# Patient Record
Sex: FEMALE | Race: WHITE | NOT HISPANIC OR LATINO | Employment: UNEMPLOYED | URBAN - METROPOLITAN AREA
[De-identification: names, ages, dates, MRNs, and addresses within clinical notes are randomized per-mention and may not be internally consistent; named-entity substitution may affect disease eponyms.]

---

## 2017-01-11 PROBLEM — Z87.09 PERSONAL HISTORY OF ASTHMA: Status: ACTIVE | Noted: 2017-01-11

## 2017-01-11 PROBLEM — Z86.69 HISTORY OF MIGRAINE HEADACHES: Status: ACTIVE | Noted: 2017-01-11

## 2017-01-12 ENCOUNTER — OFFICE VISIT (OUTPATIENT)
Dept: FAMILY MEDICINE CLINIC | Facility: CLINIC | Age: 47
End: 2017-01-12

## 2017-01-12 VITALS
WEIGHT: 185 LBS | DIASTOLIC BLOOD PRESSURE: 90 MMHG | TEMPERATURE: 98.2 F | BODY MASS INDEX: 28.13 KG/M2 | RESPIRATION RATE: 16 BRPM | HEART RATE: 72 BPM | SYSTOLIC BLOOD PRESSURE: 126 MMHG

## 2017-01-12 DIAGNOSIS — K12.0 APHTHOUS ULCER OF MOUTH: ICD-10-CM

## 2017-01-12 DIAGNOSIS — R79.89 ABNORMAL LIVER FUNCTION TEST: ICD-10-CM

## 2017-01-12 DIAGNOSIS — M25.562 ARTHRALGIA OF BOTH KNEES: ICD-10-CM

## 2017-01-12 DIAGNOSIS — R21 RASH: ICD-10-CM

## 2017-01-12 DIAGNOSIS — M25.561 ARTHRALGIA OF BOTH KNEES: ICD-10-CM

## 2017-01-12 DIAGNOSIS — E03.9 HYPOTHYROIDISM, UNSPECIFIED TYPE: Primary | ICD-10-CM

## 2017-01-12 PROCEDURE — 99214 OFFICE O/P EST MOD 30 MIN: CPT | Performed by: FAMILY MEDICINE

## 2017-01-12 PROCEDURE — 36415 COLL VENOUS BLD VENIPUNCTURE: CPT | Performed by: FAMILY MEDICINE

## 2017-01-12 RX ORDER — DEXAMETHASONE 0.5 MG/5ML
1 SOLUTION ORAL 3 TIMES DAILY
Qty: 240 ML | Refills: 0 | Status: SHIPPED | OUTPATIENT
Start: 2017-01-12 | End: 2017-04-18

## 2017-01-12 RX ORDER — LEVOTHYROXINE SODIUM 0.1 MG/1
100 TABLET ORAL DAILY
Qty: 30 TABLET | Refills: 3 | Status: SHIPPED | OUTPATIENT
Start: 2017-01-12 | End: 2017-03-15 | Stop reason: SDUPTHER

## 2017-01-12 RX ORDER — CLOTRIMAZOLE AND BETAMETHASONE DIPROPIONATE 10; .64 MG/G; MG/G
CREAM TOPICAL 2 TIMES DAILY
Qty: 15 G | Refills: 1 | Status: SHIPPED | OUTPATIENT
Start: 2017-01-12 | End: 2017-03-15

## 2017-01-12 NOTE — MR AVS SNAPSHOT
Dawna Lovell   1/12/2017 9:00 AM   Office Visit    Provider:  Tc Alejandro MD   Department:  [facility] FAMILY MEDICINE   Dept Phone:  [phone]                Your Full Care Plan              Today's Medication Changes          These changes are accurate as of: 1/12/17 10:35 AM.  If you have any questions, ask your nurse or doctor.               New Medication(s)Ordered:     clotrimazole-betamethasone 1-0.05 % cream   Commonly known as:  LOTRISONE   Apply  topically 2 (Two) Times a Day.   Started by:  Tc Alejandro MD       dexamethasone 0.5 MG/5ML solution   Take 10 mL by mouth 3 (Three) Times a Day.   Started by:  Tc Alejandro MD         Medication(s)that have changed:     levothyroxine 100 MCG tablet   Commonly known as:  SYNTHROID   Take 1 tablet by mouth Daily.   What changed:    - medication strength  - how much to take   Changed by:  Tc Alejandro MD         Stop taking medication(s)listed here:     PROBIOTIC ADVANCED PO   Stopped by:  Tc Alejandro MD                Where to Get Your Medications      These medications were sent to [facility] [place] [address] [address] [facility] [phone] [facility] [phone] [place] [address] [address] [place] [id number]    Hours:  24-hours Phone:  [phone]     clotrimazole-betamethasone 1-0.05 % cream    dexamethasone 0.5 MG/5ML solution    levothyroxine 100 MCG tablet                  Your Updated Medication List          This list is accurate as of: 1/12/17 10:35 AM.  Always use your most recent med list.                * carvedilol 6.25 MG tablet   Commonly known as:  COREG       * carvedilol 3.125 MG tablet   Commonly known as:  COREG       cholecalciferol 1000 UNITS tablet   Commonly known as:  VITAMIN D3       clotrimazole-betamethasone 1-0.05 % cream   Commonly known as:  LOTRISONE   Apply  topically 2 (Two)  Times a Day.       dexamethasone 0.5 MG/5ML solution   Take 10 mL by mouth 3 (Three) Times a Day.       gabapentin (once-daily) 600 MG tablet tablet   Commonly known as:  GRALISE   Take 3 tablets once daily       hydrOXYzine 25 MG tablet   Commonly known as:  ATARAX   TAKE 1 TABLET Twice daily PRN       levothyroxine 100 MCG tablet   Commonly known as:  SYNTHROID   Take 1 tablet by mouth Daily.       montelukast 10 MG tablet   Commonly known as:  SINGULAIR   take 1 tablet by mouth at bedtime       MULTIVITAMIN ADULT PO       * Notice:  This list has 2 medication(s) that are the same as other medications prescribed for you. Read the directions carefully, and ask your doctor or other care provider to review them with you.            We Performed the Following     Ambulatory Referral to Rheumatology     B. Burgdorferi Antibodies     CBC & Differential     Comprehensive Metabolic Panel       You Were Diagnosed With        Codes Comments    Hypothyroidism, unspecified type    -  Primary ICD-10-CM: E03.9  ICD-9-CM: 244.9     Abnormal liver function test     ICD-10-CM: R79.89  ICD-9-CM: 790.6     Aphthous ulcer of mouth     ICD-10-CM: K12.0  ICD-9-CM: 528.2     Arthralgia of both knees     ICD-10-CM: M25.561, M25.562  ICD-9-CM: 719.46     Rash     ICD-10-CM: R21  ICD-9-CM: 782.1       Instructions     None    Patient Instructions History      [employer] Signup     Our records indicate that you have an active [employer] account.    You can view your After Visit Summary by going to Jobyal and logging in with your [employer]where username and password.  If you don't have a [employer]where username and password but a parent or guardian has access to your record, the parent or guardian should login with their own Polleverywhere username and password and access your record to view the After Visit Summary.    If you have questions, you can email [email or web address] or call [phone] to talk to our Polle[employer]where staff.   Remember, MyChart is NOT to be used for urgent needs.  For medical emergencies, dial 911.               Other Info from Your Visit           Your Appointments     Apr 03, 2017 11:30 AM EDT   Follow Up with Edwardo Blum DO   [facility] CARDIOLOGY (--)    [address] [place] [address] [address]  [facility] [address] [phone]           Arrive 15 minutes prior to appointment.              Allergies     Morphine And Related  Itching      Reason for Visit     Follow-up           Vital Signs     Blood Pressure Pulse Temperature Respirations Weight Body Mass Index    126/90 72 98.2 °F (36.8 °C) 16 185 lb (83.9 kg) 28.13 kg/m2    Smoking Status                   Former Smoker           Problems and Diagnoses Noted     Abnormal liver function test    Underactive thyroid    Canker sore        Arthralgia of both knees        Rash and nonspecific skin eruption

## 2017-01-12 NOTE — PROGRESS NOTES
Subjective   Dawna Lovell is a 46 y.o. female.     History of Present Illness   Seen GYN and TSH screen 15.06.  On levo-thyroxin 88 mcg Free T 4 1.0. Hormone levels all normal.   Return of fatigue, sleeping more, mild hand swelling. No headaches.  Some exercise.  Not as energetic.  No racing heart or chest discomfort.   Some return of tremor.  Mouth sores helped by steroid mouth rinse. Dry skin on left ankle.  Leg aches and knee discomfort returned with partial help by gabapentin.  The following portions of the patient's history were reviewed and updated as appropriate: allergies, current medications, past family history, past medical history, past social history, past surgical history and problem list.    Review of Systems   Constitutional: Negative.    HENT: Negative.    Eyes: Negative.    Respiratory: Negative.    Cardiovascular: Negative.    Gastrointestinal: Negative.    Endocrine: Negative.    Musculoskeletal: Negative.    Skin: Positive for rash.       Objective   Physical Exam   Constitutional: She appears well-developed. No distress.   HENT:   Head: Normocephalic.   Nose: Nose normal.   Neck: Neck supple. No thyromegaly present.   Cardiovascular: Normal rate and regular rhythm.    Pulmonary/Chest: Effort normal.   Lymphadenopathy:     She has no cervical adenopathy.   Neurological: She is alert.   Mild non intentional tremor hands.    Skin: Rash (left lateral ankle 1 cm macular dry tan area) noted.   Vitals reviewed.      Assessment/Plan   Dawna was seen today for follow-up.    Diagnoses and all orders for this visit:    Hypothyroidism, unspecified type  -     levothyroxine (SYNTHROID) 100 MCG tablet; Take 1 tablet by mouth Daily.    Abnormal liver function test  -     CBC & Differential  -     Comprehensive Metabolic Panel  -     B. Burgdorferi Antibodies    Aphthous ulcer of mouth  -     dexamethasone 0.5 MG/5ML solution; Take 10 mL by mouth 3 (Three) Times a Day.    Arthralgia of both knees  -      Ambulatory Referral to Rheumatology    Rash  -     clotrimazole-betamethasone (LOTRISONE) 1-0.05 % cream; Apply  topically 2 (Two) Times a Day.

## 2017-01-13 LAB
ALBUMIN SERPL-MCNC: 4.7 G/DL (ref 3.5–5.5)
ALBUMIN/GLOB SERPL: 1.7 {RATIO} (ref 1.1–2.5)
ALP SERPL-CCNC: 100 IU/L (ref 39–117)
ALT SERPL-CCNC: 237 IU/L (ref 0–32)
AST SERPL-CCNC: 365 IU/L (ref 0–40)
BASOPHILS # BLD AUTO: 0 X10E3/UL (ref 0–0.2)
BASOPHILS NFR BLD AUTO: 1 %
BILIRUB SERPL-MCNC: 2.3 MG/DL (ref 0–1.2)
BUN SERPL-MCNC: 7 MG/DL (ref 6–24)
BUN/CREAT SERPL: 11 (ref 9–23)
CALCIUM SERPL-MCNC: 10 MG/DL (ref 8.7–10.2)
CHLORIDE SERPL-SCNC: 95 MMOL/L (ref 96–106)
CO2 SERPL-SCNC: 24 MMOL/L (ref 18–29)
CREAT SERPL-MCNC: 0.64 MG/DL (ref 0.57–1)
EOSINOPHIL # BLD AUTO: 0.1 X10E3/UL (ref 0–0.4)
EOSINOPHIL NFR BLD AUTO: 2 %
ERYTHROCYTE [DISTWIDTH] IN BLOOD BY AUTOMATED COUNT: 14.1 % (ref 12.3–15.4)
GLOBULIN SER CALC-MCNC: 2.8 G/DL (ref 1.5–4.5)
GLUCOSE SERPL-MCNC: 100 MG/DL (ref 65–99)
HCT VFR BLD AUTO: 38.6 % (ref 34–46.6)
HGB BLD-MCNC: 13.6 G/DL (ref 11.1–15.9)
IMM GRANULOCYTES # BLD: 0 X10E3/UL (ref 0–0.1)
IMM GRANULOCYTES NFR BLD: 0 %
LYMPHOCYTES # BLD AUTO: 0.9 X10E3/UL (ref 0.7–3.1)
LYMPHOCYTES NFR BLD AUTO: 27 %
MCH RBC QN AUTO: 37 PG (ref 26.6–33)
MCHC RBC AUTO-ENTMCNC: 35.2 G/DL (ref 31.5–35.7)
MCV RBC AUTO: 105 FL (ref 79–97)
MONOCYTES # BLD AUTO: 0.5 X10E3/UL (ref 0.1–0.9)
MONOCYTES NFR BLD AUTO: 15 %
NEUTROPHILS # BLD AUTO: 1.9 X10E3/UL (ref 1.4–7)
NEUTROPHILS NFR BLD AUTO: 55 %
PLATELET # BLD AUTO: 199 X10E3/UL (ref 150–379)
POTASSIUM SERPL-SCNC: 4.6 MMOL/L (ref 3.5–5.2)
PROT SERPL-MCNC: 7.5 G/DL (ref 6–8.5)
RBC # BLD AUTO: 3.68 X10E6/UL (ref 3.77–5.28)
SODIUM SERPL-SCNC: 140 MMOL/L (ref 134–144)
WBC # BLD AUTO: 3.5 X10E3/UL (ref 3.4–10.8)

## 2017-01-18 ENCOUNTER — TELEPHONE (OUTPATIENT)
Dept: FAMILY MEDICINE CLINIC | Facility: CLINIC | Age: 47
End: 2017-01-18

## 2017-01-18 NOTE — TELEPHONE ENCOUNTER
----- Message from Maria Esther Lester MA sent at 1/18/2017 10:55 AM EST -----  Contact: [phone]  Pt has questions regarding lab results for Lyme disease.    Left msg for pt that results aren't back on that test yet, does take longer that the others.  BBjenny, CMA

## 2017-01-19 LAB
B BURGDOR IGG+IGM SER-ACNC: <0.91 ISR (ref 0–0.9)
WRITTEN AUTHORIZATION: NORMAL

## 2017-01-20 ENCOUNTER — TELEPHONE (OUTPATIENT)
Dept: FAMILY MEDICINE CLINIC | Facility: CLINIC | Age: 47
End: 2017-01-20

## 2017-01-20 NOTE — TELEPHONE ENCOUNTER
----- Message from Maria Esther Lester MA sent at 1/20/2017 10:48 AM EST -----  Pt has questions about lab results that were posted on [facility]ban[facility]et last night and wants to know if she should start an antibiotic. Call  (Natanael) at #[phone] or Dawna #[phone].     Pt did not understand results on Mychart and thought that an antibiotic was necessary.  Explained that test that was in question was negative.  BBjenny, CMA

## 2017-02-01 RX ORDER — CARVEDILOL 6.25 MG/1
TABLET ORAL
Qty: 60 TABLET | Refills: 2 | Status: SHIPPED | OUTPATIENT
Start: 2017-02-01 | End: 2017-04-22 | Stop reason: SDUPTHER

## 2017-02-01 RX ORDER — GABAPENTIN 600 MG/1
TABLET ORAL
Qty: 90 TABLET | Refills: 5 | Status: SHIPPED | OUTPATIENT
Start: 2017-02-01 | End: 2017-03-15 | Stop reason: SDUPTHER

## 2017-02-24 RX ORDER — CARVEDILOL 3.12 MG/1
TABLET ORAL
Qty: 60 TABLET | Refills: 6 | Status: SHIPPED | OUTPATIENT
Start: 2017-02-24 | End: 2017-09-16 | Stop reason: SDUPTHER

## 2017-03-02 RX ORDER — TRAMADOL HYDROCHLORIDE 50 MG/1
TABLET ORAL
Qty: 60 TABLET | Refills: 2 | OUTPATIENT
Start: 2017-03-02

## 2017-03-15 ENCOUNTER — OFFICE VISIT (OUTPATIENT)
Dept: FAMILY MEDICINE CLINIC | Facility: CLINIC | Age: 47
End: 2017-03-15

## 2017-03-15 VITALS
DIASTOLIC BLOOD PRESSURE: 70 MMHG | WEIGHT: 181 LBS | HEART RATE: 88 BPM | RESPIRATION RATE: 16 BRPM | SYSTOLIC BLOOD PRESSURE: 90 MMHG | TEMPERATURE: 98.7 F | BODY MASS INDEX: 27.52 KG/M2

## 2017-03-15 DIAGNOSIS — G25.81 RESTLESS LEG SYNDROME: ICD-10-CM

## 2017-03-15 DIAGNOSIS — E03.9 HYPOTHYROIDISM, UNSPECIFIED TYPE: Primary | ICD-10-CM

## 2017-03-15 DIAGNOSIS — R74.01 ELEVATED AST (SGOT): ICD-10-CM

## 2017-03-15 DIAGNOSIS — J30.89 PERENNIAL ALLERGIC RHINITIS, UNSPECIFIED ALLERGIC RHINITIS TRIGGER: ICD-10-CM

## 2017-03-15 LAB
ALBUMIN SERPL-MCNC: 4.1 G/DL (ref 3.2–4.8)
ALBUMIN/GLOB SERPL: 1.5 G/DL (ref 1.5–2.5)
ALP SERPL-CCNC: 87 U/L (ref 25–100)
ALT SERPL-CCNC: 29 U/L (ref 7–40)
AST SERPL-CCNC: 27 U/L (ref 0–33)
BILIRUB SERPL-MCNC: 0.4 MG/DL (ref 0.3–1.2)
BUN SERPL-MCNC: 11 MG/DL (ref 9–23)
BUN/CREAT SERPL: 18.3 (ref 7–25)
CALCIUM SERPL-MCNC: 10.1 MG/DL (ref 8.7–10.4)
CHLORIDE SERPL-SCNC: 106 MMOL/L (ref 99–109)
CO2 SERPL-SCNC: 31 MMOL/L (ref 20–31)
CREAT SERPL-MCNC: 0.6 MG/DL (ref 0.6–1.3)
GLOBULIN SER CALC-MCNC: 2.8 GM/DL
GLUCOSE SERPL-MCNC: 91 MG/DL (ref 70–100)
POTASSIUM SERPL-SCNC: 4.5 MMOL/L (ref 3.5–5.5)
PROT SERPL-MCNC: 6.9 G/DL (ref 5.7–8.2)
SODIUM SERPL-SCNC: 141 MMOL/L (ref 132–146)
T4 FREE SERPL-MCNC: 1.1 NG/DL (ref 0.89–1.76)
TSH SERPL DL<=0.005 MIU/L-ACNC: 5 MIU/ML (ref 0.35–5.35)

## 2017-03-15 PROCEDURE — 99213 OFFICE O/P EST LOW 20 MIN: CPT | Performed by: FAMILY MEDICINE

## 2017-03-15 PROCEDURE — 36415 COLL VENOUS BLD VENIPUNCTURE: CPT | Performed by: FAMILY MEDICINE

## 2017-03-15 RX ORDER — LEVOTHYROXINE SODIUM 0.1 MG/1
100 TABLET ORAL DAILY
Qty: 30 TABLET | Refills: 3 | Status: SHIPPED | OUTPATIENT
Start: 2017-03-15 | End: 2017-07-14 | Stop reason: DRUGHIGH

## 2017-03-15 RX ORDER — MONTELUKAST SODIUM 10 MG/1
10 TABLET ORAL NIGHTLY
Qty: 30 TABLET | Refills: 3 | Status: SHIPPED | OUTPATIENT
Start: 2017-03-15 | End: 2017-10-02 | Stop reason: SDUPTHER

## 2017-03-15 RX ORDER — ALPRAZOLAM 1 MG/1
1 TABLET ORAL 2 TIMES DAILY PRN
COMMUNITY
End: 2017-04-28 | Stop reason: SDUPTHER

## 2017-03-15 RX ORDER — TRAMADOL HYDROCHLORIDE 50 MG/1
TABLET ORAL
COMMUNITY
Start: 2017-02-02 | End: 2017-03-15 | Stop reason: SDUPTHER

## 2017-03-15 RX ORDER — TRAMADOL HYDROCHLORIDE 50 MG/1
50 TABLET ORAL EVERY 8 HOURS PRN
Qty: 60 TABLET | Refills: 2 | Status: SHIPPED | OUTPATIENT
Start: 2017-03-15 | End: 2017-10-02 | Stop reason: SDUPTHER

## 2017-03-15 NOTE — PROGRESS NOTES
Subjective   Dawna Lovell is a 46 y.o. female.     History of Present Illness   Energy improved, appetite good.  Trace ankle swelling, no face or hand swelling.  No indigestion.  Sleeping fairly well.  Headache average two a month. Neck stiffness. Legs still get achy.  The following portions of the patient's history were reviewed and updated as appropriate: allergies, current medications, past family history, past medical history, past social history, past surgical history and problem list.    Review of Systems   Constitutional: Negative.    Eyes: Negative.    Respiratory: Positive for cough.    Cardiovascular: Negative.    Musculoskeletal: Positive for arthralgias and neck pain.   Skin: Negative.    Neurological: Positive for headaches. Negative for dizziness, tremors and syncope.       Objective   Physical Exam   Constitutional: She is oriented to person, place, and time. She appears well-developed. No distress.   HENT:   Head: Normocephalic.   Mouth/Throat: Oropharynx is clear and moist.   Neck: No spinous process tenderness present. No rigidity. No thyromegaly present.   Cardiovascular: Normal rate and regular rhythm.    Pulmonary/Chest: Effort normal and breath sounds normal. She has no wheezes.   Musculoskeletal: She exhibits no edema.   Lymphadenopathy:     She has no cervical adenopathy.   Neurological: She is alert and oriented to person, place, and time. She exhibits normal muscle tone.   Vitals reviewed.      Assessment/Plan   Dawna was seen today for follow-up.    Diagnoses and all orders for this visit:    Hypothyroidism, unspecified type  -     T4, Free  -     TSH  -     levothyroxine (SYNTHROID) 100 MCG tablet; Take 1 tablet by mouth Daily.    Elevated AST (SGOT)  -     Comprehensive Metabolic Panel    Restless leg syndrome  -     traMADol (ULTRAM) 50 MG tablet; Take 1 tablet by mouth Every 8 (Eight) Hours As Needed for Moderate Pain (4-6).    Perennial allergic rhinitis, unspecified allergic  rhinitis trigger  -     montelukast (SINGULAIR) 10 MG tablet; Take 1 tablet by mouth Every Night.

## 2017-03-28 DIAGNOSIS — J30.9 ALLERGIC RHINITIS: ICD-10-CM

## 2017-03-28 RX ORDER — MONTELUKAST SODIUM 10 MG/1
TABLET ORAL
Qty: 30 TABLET | Refills: 3 | Status: SHIPPED | OUTPATIENT
Start: 2017-03-28 | End: 2017-04-28

## 2017-04-18 DIAGNOSIS — R68.89 ABNORMAL ENDOCRINE LABORATORY TEST FINDING: Primary | ICD-10-CM

## 2017-04-18 RX ORDER — DEXAMETHASONE 1 MG
TABLET ORAL
Qty: 1 TABLET | Refills: 0 | Status: SHIPPED | OUTPATIENT
Start: 2017-04-18 | End: 2017-04-28

## 2017-04-24 RX ORDER — CARVEDILOL 6.25 MG/1
TABLET ORAL
Qty: 60 TABLET | Refills: 2 | Status: SHIPPED | OUTPATIENT
Start: 2017-04-24 | End: 2018-06-11 | Stop reason: SDUPTHER

## 2017-04-28 ENCOUNTER — LAB (OUTPATIENT)
Dept: INTERNAL MEDICINE | Facility: CLINIC | Age: 47
End: 2017-04-28

## 2017-04-28 ENCOUNTER — OFFICE VISIT (OUTPATIENT)
Dept: FAMILY MEDICINE CLINIC | Facility: CLINIC | Age: 47
End: 2017-04-28

## 2017-04-28 VITALS
BODY MASS INDEX: 27.74 KG/M2 | OXYGEN SATURATION: 98 % | HEART RATE: 87 BPM | SYSTOLIC BLOOD PRESSURE: 108 MMHG | HEIGHT: 68 IN | WEIGHT: 183 LBS | DIASTOLIC BLOOD PRESSURE: 78 MMHG

## 2017-04-28 DIAGNOSIS — F41.9 ANXIETY: ICD-10-CM

## 2017-04-28 DIAGNOSIS — H10.89 OTHER CONJUNCTIVITIS: Primary | ICD-10-CM

## 2017-04-28 DIAGNOSIS — R68.89 ABNORMAL ENDOCRINE LABORATORY TEST FINDING: ICD-10-CM

## 2017-04-28 PROCEDURE — 99213 OFFICE O/P EST LOW 20 MIN: CPT | Performed by: FAMILY MEDICINE

## 2017-04-28 RX ORDER — SULFACETAMIDE SODIUM 100 MG/ML
1 SOLUTION/ DROPS OPHTHALMIC 4 TIMES DAILY
Qty: 5 ML | Refills: 0 | Status: SHIPPED | OUTPATIENT
Start: 2017-04-28 | End: 2017-10-02

## 2017-04-28 RX ORDER — ALPRAZOLAM 1 MG/1
1 TABLET ORAL 2 TIMES DAILY PRN
Qty: 60 TABLET | Refills: 0 | Status: SHIPPED | OUTPATIENT
Start: 2017-04-28 | End: 2017-10-02 | Stop reason: SDUPTHER

## 2017-04-28 NOTE — PROGRESS NOTES
Subjective   Dawna Lovell is a 46 y.o. female.     History of Present Illness   Three days right eye red, itchy. Some increased tears, no light sensitivity. No fever. Used drops without help.  Renew Xanax.  The following portions of the patient's history were reviewed and updated as appropriate: allergies, current medications, past family history, past medical history, past social history, past surgical history and problem list.    Review of Systems   HENT: Negative for rhinorrhea and sinus pressure.    Eyes: Positive for redness, itching and visual disturbance.   Respiratory: Negative.    Cardiovascular: Negative.        Objective   Physical Exam   Constitutional: She appears well-developed. No distress.   HENT:   Nose: Nose normal.   Eyes: Lids are normal. Right conjunctiva is injected. Right conjunctiva has no hemorrhage. Left conjunctiva has no hemorrhage.       Neck: Neck supple.   Pulmonary/Chest: Effort normal.   Neurological: She is alert.   Vitals reviewed.      Assessment/Plan   Dawna was seen today for eye problem.    Diagnoses and all orders for this visit:    Other conjunctivitis  -     sulfacetamide (BLEPH-10) 10 % ophthalmic solution; Administer 1 drop to the right eye 4 (Four) Times a Day.    Anxiety  -     ALPRAZolam (XANAX) 1 MG tablet; Take 1 tablet by mouth 2 (Two) Times a Day As Needed for Anxiety.

## 2017-04-29 LAB — CORTIS SERPL-MCNC: 0.4 UG/DL

## 2017-05-03 ENCOUNTER — OFFICE VISIT (OUTPATIENT)
Dept: ENDOCRINOLOGY | Facility: CLINIC | Age: 47
End: 2017-05-03

## 2017-05-03 VITALS
HEIGHT: 68 IN | SYSTOLIC BLOOD PRESSURE: 106 MMHG | WEIGHT: 187.5 LBS | HEART RATE: 94 BPM | OXYGEN SATURATION: 99 % | BODY MASS INDEX: 28.42 KG/M2 | DIASTOLIC BLOOD PRESSURE: 72 MMHG

## 2017-05-03 DIAGNOSIS — E06.3 HYPOTHYROIDISM DUE TO HASHIMOTO'S THYROIDITIS: ICD-10-CM

## 2017-05-03 DIAGNOSIS — E03.8 HYPOTHYROIDISM DUE TO HASHIMOTO'S THYROIDITIS: ICD-10-CM

## 2017-05-03 DIAGNOSIS — R68.89 ABNORMAL ENDOCRINE LABORATORY TEST FINDING: Primary | ICD-10-CM

## 2017-05-03 PROCEDURE — 99214 OFFICE O/P EST MOD 30 MIN: CPT | Performed by: INTERNAL MEDICINE

## 2017-05-26 ENCOUNTER — OFFICE VISIT (OUTPATIENT)
Dept: FAMILY MEDICINE CLINIC | Facility: CLINIC | Age: 47
End: 2017-05-26

## 2017-05-26 VITALS
HEIGHT: 68 IN | SYSTOLIC BLOOD PRESSURE: 104 MMHG | HEART RATE: 89 BPM | RESPIRATION RATE: 16 BRPM | OXYGEN SATURATION: 98 % | BODY MASS INDEX: 27.28 KG/M2 | DIASTOLIC BLOOD PRESSURE: 72 MMHG | WEIGHT: 180 LBS | TEMPERATURE: 98.7 F

## 2017-05-26 DIAGNOSIS — J01.00 ACUTE NON-RECURRENT MAXILLARY SINUSITIS: Primary | ICD-10-CM

## 2017-05-26 PROCEDURE — 99213 OFFICE O/P EST LOW 20 MIN: CPT | Performed by: FAMILY MEDICINE

## 2017-05-26 RX ORDER — ROPINIROLE 1 MG/1
TABLET, FILM COATED ORAL
Refills: 1 | COMMUNITY
Start: 2017-05-20 | End: 2017-05-26

## 2017-05-26 RX ORDER — AMOXICILLIN AND CLAVULANATE POTASSIUM 875; 125 MG/1; MG/1
1 TABLET, FILM COATED ORAL 2 TIMES DAILY
Qty: 20 TABLET | Refills: 0 | Status: SHIPPED | OUTPATIENT
Start: 2017-05-26 | End: 2017-06-05

## 2017-05-26 RX ORDER — FLUTICASONE PROPIONATE 50 MCG
SPRAY, SUSPENSION (ML) NASAL
Qty: 1 BOTTLE | Refills: 0 | Status: SHIPPED | OUTPATIENT
Start: 2017-05-26 | End: 2017-08-07 | Stop reason: SDUPTHER

## 2017-07-13 LAB
T4 FREE SERPL-MCNC: 1.01 NG/DL (ref 0.82–1.77)
TSH SERPL DL<=0.005 MIU/L-ACNC: 22.95 UIU/ML (ref 0.45–4.5)

## 2017-07-14 DIAGNOSIS — E03.8 HYPOTHYROIDISM DUE TO HASHIMOTO'S THYROIDITIS: Primary | ICD-10-CM

## 2017-07-14 DIAGNOSIS — E06.3 HYPOTHYROIDISM DUE TO HASHIMOTO'S THYROIDITIS: Primary | ICD-10-CM

## 2017-07-14 RX ORDER — CARVEDILOL 6.25 MG/1
TABLET ORAL
Qty: 60 TABLET | Refills: 2 | OUTPATIENT
Start: 2017-07-14

## 2017-07-14 RX ORDER — LEVOTHYROXINE SODIUM 0.12 MG/1
125 TABLET ORAL DAILY
Qty: 30 TABLET | Refills: 11 | Status: SHIPPED | OUTPATIENT
Start: 2017-07-14 | End: 2017-09-05 | Stop reason: DRUGHIGH

## 2017-08-07 DIAGNOSIS — J01.00 ACUTE NON-RECURRENT MAXILLARY SINUSITIS: ICD-10-CM

## 2017-08-07 RX ORDER — FLUTICASONE PROPIONATE 50 MCG
SPRAY, SUSPENSION (ML) NASAL
Qty: 16 ML | Refills: 0 | Status: SHIPPED | OUTPATIENT
Start: 2017-08-07 | End: 2017-10-02 | Stop reason: SDUPTHER

## 2017-08-07 RX ORDER — AZELASTINE HYDROCHLORIDE 0.5 MG/ML
SOLUTION/ DROPS OPHTHALMIC
Qty: 6 ML | Refills: 1 | Status: SHIPPED | OUTPATIENT
Start: 2017-08-07 | End: 2018-10-23 | Stop reason: SDUPTHER

## 2017-08-08 RX ORDER — GABAPENTIN 600 MG/1
TABLET, FILM COATED ORAL
Qty: 90 TABLET | Refills: 3 | OUTPATIENT
Start: 2017-08-08 | End: 2018-01-04

## 2017-08-25 ENCOUNTER — RESULTS ENCOUNTER (OUTPATIENT)
Dept: ENDOCRINOLOGY | Facility: CLINIC | Age: 47
End: 2017-08-25

## 2017-08-25 DIAGNOSIS — E06.3 HYPOTHYROIDISM DUE TO HASHIMOTO'S THYROIDITIS: ICD-10-CM

## 2017-08-25 DIAGNOSIS — E03.8 HYPOTHYROIDISM DUE TO HASHIMOTO'S THYROIDITIS: ICD-10-CM

## 2017-08-29 ENCOUNTER — APPOINTMENT (OUTPATIENT)
Dept: LAB | Facility: HOSPITAL | Age: 47
End: 2017-08-29

## 2017-08-29 LAB
T4 FREE SERPL-MCNC: 1.3 NG/DL (ref 0.89–1.76)
TSH SERPL DL<=0.005 MIU/L-ACNC: 2.47 MIU/ML (ref 0.35–5.35)

## 2017-09-05 ENCOUNTER — TELEPHONE (OUTPATIENT)
Dept: ENDOCRINOLOGY | Facility: CLINIC | Age: 47
End: 2017-09-05

## 2017-09-05 DIAGNOSIS — E06.3 HYPOTHYROIDISM DUE TO HASHIMOTO'S THYROIDITIS: Primary | ICD-10-CM

## 2017-09-05 DIAGNOSIS — E03.8 HYPOTHYROIDISM DUE TO HASHIMOTO'S THYROIDITIS: Primary | ICD-10-CM

## 2017-09-05 RX ORDER — LEVOTHYROXINE SODIUM 137 UG/1
137 TABLET ORAL DAILY
Qty: 90 TABLET | Refills: 1 | Status: SHIPPED | OUTPATIENT
Start: 2017-09-05 | End: 2018-01-04 | Stop reason: DRUGHIGH

## 2017-09-18 RX ORDER — CARVEDILOL 3.12 MG/1
TABLET ORAL
Qty: 60 TABLET | Refills: 0 | Status: SHIPPED | OUTPATIENT
Start: 2017-09-18 | End: 2018-01-14 | Stop reason: SDUPTHER

## 2017-10-02 ENCOUNTER — OFFICE VISIT (OUTPATIENT)
Dept: FAMILY MEDICINE CLINIC | Facility: CLINIC | Age: 47
End: 2017-10-02

## 2017-10-02 VITALS
HEART RATE: 72 BPM | TEMPERATURE: 98.5 F | BODY MASS INDEX: 29.95 KG/M2 | DIASTOLIC BLOOD PRESSURE: 80 MMHG | SYSTOLIC BLOOD PRESSURE: 112 MMHG | RESPIRATION RATE: 16 BRPM | WEIGHT: 197 LBS

## 2017-10-02 DIAGNOSIS — J30.1 SEASONAL ALLERGIC RHINITIS DUE TO POLLEN, UNSPECIFIED CHRONICITY: Primary | ICD-10-CM

## 2017-10-02 DIAGNOSIS — R79.89 ABNORMAL LIVER FUNCTION TEST: ICD-10-CM

## 2017-10-02 DIAGNOSIS — G25.81 RESTLESS LEG SYNDROME: ICD-10-CM

## 2017-10-02 DIAGNOSIS — F41.9 ANXIETY: ICD-10-CM

## 2017-10-02 DIAGNOSIS — Z00.00 PERIODIC HEALTH ASSESSMENT, GENERAL SCREENING, ADULT: ICD-10-CM

## 2017-10-02 DIAGNOSIS — H10.89 OTHER CONJUNCTIVITIS OF BOTH EYES: ICD-10-CM

## 2017-10-02 DIAGNOSIS — J01.00 ACUTE NON-RECURRENT MAXILLARY SINUSITIS: ICD-10-CM

## 2017-10-02 LAB
ALBUMIN SERPL-MCNC: 4.3 G/DL (ref 3.2–4.8)
ALBUMIN/GLOB SERPL: 1.5 G/DL (ref 1.5–2.5)
ALP SERPL-CCNC: 89 U/L (ref 25–100)
ALT SERPL-CCNC: 65 U/L (ref 7–40)
AST SERPL-CCNC: 50 U/L (ref 0–33)
BASOPHILS # BLD AUTO: 0.02 10*3/MM3 (ref 0–0.2)
BASOPHILS NFR BLD AUTO: 0.3 % (ref 0–1)
BILIRUB SERPL-MCNC: 0.9 MG/DL (ref 0.3–1.2)
BUN SERPL-MCNC: 9 MG/DL (ref 9–23)
BUN/CREAT SERPL: 12.9 (ref 7–25)
CALCIUM SERPL-MCNC: 9.8 MG/DL (ref 8.7–10.4)
CHLORIDE SERPL-SCNC: 105 MMOL/L (ref 99–109)
CHOLEST SERPL-MCNC: 232 MG/DL (ref 0–200)
CO2 SERPL-SCNC: 29 MMOL/L (ref 20–31)
CREAT SERPL-MCNC: 0.7 MG/DL (ref 0.6–1.3)
EOSINOPHIL # BLD AUTO: 0.17 10*3/MM3 (ref 0–0.3)
EOSINOPHIL NFR BLD AUTO: 2.1 % (ref 0–3)
ERYTHROCYTE [DISTWIDTH] IN BLOOD BY AUTOMATED COUNT: 12.3 % (ref 11.3–14.5)
GLOBULIN SER CALC-MCNC: 2.8 GM/DL
GLUCOSE SERPL-MCNC: 109 MG/DL (ref 70–100)
HCT VFR BLD AUTO: 40.7 % (ref 34.5–44)
HDLC SERPL-MCNC: 69 MG/DL (ref 40–60)
HGB BLD-MCNC: 13.7 G/DL (ref 11.5–15.5)
IMM GRANULOCYTES # BLD: 0.03 10*3/MM3 (ref 0–0.03)
IMM GRANULOCYTES NFR BLD: 0.4 % (ref 0–0.6)
LDLC SERPL CALC-MCNC: 138 MG/DL (ref 0–100)
LYMPHOCYTES # BLD AUTO: 2.44 10*3/MM3 (ref 0.6–4.8)
LYMPHOCYTES NFR BLD AUTO: 30.6 % (ref 24–44)
MCH RBC QN AUTO: 34.1 PG (ref 27–31)
MCHC RBC AUTO-ENTMCNC: 33.7 G/DL (ref 32–36)
MCV RBC AUTO: 101.2 FL (ref 80–99)
MONOCYTES # BLD AUTO: 0.75 10*3/MM3 (ref 0–1)
MONOCYTES NFR BLD AUTO: 9.4 % (ref 0–12)
NEUTROPHILS # BLD AUTO: 4.56 10*3/MM3 (ref 1.5–8.3)
NEUTROPHILS NFR BLD AUTO: 57.2 % (ref 41–71)
PLATELET # BLD AUTO: 243 10*3/MM3 (ref 150–450)
POTASSIUM SERPL-SCNC: 4.4 MMOL/L (ref 3.5–5.5)
PROT SERPL-MCNC: 7.1 G/DL (ref 5.7–8.2)
RBC # BLD AUTO: 4.02 10*6/MM3 (ref 3.89–5.14)
SODIUM SERPL-SCNC: 137 MMOL/L (ref 132–146)
TRIGL SERPL-MCNC: 126 MG/DL (ref 0–150)
VLDLC SERPL CALC-MCNC: 25.2 MG/DL
WBC # BLD AUTO: 7.97 10*3/MM3 (ref 3.5–10.8)

## 2017-10-02 PROCEDURE — 99213 OFFICE O/P EST LOW 20 MIN: CPT | Performed by: FAMILY MEDICINE

## 2017-10-02 PROCEDURE — 36415 COLL VENOUS BLD VENIPUNCTURE: CPT | Performed by: FAMILY MEDICINE

## 2017-10-02 RX ORDER — MONTELUKAST SODIUM 10 MG/1
10 TABLET ORAL NIGHTLY
Qty: 30 TABLET | Refills: 3 | Status: SHIPPED | OUTPATIENT
Start: 2017-10-02 | End: 2018-03-04 | Stop reason: SDUPTHER

## 2017-10-02 RX ORDER — ALPRAZOLAM 1 MG/1
1 TABLET ORAL 2 TIMES DAILY PRN
Qty: 60 TABLET | Refills: 0 | Status: SHIPPED | OUTPATIENT
Start: 2017-10-02 | End: 2018-10-23 | Stop reason: SDUPTHER

## 2017-10-02 RX ORDER — SULFACETAMIDE SODIUM 100 MG/ML
1 SOLUTION/ DROPS OPHTHALMIC 4 TIMES DAILY
Qty: 5 ML | Refills: 0 | Status: SHIPPED | OUTPATIENT
Start: 2017-10-02 | End: 2018-02-15

## 2017-10-02 RX ORDER — TRAMADOL HYDROCHLORIDE 50 MG/1
50 TABLET ORAL EVERY 8 HOURS PRN
Qty: 60 TABLET | Refills: 2 | Status: SHIPPED | OUTPATIENT
Start: 2017-10-02 | End: 2018-10-23 | Stop reason: SDUPTHER

## 2017-10-02 RX ORDER — FLUTICASONE PROPIONATE 50 MCG
1 SPRAY, SUSPENSION (ML) NASAL DAILY
Qty: 16 ML | Refills: 2 | Status: SHIPPED | OUTPATIENT
Start: 2017-10-02 | End: 2018-10-23 | Stop reason: SDUPTHER

## 2017-10-02 NOTE — PROGRESS NOTES
Subjective   Dawna Lovell is a 46 y.o. female.     History of Present Illness   Thyroid followed by endocrinology rewuiring larger dose of thyroid replacement. Energy good. No headache, no heartburn.  Feet hurt helped with gabapentin 600 mg bid.  Sleeping fairly well.  Back doing reasonably well worse with housework duties.   Need refills.  Nose congestion and drainage.   The following portions of the patient's history were reviewed and updated as appropriate: allergies, current medications, past family history, past medical history, past social history, past surgical history and problem list.    Review of Systems   Constitutional: Negative.    HENT: Negative.    Respiratory: Negative.    Cardiovascular: Negative.    Musculoskeletal: Positive for myalgias.       Objective   Physical Exam   Constitutional: She is oriented to person, place, and time. She appears well-developed.   Neck: Neck supple. No thyromegaly present.   Cardiovascular: Regular rhythm.    Pulmonary/Chest: Effort normal and breath sounds normal.   Neurological: She is alert and oriented to person, place, and time.   Vitals reviewed.      Assessment/Plan   Dawna was seen today for follow-up.    Diagnoses and all orders for this visit:    Seasonal allergic rhinitis due to pollen, unspecified chronicity  -     montelukast (SINGULAIR) 10 MG tablet; Take 1 tablet by mouth Every Night.    Acute non-recurrent maxillary sinusitis  -     fluticasone (FLONASE) 50 MCG/ACT nasal spray; 1 spray into each nostril Daily.  -     CBC & Differential    Other conjunctivitis of both eyes  -     sulfacetamide (BLEPH-10) 10 % ophthalmic solution; Administer 1 drop to the right eye 4 (Four) Times a Day.    Restless leg syndrome  -     traMADol (ULTRAM) 50 MG tablet; Take 1 tablet by mouth Every 8 (Eight) Hours As Needed for Moderate Pain .    Anxiety  -     ALPRAZolam (XANAX) 1 MG tablet; Take 1 tablet by mouth 2 (Two) Times a Day As Needed for Anxiety.    Abnormal  liver function test  -     Comprehensive Metabolic Panel    Periodic health assessment, general screening, adult  -     Lipid Panel

## 2018-01-03 ENCOUNTER — OFFICE VISIT (OUTPATIENT)
Dept: ENDOCRINOLOGY | Facility: CLINIC | Age: 48
End: 2018-01-03

## 2018-01-03 VITALS
SYSTOLIC BLOOD PRESSURE: 128 MMHG | WEIGHT: 196 LBS | OXYGEN SATURATION: 98 % | BODY MASS INDEX: 29.8 KG/M2 | DIASTOLIC BLOOD PRESSURE: 82 MMHG | HEART RATE: 87 BPM

## 2018-01-03 DIAGNOSIS — E03.8 HYPOTHYROIDISM DUE TO HASHIMOTO'S THYROIDITIS: Primary | ICD-10-CM

## 2018-01-03 DIAGNOSIS — E06.3 HYPOTHYROIDISM DUE TO HASHIMOTO'S THYROIDITIS: Primary | ICD-10-CM

## 2018-01-03 PROCEDURE — 99213 OFFICE O/P EST LOW 20 MIN: CPT | Performed by: INTERNAL MEDICINE

## 2018-01-03 NOTE — PROGRESS NOTES
"Chief Complaint   Patient presents with   • Hypothyroidism     follow up       HPI:   Dawna Lovell is a 47 y.o.female who returns to endocrine clinic for follow-up evaluation of possible Cushing's syndrome.  Last visit 05/03/2017. Her history is as follows:    Interim events:   - Overall health is improved since her last visit.    - POTS controlled with Coreg.    - No noticeable difference after increasing levothyroxine dose from 125 mcg to 137 mcg    1) hypothyroidism due to Hashimoto's thyroiditis:  - Diagnosed approximately 2001  - Current dose of levothyroxine 137 µg daily  - Takes tablet in the morning on an empty stomach. Waits at least 30-60 minutes before eating/hot liquids.    - Takes multivitamin in the evening  - not on biotin    2) h/o abnormal endocrine tests:  - Pt had random serum cortisol levels collected at various time in the day which were elevated from 11/2015 to 4/2016. However, she was on an estrogen containing OCP at the time of these collections which falsely elevated the random serum cortisols. 3PM - ACTH was 11.6. Had elevated hepatic enzymes, normal glucose  - I evaluated pt in 2016 for possible Cushing's. Evaluation off birth control proved to be negative:  ( 06/2016) 24 hour urinary cortisol: was normal at 3 mcg/24 hours  (06/2016)  Midnight salivary cortisols (lab [facility]): normal  #1 <0.010, #2 0.013 ( normal range <0.010 - 0.090)  (07/2016) 1 mg overnight dexamethasone suppression test: 8AM cortisol was appropriately suppressed to 0.5 (normal < 1.8)    Had pt complete 1 mg overnight dex suppression test again on 4/28/2017:   04/28/2017)  1 mg overnight dexamethasone suppression test: 8AM cortisol was appropriately suppressed to 0.4    Review of Imaging:   - CT ABD 05/02/2016 - \"showed marked inhomogeneous geographic appearing liver with a mosaic pattern, most typical for marked inhomogeneous geographic steatosis with focal areas of fat sparing. This pattern was noted by ultrasound on " "11/10/2015.\" and normal adrenal glands  - CT Head w/o contrast 1/21/2016: \"Old lacunar infarct seen in the left basal ganglia. No acute intracranial abnormalities identified.\"    Review of Systems   Constitutional:        Weight gain since May 2017   Eyes: Negative.    Respiratory: Negative.    Cardiovascular: Positive for palpitations (occasional).   Gastrointestinal: Negative.  Negative for diarrhea and nausea.   Endocrine: Negative.    Genitourinary: Negative.    Musculoskeletal: Positive for myalgias. Negative for arthralgias.   Skin: Negative.    Allergic/Immunologic: Negative.    Neurological: Positive for headaches (Occasional). Negative for tremors and syncope.   Hematological: Bruises/bleeds easily.   Psychiatric/Behavioral:        Intermittent \"brain fog\"     The following portions of the patient's history were reviewed and updated as appropriate: allergies, current medications, past family history, past medical history, past social history, past surgical history and problem list.    /82  Pulse 87  Wt 88.9 kg (196 lb)  SpO2 98%  BMI 29.8 kg/m2  Physical Exam   Constitutional: She is oriented to person, place, and time. She appears well-developed. No distress.   HENT:   Head: Normocephalic.   Mouth/Throat: Oropharynx is clear and moist.   Eyes: Conjunctivae and EOM are normal. Pupils are equal, round, and reactive to light.   Neck: No tracheal deviation present. No thyromegaly present.   No palpable thyroid nodules     Cardiovascular: Normal rate, regular rhythm and normal heart sounds.    No murmur heard.  Pulmonary/Chest: Effort normal and breath sounds normal. No respiratory distress.   Abdominal: Soft. Bowel sounds are normal. She exhibits no mass. There is no tenderness.   Musculoskeletal: She exhibits no edema or tenderness.   Lymphadenopathy:     She has no cervical adenopathy.   Neurological: She is alert and oriented to person, place, and time. No cranial nerve deficit.   Skin: Skin is " warm and dry. She is not diaphoretic. No erythema.   no dark abd striae, no hyperpigmentation, no hirsutism   A few scattered telangiectasias on her back and chest   Psychiatric: She has a normal mood and affect. Her behavior is normal.   Vitals reviewed.    LABS/IMAGING: prior records reviewed and summarized in HPI  Office Visit on 01/03/2018   Component Date Value Ref Range Status   • TSH 01/03/2018 1.771  0.350 - 5.350 mIU/mL Final   • Free T4 01/03/2018 1.40  0.89 - 1.76 ng/dL Final     ASSESSMENT/PLAN:  1) hypothyroidism due to Hashimoto's thyroiditis:  - clinically euthyroid on exam  - TFT's today WNL  - continue same dose of 137 mcg. Will have patient try Brand Levoxyl 137 mcg to see if she notes any difference on Brand vs generic.  - Reviewed proper levothyroxine administration, and factors to avoid that decrease medication potency and medication absorption.     RTC 6 months

## 2018-01-04 ENCOUNTER — OFFICE VISIT (OUTPATIENT)
Dept: NEUROLOGY | Facility: CLINIC | Age: 48
End: 2018-01-04

## 2018-01-04 ENCOUNTER — TELEPHONE (OUTPATIENT)
Dept: ENDOCRINOLOGY | Facility: CLINIC | Age: 48
End: 2018-01-04

## 2018-01-04 VITALS
SYSTOLIC BLOOD PRESSURE: 117 MMHG | HEIGHT: 68 IN | OXYGEN SATURATION: 98 % | DIASTOLIC BLOOD PRESSURE: 82 MMHG | BODY MASS INDEX: 29.7 KG/M2 | RESPIRATION RATE: 18 BRPM | HEART RATE: 83 BPM | WEIGHT: 196 LBS

## 2018-01-04 DIAGNOSIS — G25.81 RESTLESS LEG SYNDROME: Primary | ICD-10-CM

## 2018-01-04 LAB
T4 FREE SERPL-MCNC: 1.4 NG/DL (ref 0.89–1.76)
TSH SERPL DL<=0.005 MIU/L-ACNC: 1.77 MIU/ML (ref 0.35–5.35)

## 2018-01-04 PROCEDURE — 99213 OFFICE O/P EST LOW 20 MIN: CPT | Performed by: PSYCHIATRY & NEUROLOGY

## 2018-01-04 RX ORDER — GABAPENTIN 300 MG/1
900 CAPSULE ORAL NIGHTLY
Qty: 90 CAPSULE | Refills: 3 | Status: SHIPPED | OUTPATIENT
Start: 2018-01-04 | End: 2018-07-10 | Stop reason: SDUPTHER

## 2018-01-04 RX ORDER — LEVOTHYROXINE SODIUM 137 UG/1
137 TABLET ORAL DAILY
Qty: 90 TABLET | Refills: 3 | Status: SHIPPED | OUTPATIENT
Start: 2018-01-04 | End: 2018-01-05 | Stop reason: SDUPTHER

## 2018-01-04 NOTE — TELEPHONE ENCOUNTER
Spoke to patient about lab results. See clinic note from 01/03/2018 for details.   Camilla Gray MD

## 2018-01-04 NOTE — PROGRESS NOTES
Subjective   Patient ID: Dawna Lovell is a 47 y.o. female     Chief Complaint   Patient presents with   • Pain in both upper extremities        History of Present Illness    47 y.o. female presents as a new patient to my practice.  Sx started two years ago.  Sx started suddenly.  Legs feel a fluttering in legs mainly lying down at night.  Sx improved during the day and walking.  Improved of late.  Mild intensity.  Gralise 1200 mg qhs.    Legs ache during the day when sitting still.      EMG/NCS - normal       Past Medical History:   Diagnosis Date   • Anxiety    • Asthma    • GERD (gastroesophageal reflux disease)    • Hyperlipidemia    • Hypothyroidism    • Lyme disease 05/2016   • Migraine headache    • POTS (postural orthostatic tachycardia syndrome)    • Syncope 9/27/2016   • Tachycardia 9/27/2016     Family History   Problem Relation Age of Onset   • Esophageal cancer Father    • Dementia Other      Social History     Social History   • Marital status:      Spouse name: N/A   • Number of children: N/A   • Years of education: N/A     Social History Main Topics   • Smoking status: Former Smoker     Types: Cigarettes     Quit date: 1995   • Smokeless tobacco: Never Used   • Alcohol use 0.6 oz/week     1 Glasses of wine per week   • Drug use: No   • Sexual activity: Defer     Other Topics Concern   • None     Social History Narrative       Review of Systems   Constitutional: Positive for fatigue. Negative for activity change and unexpected weight change.   HENT: Negative for tinnitus and trouble swallowing.    Eyes: Negative for photophobia and visual disturbance.   Respiratory: Negative for apnea, cough and choking.    Cardiovascular: Negative for leg swelling.   Gastrointestinal: Negative for nausea and vomiting.   Endocrine: Negative for cold intolerance and heat intolerance.   Genitourinary: Negative for difficulty urinating, frequency, menstrual problem and urgency.   Musculoskeletal: Positive for  "myalgias. Negative for back pain, gait problem and neck pain.   Skin: Negative for color change and rash.   Allergic/Immunologic: Negative for immunocompromised state.   Neurological: Positive for numbness. Negative for dizziness, tremors, seizures, syncope, facial asymmetry, speech difficulty, weakness, light-headedness and headaches.   Hematological: Negative for adenopathy. Does not bruise/bleed easily.   Psychiatric/Behavioral: Positive for decreased concentration and sleep disturbance. Negative for behavioral problems, confusion and hallucinations.       Objective     Vitals:    01/04/18 0952   BP: 117/82   BP Location: Left arm   Patient Position: Sitting   Cuff Size: Adult   Pulse: 83   Resp: 18   SpO2: 98%   Weight: 88.9 kg (196 lb)   Height: 172.7 cm (68\")       Neurologic Exam     Mental Status   Oriented to person, place, and time.   Registration: recalls 3 of 3 objects. Recall at 5 minutes: recalls 3 of 3 objects. Follows 3 step commands.   Attention: normal. Concentration: normal.   Speech: speech is normal   Level of consciousness: alert  Knowledge: good and consistent with education.   Able to name object. Able to read. Able to repeat. Able to write. Normal comprehension.     Cranial Nerves     CN II   Visual fields full to confrontation.   Visual acuity: normal  Right visual field deficit: none  Left visual field deficit: none     CN III, IV, VI   Pupils are equal, round, and reactive to light.  Extraocular motions are normal.   Right pupil: Shape: regular. Reactivity: brisk. Consensual response: intact.   Left pupil: Shape: regular. Reactivity: brisk. Consensual response: intact.   Nystagmus: none   Diplopia: none  Ophthalmoparesis: none  Upgaze: normal  Downgaze: normal  Conjugate gaze: present  Vestibulo-ocular reflex: present    CN V   Facial sensation intact.   Right corneal reflex: normal  Left corneal reflex: normal    CN VII   Right facial weakness: none  Left facial weakness: none    CN VIII "   Hearing: intact    CN IX, X   Palate: symmetric  Right gag reflex: normal  Left gag reflex: normal    CN XI   Right sternocleidomastoid strength: normal  Left sternocleidomastoid strength: normal    CN XII   Tongue: not atrophic  Fasciculations: absent  Tongue deviation: none    Motor Exam   Muscle bulk: normal  Overall muscle tone: normal  Right arm tone: normal  Left arm tone: normal  Right leg tone: normal  Left leg tone: normal    Strength   Strength 5/5 throughout.     Sensory Exam   Light touch normal.   Vibration normal.   Proprioception normal.   Pinprick normal.     Gait, Coordination, and Reflexes     Gait  Gait: normal    Coordination   Romberg: negative  Finger to nose coordination: normal  Heel to shin coordination: normal  Tandem walking coordination: normal    Tremor   Resting tremor: absent  Intention tremor: absent  Action tremor: absent    Reflexes   Reflexes 2+ except as noted.       Physical Exam   Constitutional: She is oriented to person, place, and time. Vital signs are normal. She appears well-developed and well-nourished.   HENT:   Head: Normocephalic and atraumatic.   Eyes: EOM and lids are normal. Pupils are equal, round, and reactive to light.   Fundoscopic exam:       The right eye shows no exudate, no hemorrhage and no papilledema. The right eye shows venous pulsations.        The left eye shows no exudate, no hemorrhage and no papilledema. The left eye shows venous pulsations.   Neck: Normal range of motion and phonation normal. Normal carotid pulses present. Carotid bruit is not present. No thyroid mass and no thyromegaly present.   Cardiovascular: Normal rate, regular rhythm and normal heart sounds.    Pulmonary/Chest: Effort normal.   Neurological: She is oriented to person, place, and time. She has normal strength. She has a normal Finger-Nose-Finger Test, a normal Heel to Shin Test, a normal Romberg Test and a normal Tandem Gait Test. Gait normal.   Skin: Skin is warm and dry.    Psychiatric: She has a normal mood and affect. Her speech is normal.   Nursing note and vitals reviewed.      Office Visit on 01/03/2018   Component Date Value Ref Range Status   • TSH 01/03/2018 1.771  0.350 - 5.350 mIU/mL Final   • Free T4 01/03/2018 1.40  0.89 - 1.76 ng/dL Final         Assessment/Plan     Problem List Items Addressed This Visit        Other    Restless leg syndrome - Primary    Current Assessment & Plan     Stop Gralise   Start  mg qhs          Relevant Medications    traMADol (ULTRAM) 50 MG tablet             No Follow-up on file.

## 2018-01-05 DIAGNOSIS — E06.3 HYPOTHYROIDISM DUE TO HASHIMOTO'S THYROIDITIS: ICD-10-CM

## 2018-01-05 DIAGNOSIS — E03.8 HYPOTHYROIDISM DUE TO HASHIMOTO'S THYROIDITIS: ICD-10-CM

## 2018-01-05 RX ORDER — LEVOTHYROXINE SODIUM 137 UG/1
137 TABLET ORAL DAILY
Qty: 90 TABLET | Refills: 3 | Status: SHIPPED | OUTPATIENT
Start: 2018-01-05 | End: 2018-03-07 | Stop reason: DRUGHIGH

## 2018-01-15 RX ORDER — CARVEDILOL 3.12 MG/1
TABLET ORAL
Qty: 60 TABLET | Refills: 6 | Status: SHIPPED | OUTPATIENT
Start: 2018-01-15 | End: 2018-03-09 | Stop reason: DRUGHIGH

## 2018-02-12 ENCOUNTER — TELEPHONE (OUTPATIENT)
Dept: INTERNAL MEDICINE | Facility: CLINIC | Age: 48
End: 2018-02-12

## 2018-02-12 NOTE — TELEPHONE ENCOUNTER
PT HAS SOME QUESTIONS ABOUT HER SYNTHROID.  SHE WOULD LIKE TO DISCUSS BRAND NAME VERSUS GENERIC.PLEASE CALL HER BACK [phone]

## 2018-02-15 ENCOUNTER — OFFICE VISIT (OUTPATIENT)
Dept: CARDIOLOGY | Facility: CLINIC | Age: 48
End: 2018-02-15

## 2018-02-15 VITALS
WEIGHT: 195.6 LBS | DIASTOLIC BLOOD PRESSURE: 78 MMHG | SYSTOLIC BLOOD PRESSURE: 110 MMHG | HEART RATE: 73 BPM | HEIGHT: 68 IN | BODY MASS INDEX: 29.64 KG/M2

## 2018-02-15 DIAGNOSIS — E78.2 MIXED HYPERLIPIDEMIA: ICD-10-CM

## 2018-02-15 DIAGNOSIS — R00.0 TACHYCARDIA: ICD-10-CM

## 2018-02-15 DIAGNOSIS — R00.2 HEART PALPITATIONS: ICD-10-CM

## 2018-02-15 DIAGNOSIS — I10 ESSENTIAL HYPERTENSION: Primary | ICD-10-CM

## 2018-02-15 PROCEDURE — 93000 ELECTROCARDIOGRAM COMPLETE: CPT | Performed by: PHYSICIAN ASSISTANT

## 2018-02-15 PROCEDURE — 99214 OFFICE O/P EST MOD 30 MIN: CPT | Performed by: PHYSICIAN ASSISTANT

## 2018-02-15 NOTE — PROGRESS NOTES
[facility] - Office Note  Dawna Lovell         [address] [facility] [address]  1970   [phone] (home) [phone] (work)     LOCATION:  [place] office.  Visit Type: Follow Up.    PCP:  Tc Alejandro MD    02/15/18   Dawna Lovell is a 47 y.o.  female  currently employed.      Chief Complaint:   FU on HTN, HLP, Tachycardia. C/O Palpitations.  Problem List/Past Medical History:     1. Syncope:  a. Patient experienced first syncopal episode in late 2015. No prodromal symptoms. Patient wore a 24-hour Holter monitor that revealed rare PVCs and PACs, one 5-beat run of atrial tachycardia. Minimum heart rate of 76 beats per minute, maximum heart rate was 162 beats per minute.  b. Tilt table test did not elicit any symptoms. Patient's heart rate did increase following Isuprel.   c. Event monitor from 2016-2016, showed normal sinus rhythm. There were multiple episodes of the patient having symptoms of fluttering, dizziness, fatigue, or shortness of breath, which correlated all with normal sinus rhythm. There were no atrial or ventricular arrhythmias and no significant ectopy noted on any event monitor recordings.   d. Patient has seen neurology in the recent past and also needs to see endocrinology in the near future.   2. Tachycardia.   3. Hyperlipidemia.   4. Hypothyroidism, currently on replacement therapy.   5. Anxiety.   6. GERD.  7. Thyroid disorder.   8. Surgical history:  a. History of .        Allergies   Allergen Reactions   • Morphine And Related Itching         Current Outpatient Prescriptions:   •  ALPRAZolam (XANAX) 1 MG tablet, Take 1 tablet by mouth 2 (Two) Times a Day As Needed for Anxiety., Disp: 60 tablet, Rfl: 0  •  azelastine (OPTIVAR) 0.05 % ophthalmic solution, instill 1 drop IN BOTH EYES every 12 hours, Disp: 6 mL, Rfl: 1  •  carvedilol (COREG) 3.125 MG tablet, take 1 tablet by mouth twice a  "day with food, Disp: 60 tablet, Rfl: 6  •  carvedilol (COREG) 6.25 MG tablet, take 1 tablet by mouth twice a day with food, Disp: 60 tablet, Rfl: 2  •  fluticasone (FLONASE) 50 MCG/ACT nasal spray, 1 spray into each nostril Daily., Disp: 16 mL, Rfl: 2  •  gabapentin (NEURONTIN) 300 MG capsule, Take 3 capsules by mouth Every Night. (Patient taking differently: Take 600 mg by mouth Every Night.), Disp: 90 capsule, Rfl: 3  •  hydrOXYzine (ATARAX) 25 MG tablet, TAKE 1 TABLET Twice daily PRN, Disp: 40 tablet, Rfl: 1  •  LEVOXYL 137 MCG tablet, Take 1 tablet by mouth Daily., Disp: 90 tablet, Rfl: 3  •  montelukast (SINGULAIR) 10 MG tablet, Take 1 tablet by mouth Every Night., Disp: 30 tablet, Rfl: 3  •  Multiple Vitamins-Minerals (MULTIVITAMIN ADULT PO), Take  by mouth Daily., Disp: , Rfl:   •  traMADol (ULTRAM) 50 MG tablet, Take 1 tablet by mouth Every 8 (Eight) Hours As Needed for Moderate Pain ., Disp: 60 tablet, Rfl: 2    HPI    Mrs. Lovell is a 48 yo CF with the noted above history who was last seen in our office in October 2016. She is here today for an overdue follow up, complaining of increased palpitations.  She has been on Coreg for some time, often alternating her dose of am/pm medication depending on how she felt.  For the last 4 months, she has consistently been taking Coreg 6.25mg BID.  She states she notices them often ~ 0400, waking her from sleep.  Once she is upright, the palpitations resolve.  She admits to taking Zantac OTC several times throughout the week to \"help with heartburn.\" She also mentions that since July 2017, she's had abnormalities with her thyroid levels and has undergone a significant adjustment to her thyroid supplementation.  Her most recent labs are WNL.  She denies chest pain, denies dizziness, denies pre syncope.  She        The following portions of the patient's history were reviewed in the chart and updated as appropriate: allergies, current medications, past family history, " "past medical history, past social history, past surgical history and problem list.    Review of Systems   Cardiovascular: Positive for irregular heartbeat and palpitations. Negative for chest pain, dyspnea on exertion and leg swelling.   Respiratory: Positive for shortness of breath.    Gastrointestinal: Positive for diarrhea.   Neurological: Positive for sensory change.   All other systems reviewed and are negative.            height is 172.7 cm (68\") and weight is 88.7 kg (195 lb 9.6 oz). Her blood pressure is 110/78 and her pulse is 73.   Physical Exam   Constitutional: Vital signs are normal. She appears well-developed and well-nourished.   Cardiovascular: Normal rate, regular rhythm, S1 normal, S2 normal, normal heart sounds, intact distal pulses and normal pulses.    Pulmonary/Chest: Effort normal and breath sounds normal. She has no wheezes. She has no rhonchi. She has no rales.   Abdominal: Soft. Normal appearance and bowel sounds are normal. There is no hepatosplenomegaly.   Neurological: She is alert.           ECG 12 Lead  Date/Time: 2/15/2018 10:03 AM  Performed by: JAIME LINTON  Authorized by: JAIME LINTON   Comparison: compared with previous ECG from 4/18/2016  Similar to previous ECG  Rhythm: sinus rhythm  Rate: normal  QRS axis: normal  Clinical impression: normal ECG and non-specific ECG             Assessment/ Plan     Essential hypertension:  Well controlled.  Continue to monitor.    Tachycardia:  HR WNL today, on Coreg.  Would continue with consistent dosing of 6.25mg BID.  EKG performed and reviewed -  No changes to previous tracing.  Will place a 5 day monitor on to review/evaluate for PACs, PVCs, heart rate.  I will call her with results.  I believe there is a strong component of reflux that is bothering her, along with thyroid disease.  Her recent labs are WNL.  I asked her to take her Zantac consistently and even advised elevating the head of her bed.    She is to follow up with her " PCP regarding this if the monitor is normal.  Otherwise, RTC 6 months with Dr. Blum or sooner PRN.    Mixed hyperlipidemia:  Currently on no statin therapy.  Get annual lipid panel with PCP. Follow appropriate diet.        Evy Bear PA-C functioning independently  2/15/2018 9:40 AM      EMR Dragon/Transcription disclaimer:   Much of this encounter note is an electronic transcription/translation of spoken language to printed text. The electronic translation of spoken language may permit erroneous, or at times, nonsensical words or phrases to be inadvertently transcribed; Although I have reviewed the note for such errors, some may still exist.

## 2018-03-04 DIAGNOSIS — J30.1 SEASONAL ALLERGIC RHINITIS DUE TO POLLEN, UNSPECIFIED CHRONICITY: ICD-10-CM

## 2018-03-04 RX ORDER — MONTELUKAST SODIUM 10 MG/1
TABLET ORAL
Qty: 30 TABLET | Refills: 3 | Status: SHIPPED | OUTPATIENT
Start: 2018-03-04 | End: 2018-03-09

## 2018-03-06 ENCOUNTER — TELEPHONE (OUTPATIENT)
Dept: INTERNAL MEDICINE | Facility: CLINIC | Age: 48
End: 2018-03-06

## 2018-03-06 NOTE — TELEPHONE ENCOUNTER
PATIENT WOULD LIE A RETURN CALL REGARDING BRAND NAME VS GENERIC SYNTHROID.    CALL BACK [phone]

## 2018-03-07 DIAGNOSIS — E06.3 HYPOTHYROIDISM DUE TO HASHIMOTO'S THYROIDITIS: Primary | ICD-10-CM

## 2018-03-07 DIAGNOSIS — E03.8 HYPOTHYROIDISM DUE TO HASHIMOTO'S THYROIDITIS: Primary | ICD-10-CM

## 2018-03-07 RX ORDER — LEVOTHYROXINE SODIUM 137 UG/1
137 TABLET ORAL DAILY
Qty: 30 TABLET | Refills: 11 | Status: SHIPPED | OUTPATIENT
Start: 2018-03-07 | End: 2019-01-18

## 2018-03-07 RX ORDER — SUMATRIPTAN 25 MG/1
TABLET, FILM COATED ORAL AS NEEDED
Refills: 0 | COMMUNITY
Start: 2018-01-16 | End: 2022-04-07 | Stop reason: SDUPTHER

## 2018-03-07 RX ORDER — LEVONORGESTREL/ETHINYL ESTRADIOL AND ETHINYL ESTRADIOL 100-20(84)
KIT ORAL
Refills: 0 | COMMUNITY
Start: 2018-01-10 | End: 2019-11-08 | Stop reason: ALTCHOICE

## 2018-03-07 NOTE — TELEPHONE ENCOUNTER
Spoke to patient. Has been on generic levothyroxine 137 mcg daily for the past month and tolerating. Noted no benefit with Brand vs generic. Will send in new Rx for generic levothyroxine 137 mcg daily.   Camilla Gray MD

## 2018-03-09 ENCOUNTER — OFFICE VISIT (OUTPATIENT)
Dept: FAMILY MEDICINE CLINIC | Facility: CLINIC | Age: 48
End: 2018-03-09

## 2018-03-09 VITALS
SYSTOLIC BLOOD PRESSURE: 118 MMHG | HEART RATE: 92 BPM | RESPIRATION RATE: 16 BRPM | WEIGHT: 190 LBS | TEMPERATURE: 97.9 F | OXYGEN SATURATION: 96 % | DIASTOLIC BLOOD PRESSURE: 78 MMHG | BODY MASS INDEX: 28.79 KG/M2 | HEIGHT: 68 IN

## 2018-03-09 DIAGNOSIS — J30.1 SEASONAL ALLERGIC RHINITIS DUE TO POLLEN, UNSPECIFIED CHRONICITY: ICD-10-CM

## 2018-03-09 DIAGNOSIS — R00.2 PALPITATIONS: Primary | ICD-10-CM

## 2018-03-09 DIAGNOSIS — R53.83 FATIGUE, UNSPECIFIED TYPE: ICD-10-CM

## 2018-03-09 DIAGNOSIS — F41.9 ANXIETY: ICD-10-CM

## 2018-03-09 PROCEDURE — 96372 THER/PROPH/DIAG INJ SC/IM: CPT | Performed by: FAMILY MEDICINE

## 2018-03-09 PROCEDURE — 99213 OFFICE O/P EST LOW 20 MIN: CPT | Performed by: FAMILY MEDICINE

## 2018-03-09 RX ORDER — CYANOCOBALAMIN 1000 UG/ML
1000 INJECTION, SOLUTION INTRAMUSCULAR; SUBCUTANEOUS
Status: DISCONTINUED | OUTPATIENT
Start: 2018-03-09 | End: 2019-11-08

## 2018-03-09 RX ORDER — PAROXETINE 10 MG/1
10 TABLET, FILM COATED ORAL EVERY MORNING
Qty: 30 TABLET | Refills: 3 | Status: SHIPPED | OUTPATIENT
Start: 2018-03-09 | End: 2018-03-19

## 2018-03-09 RX ORDER — LORATADINE 10 MG/1
10 TABLET ORAL DAILY
Qty: 30 TABLET | Refills: 3 | Status: SHIPPED | OUTPATIENT
Start: 2018-03-09 | End: 2018-09-11 | Stop reason: SDUPTHER

## 2018-03-09 RX ADMIN — CYANOCOBALAMIN 1000 MCG: 1000 INJECTION, SOLUTION INTRAMUSCULAR; SUBCUTANEOUS at 11:27

## 2018-03-09 NOTE — PROGRESS NOTES
Subjective   Dawna Lovell is a 47 y.o. female.     History of Present Illness   Searching for new job.   Palpitations, needs renewal of Corgeg. Saw cardiology and wore monitor two weeks. No ectopy on report.  Anxious makes feel heart.  Did not have good result taking Cymbalta, unsteady feeling. Wakes each morning about 3 AM.   Allergies no longer helped with Singulair. Sneezing often.   The following portions of the patient's history were reviewed and updated as appropriate: allergies, current medications, past family history, past medical history, past social history, past surgical history and problem list.    Review of Systems   Constitutional: Positive for fatigue.   HENT: Positive for sneezing.    Eyes: Positive for redness and itching.   Respiratory: Negative.    Cardiovascular: Negative.    Gastrointestinal: Negative.    Allergic/Immunologic: Positive for environmental allergies.       Objective   Physical Exam   Constitutional: She appears well-developed. No distress.   HENT:   Mouth/Throat: Oropharynx is clear and moist.   Neck: Neck supple.   Cardiovascular: Normal heart sounds.    Pulmonary/Chest: Breath sounds normal.   Musculoskeletal: She exhibits no edema.   Lymphadenopathy:     She has no cervical adenopathy.   Neurological: She is alert.   Psychiatric: She has a normal mood and affect.   Vitals reviewed.      Assessment/Plan   Dawna was seen today for follow-up.    Diagnoses and all orders for this visit:    Palpitations    Anxiety  -     PARoxetine (PAXIL) 10 MG tablet; Take 1 tablet by mouth Every Morning.    Seasonal allergic rhinitis due to pollen, unspecified chronicity  -     loratadine (CLARITIN) 10 MG tablet; Take 1 tablet by mouth Daily.        Vitamin B 12 injection for trial of energy improvement.

## 2018-03-19 ENCOUNTER — TELEPHONE (OUTPATIENT)
Dept: CARDIOLOGY | Facility: CLINIC | Age: 48
End: 2018-03-19

## 2018-03-19 ENCOUNTER — OFFICE VISIT (OUTPATIENT)
Dept: FAMILY MEDICINE CLINIC | Facility: CLINIC | Age: 48
End: 2018-03-19

## 2018-03-19 VITALS
WEIGHT: 188 LBS | TEMPERATURE: 98.5 F | HEART RATE: 77 BPM | DIASTOLIC BLOOD PRESSURE: 70 MMHG | BODY MASS INDEX: 28.59 KG/M2 | RESPIRATION RATE: 16 BRPM | OXYGEN SATURATION: 98 % | SYSTOLIC BLOOD PRESSURE: 120 MMHG

## 2018-03-19 DIAGNOSIS — F41.9 ANXIETY: Primary | ICD-10-CM

## 2018-03-19 PROCEDURE — 99213 OFFICE O/P EST LOW 20 MIN: CPT | Performed by: FAMILY MEDICINE

## 2018-03-19 RX ORDER — CITALOPRAM 10 MG/1
10 TABLET ORAL DAILY
Qty: 30 TABLET | Refills: 3 | Status: SHIPPED | OUTPATIENT
Start: 2018-03-19 | End: 2018-07-09 | Stop reason: SDUPTHER

## 2018-03-19 NOTE — TELEPHONE ENCOUNTER
Called patient and told her Completely normal - no arrhythmias, no ectopy, absolutely normal. She verbalized understanding.

## 2018-03-19 NOTE — PROGRESS NOTES
Subjective   Dawna Lovell is a 47 y.o. female.     History of Present Illness   Took Paxil five days and had headaches, stomach upset and palpitations. Felt could not remember things clearly.  No effect on sleep. No joint stiffness.  Loose stools. Not been on medication five days, still does not feel back to herself.  Some dizzy to rapidly turn head.   Head congestion improved taking Claritin and stopping Singulair.  Some cough, no wheeze.   The following portions of the patient's history were reviewed and updated as appropriate: allergies, current medications, past family history, past medical history, past social history, past surgical history and problem list.    Review of Systems   Constitutional: Negative.    HENT: Negative for congestion.    Respiratory: Negative.    Gastrointestinal: Positive for diarrhea. Negative for abdominal pain.   Neurological: Positive for dizziness and headaches.       Objective   Physical Exam   Pulmonary/Chest: Effort normal.   Musculoskeletal: She exhibits no edema.   Neurological: She is alert.   Psychiatric: She has a normal mood and affect. Thought content normal.   Vitals reviewed.      Assessment/Plan   Dawna was seen today for anxiety.    Diagnoses and all orders for this visit:    Anxiety  -     citalopram (CELEXA) 10 MG tablet; Take 1 tablet by mouth Daily.

## 2018-05-24 ENCOUNTER — OFFICE VISIT (OUTPATIENT)
Dept: FAMILY MEDICINE CLINIC | Facility: CLINIC | Age: 48
End: 2018-05-24

## 2018-05-24 VITALS
RESPIRATION RATE: 16 BRPM | WEIGHT: 180 LBS | BODY MASS INDEX: 27.37 KG/M2 | HEART RATE: 101 BPM | SYSTOLIC BLOOD PRESSURE: 122 MMHG | OXYGEN SATURATION: 95 % | TEMPERATURE: 98.1 F | DIASTOLIC BLOOD PRESSURE: 90 MMHG

## 2018-05-24 DIAGNOSIS — M54.50 ACUTE BILATERAL LOW BACK PAIN WITHOUT SCIATICA: Primary | ICD-10-CM

## 2018-05-24 PROCEDURE — 99213 OFFICE O/P EST LOW 20 MIN: CPT | Performed by: FAMILY MEDICINE

## 2018-05-24 RX ORDER — METHOCARBAMOL 500 MG/1
500 TABLET, FILM COATED ORAL 3 TIMES DAILY PRN
Qty: 30 TABLET | Refills: 0 | Status: SHIPPED | OUTPATIENT
Start: 2018-05-24 | End: 2018-12-14 | Stop reason: SDUPTHER

## 2018-05-24 RX ORDER — PREDNISONE 20 MG/1
20 TABLET ORAL
Qty: 20 TABLET | Refills: 0 | Status: SHIPPED | OUTPATIENT
Start: 2018-05-24 | End: 2018-07-06

## 2018-05-24 NOTE — PROGRESS NOTES
Subjective   Dawna Lovell is a 47 y.o. female.     History of Present Illness   Ten days of lower back discomfort, no specific injury.  Gardening, bending doing housework activities. Sleep difficult, gets up to lay on hard floor. No hip or leg pain no shoulder pain. Took Tramadol and made sleepy, Advil or Aleve not effective.  new job in [place], travels five days a week.   The following portions of the patient's history were reviewed and updated as appropriate: allergies, current medications, past family history, past medical history, past social history, past surgical history and problem list.    Review of Systems   Constitutional: Negative for chills and fever.   Respiratory: Negative.    Cardiovascular: Negative.    Musculoskeletal: Positive for back pain. Negative for arthralgias and joint swelling.   Neurological: Negative for tremors and numbness.       Objective   Physical Exam   Constitutional: She appears well-developed.   Pulmonary/Chest: Effort normal.   Musculoskeletal:        Lumbar back: She exhibits tenderness (bilateral SI area).   Leg lift negative   Neurological: She is alert.   Vitals reviewed.      Assessment/Plan   Dawna was seen today for back pain.    Diagnoses and all orders for this visit:    Acute bilateral low back pain without sciatica  -     predniSONE (DELTASONE) 20 MG tablet; Take 1 tablet by mouth 2 (Two) Times a Day.  -     methocarbamol (ROBAXIN) 500 MG tablet; Take 1 tablet by mouth 3 (Three) Times a Day As Needed for Muscle Spasms.  -     Ambulatory Referral to Physical Therapy Evaluate and treat

## 2018-06-11 RX ORDER — CARVEDILOL 6.25 MG/1
TABLET ORAL
Qty: 60 TABLET | Refills: 5 | Status: SHIPPED | OUTPATIENT
Start: 2018-06-11 | End: 2018-12-03 | Stop reason: SDUPTHER

## 2018-06-12 ENCOUNTER — HOSPITAL ENCOUNTER (OUTPATIENT)
Dept: PHYSICAL THERAPY | Facility: HOSPITAL | Age: 48
Setting detail: THERAPIES SERIES
Discharge: HOME | End: 2018-06-12

## 2018-06-12 DIAGNOSIS — M54.40 ACUTE BILATERAL LOW BACK PAIN WITH SCIATICA, SCIATICA LATERALITY UNSPECIFIED: Primary | ICD-10-CM

## 2018-06-12 PROCEDURE — 97161 PT EVAL LOW COMPLEX 20 MIN: CPT

## 2018-06-12 NOTE — THERAPY EVALUATION
Outpatient Physical Therapy Ortho Initial Evaluation  [facility]     Patient Name: Dawna Lovell  : 1970  MRN: [id number]  Today's Date: 2018      Visit Date: 2018    Patient Active Problem List   Diagnosis   • Obstructive sleep apnea, adult   • Psychophysiological insomnia   • Anxiety   • Essential hypertension   • GERD (gastroesophageal reflux disease)   • Restless leg syndrome   • Abnormal liver function test   • Allergic rhinitis   • Elevated AST (SGOT)   • Fatigue   • Hyperlipidemia   • Hypothyroidism due to Hashimoto's thyroiditis   • UTI (urinary tract infection)   • Memory loss   • Syncope   • Tachycardia   • History of migraine headaches   • Personal history of asthma        Past Medical History:   Diagnosis Date   • Anxiety    • Asthma    • GERD (gastroesophageal reflux disease)    • Hyperlipidemia    • Hypothyroidism    • Lyme disease 2016   • Migraine headache    • POTS (postural orthostatic tachycardia syndrome)    • Syncope 2016   • Tachycardia 2016        Past Surgical History:   Procedure Laterality Date   •  SECTION  2003       Visit Dx:     ICD-10-CM ICD-9-CM   1. Acute bilateral low back pain with sciatica, sciatica laterality unspecified M54.40 724.2     724.3             Patient History     Row Name 18 1300             History    Chief Complaint Pain  -GB      Type of Pain Back pain  -GB      Date Current Problem(s) Began --   ~ 2 month   -GB      Brief Description of Current Complaint Mrs Lovell has experienced pain across back and at times into legs.  She has had past experience of back and leg pain about 6-7 years ago.  Possible weakness in left ankle.  She has pain with sleep.  She has had more difficulty walking.  -GB      Patient/Caregiver Goals Relieve pain  -GB      Patient's Rating of General Health Good  -GB      Hand Dominance right-handed  -GB         Pain     Pain Location Back  -GB      Pain at Present 6  -GB      Pain at  Best 0  -GB      Pain at Worst 9  -GB         Fall Risk Assessment    Any falls in the past year: No  -GB         Daily Activities    Primary Language English  -GB      Barriers to learning Visual  -GB      Pt Participated in POC and Goals Yes  -GB         Safety    Are you being hurt, hit, or frightened by anyone at home or in your life? No  -GB        User Key  (r) = Recorded By, (t) = Taken By, (c) = Cosigned By    Initials Name Provider Type    GB Panchito Metzger PT Physical Therapist                PT Ortho     Row Name 06/12/18 1400       Precautions and Contraindications    Precautions/Limitations no known precautions/limitations  -GB       Special Tests/Palpation    Special Tests/Palpation Lumbar/SI;Hip  -GB       Lumbosacral Palpation    SI --   No tenderness  -GB    Lumbosacral Segment Left:;Tender;Guarded/taut   Left rotation in neutral at L4-5 and L5-S1  -GB    Lumbosacral Palpation? Yes  -GB       Lumbar/SI Special Tests    Slump Test (Neural Tension) Bilateral:;Negative  -GB    SI Compression Test (SI Dysfunction) Bilateral:;Negative  -GB    MONICA (hip vs. SI Dysfunction) Bilateral:;Negative  -GB       Leg Length Test    Apparent Unequal;Left Higher Leg   Minimal left posterior rotation of ILium  -GB       General ROM    Head/Neck/Trunk Trunk Extension;Trunk Flexion;Trunk Lt Lateral Flexion;Trunk Rt Lateral Flexion;Trunk Lt Rotation;Trunk Rt Rotation  -GB       Head/Neck/Trunk    Trunk Extension AROM 38  -GB    Trunk Flexion AROM 60  -GB    Trunk Lt Lateral Flexion AROM 30  -GB    Trunk Rt Lateral Flexion AROM 28  -GB    Trunk Lt Rotation AROM 25  -GB    Trunk Rt Rotation AROM 25  -GB       MMT (Manual Muscle Testing)    Additional Documentation General Assessment (Manual Muscle Testing) (Group)  -GB       General Assessment (Manual Muscle Testing)    General Manual Muscle Testing (MMT) Assessment no strength deficits identified  -GB    Comment, General Manual Muscle Testing (MMT) Assessment LE  screen reveals normal strength for all myotomal patterns assessed and Hip ABduction and Adduction  -GB       Flexibility    Flexibility Tested? Lower Extremity  -GB       Lower Extremity Flexibility    Hamstrings Right:;Severely limited;Left:;Moderately limited  -GB    ITB Bilateral:;Mildly limited  -GB       Pathomechanics    Pathomechanics Comments No change with flexion or extension patterns;  minimal relief with light distraction of each LE  -GB      User Key  (r) = Recorded By, (t) = Taken By, (c) = Cosigned By    Initials Name Provider Type    ANTHONY Metzger, PT Physical Therapist                                  PT OP Goals     Row Name 06/12/18 1600          PT Short Term Goals    STG Date to Achieve 06/26/18  -GB     STG 1 Patient is independent with a HEP for flexibility and initial strengthening.  -GB     STG 1 Progress New  -GB     STG 2 Patient reports that pain is decreased by at least 25% with frequency or intensity of symptoms.  -GB     STG 2 Progress New  -GB        Long Term Goals    LTG Date to Achieve 09/10/18  -GB     LTG 1 Patient is independent with long term HEP for stabilization and self mobilizations.  -GB     LTG 1 Progress New  -GB     LTG 2 Lumbar AROM is WNL for all planes.  -GB     LTG 2 Progress New  -GB     LTG 3 Modified Oswestry score is improved by at least 15%.  -GB     LTG 3 Progress New  -GB        Time Calculation    PT Goal Re-Cert Due Date 09/10/18  -GB       User Key  (r) = Recorded By, (t) = Taken By, (c) = Cosigned By    Initials Name Provider Type    ANTHONY Metzger, PT Physical Therapist                PT Assessment/Plan     Row Name 06/12/18 1623          PT Assessment    Functional Limitations Limitation in home management;Limitations in community activities;Performance in self-care ADL;Performance in leisure activities  -GB     Impairments Joint mobility;Joint integrity;Pain;Range of motion;Posture;Impaired flexibility;Impaired postural alignment  -GB      Assessment Comments Findings are consistent with diagnosis.  She has signs that would suggest some L4, L5 issues and some structural malalignment in Pelvis which seem to be related to muscle imbalances.  -GB     Please refer to paper survey for additional self-reported information Yes  -GB     Rehab Potential Good  -GB     Patient/caregiver participated in establishment of treatment plan and goals Yes  -GB     Patient would benefit from skilled therapy intervention Yes  -GB        PT Plan    PT Frequency 2x/week  -GB     Predicted Duration of Therapy Intervention (Therapy Eval) 4 weeks (then reduce to weekly x 8 weeks)  -GB     Planned CPT's? PT EVAL LOW COMPLEXITY: 82363;PT RE-EVAL: 31979;PT NEUROMUSC RE-EDUCATION EA 15 MIN: 27653;PT MANUAL THERAPY EA 15 MIN: 74793;PT THER PROC EA 15 MIN: 54700;PT ELECTRICAL STIM UNATTEND: ;PT ELECTRICAL STIM ATTD EA 15 MIN: 55462;PT HOT/COLD PACK WC NONMCARE: 98736;PT ULTRASOUND EA 15 MIN: 92417;PT TRACTION LUMBAR: 95611  -GB     PT Plan Comments Progress with ther ex in clinic and home settings and incorporate Astym and other manual techniques.  -GB       User Key  (r) = Recorded By, (t) = Taken By, (c) = Cosigned By    Initials Name Provider Type    ANTHONY Metzger, PT Physical Therapist                                        Time Calculation:     Therapy Suggested Charges     Code   Minutes Charges    None             Start Time: 1345     Therapy Charges for Today     Code Description Service Date Service Provider Modifiers Qty    75193692690 HC PT EVAL LOW COMPLEXITY 3 6/12/2018 Panchito Metzger, PT GP 1                    Panchito Metzger, PT  6/12/2018

## 2018-06-14 ENCOUNTER — HOSPITAL ENCOUNTER (OUTPATIENT)
Dept: PHYSICAL THERAPY | Facility: HOSPITAL | Age: 48
Setting detail: THERAPIES SERIES
Discharge: HOME | End: 2018-06-14

## 2018-06-14 DIAGNOSIS — M54.40 ACUTE BILATERAL LOW BACK PAIN WITH SCIATICA, SCIATICA LATERALITY UNSPECIFIED: Primary | ICD-10-CM

## 2018-06-14 PROCEDURE — 97140 MANUAL THERAPY 1/> REGIONS: CPT

## 2018-06-14 PROCEDURE — 97110 THERAPEUTIC EXERCISES: CPT

## 2018-06-14 NOTE — THERAPY TREATMENT NOTE
Outpatient Physical Therapy Ortho Treatment Note   [facility]     Patient Name: Dawna Lovell  : 1970  MRN: [id number]  Today's Date: 2018      Visit Date: 2018    Visit Dx:    ICD-10-CM ICD-9-CM   1. Acute bilateral low back pain with sciatica, sciatica laterality unspecified M54.40 724.2     724.3       Patient Active Problem List   Diagnosis   • Obstructive sleep apnea, adult   • Psychophysiological insomnia   • Anxiety   • Essential hypertension   • GERD (gastroesophageal reflux disease)   • Restless leg syndrome   • Abnormal liver function test   • Allergic rhinitis   • Elevated AST (SGOT)   • Fatigue   • Hyperlipidemia   • Hypothyroidism due to Hashimoto's thyroiditis   • UTI (urinary tract infection)   • Memory loss   • Syncope   • Tachycardia   • History of migraine headaches   • Personal history of asthma        Past Medical History:   Diagnosis Date   • Anxiety    • Asthma    • GERD (gastroesophageal reflux disease)    • Hyperlipidemia    • Hypothyroidism    • Lyme disease 2016   • Migraine headache    • POTS (postural orthostatic tachycardia syndrome)    • Syncope 2016   • Tachycardia 2016        Past Surgical History:   Procedure Laterality Date   •  SECTION  2003             PT Ortho     Row Name 18 1400       Precautions and Contraindications    Precautions/Limitations no known precautions/limitations  -GB       Special Tests/Palpation    Special Tests/Palpation Lumbar/SI;Hip  -GB       Lumbosacral Palpation    SI --   No tenderness  -GB    Lumbosacral Segment Left:;Tender;Guarded/taut   Left rotation in neutral at L4-5 and L5-S1  -GB    Lumbosacral Palpation? Yes  -GB       Lumbar/SI Special Tests    Slump Test (Neural Tension) Bilateral:;Negative  -GB    SI Compression Test (SI Dysfunction) Bilateral:;Negative  -GB    MONICA (hip vs. SI Dysfunction) Bilateral:;Negative  -GB       Leg Length Test    Apparent Unequal;Left Higher Leg   Minimal  left posterior rotation of ILium  -GB       General ROM    Head/Neck/Trunk Trunk Extension;Trunk Flexion;Trunk Lt Lateral Flexion;Trunk Rt Lateral Flexion;Trunk Lt Rotation;Trunk Rt Rotation  -GB       Head/Neck/Trunk    Trunk Extension AROM 38  -GB    Trunk Flexion AROM 60  -GB    Trunk Lt Lateral Flexion AROM 30  -GB    Trunk Rt Lateral Flexion AROM 28  -GB    Trunk Lt Rotation AROM 25  -GB    Trunk Rt Rotation AROM 25  -GB       MMT (Manual Muscle Testing)    Additional Documentation General Assessment (Manual Muscle Testing) (Group)  -GB       General Assessment (Manual Muscle Testing)    General Manual Muscle Testing (MMT) Assessment no strength deficits identified  -GB    Comment, General Manual Muscle Testing (MMT) Assessment LE screen reveals normal strength for all myotomal patterns assessed and Hip ABduction and Adduction  -GB       Flexibility    Flexibility Tested? Lower Extremity  -GB       Lower Extremity Flexibility    Hamstrings Right:;Severely limited;Left:;Moderately limited  -GB    ITB Bilateral:;Mildly limited  -GB       Pathomechanics    Pathomechanics Comments No change with flexion or extension patterns;  minimal relief with light distraction of each LE  -GB      User Key  (r) = Recorded By, (t) = Taken By, (c) = Cosigned By    Initials Name Provider Type    ANTHONY Metzger, PT Physical Therapist                            PT Assessment/Plan     Row Name 06/14/18 1041          PT Assessment    Assessment Comments Dawna seemed to do well with ther ex performed, especially stretching and she had apparent relief with Astym to paravertebrals of lumbar spine.  Abnormal tissue texture is noticed with Astym to right Piriformis and glut Med and left paravertebrals and Glut Min.  -GB        PT Plan    PT Plan Comments Follow up with care.  -GB       User Key  (r) = Recorded By, (t) = Taken By, (c) = Cosigned By    Initials Name Provider Type    ANTHONY Metzger, PT Physical Therapist                     Exercises     Row Name 06/14/18 1000 06/14/18 0900          Subjective Pain    Able to rate subjective pain? yes  -GB  --     Pre-Treatment Pain Level 6  -GB  --        Total Minutes    33883 - PT Therapeutic Exercise Minutes 30   25 min ther ex in clinic/5 min educ with HEP  -GB  --     26527 - PT Manual Therapy Minutes  -- 12  -GB        Exercise 1    Exercise Name 1 Rec Bike  -GB  --     Time 1 7 min  -GB  --     Additional Comments Level 1  -GB  --        Exercise 2    Exercise Name 2 TRX straps - forward leans  -GB  --     Reps 2 10  -GB  --        Exercise 3    Exercise Name 3 Ab presses  -GB  --     Reps 3 10  -GB  --        Exercise 4    Exercise Name 4 Oblique presses  -GB  --     Reps 4 5 each side  -GB  --        Exercise 5    Exercise Name 5 LTRs  -GB  --     Reps 5 5 each side  -GB  --        Exercise 6    Exercise Name 6 Piriformis stretch  -GB  --     Time 6 1 min each  -GB  --        Exercise 7    Exercise Name 7 HS stretch  -GB  --     Time 7 1 min each  -GB  --       User Key  (r) = Recorded By, (t) = Taken By, (c) = Cosigned By    Initials Name Provider Type    ANTHONY Metzger, PT Physical Therapist                        Manual Rx (last 36 hours)      Manual Treatments     Row Name 06/14/18 0900             Total Minutes    46947 - PT Manual Therapy Minutes 12  -GB         Manual Rx 1    Manual Rx 1 Location Bilateral Lumbosacral and gluteal areas  -GB      Manual Rx 1 Type Astym from prone and semi-prone  -GB      Manual Rx 1 Duration 12  -GB        User Key  (r) = Recorded By, (t) = Taken By, (c) = Cosigned By    Initials Name Provider Type    ANTHONY Metzger, PT Physical Therapist              Therapy Education  Education Details: Ab presses, oblique preses, LTRs, piriformis and HS stretches  Given: HEP  Program: New  How Provided: Verbal, Demonstration, Written  Provided to: Patient  Level of Understanding: Verbalized, Demonstrated              Time Calculation:    Start Time: 1000  Therapy Suggested Charges     Code   Minutes Charges    01723 (CPT®) Hc Pt Neuromusc Re Education Ea 15 Min      49652 (CPT®) Hc Pt Ther Proc Ea 15 Min 30 2    14504 (CPT®) Hc Gait Training Ea 15 Min      71359 (CPT®) Hc Pt Therapeutic Act Ea 15 Min      51358 (CPT®) Hc Pt Manual Therapy Ea 15 Min 12 1    84159 (CPT®) Hc Pt Ther Massage- Per 15 Min      12898 (CPT®) Hc Pt Iontophoresis Ea 15 Min      17230 (CPT®) Hc Pt Elec Stim Ea-Per 15 Min      53011 (CPT®) Hc Pt Ultrasound Ea 15 Min      73030 (CPT®) Hc Pt Self Care/Mgmt/Train Ea 15 Min      Total  42 3                      Panchito Metzger, PT  6/14/2018

## 2018-06-21 ENCOUNTER — HOSPITAL ENCOUNTER (OUTPATIENT)
Dept: PHYSICAL THERAPY | Facility: HOSPITAL | Age: 48
Setting detail: THERAPIES SERIES
Discharge: HOME | End: 2018-06-21

## 2018-06-21 DIAGNOSIS — M54.40 ACUTE BILATERAL LOW BACK PAIN WITH SCIATICA, SCIATICA LATERALITY UNSPECIFIED: Primary | ICD-10-CM

## 2018-06-21 PROCEDURE — 97110 THERAPEUTIC EXERCISES: CPT

## 2018-06-21 NOTE — THERAPY TREATMENT NOTE
Outpatient Physical Therapy Ortho Treatment Note  [facility]     Patient Name: Dawna Lovell  : 1970  MRN: [id number]  Today's Date: 2018      Visit Date: 2018    Visit Dx:    ICD-10-CM ICD-9-CM   1. Acute bilateral low back pain with sciatica, sciatica laterality unspecified M54.40 724.2     724.3       Patient Active Problem List   Diagnosis   • Obstructive sleep apnea, adult   • Psychophysiological insomnia   • Anxiety   • Essential hypertension   • GERD (gastroesophageal reflux disease)   • Restless leg syndrome   • Abnormal liver function test   • Allergic rhinitis   • Elevated AST (SGOT)   • Fatigue   • Hyperlipidemia   • Hypothyroidism due to Hashimoto's thyroiditis   • UTI (urinary tract infection)   • Memory loss   • Syncope   • Tachycardia   • History of migraine headaches   • Personal history of asthma        Past Medical History:   Diagnosis Date   • Anxiety    • Asthma    • GERD (gastroesophageal reflux disease)    • Hyperlipidemia    • Hypothyroidism    • Lyme disease 2016   • Migraine headache    • POTS (postural orthostatic tachycardia syndrome)    • Syncope 2016   • Tachycardia 2016        Past Surgical History:   Procedure Laterality Date   •  SECTION  2003                             PT Assessment/Plan     Row Name 18 1316          PT Assessment    Assessment Comments She has less abnormal tissue texture in gluteal and lumbar areas.  She does well with subtle progressions in ther ex.  -GB        PT Plan    PT Plan Comments Continue with care.  -ANTHONY       User Key  (r) = Recorded By, (t) = Taken By, (c) = Cosigned By    Initials Name Provider Type    ANTHONY Metzger, PT Physical Therapist                    Exercises     Row Name 18 1300 18 1100          Subjective Comments    Subjective Comments  -- She feels comfortable with HEP.      -ANTHONY        Subjective Pain    Able to rate subjective pain?  -- yes  -ANTHONY      Pre-Treatment Pain Level  -- 5  -GB     Post-Treatment Pain Level  -- 5  -GB        Total Minutes    44850 - PT Therapeutic Exercise Minutes  -- 25   20 min ther ex/ 5 min educ in HEP  -GB     51006 - PT Manual Therapy Minutes 14  -GB  --        Exercise 1    Exercise Name 1  -- Rec Bike  -GB     Time 1  -- 7 min  -GB        Exercise 2    Exercise Name 2  -- TRX straps Forward/reverse leans  -GB     Sets 2  -- 2  -GB     Reps 2  -- 10  -GB        Exercise 3    Exercise Name 3  -- Ab presses  -GB     Reps 3  -- 10  -GB     Additional Comments  -- Legs on gym ball  -GB        Exercise 4    Exercise Name 4  -- Oblique presses  -GB     Reps 4  -- 5 each side  -GB     Additional Comments  -- Legs on gym ball  -GB        Exercise 5    Exercise Name 5  -- LTRs  -GB     Reps 5  -- 5 each side  -GB     Additional Comments  -- Legs on gym ball  -GB        Exercise 6    Exercise Name 6  -- Piriformis stretch  -GB     Time 6  -- 1 min each  -GB        Exercise 7    Exercise Name 7  -- Bird Dogs  -GB     Reps 7  -- 10  -GB        Exercise 8    Exercise Name 8  -- HS stretch  -GB     Time 8  -- 1 min each  -GB       User Key  (r) = Recorded By, (t) = Taken By, (c) = Cosigned By    Initials Name Provider Type    ANTHONY Metzger, PT Physical Therapist                        Manual Rx (last 36 hours)      Manual Treatments     Row Name 06/21/18 1300             Total Minutes    17902 - PT Manual Therapy Minutes 14  -GB         Manual Rx 1    Manual Rx 1 Location Bilateral Lumbosacral and gluteal areas  -GB      Manual Rx 1 Type Astym from prone and semi-prone  -GB      Manual Rx 1 Duration 14  -GB        User Key  (r) = Recorded By, (t) = Taken By, (c) = Cosigned By    Initials Name Provider Type    ANTHONY Metzger, PT Physical Therapist              Therapy Education  Education Details: Ab presses, Oblique presses and LTRs reinforced  Given: HEP  Program: Reinforced  How Provided: Verbal, Demonstration  Provided to:  Patient  Level of Understanding: Verbalized, Demonstrated              Time Calculation:   Start Time: 1136  Therapy Suggested Charges     Code   Minutes Charges    97257 (CPT®) Hc Pt Neuromusc Re Education Ea 15 Min      35965 (CPT®) Hc Pt Ther Proc Ea 15 Min      16090 (CPT®) Hc Gait Training Ea 15 Min      30800 (CPT®) Hc Pt Therapeutic Act Ea 15 Min      87827 (CPT®) Hc Pt Manual Therapy Ea 15 Min 14 1    19368 (CPT®) Hc Pt Ther Massage- Per 15 Min      42944 (CPT®) Hc Pt Iontophoresis Ea 15 Min      16964 (CPT®) Hc Pt Elec Stim Ea-Per 15 Min      39041 (CPT®) Hc Pt Ultrasound Ea 15 Min      00539 (CPT®) Hc Pt Self Care/Mgmt/Train Ea 15 Min      Total  14 1        Therapy Charges for Today     Code Description Service Date Service Provider Modifiers Qty    98084358129 HC PT THER PROC EA 15 MIN 6/21/2018 Panchito Metzger, PT GP 2                    Panchito Metzger, PT  6/21/2018

## 2018-06-29 DIAGNOSIS — J30.1 SEASONAL ALLERGIC RHINITIS DUE TO POLLEN, UNSPECIFIED CHRONICITY: ICD-10-CM

## 2018-06-29 RX ORDER — MONTELUKAST SODIUM 10 MG/1
TABLET ORAL
Qty: 30 TABLET | Refills: 3 | OUTPATIENT
Start: 2018-06-29

## 2018-07-03 ENCOUNTER — TELEPHONE (OUTPATIENT)
Dept: FAMILY MEDICINE CLINIC | Facility: CLINIC | Age: 48
End: 2018-07-03

## 2018-07-06 ENCOUNTER — HOSPITAL ENCOUNTER (OUTPATIENT)
Dept: PHYSICAL THERAPY | Facility: HOSPITAL | Age: 48
Setting detail: THERAPIES SERIES
Discharge: HOME | End: 2018-07-06

## 2018-07-06 ENCOUNTER — OFFICE VISIT (OUTPATIENT)
Dept: FAMILY MEDICINE CLINIC | Facility: CLINIC | Age: 48
End: 2018-07-06

## 2018-07-06 VITALS
SYSTOLIC BLOOD PRESSURE: 130 MMHG | DIASTOLIC BLOOD PRESSURE: 90 MMHG | HEART RATE: 97 BPM | BODY MASS INDEX: 27.37 KG/M2 | TEMPERATURE: 98.9 F | RESPIRATION RATE: 16 BRPM | OXYGEN SATURATION: 95 % | WEIGHT: 180 LBS

## 2018-07-06 DIAGNOSIS — E03.8 HYPOTHYROIDISM DUE TO HASHIMOTO'S THYROIDITIS: ICD-10-CM

## 2018-07-06 DIAGNOSIS — M54.40 ACUTE BILATERAL LOW BACK PAIN WITH SCIATICA, SCIATICA LATERALITY UNSPECIFIED: Primary | ICD-10-CM

## 2018-07-06 DIAGNOSIS — I10 ESSENTIAL HYPERTENSION: Primary | ICD-10-CM

## 2018-07-06 DIAGNOSIS — E06.3 HYPOTHYROIDISM DUE TO HASHIMOTO'S THYROIDITIS: ICD-10-CM

## 2018-07-06 DIAGNOSIS — M25.50 ARTHRALGIA, UNSPECIFIED JOINT: ICD-10-CM

## 2018-07-06 PROCEDURE — 99213 OFFICE O/P EST LOW 20 MIN: CPT | Performed by: FAMILY MEDICINE

## 2018-07-06 PROCEDURE — 97140 MANUAL THERAPY 1/> REGIONS: CPT

## 2018-07-06 PROCEDURE — 97110 THERAPEUTIC EXERCISES: CPT

## 2018-07-06 NOTE — THERAPY TREATMENT NOTE
Outpatient Physical Therapy Ortho Treatment Note  [facility]     Patient Name: Dawna Lovell  : 1970  MRN: [id number]  Today's Date: 2018      Visit Date: 2018    Visit Dx:    ICD-10-CM ICD-9-CM   1. Acute bilateral low back pain with sciatica, sciatica laterality unspecified M54.40 724.2     724.3       Patient Active Problem List   Diagnosis   • Obstructive sleep apnea, adult   • Psychophysiological insomnia   • Anxiety   • Essential hypertension   • GERD (gastroesophageal reflux disease)   • Restless leg syndrome   • Abnormal liver function test   • Allergic rhinitis   • Elevated AST (SGOT)   • Fatigue   • Hyperlipidemia   • Hypothyroidism due to Hashimoto's thyroiditis   • UTI (urinary tract infection)   • Memory loss   • Syncope   • Tachycardia   • History of migraine headaches   • Personal history of asthma        Past Medical History:   Diagnosis Date   • Anxiety    • Asthma    • GERD (gastroesophageal reflux disease)    • Hyperlipidemia    • Hypothyroidism    • Lyme disease 2016   • Migraine headache    • POTS (postural orthostatic tachycardia syndrome)    • Syncope 2016   • Tachycardia 2016        Past Surgical History:   Procedure Laterality Date   •  SECTION  2003                             PT Assessment/Plan     Row Name 18 0819          PT Assessment    Assessment Comments Mrs Lovell has increased abnormal tissue texture and tightness over  left L4,5 and upper Sacral border with additional abnormal tissue texture at left Gluteal Robin and Medius.  -GB        PT Plan    PT Plan Comments Follow up with care and reassess.  -ANTHONY       User Key  (r) = Recorded By, (t) = Taken By, (c) = Cosigned By    Initials Name Provider Type    ANTHONY Metzger, PT Physical Therapist                    Exercises     Row Name 18 0700             Subjective Comments    Subjective Comments Patient reports that she aggrravated back while out of town,  about 5 days ago.  -GB         Subjective Pain    Able to rate subjective pain? yes  -GB      Pre-Treatment Pain Level 7  -GB      Post-Treatment Pain Level 6  -GB         Total Minutes    46709 - PT Therapeutic Exercise Minutes 24  -GB      82007 - PT Manual Therapy Minutes 18  -GB         Exercise 1    Exercise Name 1 NuStep at level 4  -GB      Time 1 7 min  -GB         Exercise 2    Exercise Name 2 Leaning Planks  -GB      Reps 2 5  -GB      Time 2 10 sec each  -GB         Exercise 3    Exercise Name 3 Bird Dogs  -GB      Reps 3 10  -GB         Exercise 4    Exercise Name 4 Fire hydrants  -GB      Reps 4 10  -GB         Exercise 5    Exercise Name 5 Quadruped roll outs  -GB      Reps 5 10  -GB         Exercise 6    Exercise Name 6 Quadruped extended roll outs  -GB      Reps 6 10  -GB         Exercise 7    Exercise Name 7 Quadruuped gym ball clocks  -GB      Reps 7 10  -GB         Exercise 8    Exercise Name 8 Kathie pose  -GB      Time 8 1 min  -GB         Exercise 9    Exercise Name 9 HS and Piriformis stretches  -GB      Time 9 1 min each  -GB        User Key  (r) = Recorded By, (t) = Taken By, (c) = Cosigned By    Initials Name Provider Type    GB Panchito Carrilloncer Yassine, PT Physical Therapist                        Manual Rx (last 36 hours)      Manual Treatments     Row Name 07/06/18 0700             Total Minutes    01436 - PT Manual Therapy Minutes 18  -GB         Manual Rx 1    Manual Rx 1 Location Bilateral Lumbosacral and gluteal areas  -GB      Manual Rx 1 Type Astym from prone and semi-prone  -GB      Manual Rx 1 Duration 12  -GB         Manual Rx 2    Manual Rx 2 Location Pelvis  -GB      Manual Rx 2 Type MET with ham/quad technique for left posterior rotation  -GB      Manual Rx 2 Duration 2  -GB         Manual Rx 3    Manual Rx 3 Location L4,5  -GB      Manual Rx 3 Type Side lying rotational glides and MET for left Piriformis  -GB      Manual Rx 3 Duration 3-4 min  -GB        User Key  (r) = Recorded  By, (t) = Taken By, (c) = Cosigned By    Initials Name Provider Type    GB Panchito Metzger, PT Physical Therapist                             Time Calculation:   Start Time: 0730  Therapy Suggested Charges     Code   Minutes Charges    44480 (CPT®) Hc Pt Neuromusc Re Education Ea 15 Min      51915 (CPT®) Hc Pt Ther Proc Ea 15 Min 24 2    87241 (CPT®) Hc Gait Training Ea 15 Min      35692 (CPT®) Hc Pt Therapeutic Act Ea 15 Min      80009 (CPT®) Hc Pt Manual Therapy Ea 15 Min 18 1    32656 (CPT®) Hc Pt Ther Massage- Per 15 Min      06021 (CPT®) Hc Pt Iontophoresis Ea 15 Min      92529 (CPT®) Hc Pt Elec Stim Ea-Per 15 Min      29239 (CPT®) Hc Pt Ultrasound Ea 15 Min      76958 (CPT®) Hc Pt Self Care/Mgmt/Train Ea 15 Min      Total  42 3        Therapy Charges for Today     Code Description Service Date Service Provider Modifiers Qty    34572704352 HC PT THER PROC EA 15 MIN 7/6/2018 Panchito Metzger, PT GP 2    90570271474 HC PT MANUAL THERAPY EA 15 MIN 7/6/2018 Panchito Metzger, PT GP 1                    Panchito Metzger, PT  7/6/2018

## 2018-07-06 NOTE — PROGRESS NOTES
"Subjective   Dawna Lovell is a 47 y.o. female.     History of Present Illness   Seems to have regressed with increased irritability, \"foggy\" feeling, changes of sleep pattern with long naps and not resting several days.  Night hot flashes.  Appetite slow.  Not swelling.  No heart racing, no abdominal bloating.   Concern about tick exposure.  The following portions of the patient's history were reviewed and updated as appropriate: allergies, current medications, past family history, past medical history, past social history, past surgical history and problem list.    Review of Systems   Constitutional: Negative.    Respiratory: Negative.    Cardiovascular: Negative.    Gastrointestinal: Negative.        Objective   Physical Exam   Neck: Neck supple. No thyromegaly present.   Cardiovascular: Regular rhythm and normal heart sounds.    Pulmonary/Chest: Breath sounds normal.   Musculoskeletal: She exhibits no edema.   Lymphadenopathy:     She has no cervical adenopathy.   Neurological: She is alert.   Vitals reviewed.      Assessment/Plan   Dawna was seen today for altered mental status and back pain.    Diagnoses and all orders for this visit:    Essential hypertension  -     Comprehensive Metabolic Panel  -     CBC & Differential    Hypothyroidism due to Hashimoto's thyroiditis  -     TSH    Arthralgia, unspecified joint  -     Lyme Disease IgG/IgM Antibodies               "

## 2018-07-07 LAB
ALBUMIN SERPL-MCNC: 4.6 G/DL (ref 3.5–5.5)
ALBUMIN/GLOB SERPL: 1.4 {RATIO} (ref 1.2–2.2)
ALP SERPL-CCNC: 68 IU/L (ref 39–117)
ALT SERPL-CCNC: 131 IU/L (ref 0–32)
AST SERPL-CCNC: 251 IU/L (ref 0–40)
B BURGDOR IGG+IGM SER-ACNC: <0.91 ISR (ref 0–0.9)
BASOPHILS # BLD AUTO: 0 X10E3/UL (ref 0–0.2)
BASOPHILS NFR BLD AUTO: 0 %
BILIRUB SERPL-MCNC: 2.1 MG/DL (ref 0–1.2)
BUN SERPL-MCNC: 8 MG/DL (ref 6–24)
BUN/CREAT SERPL: 10 (ref 9–23)
CALCIUM SERPL-MCNC: 9.3 MG/DL (ref 8.7–10.2)
CHLORIDE SERPL-SCNC: 94 MMOL/L (ref 96–106)
CO2 SERPL-SCNC: 24 MMOL/L (ref 20–29)
CREAT SERPL-MCNC: 0.81 MG/DL (ref 0.57–1)
EOSINOPHIL # BLD AUTO: 0 X10E3/UL (ref 0–0.4)
EOSINOPHIL NFR BLD AUTO: 1 %
ERYTHROCYTE [DISTWIDTH] IN BLOOD BY AUTOMATED COUNT: 14 % (ref 12.3–15.4)
GLOBULIN SER CALC-MCNC: 3.2 G/DL (ref 1.5–4.5)
GLUCOSE SERPL-MCNC: 109 MG/DL (ref 65–99)
HCT VFR BLD AUTO: 41.3 % (ref 34–46.6)
HGB BLD-MCNC: 14.2 G/DL (ref 11.1–15.9)
IMM GRANULOCYTES # BLD: 0 X10E3/UL (ref 0–0.1)
IMM GRANULOCYTES NFR BLD: 0 %
LYMPHOCYTES # BLD AUTO: 1.8 X10E3/UL (ref 0.7–3.1)
LYMPHOCYTES NFR BLD AUTO: 36 %
MCH RBC QN AUTO: 35.9 PG (ref 26.6–33)
MCHC RBC AUTO-ENTMCNC: 34.4 G/DL (ref 31.5–35.7)
MCV RBC AUTO: 104 FL (ref 79–97)
MONOCYTES # BLD AUTO: 0.4 X10E3/UL (ref 0.1–0.9)
MONOCYTES NFR BLD AUTO: 9 %
NEUTROPHILS # BLD AUTO: 2.6 X10E3/UL (ref 1.4–7)
NEUTROPHILS NFR BLD AUTO: 54 %
PLATELET # BLD AUTO: 198 X10E3/UL (ref 150–379)
POTASSIUM SERPL-SCNC: 4.1 MMOL/L (ref 3.5–5.2)
PROT SERPL-MCNC: 7.8 G/DL (ref 6–8.5)
RBC # BLD AUTO: 3.96 X10E6/UL (ref 3.77–5.28)
SODIUM SERPL-SCNC: 138 MMOL/L (ref 134–144)
TSH SERPL DL<=0.005 MIU/L-ACNC: 2.97 UIU/ML (ref 0.45–4.5)
WBC # BLD AUTO: 4.9 X10E3/UL (ref 3.4–10.8)

## 2018-07-09 DIAGNOSIS — F41.9 ANXIETY: ICD-10-CM

## 2018-07-09 RX ORDER — CITALOPRAM 10 MG/1
TABLET ORAL
Qty: 30 TABLET | Refills: 3 | Status: SHIPPED | OUTPATIENT
Start: 2018-07-09 | End: 2018-10-23 | Stop reason: SDUPTHER

## 2018-07-10 ENCOUNTER — HOSPITAL ENCOUNTER (OUTPATIENT)
Dept: PHYSICAL THERAPY | Facility: HOSPITAL | Age: 48
Setting detail: THERAPIES SERIES
Discharge: HOME | End: 2018-07-10

## 2018-07-10 ENCOUNTER — OFFICE VISIT (OUTPATIENT)
Dept: NEUROLOGY | Facility: CLINIC | Age: 48
End: 2018-07-10

## 2018-07-10 VITALS
SYSTOLIC BLOOD PRESSURE: 98 MMHG | RESPIRATION RATE: 16 BRPM | HEART RATE: 92 BPM | DIASTOLIC BLOOD PRESSURE: 72 MMHG | WEIGHT: 184 LBS | BODY MASS INDEX: 27.89 KG/M2 | HEIGHT: 68 IN

## 2018-07-10 DIAGNOSIS — M54.40 ACUTE BILATERAL LOW BACK PAIN WITH SCIATICA, SCIATICA LATERALITY UNSPECIFIED: Primary | ICD-10-CM

## 2018-07-10 DIAGNOSIS — G25.81 RESTLESS LEG SYNDROME: Primary | ICD-10-CM

## 2018-07-10 PROCEDURE — 97110 THERAPEUTIC EXERCISES: CPT

## 2018-07-10 PROCEDURE — 99212 OFFICE O/P EST SF 10 MIN: CPT | Performed by: PSYCHIATRY & NEUROLOGY

## 2018-07-10 PROCEDURE — 97140 MANUAL THERAPY 1/> REGIONS: CPT

## 2018-07-10 RX ORDER — GABAPENTIN 300 MG/1
300 CAPSULE ORAL NIGHTLY
Qty: 30 CAPSULE | Refills: 5 | Status: SHIPPED | OUTPATIENT
Start: 2018-07-10 | End: 2018-07-10 | Stop reason: SDUPTHER

## 2018-07-10 RX ORDER — GABAPENTIN 300 MG/1
300 CAPSULE ORAL NIGHTLY
Qty: 30 CAPSULE | Refills: 5 | Status: SHIPPED | OUTPATIENT
Start: 2018-07-10 | End: 2019-02-18 | Stop reason: SDUPTHER

## 2018-07-10 NOTE — THERAPY RE-EVALUATION
Outpatient Physical Therapy Ortho Re-Assessment   Jovanny     Patient Name: Dawna Lovell  : 1970  MRN: [id number]  Today's Date: 7/10/2018      Visit Date: 07/10/2018    Patient Active Problem List   Diagnosis   • Obstructive sleep apnea, adult   • Psychophysiological insomnia   • Anxiety   • Essential hypertension   • GERD (gastroesophageal reflux disease)   • Restless leg syndrome   • Abnormal liver function test   • Allergic rhinitis   • Elevated AST (SGOT)   • Fatigue   • Hyperlipidemia   • Hypothyroidism due to Hashimoto's thyroiditis   • UTI (urinary tract infection)   • Memory loss   • Syncope   • Tachycardia   • History of migraine headaches   • Personal history of asthma        Past Medical History:   Diagnosis Date   • Anxiety    • Asthma    • GERD (gastroesophageal reflux disease)    • Hyperlipidemia    • Hypothyroidism    • Lyme disease 2016   • Migraine headache    • POTS (postural orthostatic tachycardia syndrome)    • Syncope 2016   • Tachycardia 2016        Past Surgical History:   Procedure Laterality Date   •  SECTION  2003       Visit Dx:     ICD-10-CM ICD-9-CM   1. Acute bilateral low back pain with sciatica, sciatica laterality unspecified M54.40 724.2     724.3                 PT Ortho     Row Name 07/10/18 1600       Subjective Comments    Subjective Comments She feels that she is improved since starting therapy.  She feels Astym makes a tremendous difference with relief.  -GB       Subjective Pain    Able to rate subjective pain? yes  -GB    Pre-Treatment Pain Level 5  -GB    Post-Treatment Pain Level 5  -GB       Lumbosacral Palpation    Lumbosacral Segment Left:;Tender;Guarded/taut   Minimal left rotation restricted in extension at L4-5, L5-S1  -GB       Lumbar/SI Special Tests    SI Compression Test (SI Dysfunction) Bilateral:;Negative  -GB       Leg Length Test    Apparent Unequal;Left Higher Leg   Minimal left posterior rotation of [place]lium   -GB       Head/Neck/Trunk    Trunk Extension AROM 35  -GB    Trunk Flexion AROM 60  -GB    Trunk Lt Lateral Flexion AROM 30  -GB    Trunk Rt Lateral Flexion AROM 27  -GB    Trunk Lt Rotation AROM 28  -GB    Trunk Rt Rotation AROM 25  -GB      User Key  (r) = Recorded By, (t) = Taken By, (c) = Cosigned By    Initials Name Provider Type    ANTHONY Metzger, PT Physical Therapist                                  PT OP Goals     Row Name 07/10/18 1600          PT Short Term Goals    STG Date to Achieve 06/26/18  -GB     STG 1 Patient is independent with a HEP for flexibility and initial strengthening.  -GB     STG 1 Progress Met  -GB     STG 2 Patient reports that pain is decreased by at least 25% with frequency or intensity of symptoms.  -GB     STG 2 Progress Partially Met  -GB     STG 2 Progress Comments Had been met, but status declined while away x 1+ week  -GB        Long Term Goals    LTG Date to Achieve 09/10/18  -GB     LTG 1 Patient is independent with long term HEP for stabilization and self mobilizations.  -GB     LTG 1 Progress Progressing  -GB     LTG 2 Lumbar AROM is WNL for all planes.  -GB     LTG 2 Progress Partially Met  -GB     LTG 3 Modified Oswestry score is improved by at least 15%.  -GB     LTG 3 Progress Ongoing;Not Met  -GB       User Key  (r) = Recorded By, (t) = Taken By, (c) = Cosigned By    Initials Name Provider Type    ANTHONY Metzger, PT Physical Therapist                PT Assessment/Plan     Row Name 07/10/18 1606          PT Assessment    Functional Limitations Limitation in home management;Limitations in community activities;Performance in self-care ADL;Performance in leisure activities  -     Impairments Joint mobility;Joint integrity;Pain;Range of motion;Posture;Impaired flexibility;Impaired postural alignment  -GB     Assessment Comments Mrs Lovell had been having some progress, and feels that she gets benefit from Astym.  She still has abnormal tissue texture over  lower left lumbar Paravertebrals, left Sacral border and left Gluteus Medius.  -GB     Please refer to paper survey for additional self-reported information Yes  -GB     Rehab Potential Good  -GB     Patient/caregiver participated in establishment of treatment plan and goals Yes  -GB     Patient would benefit from skilled therapy intervention Yes  -GB        PT Plan    PT Frequency 1x/week  -GB     Predicted Duration of Therapy Intervention (Therapy Eval) 8 weeks  -GB     PT Plan Comments Continue to progress with ther ex while including manual therapy.  -GB       User Key  (r) = Recorded By, (t) = Taken By, (c) = Cosigned By    Initials Name Provider Type    ANTHONY Haywardald Rashad Metzger, PT Physical Therapist                  Exercises     Row Name 07/10/18 1600 07/10/18 1500          Subjective Comments    Subjective Comments She feels that she is improved since starting therapy.  She feels Astym makes a tremendous difference with relief.  -GB  --        Subjective Pain    Able to rate subjective pain? yes  -GB  --     Pre-Treatment Pain Level 5  -GB  --     Post-Treatment Pain Level 5  -GB  --        Total Minutes    69637 - PT Therapeutic Exercise Minutes 15  -GB (time with reassessment)  --     53596 - PT Manual Therapy Minutes  -- 18  -GB       User Key  (r) = Recorded By, (t) = Taken By, (c) = Cosigned By    Initials Name Provider Type    ANTHONY Carrilloty Metgzer, PT Physical Therapist           Manual Rx (last 36 hours)      Manual Treatments     Row Name 07/10/18 1500             Total Minutes    54708 - PT Manual Therapy Minutes 18  -GB         Manual Rx 1    Manual Rx 1 Location Bilateral Lumbosacral and gluteal areas  -GB      Manual Rx 1 Type Astym from prone and semi-prone  -GB      Manual Rx 1 Duration 12  -GB         Manual Rx 2    Manual Rx 2 Location Pelvis  -GB      Manual Rx 2 Type MET with ham/quad technique for left posterior rotation  -GB      Manual Rx 2 Duration 2  -GB         Manual Rx 3    Manual  Rx 3 Location L4,5  -GB      Manual Rx 3 Type Side lying rotational glides and MET for left Piriformis  -GB      Manual Rx 3 Duration 3-4 min  -GB        User Key  (r) = Recorded By, (t) = Taken By, (c) = Cosigned By    Initials Name Provider Type    GB Panchito Metzger, PT Physical Therapist                      Outcome Measure Options: Modifed Owestry  Modified Oswestry  Modified Oswestry Score/Comments: 36%      Time Calculation:     Therapy Suggested Charges     Code   Minutes Charges    12833 (CPT®) Hc Pt Neuromusc Re Education Ea 15 Min      32281 (CPT®) Hc Pt Ther Proc Ea 15 Min 15 1    44550 (CPT®) Hc Gait Training Ea 15 Min      63407 (CPT®) Hc Pt Therapeutic Act Ea 15 Min      72074 (CPT®) Hc Pt Manual Therapy Ea 15 Min 18 1    62898 (CPT®) Hc Pt Ther Massage- Per 15 Min      20753 (CPT®) Hc Pt Iontophoresis Ea 15 Min      96280 (CPT®) Hc Pt Elec Stim Ea-Per 15 Min      16099 (CPT®) Hc Pt Ultrasound Ea 15 Min      99648 (CPT®) Hc Pt Self Care/Mgmt/Train Ea 15 Min      Total  33 2          Start Time: 1415     Therapy Charges for Today     Code Description Service Date Service Provider Modifiers Qty    00715399527 HC PT THER PROC EA 15 MIN 7/10/2018 Panchito Metzger, PT GP 1    29893161367 HC PT MANUAL THERAPY EA 15 MIN 7/10/2018 Panchito Metzger, PT GP 1          PT G-Codes  Outcome Measure Options: Modifed Owestry         Panchito Metzger, PT  7/10/2018

## 2018-07-10 NOTE — PROGRESS NOTES
Subjective   Patient ID: Dawna Lovell is a 47 y.o. female     Chief Complaint   Patient presents with   • Restless Legs Syndrome        History of Present Illness    47 y.o. female with RLS returns in follow up.  Last visit on 1/4/18 started  mg qhs and stopped Gralise.      Taking  mg qhs controlling restless sensation.  Will occassionally take 300 mg at 8 pm if sx start earlier in evenings.      Problem history:    Sx started two years ago.  Sx started suddenly.  Legs feel a fluttering in legs mainly lying down at night.  Sx improved during the day and walking.  Improved of late.  Mild intensity.  Gralise 1200 mg qhs.    Legs ache during the day when sitting still.      EMG/NCS - normal       Past Medical History:   Diagnosis Date   • Anxiety    • Asthma    • GERD (gastroesophageal reflux disease)    • Hyperlipidemia    • Hypothyroidism    • Lyme disease 05/2016   • Migraine headache    • POTS (postural orthostatic tachycardia syndrome)    • Syncope 9/27/2016   • Tachycardia 9/27/2016     Family History   Problem Relation Age of Onset   • Esophageal cancer Father    • Dementia Other      Social History     Social History   • Marital status:      Social History Main Topics   • Smoking status: Former Smoker     Types: Cigarettes     Quit date: 1995   • Smokeless tobacco: Never Used   • Alcohol use 0.6 oz/week     1 Glasses of wine per week   • Drug use: No   • Sexual activity: Defer     Other Topics Concern   • Not on file       Review of Systems   Constitutional: Positive for fatigue. Negative for activity change and unexpected weight change.   HENT: Negative for tinnitus and trouble swallowing.    Eyes: Negative for photophobia and visual disturbance.   Respiratory: Negative for apnea, cough and choking.    Cardiovascular: Negative for leg swelling.   Gastrointestinal: Negative for nausea and vomiting.   Endocrine: Negative for cold intolerance and heat intolerance.   Genitourinary:  "Negative for difficulty urinating, frequency, menstrual problem and urgency.   Musculoskeletal: Positive for myalgias. Negative for back pain, gait problem and neck pain.   Skin: Negative for color change and rash.   Allergic/Immunologic: Negative for immunocompromised state.   Neurological: Positive for numbness. Negative for dizziness, tremors, seizures, syncope, facial asymmetry, speech difficulty, weakness, light-headedness and headaches.   Hematological: Negative for adenopathy. Does not bruise/bleed easily.   Psychiatric/Behavioral: Positive for decreased concentration and sleep disturbance. Negative for behavioral problems, confusion and hallucinations.       Objective     Vitals:    07/10/18 1456   BP: 98/72   BP Location: Left arm   Patient Position: Sitting   Cuff Size: Adult   Pulse: 92   Resp: 16   Weight: 83.5 kg (184 lb)   Height: 172.7 cm (68\")       Neurologic Exam     Mental Status   Registration: recalls 3 of 3 objects. Recall at 5 minutes: recalls 3 of 3 objects. Follows 3 step commands.   Attention: normal. Concentration: normal.   Level of consciousness: alert  Knowledge: good and consistent with education.   Able to name object. Able to read. Able to repeat. Able to write. Normal comprehension.     Cranial Nerves     CN II   Visual fields full to confrontation.   Visual acuity: normal  Right visual field deficit: none  Left visual field deficit: none     CN III, IV, VI   Right pupil: Shape: regular. Reactivity: brisk. Consensual response: intact.   Left pupil: Shape: regular. Reactivity: brisk. Consensual response: intact.   Nystagmus: none   Diplopia: none  Ophthalmoparesis: none  Upgaze: normal  Downgaze: normal  Conjugate gaze: present  Vestibulo-ocular reflex: present    CN V   Facial sensation intact.   Right corneal reflex: normal  Left corneal reflex: normal    CN VII   Right facial weakness: none  Left facial weakness: none    CN VIII   Hearing: intact    CN IX, X   Palate: " symmetric  Right gag reflex: normal  Left gag reflex: normal    CN XI   Right sternocleidomastoid strength: normal  Left sternocleidomastoid strength: normal    CN XII   Tongue: not atrophic  Fasciculations: absent  Tongue deviation: none    Motor Exam   Muscle bulk: normal  Overall muscle tone: normal  Right arm tone: normal  Left arm tone: normal  Right leg tone: normal  Left leg tone: normal    Sensory Exam   Light touch normal.   Vibration normal.   Proprioception normal.   Pinprick normal.     Gait, Coordination, and Reflexes     Tremor   Resting tremor: absent  Intention tremor: absent  Action tremor: absent    Reflexes   Reflexes 2+ except as noted.       Physical Exam   Constitutional: She appears well-developed and well-nourished.   Nursing note and vitals reviewed.      Orders Only on 07/06/2018   Component Date Value Ref Range Status   • Lyme Ab IgG/IgM 07/06/2018 <0.91  0.00 - 0.90 ISR Final    Comment:                                 Negative         <0.91                                  Equivocal  0.91 - 1.09                                  Positive         >1.09           Assessment/Plan     Problem List Items Addressed This Visit        Other    Restless leg syndrome - Primary    Current Assessment & Plan     Sx controlled on  mg qhs          Relevant Medications    traMADol (ULTRAM) 50 MG tablet             No Follow-up on file.

## 2018-07-11 ENCOUNTER — OFFICE VISIT (OUTPATIENT)
Dept: ENDOCRINOLOGY | Facility: CLINIC | Age: 48
End: 2018-07-11

## 2018-07-11 VITALS
SYSTOLIC BLOOD PRESSURE: 106 MMHG | OXYGEN SATURATION: 98 % | BODY MASS INDEX: 27.74 KG/M2 | HEART RATE: 89 BPM | WEIGHT: 183 LBS | HEIGHT: 68 IN | DIASTOLIC BLOOD PRESSURE: 76 MMHG

## 2018-07-11 DIAGNOSIS — E06.3 HYPOTHYROIDISM DUE TO HASHIMOTO'S THYROIDITIS: Primary | ICD-10-CM

## 2018-07-11 DIAGNOSIS — E03.8 HYPOTHYROIDISM DUE TO HASHIMOTO'S THYROIDITIS: Primary | ICD-10-CM

## 2018-07-11 PROCEDURE — 99213 OFFICE O/P EST LOW 20 MIN: CPT | Performed by: INTERNAL MEDICINE

## 2018-07-11 NOTE — PROGRESS NOTES
"Chief Complaint   Patient presents with   • Hypothyroidism due to Hashimoto's thyroidits     Patient in today for f/u        HPI:   Dawna Lovell is a 47 y.o.female who returns to endocrine clinic for follow-up evaluation of possible Cushing's syndrome.  Last visit 01/04/2018. Her history is as follows:    Interim events:   - episode of severe back pain. Treated with prednisone and physical therapy  - Wore Holter monitor for her episodes of palpitations. No abnormal findings per pt.  - Has started Celexa for anxiety/palpitarions which is helping     1) hypothyroidism due to Hashimoto's thyroiditis:  - Diagnosed approximately 2001  - Current dose of levothyroxine 137 µg daily  - Takes tablet in the morning on an empty stomach. Waits at least 30-60 minutes before eating/hot liquids.    - Takes multivitamin in the evening  - not on biotin  - Pt did not notice any benefit when taking Brand vs generic thyroid hormone    2) h/o abnormal endocrine tests:  - Pt had random serum cortisol levels collected at various time in the day which were elevated from 11/2015 to 4/2016. However, she was on an estrogen containing OCP at the time of these collections which falsely elevated the random serum cortisols. 3PM - ACTH was 11.6. Had elevated hepatic enzymes, normal glucose  - I evaluated pt in 2016 for possible Cushing's. Evaluation off birth control proved to be negative:  ( 06/2016) 24 hour urinary cortisol: was normal at 3 mcg/24 hours  (06/2016)  Midnight salivary cortisols ([facility]): normal  #1 <0.010, #2 0.013 ( normal range <0.010 - 0.090)  (07/2016) 1 mg overnight dexamethasone suppression test: 8AM cortisol was appropriately suppressed to 0.5 (normal < 1.8)    Had pt complete 1 mg overnight dex suppression test again on 4/28/2017:   04/28/2017)  1 mg overnight dexamethasone suppression test: 8AM cortisol was appropriately suppressed to 0.4    Review of Imaging:   - CT ABD 05/02/2016 - \"showed marked inhomogeneous " "geographic appearing liver with a mosaic pattern, most typical for marked inhomogeneous geographic steatosis with focal areas of fat sparing. This pattern was noted by ultrasound on 11/10/2015.\" and normal adrenal glands  - CT Head w/o contrast 1/21/2016: \"Old lacunar infarct seen in the left basal ganglia. No acute intracranial abnormalities identified.\"    Review of Systems   Constitutional:        Weight loss since January 2018   Eyes: Negative.    Respiratory: Negative.    Cardiovascular: Positive for palpitations (occasional, better on celexa).   Gastrointestinal: Negative.  Negative for diarrhea and nausea.   Endocrine: Negative.    Genitourinary: Negative.    Musculoskeletal: Positive for myalgias. Negative for arthralgias.   Skin: Negative.    Allergic/Immunologic: Negative.    Neurological: Positive for headaches (Occasional). Negative for tremors and syncope.   Hematological: Negative.    Psychiatric/Behavioral:        Intermittent \"brain fog\"     The following portions of the patient's history were reviewed and updated as appropriate: allergies, current medications, past family history, past medical history, past social history, past surgical history and problem list.    /76   Pulse 89   Ht 172.7 cm (68\")   Wt 83 kg (183 lb)   SpO2 98%   BMI 27.83 kg/m²   Physical Exam   Constitutional: She is oriented to person, place, and time. She appears well-developed. No distress.   HENT:   Head: Normocephalic.   Mouth/Throat: Oropharynx is clear and moist.   Eyes: Conjunctivae and EOM are normal. Pupils are equal, round, and reactive to light.   Neck: No tracheal deviation present. No thyromegaly present.   No palpable thyroid nodules     Cardiovascular: Normal rate, regular rhythm and normal heart sounds.    No murmur heard.  Pulmonary/Chest: Effort normal and breath sounds normal. No respiratory distress.   Abdominal: Soft. Bowel sounds are normal. She exhibits no mass. There is no tenderness. "   Musculoskeletal: She exhibits no edema or tenderness.   Lymphadenopathy:     She has no cervical adenopathy.   Neurological: She is alert and oriented to person, place, and time. No cranial nerve deficit.   Skin: Skin is warm and dry. She is not diaphoretic. No erythema.   no dark abd striae, no hyperpigmentation, no hirsutism   A few scattered telangiectasias on her back and chest   Psychiatric: She has a normal mood and affect. Her behavior is normal.   Vitals reviewed.    LABS/IMAGING: prior records reviewed and summarized in HPI  Lab Results   Component Value Date    TSH 2.970 07/06/2018       ASSESSMENT/PLAN:  1) hypothyroidism due to Hashimoto's thyroiditis:  - clinically euthyroid on exam  - Recent TSH was WNL on this dose  - continue same dose of levothyroxine 137 mcg.   (pt has tried Brand Levoxyl 137 mcg and she noted no difference on Brand vs generic.)  - Reviewed proper levothyroxine administration, and factors to avoid that decrease medication potency and medication absorption.     RTC 6 months

## 2018-07-12 ENCOUNTER — HOSPITAL ENCOUNTER (OUTPATIENT)
Dept: PHYSICAL THERAPY | Facility: HOSPITAL | Age: 48
Setting detail: THERAPIES SERIES
Discharge: HOME | End: 2018-07-12

## 2018-07-12 DIAGNOSIS — M54.40 ACUTE BILATERAL LOW BACK PAIN WITH SCIATICA, SCIATICA LATERALITY UNSPECIFIED: Primary | ICD-10-CM

## 2018-07-12 PROCEDURE — 97140 MANUAL THERAPY 1/> REGIONS: CPT

## 2018-07-12 PROCEDURE — 97110 THERAPEUTIC EXERCISES: CPT

## 2018-07-12 NOTE — THERAPY TREATMENT NOTE
Outpatient Physical Therapy Ortho Treatment Note   [facility]     Patient Name: Dawna Lovell  : 1970  MRN: [id number]  Today's Date: 2018      Visit Date: 2018    Visit Dx:    ICD-10-CM ICD-9-CM   1. Acute bilateral low back pain with sciatica, sciatica laterality unspecified M54.40 724.2     724.3       Patient Active Problem List   Diagnosis   • Obstructive sleep apnea, adult   • Psychophysiological insomnia   • Anxiety   • Essential hypertension   • GERD (gastroesophageal reflux disease)   • Restless leg syndrome   • Abnormal liver function test   • Allergic rhinitis   • Elevated AST (SGOT)   • Fatigue   • Hyperlipidemia   • Hypothyroidism due to Hashimoto's thyroiditis   • UTI (urinary tract infection)   • Memory loss   • Syncope   • Tachycardia   • History of migraine headaches   • Personal history of asthma        Past Medical History:   Diagnosis Date   • Anxiety    • Asthma    • GERD (gastroesophageal reflux disease)    • Hyperlipidemia    • Hypothyroidism    • Lyme disease 2016   • Migraine headache    • POTS (postural orthostatic tachycardia syndrome)    • Syncope 2016   • Tachycardia 2016        Past Surgical History:   Procedure Laterality Date   •  SECTION  2003             PT Ortho     Row Name 07/10/18 1600       Subjective Comments    Subjective Comments She feels that she is improved since starting therapy.  She feels Astym makes a tremendous difference with relief.  -GB       Subjective Pain    Able to rate subjective pain? yes  -GB    Pre-Treatment Pain Level 5  -GB    Post-Treatment Pain Level 5  -GB       Lumbosacral Palpation    Lumbosacral Segment Left:;Tender;Guarded/taut   Minimal left rotation restricted in extension at L4-5, L5-S1  -GB       Lumbar/SI Special Tests    SI Compression Test (SI Dysfunction) Bilateral:;Negative  -GB       Leg Length Test    Apparent Unequal;Left Higher Leg   Minimal left posterior rotation of [place]  -GB        Head/Neck/Trunk    Trunk Extension AROM 35  -GB    Trunk Flexion AROM 60  -GB    Trunk Lt Lateral Flexion AROM 30  -GB    Trunk Rt Lateral Flexion AROM 27  -GB    Trunk Lt Rotation AROM 28  -GB    Trunk Rt Rotation AROM 25  -GB      User Key  (r) = Recorded By, (t) = Taken By, (c) = Cosigned By    Initials Name Provider Type    ANTHONY Metzger, PT Physical Therapist                            PT Assessment/Plan     Row Name 07/12/18 1557          PT Assessment    Assessment Comments She has more loealized tightenss and abnormal tissue texture over left lower lumbar paravertebrals, with left rotation resticted in extension at L5.  -GB        PT Plan    PT Plan Comments Follow up with care and resume strengthening and stabilization as pain allows.  -GB       User Key  (r) = Recorded By, (t) = Taken By, (c) = Cosigned By    Initials Name Provider Type    ANTHONY Metzger, PT Physical Therapist                    Exercises     Row Name 07/12/18 1500             Subjective Comments    Subjective Comments Dawna feels a little worse today, without known cause.    -GB         Subjective Pain    Able to rate subjective pain? yes  -GB      Pre-Treatment Pain Level 6  -GB      Post-Treatment Pain Level 6  -GB         Total Minutes    33487 - PT Therapeutic Exercise Minutes 20  -GB      16464 - PT Manual Therapy Minutes 20  -GB         Exercise 1    Exercise Name 1 NuStep at level 4  -GB      Time 1 7 min  -GB         Exercise 2    Exercise Name 2 SKTC  -GB      Sets 2 2  -GB      Reps 2 5 each  -GB         Exercise 3    Exercise Name 3 Piriformis stretch  -GB      Time 3 3-4 min  -GB         Exercise 4    Exercise Name 4 Gluteal Stretch  -GB      Time 4 3-4 min  -GB         Exercise 5    Exercise Name 5 HS stretch  -GB      Time 5 3-4 min  -GB         Exercise 6    Exercise Name 6 LTRs  -GB      Sets 6 2  -GB      Reps 6 10  -GB        User Key  (r) = Recorded By, (t) = Taken By, (c) = Cosigned By    Initials  Name Provider Type    GB Panchito Metzger, PT Physical Therapist                        Manual Rx (last 36 hours)      Manual Treatments     Row Name 07/12/18 1500             Total Minutes    66458 - PT Manual Therapy Minutes 20  -GB         Manual Rx 1    Manual Rx 1 Location Bilateral Lumbosacral and gluteal areas  -GB      Manual Rx 1 Type TDN f/b Astym and STM to left Gluteal and (B) Lumbosacral paravertebrals  -GB      Manual Rx 1 Duration 20  -GB        User Key  (r) = Recorded By, (t) = Taken By, (c) = Cosigned By    Initials Name Provider Type    GB Panchito Metzger, PT Physical Therapist                             Time Calculation:      Therapy Suggested Charges     Code   Minutes Charges    02104 (CPT®) Hc Pt Neuromusc Re Education Ea 15 Min      48839 (CPT®) Hc Pt Ther Proc Ea 15 Min 20 2    10707 (CPT®) Hc Gait Training Ea 15 Min      62558 (CPT®) Hc Pt Therapeutic Act Ea 15 Min      40322 (CPT®) Hc Pt Manual Therapy Ea 15 Min 20 1    50083 (CPT®) Hc Pt Ther Massage- Per 15 Min      29320 (CPT®) Hc Pt Iontophoresis Ea 15 Min      27747 (CPT®) Hc Pt Elec Stim Ea-Per 15 Min      33234 (CPT®) Hc Pt Ultrasound Ea 15 Min      85245 (CPT®) Hc Pt Self Care/Mgmt/Train Ea 15 Min      Total  40 3        Therapy Charges for Today     Code Description Service Date Service Provider Modifiers Qty    78087671492 HC PT THER PROC EA 15 MIN 7/12/2018 Panchito Metzger, PT GP 2    36137282824 HC PT MANUAL THERAPY EA 15 MIN 7/12/2018 Panchito Metzger, PT GP 1                    Panchito Metzger, PT  7/12/2018

## 2018-07-16 ENCOUNTER — HOSPITAL ENCOUNTER (OUTPATIENT)
Dept: PHYSICAL THERAPY | Facility: HOSPITAL | Age: 48
Setting detail: THERAPIES SERIES
Discharge: HOME | End: 2018-07-16

## 2018-07-16 DIAGNOSIS — M54.40 ACUTE BILATERAL LOW BACK PAIN WITH SCIATICA, SCIATICA LATERALITY UNSPECIFIED: Primary | ICD-10-CM

## 2018-07-16 PROCEDURE — 97140 MANUAL THERAPY 1/> REGIONS: CPT

## 2018-07-16 PROCEDURE — 97110 THERAPEUTIC EXERCISES: CPT

## 2018-07-16 NOTE — THERAPY TREATMENT NOTE
Outpatient Physical Therapy Ortho Treatment Note  [facility]     Patient Name: Dawna Lovell  : 1970  MRN: [id number]  Today's Date: 2018      Visit Date: 2018    Visit Dx:    ICD-10-CM ICD-9-CM   1. Acute bilateral low back pain with sciatica, sciatica laterality unspecified M54.40 724.2     724.3       Patient Active Problem List   Diagnosis   • Obstructive sleep apnea, adult   • Psychophysiological insomnia   • Anxiety   • Essential hypertension   • GERD (gastroesophageal reflux disease)   • Restless leg syndrome   • Abnormal liver function test   • Allergic rhinitis   • Elevated AST (SGOT)   • Fatigue   • Hyperlipidemia   • Hypothyroidism due to Hashimoto's thyroiditis   • UTI (urinary tract infection)   • Memory loss   • Syncope   • Tachycardia   • History of migraine headaches   • Personal history of asthma        Past Medical History:   Diagnosis Date   • Anxiety    • Asthma    • GERD (gastroesophageal reflux disease)    • Hyperlipidemia    • Hypothyroidism    • Lyme disease 2016   • Migraine headache    • POTS (postural orthostatic tachycardia syndrome)    • Syncope 2016   • Tachycardia 2016        Past Surgical History:   Procedure Laterality Date   •  SECTION  2003                             PT Assessment/Plan     Row Name 18 1338          PT Assessment    Assessment Comments She has less tightness, less abnormal tissue texture, and fewer trigger points.   Her pain level is also decreased.  -ANTHONY        PT Plan    PT Plan Comments Continue with care.  -ANTHONY       User Key  (r) = Recorded By, (t) = Taken By, (c) = Cosigned By    Initials Name Provider Type    ANTHONY Metzger, PT Physical Therapist                    Exercises     Row Name 18 1300 18 1200          Subjective Comments    Subjective Comments  -- She is having less pain and tightness.  -ANTHONY        Subjective Pain    Able to rate subjective pain?  -- yes  -ANTHONY      Pre-Treatment Pain Level  -- 3  -GB     Post-Treatment Pain Level  -- 3  -GB        Total Minutes    30408 - PT Therapeutic Exercise Minutes  -- 17  -GB     81883 - PT Manual Therapy Minutes 18  -GB  --        Exercise 1    Exercise Name 1  -- Recumbent  Bike  -GB     Time 1  -- 7 min  -GB        Exercise 2    Exercise Name 2  -- TRX Strap- frwd lean  -GB     Reps 2  -- 10  -GB        Exercise 3    Exercise Name 3  -- TRX Strap -Reverse leans  -GB     Reps 3  -- 10  -GB        Exercise 4    Exercise Name 4  -- TG - Deep squats  -GB     Reps 4  -- 20  -GB        Exercise 5    Exercise Name 5  -- Side lying Hip ABDuction   -GB     Reps 5  -- 15  -GB        Exercise 6    Exercise Name 6  -- Prone hip extension SLR  -GB     Reps 6  -- 10  -GB        Exercise 7    Exercise Name 7  -- Cat - Camel  -GB     Reps 7  -- 5  -GB        Exercise 8    Exercise Name 8  -- Kathie Pose  -GB     Reps 8  -- 2  -GB     Time 8  -- 30 sec each  -GB       User Key  (r) = Recorded By, (t) = Taken By, (c) = Cosigned By    Initials Name Provider Type    ANTHONY Metzger, PT Physical Therapist                        Manual Rx (last 36 hours)      Manual Treatments     Row Name 07/16/18 1300             Total Minutes    82409 - PT Manual Therapy Minutes 18  -GB         Manual Rx 1    Manual Rx 1 Location Bilateral Lumbosacral and gluteal areas  -GB      Manual Rx 1 Type TDN f/b Astym and STM to left Gluteal and (B) Lumbosacral paravertebrals  -GB      Manual Rx 1 Duration 18  -GB        User Key  (r) = Recorded By, (t) = Taken By, (c) = Cosigned By    Initials Name Provider Type    GB Panchito Rashad Metzger, PT Physical Therapist                             Time Calculation:   Start Time: 1257  Therapy Suggested Charges     Code   Minutes Charges    61814 (CPT®) Hc Pt Neuromusc Re Education Ea 15 Min      42418 (CPT®) Hc Pt Ther Proc Ea 15 Min 17 1    33841 (CPT®) Hc Gait Training Ea 15 Min      88081 (CPT®) Hc Pt Therapeutic Act Ea 15 Min       61186 (CPT®) Hc Pt Manual Therapy Ea 15 Min 18 1    88756 (CPT®) Hc Pt Ther Massage- Per 15 Min      57581 (CPT®) Hc Pt Iontophoresis Ea 15 Min      03182 (CPT®) Hc Pt Elec Stim Ea-Per 15 Min      91986 (CPT®) Hc Pt Ultrasound Ea 15 Min      76552 (CPT®) Hc Pt Self Care/Mgmt/Train Ea 15 Min      Total  35 2        Therapy Charges for Today     Code Description Service Date Service Provider Modifiers Qty    34925633976 HC PT THER PROC EA 15 MIN 7/16/2018 Panchito Metzger, PT GP 1    96772235205 HC PT MANUAL THERAPY EA 15 MIN 7/16/2018 Panchito Metzger, PT GP 1                    Panchito Metzger, PT  7/16/2018

## 2018-07-18 ENCOUNTER — APPOINTMENT (OUTPATIENT)
Dept: PHYSICAL THERAPY | Facility: HOSPITAL | Age: 48
End: 2018-07-18

## 2018-07-19 ENCOUNTER — HOSPITAL ENCOUNTER (OUTPATIENT)
Dept: PHYSICAL THERAPY | Facility: HOSPITAL | Age: 48
Setting detail: THERAPIES SERIES
Discharge: HOME | End: 2018-07-19

## 2018-07-19 DIAGNOSIS — M54.40 ACUTE BILATERAL LOW BACK PAIN WITH SCIATICA, SCIATICA LATERALITY UNSPECIFIED: Primary | ICD-10-CM

## 2018-07-19 PROCEDURE — 97110 THERAPEUTIC EXERCISES: CPT

## 2018-07-19 PROCEDURE — 97140 MANUAL THERAPY 1/> REGIONS: CPT

## 2018-07-19 NOTE — THERAPY TREATMENT NOTE
Outpatient Physical Therapy Ortho Treatment Note  [facility]     Patient Name: Dawna Lovell  : 1970  MRN: [id number]  Today's Date: 2018      Visit Date: 2018    Visit Dx:    ICD-10-CM ICD-9-CM   1. Acute bilateral low back pain with sciatica, sciatica laterality unspecified M54.40 724.2     724.3       Patient Active Problem List   Diagnosis   • Obstructive sleep apnea, adult   • Psychophysiological insomnia   • Anxiety   • Essential hypertension   • GERD (gastroesophageal reflux disease)   • Restless leg syndrome   • Abnormal liver function test   • Allergic rhinitis   • Elevated AST (SGOT)   • Fatigue   • Hyperlipidemia   • Hypothyroidism due to Hashimoto's thyroiditis   • UTI (urinary tract infection)   • Memory loss   • Syncope   • Tachycardia   • History of migraine headaches   • Personal history of asthma        Past Medical History:   Diagnosis Date   • Anxiety    • Asthma    • GERD (gastroesophageal reflux disease)    • Hyperlipidemia    • Hypothyroidism    • Lyme disease 2016   • Migraine headache    • POTS (postural orthostatic tachycardia syndrome)    • Syncope 2016   • Tachycardia 2016        Past Surgical History:   Procedure Laterality Date   •  SECTION  2003                             PT Assessment/Plan     Row Name 18 1437          PT Assessment    Assessment Comments She continues to progress toward goals with decreased pain and tightness.  -GB        PT Plan    PT Plan Comments Follow up with care.  -GB       User Key  (r) = Recorded By, (t) = Taken By, (c) = Cosigned By    Initials Name Provider Type    ANTHONY Metzger, PT Physical Therapist                    Exercises     Row Name 18 1300 18 1000          Subjective Comments    Subjective Comments  -- She is feeling better.  -GB        Subjective Pain    Able to rate subjective pain?  -- yes  -GB     Pre-Treatment Pain Level  -- 2  -GB     Post-Treatment Pain  Level  -- 2  -GB        Total Minutes    29348 - PT Therapeutic Exercise Minutes  -- 14  -GB     98474 - PT Manual Therapy Minutes 14  -GB  --        Exercise 1    Exercise Name 1  -- Recumbent  Bike  -GB     Time 1  -- 7 min  -GB        Exercise 2    Exercise Name 2  -- TRX Strap- frwd lean  -GB     Reps 2  -- 10  -GB        Exercise 3    Exercise Name 3  -- TRX Strap -Reverse leans  -GB     Reps 3  -- 10  -GB        Exercise 4    Exercise Name 4  -- Wide stance deep squat  -GB     Reps 4  -- 10  -GB        Exercise 5    Exercise Name 5  -- TRX strap supported Hip Extension   -GB     Reps 5  -- 10 each, bilaterally  -GB       User Key  (r) = Recorded By, (t) = Taken By, (c) = Cosigned By    Initials Name Provider Type    ANTHONY Metzger, PT Physical Therapist                        Manual Rx (last 36 hours)      Manual Treatments     Row Name 07/19/18 1300             Total Minutes    08301 - PT Manual Therapy Minutes 14  -GB         Manual Rx 1    Manual Rx 1 Location Bilateral Lumbosacral and gluteal areas  -GB      Manual Rx 1 Type Astym wtih TDN to left Glut medius and Min  -GB      Manual Rx 1 Duration 14  -GB        User Key  (r) = Recorded By, (t) = Taken By, (c) = Cosigned By    Initials Name Provider Type    ANTHONY Metzger, PT Physical Therapist                             Time Calculation:   Start Time: 1030  Therapy Suggested Charges     Code   Minutes Charges    53846 (CPT®) Hc Pt Neuromusc Re Education Ea 15 Min      77157 (CPT®) Hc Pt Ther Proc Ea 15 Min 14 1    47963 (CPT®) Hc Gait Training Ea 15 Min      46081 (CPT®) Hc Pt Therapeutic Act Ea 15 Min      14779 (CPT®) Hc Pt Manual Therapy Ea 15 Min 14 1    88238 (CPT®) Hc Pt Ther Massage- Per 15 Min      08153 (CPT®) Hc Pt Iontophoresis Ea 15 Min      13053 (CPT®) Hc Pt Elec Stim Ea-Per 15 Min      77251 (CPT®) Hc Pt Ultrasound Ea 15 Min      76977 (CPT®) Hc Pt Self Care/Mgmt/Train Ea 15 Min      Total  28 2        Therapy Charges for  Today     Code Description Service Date Service Provider Modifiers Qty    25830578477 HC PT MANUAL THERAPY EA 15 MIN 7/19/2018 Panchito Metzger, PT GP 1    04181939635 HC PT THER PROC EA 15 MIN 7/19/2018 Panchito Metzger, PT GP 1                    Panchito Metzger, PT  7/19/2018

## 2018-07-25 ENCOUNTER — HOSPITAL ENCOUNTER (OUTPATIENT)
Dept: PHYSICAL THERAPY | Facility: HOSPITAL | Age: 48
Setting detail: THERAPIES SERIES
Discharge: HOME | End: 2018-07-25

## 2018-07-25 DIAGNOSIS — M54.40 ACUTE BILATERAL LOW BACK PAIN WITH SCIATICA, SCIATICA LATERALITY UNSPECIFIED: Primary | ICD-10-CM

## 2018-07-25 PROCEDURE — 97032 APPL MODALITY 1+ESTIM EA 15: CPT

## 2018-07-25 PROCEDURE — 97110 THERAPEUTIC EXERCISES: CPT

## 2018-07-25 NOTE — THERAPY TREATMENT NOTE
Outpatient Physical Therapy Ortho Treatment Note  [facility]     Patient Name: Dawna Lovell  : 1970  MRN: [id number]  Today's Date: 2018      Visit Date: 2018    Visit Dx:    ICD-10-CM ICD-9-CM   1. Acute bilateral low back pain with sciatica, sciatica laterality unspecified M54.40 724.2     724.3       Patient Active Problem List   Diagnosis   • Obstructive sleep apnea, adult   • Psychophysiological insomnia   • Anxiety   • Essential hypertension   • GERD (gastroesophageal reflux disease)   • Restless leg syndrome   • Abnormal liver function test   • Allergic rhinitis   • Elevated AST (SGOT)   • Fatigue   • Hyperlipidemia   • Hypothyroidism due to Hashimoto's thyroiditis   • UTI (urinary tract infection)   • Memory loss   • Syncope   • Tachycardia   • History of migraine headaches   • Personal history of asthma        Past Medical History:   Diagnosis Date   • Anxiety    • Asthma    • GERD (gastroesophageal reflux disease)    • Hyperlipidemia    • Hypothyroidism    • Lyme disease 2016   • Migraine headache    • POTS (postural orthostatic tachycardia syndrome)    • Syncope 2016   • Tachycardia 2016        Past Surgical History:   Procedure Laterality Date   •  SECTION  2003                             PT Assessment/Plan     Row Name 18 1331          PT Assessment    Assessment Comments Change in modality used since she has had an exacerbation, in an effort to try another intervention for pain relief and muscle tightness.  -GB        PT Plan    PT Plan Comments Resume care after her return from vacation.  -GB       User Key  (r) = Recorded By, (t) = Taken By, (c) = Cosigned By    Initials Name Provider Type    ANTHONY Metzger, PT Physical Therapist                Modalities     Row Name 18 1000             Ultrasound 10183    Location Left lumbosacral and gluteal areas  -GB      Combo RX Minutes 41683 10  -GB      Duty Cycle 50  -GB       Frequency 1.0 MHz  -GB      Intensity - Wts/cm 1.2  -GB         ELECTRICAL STIMULATION    Attended/Unattended Attended  -GB      Stimulation Type Pre-Mod  -GB      Max mAmp 10.4  -GB      Location/Electrode Placement/Other Right SI  -GB      35268 - PT Electrical Stimulation (Manual) Minutes 10  -GB        User Key  (r) = Recorded By, (t) = Taken By, (c) = Cosigned By    Initials Name Provider Type    GB Panchito Metzger, PT Physical Therapist                Exercises     Row Name 07/25/18 1000             Subjective Comments    Subjective Comments Dawna feels she has had an increase in pain over past 2-3 days without known cause.  -GB         Subjective Pain    Able to rate subjective pain? yes  -GB      Pre-Treatment Pain Level 3  -GB      Post-Treatment Pain Level 2  -GB         Total Minutes    05122 - PT Therapeutic Exercise Minutes 17  -GB         Exercise 1    Exercise Name 1 NuStep at level 5  -GB      Time 1 7 min  -GB         Exercise 2    Exercise Name 2 Paloff presses  -GB      Sets 2 2  -GB      Reps 2 10  -GB      Additional Comments 3 plates  -GB         Exercise 3    Exercise Name 3 PNF diagonals  -GB      Reps 3 10 each  -GB      Additional Comments 3 plates  -GB         Exercise 4    Exercise Name 4 Low Rows  -GB      Reps 4 10  -GB      Additional Comments 3 plates  -GB         Exercise 5    Exercise Name 5 Prone hip extension  -GB      Reps 5 10 each  -GB         Exercise 6    Exercise Name 6 Supermans/superwomans  -GB      Reps 6 10  -GB        User Key  (r) = Recorded By, (t) = Taken By, (c) = Cosigned By    Initials Name Provider Type    ANTHONY Metzger, PT Physical Therapist                                            Time Calculation:   Start Time: 1030  Therapy Suggested Charges     Code   Minutes Charges    39238 (CPT®) Hc Pt Neuromusc Re Education Ea 15 Min      45477 (CPT®) Hc Pt Ther Proc Ea 15 Min 17 1    65103 (CPT®) Hc Gait Training Ea 15 Min      40928 (CPT®) Hc Pt  Therapeutic Act Ea 15 Min      08494 (CPT®) Hc Pt Manual Therapy Ea 15 Min      60011 (CPT®) Hc Pt Ther Massage- Per 15 Min      86923 (CPT®) Hc Pt Iontophoresis Ea 15 Min      10252 (CPT®) Hc Pt Elec Stim Ea-Per 15 Min 10 1    00873 (CPT®) Hc Pt Ultrasound Ea 15 Min      14165 (CPT®) Hc Pt Self Care/Mgmt/Train Ea 15 Min      Total  27 2        Therapy Charges for Today     Code Description Service Date Service Provider Modifiers Qty    70402402347 HC PT THER PROC EA 15 MIN 7/25/2018 Panchito Metzger, PT GP 1    77785571600 HC PT ELEC STIM EA-PER 15 MIN 7/25/2018 Panchito Metzger, PT GP 1                    Panchito Metzger, PT  7/25/2018

## 2018-07-31 RX ORDER — CARVEDILOL 3.12 MG/1
TABLET ORAL
Qty: 60 TABLET | Refills: 6 | OUTPATIENT
Start: 2018-07-31

## 2018-08-08 ENCOUNTER — HOSPITAL ENCOUNTER (OUTPATIENT)
Dept: PHYSICAL THERAPY | Facility: HOSPITAL | Age: 48
Setting detail: THERAPIES SERIES
Discharge: HOME | End: 2018-08-08

## 2018-08-08 DIAGNOSIS — M54.40 ACUTE BILATERAL LOW BACK PAIN WITH SCIATICA, SCIATICA LATERALITY UNSPECIFIED: Primary | ICD-10-CM

## 2018-08-08 PROCEDURE — 97140 MANUAL THERAPY 1/> REGIONS: CPT

## 2018-08-08 PROCEDURE — 97110 THERAPEUTIC EXERCISES: CPT

## 2018-08-08 NOTE — THERAPY RE-EVALUATION
Outpatient Physical Therapy Ortho Re-Assessment   [facility]     Patient Name: Dawna Lovell  : 1970  MRN: [id number]  Today's Date: 2018      Visit Date: 2018    Patient Active Problem List   Diagnosis   • Obstructive sleep apnea, adult   • Psychophysiological insomnia   • Anxiety   • Essential hypertension   • GERD (gastroesophageal reflux disease)   • Restless leg syndrome   • Abnormal liver function test   • Allergic rhinitis   • Elevated AST (SGOT)   • Fatigue   • Hyperlipidemia   • Hypothyroidism due to Hashimoto's thyroiditis   • UTI (urinary tract infection)   • Memory loss   • Syncope   • Tachycardia   • History of migraine headaches   • Personal history of asthma        Past Medical History:   Diagnosis Date   • Anxiety    • Asthma    • GERD (gastroesophageal reflux disease)    • Hyperlipidemia    • Hypothyroidism    • Lyme disease 2016   • Migraine headache    • POTS (postural orthostatic tachycardia syndrome)    • Syncope 2016   • Tachycardia 2016        Past Surgical History:   Procedure Laterality Date   •  SECTION  2003       Visit Dx:     ICD-10-CM ICD-9-CM   1. Acute bilateral low back pain with sciatica, sciatica laterality unspecified M54.40 724.2     724.3                 PT Ortho     Row Name 18 1400       Leg Length Test    Apparent Unequal;Left Higher Leg   Left posterior rotation of [place]  -GB       Head/Neck/Trunk    Trunk Extension AROM 28  -GB    Trunk Flexion AROM 54  -GB    Trunk Lt Lateral Flexion AROM 25  -GB    Trunk Rt Lateral Flexion AROM 25  -GB    Trunk Lt Rotation AROM 28  -GB    Trunk Rt Rotation AROM 25  -GB    Head/Neck/Trunk Comments Pain with movement into extension and contralateral rotation  -GB       General Assessment (Manual Muscle Testing)    General Manual Muscle Testing (MMT) Assessment no strength deficits identified  -GB      User Key  (r) = Recorded By, (t) = Taken By, (c) = Cosigned By    Initials Name  Provider Type    Panchito Fernandez, PT Physical Therapist                                  PT OP Goals     Row Name 08/08/18 1400          PT Short Term Goals    STG Date to Achieve 06/26/18  -GB     STG 1 Patient is independent with a HEP for flexibility and initial strengthening.  -GB     STG 1 Progress Met  -GB     STG 2 Patient reports that pain is decreased by at least 25% with frequency or intensity of symptoms.  -GB     STG 2 Progress Partially Met  -GB        Long Term Goals    LTG Date to Achieve 09/10/18  -GB     LTG 1 Patient is independent with long term HEP for stabilization and self mobilizations.  -GB     LTG 1 Progress Progressing  -GB     LTG 2 Lumbar AROM is WNL for all planes.  -GB     LTG 2 Progress Partially Met  -GB     LTG 3 Modified Oswestry score is improved by at least 15%.  -GB     LTG 3 Progress Ongoing;Not Met  -GB       User Key  (r) = Recorded By, (t) = Taken By, (c) = Cosigned By    Initials Name Provider Type    Panchito Fernandez, PT Physical Therapist                PT Assessment/Plan     Row Name 08/08/18 1444          PT Assessment    Functional Limitations Limitation in home management;Limitations in community activities;Performance in self-care ADL;Performance in leisure activities  -GB     Impairments Joint mobility;Joint integrity;Pain;Range of motion;Posture;Impaired flexibility;Impaired postural alignment  -GB     Assessment Comments Dawna seems to have additional tightness and facet restrictions and SI irritation, likely resulting rom prolonged travel with 5-8 hour days riding or driving for past 2 weeks.  -GB     Please refer to paper survey for additional self-reported information Yes  -GB     Rehab Potential Good  -GB     Patient/caregiver participated in establishment of treatment plan and goals Yes  -GB     Patient would benefit from skilled therapy intervention Yes  -GB        PT Plan    PT Frequency 1x/week  -GB     Predicted Duration of Therapy  Intervention (Therapy Eval) 4 weeks  -GB     Planned CPT's? PT EVAL LOW COMPLEXITY: 42699;PT RE-EVAL: 31384;PT NEUROMUSC RE-EDUCATION EA 15 MIN: 16585;PT SELF CARE/HOME MGMT/TRAIN EA 15: 74984;PT ULTRASOUND EA 15 MIN: 87502;PT MANUAL THERAPY EA 15 MIN: 01317;PT AQUATIC THERAPY EA 15 MIN: 92845;PT ELECTRICAL STIM ATTD EA 15 MIN: 75369;PT ELECTRICAL STIM UNATTEND: ;PT THER PROC EA 15 MIN: 15958  -GB     PT Plan Comments Resume ther ex in clinic and manual therapies regularly to help with restrictions.  -GB       User Key  (r) = Recorded By, (t) = Taken By, (c) = Cosigned By    Initials Name Provider Type    Panchito Fernandez, PT Physical Therapist                  Exercises     Row Name 08/08/18 1400 08/08/18 1300          Subjective Comments    Subjective Comments Dawna recognizes increased pain from prolonged travels of the past couple of weeks.  -GB  --        Subjective Pain    Able to rate subjective pain? yes  -GB  --     Pre-Treatment Pain Level 4  -GB  --     Post-Treatment Pain Level 4  -GB  --        Total Minutes    86142 - PT Therapeutic Exercise Minutes 18  -GB  --     10807 - PT Manual Therapy Minutes  -- 20  -GB        Exercise 1    Exercise Name 1 Rec Bike  -GB  --     Time 1 8 min  -GB  --        Exercise 2    Exercise Name 2 Reassessment  -GB  --     Time 2 10 min  -GB  --       User Key  (r) = Recorded By, (t) = Taken By, (c) = Cosigned By    Initials Name Provider Type    Panchito Fernandez, PT Physical Therapist           Manual Rx (last 36 hours)      Manual Treatments     Row Name 08/08/18 1300             Total Minutes    07957 - PT Manual Therapy Minutes 20  -GB         Manual Rx 1    Manual Rx 1 Location Bilateral Lumbosacral and gluteal areas  -GB      Manual Rx 1 Type Astym wtih TDN to left Glut medius and Min  -GB      Manual Rx 1 Duration 16  -GB         Manual Rx 2    Manual Rx 2 Location Pelvis  -GB      Manual Rx 2 Type Ham/quad for left posterior rotation  -GB       Manual Rx 2 Duration 2 min  -GB         Manual Rx 3    Manual Rx 3 Location Left L5  -GB      Manual Rx 3 Type Side lying MET for left rotation at L5  -GB      Manual Rx 3 Duration 2 min  -GB        User Key  (r) = Recorded By, (t) = Taken By, (c) = Cosigned By    Initials Name Provider Type    Panchito Fernandez, PT Physical Therapist                      Outcome Measure Options: Modifed Owestry  Modified Oswestry  Modified Oswestry Score/Comments: 30%      Time Calculation:     Therapy Suggested Charges     Code   Minutes Charges    21240 (CPT®) Hc Pt Neuromusc Re Education Ea 15 Min      13328 (CPT®) Hc Pt Ther Proc Ea 15 Min 18 1    82073 (CPT®) Hc Gait Training Ea 15 Min      99725 (CPT®) Hc Pt Therapeutic Act Ea 15 Min      91340 (CPT®) Hc Pt Manual Therapy Ea 15 Min 20 2    02613 (CPT®) Hc Pt Ther Massage- Per 15 Min      78586 (CPT®) Hc Pt Iontophoresis Ea 15 Min      01278 (CPT®) Hc Pt Elec Stim Ea-Per 15 Min      84560 (CPT®) Hc Pt Ultrasound Ea 15 Min      68342 (CPT®) Hc Pt Self Care/Mgmt/Train Ea 15 Min      Total  38 3                Therapy Charges for Today     Code Description Service Date Service Provider Modifiers Qty    44377771011 HC PT THER PROC EA 15 MIN 8/8/2018 Panchito Metzger, PT GP 1    77643054725 HC PT MANUAL THERAPY EA 15 MIN 8/8/2018 Panchito Metzger, PT GP 2          PT G-Codes  Outcome Measure Options: Modifed Owestry         Panchito Metzger, PT  8/8/2018

## 2018-08-13 ENCOUNTER — HOSPITAL ENCOUNTER (OUTPATIENT)
Dept: PHYSICAL THERAPY | Facility: HOSPITAL | Age: 48
Setting detail: THERAPIES SERIES
Discharge: HOME | End: 2018-08-13

## 2018-08-13 DIAGNOSIS — M54.40 ACUTE BILATERAL LOW BACK PAIN WITH SCIATICA, SCIATICA LATERALITY UNSPECIFIED: Primary | ICD-10-CM

## 2018-08-13 DIAGNOSIS — J30.1 SEASONAL ALLERGIC RHINITIS DUE TO POLLEN, UNSPECIFIED CHRONICITY: ICD-10-CM

## 2018-08-13 PROCEDURE — 97140 MANUAL THERAPY 1/> REGIONS: CPT

## 2018-08-13 RX ORDER — CARVEDILOL 3.12 MG/1
TABLET ORAL
Qty: 60 TABLET | Refills: 6 | OUTPATIENT
Start: 2018-08-13

## 2018-08-13 NOTE — THERAPY TREATMENT NOTE
Outpatient Physical Therapy Ortho Treatment Note  [facility]     Patient Name: Dawna Lovell  : 1970  MRN: [id number]  Today's Date: 2018      Visit Date: 2018    Visit Dx:    ICD-10-CM ICD-9-CM   1. Acute bilateral low back pain with sciatica, sciatica laterality unspecified M54.40 724.2     724.3       Patient Active Problem List   Diagnosis   • Obstructive sleep apnea, adult   • Psychophysiological insomnia   • Anxiety   • Essential hypertension   • GERD (gastroesophageal reflux disease)   • Restless leg syndrome   • Abnormal liver function test   • Allergic rhinitis   • Elevated AST (SGOT)   • Fatigue   • Hyperlipidemia   • Hypothyroidism due to Hashimoto's thyroiditis   • UTI (urinary tract infection)   • Memory loss   • Syncope   • Tachycardia   • History of migraine headaches   • Personal history of asthma        Past Medical History:   Diagnosis Date   • Anxiety    • Asthma    • GERD (gastroesophageal reflux disease)    • Hyperlipidemia    • Hypothyroidism    • Lyme disease 2016   • Migraine headache    • POTS (postural orthostatic tachycardia syndrome)    • Syncope 2016   • Tachycardia 2016        Past Surgical History:   Procedure Laterality Date   •  SECTION  2003                             PT Assessment/Plan     Row Name 18 1015          PT Assessment    Assessment Comments Dawna has less tightness today in lumbar paravertebrals, and seems to have significant benefit from Astym to left gluteal areas.  Keeping in line with Astym protocols, only one more Astym session will be performed in this series.  -GB        PT Plan    PT Plan Comments Conclude Astym and instruct in other ther ex options.  -ANTHONY       User Key  (r) = Recorded By, (t) = Taken By, (c) = Cosigned By    Initials Name Provider Type    Panchito Fernandez, PT Physical Therapist                    Exercises     Row Name 18 1000 18 0900          Subjective Comments     Subjective Comments She is about the same, but after visit, feels 'much better'.  -GB  --        Subjective Pain    Able to rate subjective pain? yes  -GB  --     Pre-Treatment Pain Level 4  -GB  --     Post-Treatment Pain Level 2  -GB  --        Total Minutes    17006 - PT Therapeutic Exercise Minutes 5  -GB  --     62353 - PT Manual Therapy Minutes  -- 22  -GB        Exercise 1    Exercise Name 1 Recumbent bike  -GB  --     Time 1 7 min  -GB  --     Additional Comments during biking, discussion in POC and review of HEP performed  -GB  --       User Key  (r) = Recorded By, (t) = Taken By, (c) = Cosigned By    Initials Name Provider Type    Panchito Fernandez PT Physical Therapist                        Manual Rx (last 36 hours)      Manual Treatments     Row Name 08/13/18 0900             Total Minutes    02667 - PT Manual Therapy Minutes 22  -GB         Manual Rx 1    Manual Rx 1 Location Bilateral Lumbosacral and gluteal areas  -GB      Manual Rx 1 Type Astym wtih TDN to left Glut medius and Min  -GB      Manual Rx 1 Duration 20  -GB         Manual Rx 3    Manual Rx 3 Location Left L5  -GB      Manual Rx 3 Type Side lying MET for left rotation at L5  -GB      Manual Rx 3 Duration 2 min  -GB        User Key  (r) = Recorded By, (t) = Taken By, (c) = Cosigned By    Initials Name Provider Type    Panchito Fernandez, PT Physical Therapist                             Time Calculation:   Start Time: 0945  Therapy Suggested Charges     Code   Minutes Charges    95637 (CPT®)  Pt Neuromusc Re Education Ea 15 Min      02783 (CPT®) Hc Pt Ther Proc Ea 15 Min 5     46468 (CPT®) Hc Gait Training Ea 15 Min      03801 (CPT®) Hc Pt Therapeutic Act Ea 15 Min      43826 (CPT®) Hc Pt Manual Therapy Ea 15 Min 22 2    76062 (CPT®) Hc Pt Ther Massage- Per 15 Min      21176 (CPT®) Hc Pt Iontophoresis Ea 15 Min      46202 (CPT®) Hc Pt Elec Stim Ea-Per 15 Min      21236 (CPT®)  Pt Ultrasound Ea 15 Min      06843 (CPT®)   Pt Self Care/Mgmt/Train Ea 15 Min      Total  27 2        Therapy Charges for Today     Code Description Service Date Service Provider Modifiers Qty    30902341819 HC PT MANUAL THERAPY EA 15 MIN 8/13/2018 Panchito Metzger, PT GP 2                    Panchito Metzger, PT  8/13/2018

## 2018-08-14 RX ORDER — MONTELUKAST SODIUM 10 MG/1
TABLET ORAL
Qty: 30 TABLET | Refills: 3 | Status: SHIPPED | OUTPATIENT
Start: 2018-08-14 | End: 2018-12-03 | Stop reason: SDUPTHER

## 2018-08-15 ENCOUNTER — HOSPITAL ENCOUNTER (OUTPATIENT)
Dept: PHYSICAL THERAPY | Facility: HOSPITAL | Age: 48
Setting detail: THERAPIES SERIES
Discharge: HOME | End: 2018-08-15

## 2018-08-15 DIAGNOSIS — M54.40 ACUTE BILATERAL LOW BACK PAIN WITH SCIATICA, SCIATICA LATERALITY UNSPECIFIED: Primary | ICD-10-CM

## 2018-08-15 PROCEDURE — 97110 THERAPEUTIC EXERCISES: CPT

## 2018-08-15 PROCEDURE — 97140 MANUAL THERAPY 1/> REGIONS: CPT

## 2018-08-15 NOTE — THERAPY TREATMENT NOTE
Outpatient Physical Therapy Ortho Treatment Note  [facility]     Patient Name: Dawna Lovell  : 1970  MRN: [id number]  Today's Date: 8/15/2018      Visit Date: 08/15/2018    Visit Dx:    ICD-10-CM ICD-9-CM   1. Acute bilateral low back pain with sciatica, sciatica laterality unspecified M54.40 724.2     724.3       Patient Active Problem List   Diagnosis   • Obstructive sleep apnea, adult   • Psychophysiological insomnia   • Anxiety   • Essential hypertension   • GERD (gastroesophageal reflux disease)   • Restless leg syndrome   • Abnormal liver function test   • Allergic rhinitis   • Elevated AST (SGOT)   • Fatigue   • Hyperlipidemia   • Hypothyroidism due to Hashimoto's thyroiditis   • UTI (urinary tract infection)   • Memory loss   • Syncope   • Tachycardia   • History of migraine headaches   • Personal history of asthma        Past Medical History:   Diagnosis Date   • Anxiety    • Asthma    • GERD (gastroesophageal reflux disease)    • Hyperlipidemia    • Hypothyroidism    • Lyme disease 2016   • Migraine headache    • POTS (postural orthostatic tachycardia syndrome)    • Syncope 2016   • Tachycardia 2016        Past Surgical History:   Procedure Laterality Date   •  SECTION  2003                             PT Assessment/Plan     Row Name 08/15/18 1438          PT Assessment    Assessment Comments Dawna has slightly increased tightness of right lumbar paravertebrals and ongoing tightness, but with less trigger points of left Gluteal areas.  -GB        PT Plan    PT Plan Comments Follow up with care.  -ANTHONY       User Key  (r) = Recorded By, (t) = Taken By, (c) = Cosigned By    Initials Name Provider Type    Panchito Fernandez, PT Physical Therapist                    Exercises     Row Name 08/15/18 1300 08/15/18 1000          Subjective Comments    Subjective Comments  -- She is doing a little better today.  -ANTHONY        Subjective Pain    Able to rate subjective  pain?  -- yes  -GB     Pre-Treatment Pain Level  -- 3  -GB     Post-Treatment Pain Level  -- 2  -GB        Total Minutes    02512 - PT Therapeutic Exercise Minutes  -- 25  -GB     15234 - PT Manual Therapy Minutes 18  -GB  --        Exercise 1    Exercise Name 1  -- NuStep at level 5  -GB     Time 1  -- 7 min  -GB        Exercise 2    Exercise Name 2  -- Pilates series with legs on gym ball:  100, ab presses, oblique presses, short arc bridges, dig and lifts, LTRs,   -GB     Reps 2  -- 10 reps each  -GB     Additional Comments  -- med gym ball  -GB        Exercise 3    Exercise Name 3  -- SKTC  -GB     Reps 3  -- 5  -GB        Exercise 4    Exercise Name 4  -- Seated gym ball roll outs  -GB     Reps 4  -- 10  -GB        Exercise 5    Exercise Name 5  -- Long sitting HS stretch  -GB     Time 5  -- 2 min  -GB        Exercise 6    Exercise Name 6  -- Quadruped roll outs  -GB     Reps 6  -- 10  -GB        Exercise 7    Exercise Name 7  -- Wall squats vs gym ball  -GB     Reps 7  -- 5  -GB     Time 7  -- 10 sec each  -GB       User Key  (r) = Recorded By, (t) = Taken By, (c) = Cosigned By    Initials Name Provider Type    Panchito Fernandez, SYED Physical Therapist                        Manual Rx (last 36 hours)      Manual Treatments     Row Name 08/15/18 1300             Total Minutes    61600 - PT Manual Therapy Minutes 18  -GB         Manual Rx 1    Manual Rx 1 Location Bilateral Lumbosacral and gluteal areas  -GB      Manual Rx 1 Type Astym wtih TDN to left Glut medius and Min  -GB      Manual Rx 1 Duration 18  -GB        User Key  (r) = Recorded By, (t) = Taken By, (c) = Cosigned By    Initials Name Provider Type    Panchito Fernandez PT Physical Therapist                             Time Calculation:   Start Time: 0945  Therapy Suggested Charges     Code   Minutes Charges    24691 (CPT®) Hc Pt Neuromusc Re Education Ea 15 Min      65990 (CPT®) Hc Pt Ther Proc Ea 15 Min 25 2    98238 (CPT®) Hc Gait  Training Ea 15 Min      30398 (CPT®) Hc Pt Therapeutic Act Ea 15 Min      36432 (CPT®) Hc Pt Manual Therapy Ea 15 Min 18 1    45243 (CPT®) Hc Pt Ther Massage- Per 15 Min      17074 (CPT®) Hc Pt Iontophoresis Ea 15 Min      32359 (CPT®) Hc Pt Elec Stim Ea-Per 15 Min      60775 (CPT®) Hc Pt Ultrasound Ea 15 Min      31492 (CPT®) Hc Pt Self Care/Mgmt/Train Ea 15 Min      49122 (CPT®) Hc Pt Prosthetic (S) Train Initial Encounter, Each 15 Min      26162 (CPT®) Hc Orthotic(S) Mgmt/Train Initial Encounter, Each 15min      78048 (CPT®) Hc Pt Aquatic Therapy Ea 15 Min      04575 (CPT®) Hc Pt Orthotic(S)/Prosthetic(S) Encounter, Each 15 Min      Total  43 3        Therapy Charges for Today     Code Description Service Date Service Provider Modifiers Qty    70236467649 HC PT THER PROC EA 15 MIN 8/15/2018 Panchito Metzger, PT GP 2    55420661337 HC PT MANUAL THERAPY EA 15 MIN 8/15/2018 Panchito Metzger, PT GP 1                    Panchito Metzger, PT  8/15/2018

## 2018-08-20 ENCOUNTER — OFFICE VISIT (OUTPATIENT)
Dept: CARDIOLOGY | Facility: CLINIC | Age: 48
End: 2018-08-20

## 2018-08-20 VITALS
HEART RATE: 85 BPM | WEIGHT: 186.2 LBS | DIASTOLIC BLOOD PRESSURE: 74 MMHG | BODY MASS INDEX: 28.22 KG/M2 | SYSTOLIC BLOOD PRESSURE: 110 MMHG | HEIGHT: 68 IN

## 2018-08-20 DIAGNOSIS — G90.A POTS (POSTURAL ORTHOSTATIC TACHYCARDIA SYNDROME): Primary | ICD-10-CM

## 2018-08-20 DIAGNOSIS — R00.0 TACHYCARDIA: ICD-10-CM

## 2018-08-20 DIAGNOSIS — I10 ESSENTIAL HYPERTENSION: ICD-10-CM

## 2018-08-20 PROCEDURE — 93000 ELECTROCARDIOGRAM COMPLETE: CPT | Performed by: NURSE PRACTITIONER

## 2018-08-20 PROCEDURE — 99213 OFFICE O/P EST LOW 20 MIN: CPT | Performed by: NURSE PRACTITIONER

## 2018-08-20 NOTE — PROGRESS NOTES
Subjective:   Dawna Lovell  1970    [phone] (work)      2018    [facility] CARDIOLOGY    [place], Tc Martinez MD  [address]  [place] [address]        Patient ID: Dawna Lovell is a 47 y.o. female.    Chief Complaint:   Chief Complaint   Patient presents with   • Hypertension   • Palpitations     Problem List:  1. Syncope:  a. Patient experienced first syncopal episode in late 2015. No prodromal symptoms. Patient wore a 24-hour Holter monitor that revealed rare PVCs and PACs, one 5-beat run of atrial tachycardia. Minimum heart rate of 76 beats per minute, maximum heart rate was 162 beats per minute.  b. Tilt table test did not elicit any symptoms. Patient's heart rate did increase following Isuprel.   c. Event monitor from 2016-2016, showed normal sinus rhythm. There were multiple episodes of the patient having symptoms of fluttering, dizziness, fatigue, or shortness of breath, which correlated all with normal sinus rhythm. There were no atrial or ventricular arrhythmias and no significant ectopy noted on any event monitor recordings.   d. Patient has seen neurology in the recent past and also needs to see endocrinology in the near future.   2. Tachycardia.   3. Hyperlipidemia.   4. Hypothyroidism, currently on replacement therapy.   5. Anxiety.   6. GERD.  7. Thyroid disorder.   8. Surgical history:  a. History of .        Allergies   Allergen Reactions   • Morphine And Related Itching       Current Outpatient Prescriptions:   •  ALPRAZolam (XANAX) 1 MG tablet, Take 1 tablet by mouth 2 (Two) Times a Day As Needed for Anxiety., Disp: 60 tablet, Rfl: 0  •  azelastine (OPTIVAR) 0.05 % ophthalmic solution, instill 1 drop IN BOTH EYES every 12 hours, Disp: 6 mL, Rfl: 1  •  CAMRESE LO 0.1-0.02 & 0.01 MG tablet, take 1 tablet by mouth once daily until finished, Disp: , Rfl: 0  •  carvedilol (COREG) 6.25 MG  tablet, take 1 tablet by mouth twice a day with food, Disp: 60 tablet, Rfl: 5  •  citalopram (CeleXA) 10 MG tablet, take 1 tablet by mouth once daily, Disp: 30 tablet, Rfl: 3  •  fluticasone (FLONASE) 50 MCG/ACT nasal spray, 1 spray into each nostril Daily., Disp: 16 mL, Rfl: 2  •  gabapentin (NEURONTIN) 300 MG capsule, Take 1 capsule by mouth Every Night., Disp: 30 capsule, Rfl: 5  •  hydrOXYzine (ATARAX) 25 MG tablet, TAKE 1 TABLET Twice daily PRN, Disp: 40 tablet, Rfl: 1  •  levothyroxine (SYNTHROID, LEVOTHROID) 137 MCG tablet, Take 1 tablet by mouth Daily., Disp: 30 tablet, Rfl: 11  •  loratadine (CLARITIN) 10 MG tablet, Take 1 tablet by mouth Daily., Disp: 30 tablet, Rfl: 3  •  methocarbamol (ROBAXIN) 500 MG tablet, Take 1 tablet by mouth 3 (Three) Times a Day As Needed for Muscle Spasms., Disp: 30 tablet, Rfl: 0  •  montelukast (SINGULAIR) 10 MG tablet, take 1 tablet by mouth at bedtime, Disp: 30 tablet, Rfl: 3  •  Multiple Vitamins-Minerals (MULTIVITAMIN ADULT PO), Take  by mouth Daily., Disp: , Rfl:   •  SUMAtriptan (IMITREX) 25 MG tablet, As Needed., Disp: , Rfl: 0  •  traMADol (ULTRAM) 50 MG tablet, Take 1 tablet by mouth Every 8 (Eight) Hours As Needed for Moderate Pain ., Disp: 60 tablet, Rfl: 2    Current Facility-Administered Medications:   •  cyanocobalamin injection 1,000 mcg, 1,000 mcg, Intramuscular, Q28 Days, Tc Alejandro MD, 1,000 mcg at 03/09/18 1127    History of Present Illness: Ms. Lovell sunshine  48 y/o female with the above noted past medical history. She presents today for follow up on POTS, syncope. At last office visit she was complaining of increased palpitations. She wore an event monitor which was unrevealing. She states now her palpitations are much improved. No further episodes of syncope. No issues with chest pain, shortness of breath, fevers, chills, night sweats, PND, orthopnea . No recent ER visits or hospital stays.      The following portions of the patient's history  "were reviewed and updated as appropriate: allergies, current medications, past family history, past medical history, past social history, past surgical history and problem list.    ROS   14 point ROS negative except as outlined in problem list, HPI and other parts of the note.      ECG 12 Lead  Date/Time: 8/20/2018 12:21 PM  Performed by: ANNE-MARIE GOOD  Authorized by: ANNE-MARIE GOOD   Comparison: compared with previous ECG from 2/15/2018  Rhythm: sinus rhythm  BPM: 85                 Objective:       Vitals:    08/20/18 0948   BP: 110/74   BP Location: Left arm   Patient Position: Sitting   Pulse: 85   Weight: 84.5 kg (186 lb 3.2 oz)   Height: 172.7 cm (68\")     Body mass index is 28.31 kg/m².    Physical Exam:  GENERAL: Well-developed, well-nourished patient in no acute distress.  HEENT: Normocephalic, atraumatic, PERRLA. Moist mucous membranes.  NECK: No JVD present at 30°. No carotid bruits auscultated.  LUNGS: Clear to auscultation. Non labored.   CARDIOVASCULAR: Regular rate and rhythm. No murmurs, gallops or rubs noted.   ABDOMEN: Soft, nontender. Non-distended. positive bowel sounds.  MUSCULOSKELETAL: No gross deformities. No clubbing, cyanosis, or lower extremity edema.  SKIN: Pink, warm  Neuro: No gross neurologic deficits  Ext: No edema or bruising    The patient's old records including ambulatory rhythm recordings (ECGs, Holter/event monitor) were reviewed and discussed.      Lab Review:   Results for orders placed or performed in visit on 07/06/18   Lyme, Total Antibody Test / Reflex   Result Value Ref Range    Lyme Ab IgG/IgM <0.91 0.00 - 0.90 ISR           Diagnosis:   1. POTS (postural orthostatic tachycardia syndrome)  2. Tachycardia  3. Essential hypertension  Assessment & Plan:   1) POTS/Syncope/Tachycardia: No further episodes of tachycardia, palpitations or syncope. Recent event monitor unrevealing. Continue current therapy with BB.    2) HTN, controlled  Continue current " medications    Follow up in 6 months.       SANGEETHA [facility]  8/20/2018  12:21 PM

## 2018-08-22 ENCOUNTER — HOSPITAL ENCOUNTER (OUTPATIENT)
Dept: PHYSICAL THERAPY | Facility: HOSPITAL | Age: 48
Setting detail: THERAPIES SERIES
Discharge: HOME | End: 2018-08-22

## 2018-08-22 DIAGNOSIS — M54.40 ACUTE BILATERAL LOW BACK PAIN WITH SCIATICA, SCIATICA LATERALITY UNSPECIFIED: Primary | ICD-10-CM

## 2018-08-22 PROCEDURE — 97032 APPL MODALITY 1+ESTIM EA 15: CPT

## 2018-08-22 PROCEDURE — 97110 THERAPEUTIC EXERCISES: CPT

## 2018-08-22 NOTE — THERAPY TREATMENT NOTE
Outpatient Physical Therapy Ortho Treatment Note  [facility]     Patient Name: Dawna Lovell  : 1970  MRN: [id number]  Today's Date: 2018      Visit Date: 2018    Visit Dx:    ICD-10-CM ICD-9-CM   1. Acute bilateral low back pain with sciatica, sciatica laterality unspecified M54.40 724.2     724.3       Patient Active Problem List   Diagnosis   • Obstructive sleep apnea, adult   • Psychophysiological insomnia   • Anxiety   • Essential hypertension   • GERD (gastroesophageal reflux disease)   • Restless leg syndrome   • Abnormal liver function test   • Allergic rhinitis   • Elevated AST (SGOT)   • Fatigue   • Hyperlipidemia   • Hypothyroidism due to Hashimoto's thyroiditis   • UTI (urinary tract infection)   • Memory loss   • Syncope   • Tachycardia   • History of migraine headaches   • Personal history of asthma   • POTS (postural orthostatic tachycardia syndrome)        Past Medical History:   Diagnosis Date   • Anxiety    • Asthma    • GERD (gastroesophageal reflux disease)    • Hyperlipidemia    • Hypothyroidism    • Lyme disease 2016   • Migraine headache    • POTS (postural orthostatic tachycardia syndrome)    • Syncope 2016   • Tachycardia 2016        Past Surgical History:   Procedure Laterality Date   •  SECTION  2003                             PT Assessment/Plan     Row Name 18 1024          PT Assessment    Assessment Comments Patient has relief with combination US/E-stim.  She does well with various exercises for stabilization.  -GB        PT Plan    PT Plan Comments Continue with care.  -GB       User Key  (r) = Recorded By, (t) = Taken By, (c) = Cosigned By    Initials Name Provider Type    Panchito Fernandez, PT Physical Therapist                Modalities     Row Name 18 0900             Subjective Pain    Post-Treatment Pain Level 3  -GB         ELECTRICAL STIMULATION    Attended/Unattended Attended  -GB      Stimulation Type  Pre-Mod  -GB      Max mAmp 14.5  -GB      Location/Electrode Placement/Other Right Glut Med  -GB      42801 - PT Electrical Stimulation (Manual) Minutes 9  -GB        User Key  (r) = Recorded By, (t) = Taken By, (c) = Cosigned By    Initials Name Provider Type    Panchito Fernandez, PT Physical Therapist                Exercises     Row Name 08/22/18 0900             Subjective Comments    Subjective Comments She is having good days and bad days.  -GB         Subjective Pain    Able to rate subjective pain? yes  -GB      Pre-Treatment Pain Level --   6.75  -GB      Post-Treatment Pain Level 3  -GB         Total Minutes    47241 - PT Therapeutic Exercise Minutes 18  -GB         Exercise 1    Exercise Name 1 NuStep at level 5  -GB      Time 1 7 min  -GB         Exercise 2    Exercise Name 2 Rows  -GB      Reps 2 10  -GB      Additional Comments 3 plates  -GB         Exercise 3    Exercise Name 3 Low Rows  -GB      Reps 3 10  -GB      Additional Comments 3 plates  -GB         Exercise 4    Exercise Name 4 TRX - Forward leans  -GB      Reps 4 10  -GB         Exercise 5    Exercise Name 5 TRX - Reverse leans  -GB      Reps 5 10  -GB         Exercise 6    Exercise Name 6 Quadruped roll outs  -GB      Reps 6 10  -GB         Exercise 7    Exercise Name 7 Wall squats vs gym ball  -GB      Reps 7 5  -GB      Time 7 10 sec each  -GB        User Key  (r) = Recorded By, (t) = Taken By, (c) = Cosigned By    Initials Name Provider Type    Panchito Fernandez, PT Physical Therapist                                            Time Calculation:   Start Time: 0945  Therapy Suggested Charges     Code   Minutes Charges    42241 (CPT®) Hc Pt Neuromusc Re Education Ea 15 Min      43567 (CPT®) Hc Pt Ther Proc Ea 15 Min 18 1    58619 (CPT®) Hc Gait Training Ea 15 Min      02687 (CPT®) Hc Pt Therapeutic Act Ea 15 Min      09584 (CPT®) Hc Pt Manual Therapy Ea 15 Min      26395 (CPT®) Hc Pt Ther Massage- Per 15 Min      63140 (CPT®) Hc  Pt Iontophoresis Ea 15 Min      07168 (CPT®) Hc Pt Elec Stim Ea-Per 15 Min 9 1    08805 (CPT®) Hc Pt Ultrasound Ea 15 Min      85383 (CPT®) Hc Pt Self Care/Mgmt/Train Ea 15 Min      92892 (CPT®) Hc Pt Prosthetic (S) Train Initial Encounter, Each 15 Min      37255 (CPT®) Hc Orthotic(S) Mgmt/Train Initial Encounter, Each 15min      74926 (CPT®) Hc Pt Aquatic Therapy Ea 15 Min      82291 (CPT®) Hc Pt Orthotic(S)/Prosthetic(S) Encounter, Each 15 Min      Total  27 2        Therapy Charges for Today     Code Description Service Date Service Provider Modifiers Qty    36630576680 HC PT THER PROC EA 15 MIN 8/22/2018 Panchito Metzger, PT GP 1    79678754692 HC PT ELEC STIM EA-PER 15 MIN 8/22/2018 Panchito Metzger, PT GP 1                    Panchito Metzger, PT  8/22/2018

## 2018-08-27 ENCOUNTER — HOSPITAL ENCOUNTER (OUTPATIENT)
Dept: PHYSICAL THERAPY | Facility: HOSPITAL | Age: 48
Setting detail: THERAPIES SERIES
Discharge: HOME | End: 2018-08-27

## 2018-08-27 DIAGNOSIS — M54.40 ACUTE BILATERAL LOW BACK PAIN WITH SCIATICA, SCIATICA LATERALITY UNSPECIFIED: Primary | ICD-10-CM

## 2018-08-27 PROCEDURE — 97032 APPL MODALITY 1+ESTIM EA 15: CPT

## 2018-08-27 NOTE — THERAPY TREATMENT NOTE
Outpatient Physical Therapy Ortho Treatment Note  [facility]     Patient Name: Dawna Lovell  : 1970  MRN: [id number]  Today's Date: 2018      Visit Date: 2018    Visit Dx:    ICD-10-CM ICD-9-CM   1. Acute bilateral low back pain with sciatica, sciatica laterality unspecified M54.40 724.2     724.3       Patient Active Problem List   Diagnosis   • Obstructive sleep apnea, adult   • Psychophysiological insomnia   • Anxiety   • Essential hypertension   • GERD (gastroesophageal reflux disease)   • Restless leg syndrome   • Abnormal liver function test   • Allergic rhinitis   • Elevated AST (SGOT)   • Fatigue   • Hyperlipidemia   • Hypothyroidism due to Hashimoto's thyroiditis   • UTI (urinary tract infection)   • Memory loss   • Syncope   • Tachycardia   • History of migraine headaches   • Personal history of asthma   • POTS (postural orthostatic tachycardia syndrome)        Past Medical History:   Diagnosis Date   • Anxiety    • Asthma    • GERD (gastroesophageal reflux disease)    • Hyperlipidemia    • Hypothyroidism    • Lyme disease 2016   • Migraine headache    • POTS (postural orthostatic tachycardia syndrome)    • Syncope 2016   • Tachycardia 2016        Past Surgical History:   Procedure Laterality Date   •  SECTION  2003                             PT Assessment/Plan     Row Name 18 1057          PT Assessment    Assessment Comments Patient presents with more tenderness over mid segments of Lumbar paravertebrals.  -GB        PT Plan    PT Plan Comments Follow up with care.  -GB       User Key  (r) = Recorded By, (t) = Taken By, (c) = Cosigned By    Initials Name Provider Type    Panchito Fernandez, PT Physical Therapist                Modalities     Row Name 18 1000             Ultrasound 88187    Combo RX Minutes 86820 10  -GB      Duty Cycle 50  -GB      Frequency 1.0 MHz  -GB      Intensity - Wts/cm 1.4  -GB         ELECTRICAL  STIMULATION    Attended/Unattended Attended  -GB      Stimulation Type Pre-Mod  -GB      Max mAmp 14.5  -GB      Location/Electrode Placement/Other Right Glut Med  -GB      64892 - PT Electrical Stimulation (Manual) Minutes 10  -GB        User Key  (r) = Recorded By, (t) = Taken By, (c) = Cosigned By    Initials Name Provider Type    Panchito Fernandez, PT Physical Therapist                Exercises     Row Name 08/27/18 1000             Subjective Comments    Subjective Comments She has slightly increased pain over past couple of days.  -GB         Subjective Pain    Able to rate subjective pain? yes  -GB      Pre-Treatment Pain Level 4  -GB      Post-Treatment Pain Level 3  -GB         Total Minutes    52414 - PT Therapeutic Exercise Minutes 2  -GB         Exercise 1    Exercise Name 1 NuStep at level 5  -GB      Time 1 8 min  -GB      Additional Comments Other than time discussing HEP, time on nustep used as warm up and not applied toward minutes  -GB        User Key  (r) = Recorded By, (t) = Taken By, (c) = Cosigned By    Initials Name Provider Type    Panchito Fernandez, PT Physical Therapist                                            Time Calculation:   Start Time: 1021  Therapy Suggested Charges     Code   Minutes Charges    79219 (CPT®) Hc Pt Neuromusc Re Education Ea 15 Min      21136 (CPT®) Hc Pt Ther Proc Ea 15 Min 2     08705 (CPT®) Hc Gait Training Ea 15 Min      82289 (CPT®) Hc Pt Therapeutic Act Ea 15 Min      93584 (CPT®) Hc Pt Manual Therapy Ea 15 Min      46545 (CPT®) Hc Pt Ther Massage- Per 15 Min      10908 (CPT®) Hc Pt Iontophoresis Ea 15 Min      78382 (CPT®) Hc Pt Elec Stim Ea-Per 15 Min 10 1    97827 (CPT®) Hc Pt Ultrasound Ea 15 Min      66213 (CPT®) Hc Pt Self Care/Mgmt/Train Ea 15 Min      20759 (CPT®) Hc Pt Prosthetic (S) Train Initial Encounter, Each 15 Min      29408 (CPT®) Hc Orthotic(S) Mgmt/Train Initial Encounter, Each 15min      10877 (CPT®) Hc Pt Aquatic Therapy Ea 15  Min      46965 (CPT®) Hc Pt Orthotic(S)/Prosthetic(S) Encounter, Each 15 Min      Total  12 1        Therapy Charges for Today     Code Description Service Date Service Provider Modifiers Qty    67293216323 HC PT ELEC STIM EA-PER 15 MIN 8/27/2018 Panchito Metzger, PT GP 1                    Panchito Metzger, PT  8/27/2018

## 2018-09-05 ENCOUNTER — HOSPITAL ENCOUNTER (OUTPATIENT)
Dept: PHYSICAL THERAPY | Facility: HOSPITAL | Age: 48
Setting detail: THERAPIES SERIES
Discharge: HOME | End: 2018-09-05

## 2018-09-05 DIAGNOSIS — M54.40 ACUTE BILATERAL LOW BACK PAIN WITH SCIATICA, SCIATICA LATERALITY UNSPECIFIED: Primary | ICD-10-CM

## 2018-09-05 PROCEDURE — 97110 THERAPEUTIC EXERCISES: CPT

## 2018-09-05 PROCEDURE — 97032 APPL MODALITY 1+ESTIM EA 15: CPT

## 2018-09-05 NOTE — THERAPY RE-EVALUATION
Outpatient Physical Therapy Ortho Re-Assessment   [facility]     Patient Name: Dawna Lovell  : 1970  MRN: [id number]  Today's Date: 2018      Visit Date: 2018    Patient Active Problem List   Diagnosis   • Obstructive sleep apnea, adult   • Psychophysiological insomnia   • Anxiety   • Essential hypertension   • GERD (gastroesophageal reflux disease)   • Restless leg syndrome   • Abnormal liver function test   • Allergic rhinitis   • Elevated AST (SGOT)   • Fatigue   • Hyperlipidemia   • Hypothyroidism due to Hashimoto's thyroiditis   • UTI (urinary tract infection)   • Memory loss   • Syncope   • Tachycardia   • History of migraine headaches   • Personal history of asthma   • POTS (postural orthostatic tachycardia syndrome)        Past Medical History:   Diagnosis Date   • Anxiety    • Asthma    • GERD (gastroesophageal reflux disease)    • Hyperlipidemia    • Hypothyroidism    • Lyme disease 2016   • Migraine headache    • POTS (postural orthostatic tachycardia syndrome)    • Syncope 2016   • Tachycardia 2016        Past Surgical History:   Procedure Laterality Date   •  SECTION  2003       Visit Dx:     ICD-10-CM ICD-9-CM   1. Acute bilateral low back pain with sciatica, sciatica laterality unspecified M54.40 724.2     724.3                 PT Ortho     Row Name 18 1300       Subjective Pain    Able to rate subjective pain? yes  -GB    Pre-Treatment Pain Level 3  -GB    Post-Treatment Pain Level 3  -GB       Special Tests/Palpation    Special Tests/Palpation Lumbar/SI;Hip  -GB       Lumbar/SI Special Tests    Slump Test (Neural Tension) Negative  -GB    SI Compression Test (SI Dysfunction) Negative  -GB       Lumbosacral Palpation    SI Left:;Tender  -GB    Lumbosacral Segment Left:;Tender;Guarded/taut   Minimal extension restriction at left L4-5  -GB       Leg Length Test    Apparent Equal  -GB       Head/Neck/Trunk    Trunk Extension AROM 35  -GB     Trunk Flexion AROM 56  -GB    Trunk Lt Lateral Flexion AROM 30  -GB    Trunk Rt Lateral Flexion AROM 30  -GB    Trunk Lt Rotation AROM 30  -GB    Trunk Rt Rotation AROM 30  -GB      User Key  (r) = Recorded By, (t) = Taken By, (c) = Cosigned By    Initials Name Provider Type    Panchito Fernandez PT Physical Therapist                                  PT OP Goals     Row Name 09/05/18 1800          PT Short Term Goals    STG Date to Achieve 06/26/18  -GB     STG 1 Patient is independent with a HEP for flexibility and initial strengthening.  -GB     STG 1 Progress Met  -GB     STG 2 Patient reports that pain is decreased by at least 25% with frequency or intensity of symptoms.  -GB     STG 2 Progress Met  -GB        Long Term Goals    LTG Date to Achieve 09/10/18  -GB     LTG 1 Patient is independent with long term HEP for stabilization and self mobilizations.  -GB     LTG 1 Progress Progressing;Partially Met  -GB     LTG 2 Lumbar AROM is WNL for all planes.  -GB     LTG 2 Progress Partially Met  -GB     LTG 3 Modified Oswestry score is improved by at least 15%.  -GB     LTG 3 Progress Ongoing;Not Met  -GB        Time Calculation    PT Goal Re-Cert Due Date 09/10/18  -GB       User Key  (r) = Recorded By, (t) = Taken By, (c) = Cosigned By    Initials Name Provider Type    Panchito Fernandez PT Physical Therapist                PT Assessment/Plan     Row Name 09/05/18 1850          PT Assessment    Functional Limitations Limitation in home management;Limitations in community activities;Performance in self-care ADL;Performance in leisure activities  -GB     Impairments Joint mobility;Joint integrity;Pain;Range of motion;Posture;Impaired flexibility;Impaired postural alignment  -GB     Assessment Comments Mrs Lovell has minimal tightness and facet restriction, but ongoing pain and limits with tolerance to activity and positioning.  -GB        PT Plan    PT Frequency One time visit  -GB     Predicted Duration  of Therapy Intervention (Therapy Eval) 1 week  -GB     Planned CPT's? PT EVAL LOW COMPLEXITY: 96835;PT RE-EVAL: 15185;PT NEUROMUSC RE-EDUCATION EA 15 MIN: 59580;PT SELF CARE/HOME MGMT/TRAIN EA 15: 05561;PT ULTRASOUND EA 15 MIN: 11563;PT ELECTRICAL STIM ATTD EA 15 MIN: 68553;PT MANUAL THERAPY EA 15 MIN: 53079;PT ELECTRICAL STIM UNATTEND: ;PT THER PROC EA 15 MIN: 46956;PT HOT/COLD PACK WC NONMCARE: 13229  -GB     PT Plan Comments Finalize HEP and perform one more session prior to discharge.  -GB       User Key  (r) = Recorded By, (t) = Taken By, (c) = Cosigned By    Initials Name Provider Type    Panchito Fernandez, PT Physical Therapist                Modalities     Row Name 09/05/18 1300             Ultrasound 77648    Location Left lumbosacral and gluteal areas  -GB      Combo RX Minutes 65981 10  -GB      Duty Cycle 50  -GB      Frequency 1.0 MHz  -GB      Intensity - Wts/cm 1.4  -GB         ELECTRICAL STIMULATION    Attended/Unattended Attended  -GB      Stimulation Type Pre-Mod  -GB      Max mAmp 14.5  -GB      Location/Electrode Placement/Other Right Glut Med  -GB      31742 - PT Electrical Stimulation (Manual) Minutes 10  -GB        User Key  (r) = Recorded By, (t) = Taken By, (c) = Cosigned By    Initials Name Provider Type    Panchito Fernandez, PT Physical Therapist              Exercises     Row Name 09/05/18 1700 09/05/18 1300          Subjective Comments    Subjective Comments  -- She is feeling better and is agreeable to continued increased emphasis on HEP.  -GB        Subjective Pain    Able to rate subjective pain?  -- yes  -GB     Pre-Treatment Pain Level  -- 3  -GB     Post-Treatment Pain Level  -- 3  -GB        Total Minutes    67266 - PT Therapeutic Exercise Minutes  -- 17  -GB     91027 - PT Manual Therapy Minutes 2  -GB  --        Exercise 1    Exercise Name 1  -- NuStep at level 5  -GB     Time 1  -- 7 min  -GB        Exercise 2    Exercise Name 2  -- Reassessment 10 min  -GB      Time 2  -- 10 min  -GB       User Key  (r) = Recorded By, (t) = Taken By, (c) = Cosigned By    Initials Name Provider Type    Panchito Fernandez, SYED Physical Therapist           Manual Rx (last 36 hours)      Manual Treatments     Row Name 09/05/18 1700             Total Minutes    51693 - PT Manual Therapy Minutes 2  -GB         Manual Rx 1    Manual Rx 1 Location Left leg distraction  -GB      Manual Rx 1 Duration 2 min  -GB        User Key  (r) = Recorded By, (t) = Taken By, (c) = Cosigned By    Initials Name Provider Type    Panchito Fernandez, PT Physical Therapist                      Outcome Measure Options: Modifed Owestry  Modified Oswestry  Modified Oswestry Score/Comments: 36%      Time Calculation:     Therapy Suggested Charges     Code   Minutes Charges    63762 (CPT®) Hc Pt Neuromusc Re Education Ea 15 Min      97695 (CPT®) Hc Pt Ther Proc Ea 15 Min 17 1    39006 (CPT®) Hc Gait Training Ea 15 Min      51424 (CPT®) Hc Pt Therapeutic Act Ea 15 Min      85839 (CPT®) Hc Pt Manual Therapy Ea 15 Min 2     00333 (CPT®) Hc Pt Ther Massage- Per 15 Min      93060 (CPT®) Hc Pt Iontophoresis Ea 15 Min      86359 (CPT®) Hc Pt Elec Stim Ea-Per 15 Min 10 1    30639 (CPT®) Hc Pt Ultrasound Ea 15 Min      08922 (CPT®) Hc Pt Self Care/Mgmt/Train Ea 15 Min      06464 (CPT®) Hc Pt Prosthetic (S) Train Initial Encounter, Each 15 Min      09564 (CPT®) Hc Orthotic(S) Mgmt/Train Initial Encounter, Each 15min      88090 (CPT®) Hc Pt Aquatic Therapy Ea 15 Min      41419 (CPT®) Hc Pt Orthotic(S)/Prosthetic(S) Encounter, Each 15 Min      Total  29 2          Start Time: 1300     Therapy Charges for Today     Code Description Service Date Service Provider Modifiers Qty    02235529914 HC PT ELEC STIM EA-PER 15 MIN 9/5/2018 Panchito Metzger, PT GP 1    39938406569 HC PT THER PROC EA 15 MIN 9/5/2018 Panchito Metzger, PT GP 1          PT G-Codes  Outcome Measure Options: Modifed Owestry  Modified Oswestry  Score/Comments: 36%         Panchito Metzger, PT  9/5/2018

## 2018-09-10 ENCOUNTER — HOSPITAL ENCOUNTER (OUTPATIENT)
Dept: PHYSICAL THERAPY | Facility: HOSPITAL | Age: 48
Setting detail: THERAPIES SERIES
Discharge: HOME | End: 2018-09-10

## 2018-09-10 DIAGNOSIS — M54.40 ACUTE BILATERAL LOW BACK PAIN WITH SCIATICA, SCIATICA LATERALITY UNSPECIFIED: Primary | ICD-10-CM

## 2018-09-10 PROCEDURE — 97032 APPL MODALITY 1+ESTIM EA 15: CPT

## 2018-09-10 PROCEDURE — 97110 THERAPEUTIC EXERCISES: CPT

## 2018-09-10 NOTE — THERAPY DISCHARGE NOTE
Outpatient Physical Therapy Ortho Treatment Note/Discharge Summary   [facility]     Patient Name: Dawna Lovell  : 1970  MRN: [id number]  Today's Date: 9/10/2018      Visit Date: 09/10/2018    Visit Dx:    ICD-10-CM ICD-9-CM   1. Acute bilateral low back pain with sciatica, sciatica laterality unspecified M54.40 724.2     724.3       Patient Active Problem List   Diagnosis   • Obstructive sleep apnea, adult   • Psychophysiological insomnia   • Anxiety   • Essential hypertension   • GERD (gastroesophageal reflux disease)   • Restless leg syndrome   • Abnormal liver function test   • Allergic rhinitis   • Elevated AST (SGOT)   • Fatigue   • Hyperlipidemia   • Hypothyroidism due to Hashimoto's thyroiditis   • UTI (urinary tract infection)   • Memory loss   • Syncope   • Tachycardia   • History of migraine headaches   • Personal history of asthma   • POTS (postural orthostatic tachycardia syndrome)        Past Medical History:   Diagnosis Date   • Anxiety    • Asthma    • GERD (gastroesophageal reflux disease)    • Hyperlipidemia    • Hypothyroidism    • Lyme disease 2016   • Migraine headache    • POTS (postural orthostatic tachycardia syndrome)    • Syncope 2016   • Tachycardia 2016        Past Surgical History:   Procedure Laterality Date   •  SECTION  2003                                 Modalities     Row Name 09/10/18 1700             Ultrasound 89315    Location (B) Lumbosacral and gluteal areas (left> Right)  -GB      Combo RX Minutes 22641 10  -GB      Duty Cycle 50  -GB      Frequency 1.0 MHz  -GB      Intensity - Wts/cm 1.4  -GB         ELECTRICAL STIMULATION    Attended/Unattended Attended  -GB      Stimulation Type Pre-Mod  -GB      Max mAmp 14.5  -GB      Location/Electrode Placement/Other Right Glut Med  -GB      52601 - PT Electrical Stimulation (Manual) Minutes 10  -GB        User Key  (r) = Recorded By, (t) = Taken By, (c) = Cosigned By    Initials Name  Provider Type    Panchito Fernandez, SYED Physical Therapist                Exercises     Row Name 09/10/18 1700             Subjective Pain    Able to rate subjective pain? yes  -GB      Pre-Treatment Pain Level 3  -GB      Post-Treatment Pain Level 3  -GB         Total Minutes    63645 - PT Therapeutic Exercise Minutes 10  -GB      96794 - PT Manual Therapy Minutes 2  -GB         Exercise 1    Exercise Name 1 Recumbent bike  -GB      Time 1 10 min  -GB        User Key  (r) = Recorded By, (t) = Taken By, (c) = Cosigned By    Initials Name Provider Type    Panchito Fernandez PT Physical Therapist                        Manual Rx (last 36 hours)      Manual Treatments     Row Name 09/10/18 1700             Total Minutes    93872 - PT Manual Therapy Minutes 2  -GB         Manual Rx 1    Manual Rx 1 Location Lumbar spoine, SI, and Pelvis assessed as level  -GB      Manual Rx 1 Duration 2  -GB        User Key  (r) = Recorded By, (t) = Taken By, (c) = Cosigned By    Initials Name Provider Type    Panchito Fernandez PT Physical Therapist                PT OP Goals     Row Name 09/10/18 1700          PT Short Term Goals    STG Date to Achieve 06/26/18  -GB     STG 1 Patient is independent with a HEP for flexibility and initial strengthening.  -GB     STG 1 Progress Met  -GB     STG 2 Patient reports that pain is decreased by at least 25% with frequency or intensity of symptoms.  -GB     STG 2 Progress Met  -GB        Long Term Goals    LTG Date to Achieve 09/10/18  -GB     LTG 1 Patient is independent with long term HEP for stabilization and self mobilizations.  -GB     LTG 1 Progress Met  -GB     LTG 2 Lumbar AROM is WNL for all planes.  -GB     LTG 2 Progress Partially Met  -GB     LTG 3 Modified Oswestry score is improved by at least 15%.  -GB     LTG 3 Progress Not Met  -GB       User Key  (r) = Recorded By, (t) = Taken By, (c) = Cosigned By    Initials Name Provider Type    Panchito Fernandez PT  Physical Therapist          Therapy Education  Education Details: Review of HEP and treatment options/considerations  Given: Symptoms/condition management  Program: Reinforced  How Provided: Verbal  Provided to: Patient  Level of Understanding: Verbalized  26458 - PT Self Care/Mgmt Minutes: 5              Time Calculation:   Start Time: 1405  Therapy Suggested Charges     Code   Minutes Charges    35876 (CPT®) Hc Pt Neuromusc Re Education Ea 15 Min      86441 (CPT®) Hc Pt Ther Proc Ea 15 Min 10 1    54614 (CPT®) Hc Gait Training Ea 15 Min      81040 (CPT®) Hc Pt Therapeutic Act Ea 15 Min      67053 (CPT®) Hc Pt Manual Therapy Ea 15 Min 2     32338 (CPT®) Hc Pt Ther Massage- Per 15 Min      50539 (CPT®) Hc Pt Iontophoresis Ea 15 Min      71033 (CPT®) Hc Pt Elec Stim Ea-Per 15 Min 10 1    20055 (CPT®) Hc Pt Ultrasound Ea 15 Min      40926 (CPT®) Hc Pt Self Care/Mgmt/Train Ea 15 Min 5     22737 (CPT®) Hc Pt Prosthetic (S) Train Initial Encounter, Each 15 Min      66138 (CPT®) Hc Orthotic(S) Mgmt/Train Initial Encounter, Each 15min      00520 (CPT®) Hc Pt Aquatic Therapy Ea 15 Min      50573 (CPT®) Hc Pt Orthotic(S)/Prosthetic(S) Encounter, Each 15 Min      Total  27 2        Therapy Charges for Today     Code Description Service Date Service Provider Modifiers Qty    98715763499 HC PT THER PROC EA 15 MIN 9/10/2018 Panchito Metzger, PT GP 1    24727985087 HC PT ELEC STIM EA-PER 15 MIN 9/10/2018 Panchito Metzger, PT GP 1                OP PT Discharge Summary  Date of Discharge: 09/10/18  Reason for Discharge: Maximum functional potential achieved  Outcomes Achieved: Patient able to partially acheive established goals  Discharge Destination: Home with home program      Panchito Metzger, PT  9/10/2018

## 2018-09-11 DIAGNOSIS — J30.1 SEASONAL ALLERGIC RHINITIS DUE TO POLLEN, UNSPECIFIED CHRONICITY: ICD-10-CM

## 2018-09-11 RX ORDER — LORATADINE 10 MG/1
TABLET ORAL
Qty: 30 TABLET | Refills: 1 | Status: SHIPPED | OUTPATIENT
Start: 2018-09-11 | End: 2018-11-17 | Stop reason: SDUPTHER

## 2018-10-23 ENCOUNTER — OFFICE VISIT (OUTPATIENT)
Dept: FAMILY MEDICINE CLINIC | Facility: CLINIC | Age: 48
End: 2018-10-23

## 2018-10-23 VITALS
SYSTOLIC BLOOD PRESSURE: 130 MMHG | TEMPERATURE: 98.8 F | WEIGHT: 183 LBS | OXYGEN SATURATION: 98 % | BODY MASS INDEX: 27.83 KG/M2 | HEART RATE: 89 BPM | DIASTOLIC BLOOD PRESSURE: 86 MMHG

## 2018-10-23 DIAGNOSIS — J30.89 ENVIRONMENTAL AND SEASONAL ALLERGIES: ICD-10-CM

## 2018-10-23 DIAGNOSIS — J01.00 ACUTE NON-RECURRENT MAXILLARY SINUSITIS: ICD-10-CM

## 2018-10-23 DIAGNOSIS — R79.89 ABNORMAL LIVER FUNCTION TEST: ICD-10-CM

## 2018-10-23 DIAGNOSIS — I10 ESSENTIAL HYPERTENSION: Primary | ICD-10-CM

## 2018-10-23 DIAGNOSIS — G25.81 RESTLESS LEG SYNDROME: ICD-10-CM

## 2018-10-23 DIAGNOSIS — E06.3 HYPOTHYROIDISM DUE TO HASHIMOTO'S THYROIDITIS: ICD-10-CM

## 2018-10-23 DIAGNOSIS — F41.9 ANXIETY: ICD-10-CM

## 2018-10-23 DIAGNOSIS — E03.8 HYPOTHYROIDISM DUE TO HASHIMOTO'S THYROIDITIS: ICD-10-CM

## 2018-10-23 LAB
ALBUMIN SERPL-MCNC: 4.42 G/DL (ref 3.2–4.8)
ALBUMIN/GLOB SERPL: 1.7 G/DL (ref 1.5–2.5)
ALP SERPL-CCNC: 59 U/L (ref 25–100)
ALT SERPL-CCNC: 30 U/L (ref 7–40)
AST SERPL-CCNC: 31 U/L (ref 0–33)
BASOPHILS # BLD AUTO: 0.01 10*3/MM3 (ref 0–0.2)
BASOPHILS NFR BLD AUTO: 0.1 % (ref 0–1)
BILIRUB SERPL-MCNC: 1.4 MG/DL (ref 0.3–1.2)
BUN SERPL-MCNC: 8 MG/DL (ref 9–23)
BUN/CREAT SERPL: 11.6 (ref 7–25)
CALCIUM SERPL-MCNC: 9.3 MG/DL (ref 8.7–10.4)
CHLORIDE SERPL-SCNC: 100 MMOL/L (ref 99–109)
CO2 SERPL-SCNC: 27 MMOL/L (ref 20–31)
CREAT SERPL-MCNC: 0.69 MG/DL (ref 0.6–1.3)
EOSINOPHIL # BLD AUTO: 0.09 10*3/MM3 (ref 0–0.3)
EOSINOPHIL NFR BLD AUTO: 1.1 % (ref 0–3)
ERYTHROCYTE [DISTWIDTH] IN BLOOD BY AUTOMATED COUNT: 11.6 % (ref 11.3–14.5)
GLOBULIN SER CALC-MCNC: 2.6 GM/DL
GLUCOSE SERPL-MCNC: 82 MG/DL (ref 70–100)
HCT VFR BLD AUTO: 42.5 % (ref 34.5–44)
HGB BLD-MCNC: 14 G/DL (ref 11.5–15.5)
IMM GRANULOCYTES # BLD: 0.02 10*3/MM3 (ref 0–0.03)
IMM GRANULOCYTES NFR BLD: 0.2 % (ref 0–0.6)
LYMPHOCYTES # BLD AUTO: 2.68 10*3/MM3 (ref 0.6–4.8)
LYMPHOCYTES NFR BLD AUTO: 32.6 % (ref 24–44)
MCH RBC QN AUTO: 33.2 PG (ref 27–31)
MCHC RBC AUTO-ENTMCNC: 32.9 G/DL (ref 32–36)
MCV RBC AUTO: 100.7 FL (ref 80–99)
MONOCYTES # BLD AUTO: 0.61 10*3/MM3 (ref 0–1)
MONOCYTES NFR BLD AUTO: 7.4 % (ref 0–12)
NEUTROPHILS # BLD AUTO: 4.82 10*3/MM3 (ref 1.5–8.3)
NEUTROPHILS NFR BLD AUTO: 58.6 % (ref 41–71)
PLATELET # BLD AUTO: 300 10*3/MM3 (ref 150–450)
POTASSIUM SERPL-SCNC: 4.3 MMOL/L (ref 3.5–5.5)
PROT SERPL-MCNC: 7 G/DL (ref 5.7–8.2)
RBC # BLD AUTO: 4.22 10*6/MM3 (ref 3.89–5.14)
SODIUM SERPL-SCNC: 134 MMOL/L (ref 132–146)
T4 FREE SERPL-MCNC: 1.41 NG/DL (ref 0.89–1.76)
TSH SERPL DL<=0.005 MIU/L-ACNC: 0.16 MIU/ML (ref 0.35–5.35)
WBC # BLD AUTO: 8.23 10*3/MM3 (ref 3.5–10.8)

## 2018-10-23 PROCEDURE — 99213 OFFICE O/P EST LOW 20 MIN: CPT | Performed by: FAMILY MEDICINE

## 2018-10-23 RX ORDER — ALPRAZOLAM 1 MG/1
1 TABLET ORAL 2 TIMES DAILY PRN
Qty: 60 TABLET | Refills: 2 | Status: SHIPPED | OUTPATIENT
Start: 2018-10-23 | End: 2019-11-08

## 2018-10-23 RX ORDER — AZELASTINE HYDROCHLORIDE 0.5 MG/ML
1 SOLUTION/ DROPS OPHTHALMIC DAILY
Qty: 6 ML | Refills: 1 | Status: SHIPPED | OUTPATIENT
Start: 2018-10-23 | End: 2019-11-08 | Stop reason: SDUPTHER

## 2018-10-23 RX ORDER — TRAMADOL HYDROCHLORIDE 50 MG/1
50 TABLET ORAL EVERY 8 HOURS PRN
Qty: 60 TABLET | Refills: 2 | Status: SHIPPED | OUTPATIENT
Start: 2018-10-23 | End: 2019-11-08

## 2018-10-23 RX ORDER — FLUTICASONE PROPIONATE 50 MCG
1 SPRAY, SUSPENSION (ML) NASAL DAILY
Qty: 16 ML | Refills: 2 | Status: SHIPPED | OUTPATIENT
Start: 2018-10-23 | End: 2019-11-08 | Stop reason: SDUPTHER

## 2018-10-23 RX ORDER — CITALOPRAM 10 MG/1
10 TABLET ORAL DAILY
Qty: 30 TABLET | Refills: 6 | Status: SHIPPED | OUTPATIENT
Start: 2018-10-23 | End: 2019-01-14 | Stop reason: DRUGHIGH

## 2018-10-23 NOTE — PROGRESS NOTES
Subjective   Dawna Lovell is a 47 y.o. female.     History of Present Illness   Recheck liver tests.  Feeling well, returned to work. No swelling face, hands or legs.  Rare headache after working on computer.  Back still causes difficulties, helped best by physical therapy.  Back acts up with activity or prolonged standing.   Appetite good, sleeps fairly well.  Energy good.  Rare neck discomfort.  Itchy left ear one month, no fever.   The following portions of the patient's history were reviewed and updated as appropriate: allergies, current medications, past family history, past medical history, past social history, past surgical history and problem list.    Review of Systems   Constitutional: Negative for activity change, fatigue and fever.   HENT: Negative for congestion, sinus pressure and sore throat.    Respiratory: Negative.        Objective   Physical Exam   Constitutional: She appears well-developed. No distress.   HENT:   Right Ear: External ear normal.   Left Ear: External ear normal.   Mouth/Throat: Oropharynx is clear and moist.   Neck: Neck supple.   Cardiovascular: Normal heart sounds.    Pulmonary/Chest: Breath sounds normal.   Musculoskeletal: She exhibits no edema.   Lymphadenopathy:     She has no cervical adenopathy.   Neurological: She displays normal reflexes.   Vitals reviewed.      Assessment/Plan   Dawna was seen today for anxiety, labs only and back pain.    Diagnoses and all orders for this visit:    Essential hypertension  -     CBC & Differential    Abnormal liver function test  -     Comprehensive Metabolic Panel    Hypothyroidism due to Hashimoto's thyroiditis  -     TSH  -     T4, Free    Acute non-recurrent maxillary sinusitis  -     fluticasone (FLONASE) 50 MCG/ACT nasal spray; 1 spray into the nostril(s) as directed by provider Daily.    Anxiety  -     citalopram (CeleXA) 10 MG tablet; Take 1 tablet by mouth Daily.  -     ALPRAZolam (XANAX) 1 MG tablet; Take 1 tablet by mouth  2 (Two) Times a Day As Needed for Anxiety.    Environmental and seasonal allergies  -     azelastine (OPTIVAR) 0.05 % ophthalmic solution; Administer 1 drop to both eyes Daily.    Restless leg syndrome  -     traMADol (ULTRAM) 50 MG tablet; Take 1 tablet by mouth Every 8 (Eight) Hours As Needed for Moderate Pain .

## 2018-11-15 ENCOUNTER — TELEPHONE (OUTPATIENT)
Dept: FAMILY MEDICINE CLINIC | Facility: CLINIC | Age: 48
End: 2018-11-15

## 2018-11-15 NOTE — TELEPHONE ENCOUNTER
----- Message from Faye Zhao sent at 11/14/2018  3:00 PM EST -----  Contact: [phone]  Pt has been waiting for 10 days for tramadol, she said her pharmacy told her that they were waiting for us to do a PA?    Notified pt that PA for tramadol has been approved.  BBjenny, CMA

## 2018-11-17 DIAGNOSIS — J30.1 SEASONAL ALLERGIC RHINITIS DUE TO POLLEN: ICD-10-CM

## 2018-11-19 RX ORDER — LORATADINE 10 MG/1
TABLET ORAL
Qty: 30 TABLET | Refills: 1 | Status: SHIPPED | OUTPATIENT
Start: 2018-11-19 | End: 2021-06-25

## 2018-12-03 DIAGNOSIS — J30.1 SEASONAL ALLERGIC RHINITIS DUE TO POLLEN: ICD-10-CM

## 2018-12-03 RX ORDER — MONTELUKAST SODIUM 10 MG/1
TABLET ORAL
Qty: 30 TABLET | Refills: 3 | Status: SHIPPED | OUTPATIENT
Start: 2018-12-03 | End: 2019-11-08 | Stop reason: SDUPTHER

## 2018-12-05 RX ORDER — CARVEDILOL 6.25 MG/1
TABLET ORAL
Qty: 60 TABLET | Refills: 5 | Status: SHIPPED | OUTPATIENT
Start: 2018-12-05 | End: 2019-06-05 | Stop reason: SDUPTHER

## 2018-12-14 ENCOUNTER — OFFICE VISIT (OUTPATIENT)
Dept: FAMILY MEDICINE CLINIC | Facility: CLINIC | Age: 48
End: 2018-12-14

## 2018-12-14 VITALS
RESPIRATION RATE: 16 BRPM | WEIGHT: 184 LBS | SYSTOLIC BLOOD PRESSURE: 100 MMHG | OXYGEN SATURATION: 97 % | TEMPERATURE: 98.4 F | BODY MASS INDEX: 27.98 KG/M2 | DIASTOLIC BLOOD PRESSURE: 80 MMHG | HEART RATE: 103 BPM

## 2018-12-14 DIAGNOSIS — G89.29 CHRONIC BILATERAL LOW BACK PAIN WITHOUT SCIATICA: Primary | ICD-10-CM

## 2018-12-14 DIAGNOSIS — M54.50 CHRONIC BILATERAL LOW BACK PAIN WITHOUT SCIATICA: Primary | ICD-10-CM

## 2018-12-14 DIAGNOSIS — M54.50 ACUTE BILATERAL LOW BACK PAIN WITHOUT SCIATICA: ICD-10-CM

## 2018-12-14 PROCEDURE — 99213 OFFICE O/P EST LOW 20 MIN: CPT | Performed by: FAMILY MEDICINE

## 2018-12-14 RX ORDER — PREDNISONE 20 MG/1
10 TABLET ORAL DAILY
Qty: 14 TABLET | Refills: 0 | Status: SHIPPED | OUTPATIENT
Start: 2018-12-14 | End: 2019-01-14

## 2018-12-14 RX ORDER — METHOCARBAMOL 500 MG/1
500 TABLET, FILM COATED ORAL 3 TIMES DAILY PRN
Qty: 30 TABLET | Refills: 2 | Status: SHIPPED | OUTPATIENT
Start: 2018-12-14 | End: 2019-11-08

## 2018-12-14 NOTE — PROGRESS NOTES
Subjective   Dawna Lovell is a 47 y.o. female.     History of Present Illness   Lower left back discomfort weeks.  Treated with physical therapy wihiout improvement this time.  Discomfort sleeping.  Some relief to lay on hard surface on back. No leg pain.  Hours of sitting with  at work.  The following portions of the patient's history were reviewed and updated as appropriate: allergies, current medications, past family history, past medical history, past social history, past surgical history and problem list.    Review of Systems   HENT: Negative.    Respiratory: Negative.    Cardiovascular: Negative.    Musculoskeletal: Positive for back pain.   Neurological: Negative for dizziness and weakness.       Objective   Physical Exam   Constitutional: She appears well-developed.   Pulmonary/Chest: Effort normal.   Musculoskeletal: She exhibits tenderness (bilateral lower back soft tissue with tender mobils nodules.  Leg lift negative bilaterally.).   Neurological: She is alert.   Vitals reviewed.      Assessment/Plan   Dawna was seen today for back pain.    Diagnoses and all orders for this visit:    Chronic bilateral low back pain without sciatica    Acute bilateral low back pain without sciatica  -     methocarbamol (ROBAXIN) 500 MG tablet; Take 1 tablet by mouth 3 (Three) Times a Day As Needed for Muscle Spasms.  -     predniSONE (DELTASONE) 20 MG tablet; Take 0.5 tablets by mouth Daily.

## 2018-12-27 ENCOUNTER — TELEPHONE (OUTPATIENT)
Dept: FAMILY MEDICINE CLINIC | Facility: CLINIC | Age: 48
End: 2018-12-27

## 2018-12-27 NOTE — TELEPHONE ENCOUNTER
----- Message from Tyrone Lin sent at 12/26/2018  5:06 PM EST -----  Regarding: BACK STILL HURTS  Contact: [phone]  PT WOULD LIKE A REFERRAL FOR PHYSICAL THERAPY FOR HER BACK PAIN.     Per Dr. Alejandro, recommends pt goes to [facility].  [place], [facility]

## 2019-01-14 ENCOUNTER — OFFICE VISIT (OUTPATIENT)
Dept: ENDOCRINOLOGY | Facility: CLINIC | Age: 49
End: 2019-01-14

## 2019-01-14 VITALS
HEART RATE: 83 BPM | DIASTOLIC BLOOD PRESSURE: 84 MMHG | SYSTOLIC BLOOD PRESSURE: 120 MMHG | WEIGHT: 186 LBS | OXYGEN SATURATION: 99 % | HEIGHT: 68 IN | BODY MASS INDEX: 28.19 KG/M2

## 2019-01-14 DIAGNOSIS — F41.8 DEPRESSION WITH ANXIETY: ICD-10-CM

## 2019-01-14 DIAGNOSIS — E03.8 HYPOTHYROIDISM DUE TO HASHIMOTO'S THYROIDITIS: Primary | ICD-10-CM

## 2019-01-14 DIAGNOSIS — E06.3 HYPOTHYROIDISM DUE TO HASHIMOTO'S THYROIDITIS: Primary | ICD-10-CM

## 2019-01-14 DIAGNOSIS — Z00.00 HEALTHCARE MAINTENANCE: ICD-10-CM

## 2019-01-14 LAB
25(OH)D3+25(OH)D2 SERPL-MCNC: 31 NG/ML
TSH SERPL DL<=0.005 MIU/L-ACNC: 0.18 MIU/ML (ref 0.35–5.35)

## 2019-01-14 PROCEDURE — 99213 OFFICE O/P EST LOW 20 MIN: CPT | Performed by: INTERNAL MEDICINE

## 2019-01-14 RX ORDER — CITALOPRAM 20 MG/1
20 TABLET ORAL DAILY
Qty: 30 TABLET | Refills: 5 | Status: SHIPPED | OUTPATIENT
Start: 2019-01-14 | End: 2019-07-19 | Stop reason: SDUPTHER

## 2019-01-14 NOTE — PROGRESS NOTES
Chief Complaint   Patient presents with   • Hypothyroidism due to Hashimoto's thyroiditis     f/u        HPI:   Dawna Lovell is a 48 y.o.female who returns to endocrine clinic for follow-up evaluation of possible Cushing's syndrome.  Last visit 07/11/2018. Her history is as follows:    Interim events:   - pt's PCP recently retired. She is in the process of obtaining a new PCP  - Pt requesting to increase her dose of Celexa to 20 mg daily until she can get established with a new PCP  - has had low back pain for the past few months. Took prednisone 10 mg daily X 30 days, has tried robaxin PRN, completed physical therapy. Still with back pain      1) hypothyroidism due to Hashimoto's thyroiditis:  - Diagnosed approximately 2001  - Current dose of levothyroxine 137 µg daily  - Takes tablet in the morning on an empty stomach. Waits at least 30-60 minutes before eating/hot liquids.    - Takes multivitamin in the evening  - not on biotin  - Pt did not notice any improvement in fatigue when taking Brand vs generic thyroid hormone. TSH levels have fluctuated more on generic levothyroxine.    2) h/o abnormal endocrine tests:  - Pt had random serum cortisol levels collected at various time in the day which were elevated from 11/2015 to 4/2016. However, she was on an estrogen containing OCP at the time of these collections which falsely elevated the random serum cortisols. 3PM - ACTH was 11.6. Had elevated hepatic enzymes, normal glucose  - I evaluated pt in 2016 for possible Cushing's. Evaluation off birth control proved to be negative:  ( 06/2016) 24 hour urinary cortisol: was normal at 3 mcg/24 hours  (06/2016)  Midnight salivary cortisols ([facility]): normal  #1 <0.010, #2 0.013 ( normal range <0.010 - 0.090)  (07/2016) 1 mg overnight dexamethasone suppression test: 8AM cortisol was appropriately suppressed to 0.5 (normal < 1.8)    Had pt complete 1 mg overnight dex suppression test again on 4/28/2017:   04/28/2017)  1 mg  "overnight dexamethasone suppression test: 8AM cortisol was appropriately suppressed to 0.4    Review of Imaging:   - CT ABD 05/02/2016 - \"showed marked inhomogeneous geographic appearing liver with a mosaic pattern, most typical for marked inhomogeneous geographic steatosis with focal areas of fat sparing. This pattern was noted by ultrasound on 11/10/2015.\" and normal adrenal glands  - CT Head w/o contrast 1/21/2016: \"Old lacunar infarct seen in the left basal ganglia. No acute intracranial abnormalities identified.\"      Review of Systems   Constitutional:        Weight loss since January 2018   Eyes: Negative.    Respiratory: Negative.    Cardiovascular: Negative for palpitations (on carvediolol).   Gastrointestinal: Negative.  Negative for diarrhea and nausea.   Endocrine: Negative.    Genitourinary: Negative.    Musculoskeletal: Positive for back pain and myalgias. Negative for arthralgias.   Skin: Negative.    Allergic/Immunologic: Negative.    Neurological: Positive for headaches (Occasional). Negative for tremors and syncope.   Hematological: Negative.    Psychiatric/Behavioral:        Intermittent \"brain fog\" better  Depression with anxiety     The following portions of the patient's history were reviewed and updated as appropriate: allergies, current medications, past family history, past medical history, past social history, past surgical history and problem list.    /84   Pulse 83   Ht 172.7 cm (68\")   Wt 84.4 kg (186 lb)   SpO2 99%   BMI 28.28 kg/m²   Physical Exam   Constitutional: She is oriented to person, place, and time. She appears well-developed. No distress.   HENT:   Head: Normocephalic.   Mouth/Throat: Oropharynx is clear and moist.   Eyes: Conjunctivae and EOM are normal. Pupils are equal, round, and reactive to light.   Neck: No tracheal deviation present. No thyromegaly present.   No palpable thyroid nodules     Cardiovascular: Normal rate, regular rhythm and normal heart sounds. "   No murmur heard.  Pulmonary/Chest: Effort normal and breath sounds normal. No respiratory distress.   Abdominal: Soft. Bowel sounds are normal. She exhibits no mass. There is no tenderness.   Musculoskeletal: She exhibits no edema or tenderness.   Lymphadenopathy:     She has no cervical adenopathy.   Neurological: She is alert and oriented to person, place, and time. No cranial nerve deficit.   Skin: Skin is warm and dry. She is not diaphoretic. No erythema.   Psychiatric: She has a normal mood and affect. Her behavior is normal.   Vitals reviewed.    LABS/IMAGING: prior records reviewed and summarized in HPI  Office Visit on 01/14/2019   Component Date Value Ref Range Status   • TSH 01/14/2019 0.177* 0.350 - 5.350 mIU/mL Final   • 25 Hydroxy, Vitamin D 01/14/2019 31.0  ng/ml Final    Comment: Reference Ranges for Total Vitamin D 25(OH)  Deficiency      <20.0 ng/ml  Insufficiency   20-30 ng/ml  Sufficiency     ng/ml  Toxicity         >100 ng/ml         ASSESSMENT/PLAN:  1) hypothyroidism due to Hashimoto's thyroiditis:  - clinically euthyroid on exam  - TSH on 137 mcg was slightly suppressed in 10/2018 and today  - Will change to Brand Levoxyl 125 mcg daily.  - TSH to be checked in 2 months, will adjust dose as indicated  - Reviewed proper levothyroxine administration, and factors to avoid that decrease medication potency and medication absorption.     2) depression with anxiety:  - Pt requesting to increase her dose of Celexa to 20 mg daily until she can get established with a new PCP  - new Rx sent to her pharmacy    3) health care maintenace:  - pt requested her vitamin D level be checked today. Level was normal    RTC 6 months

## 2019-01-18 ENCOUNTER — TELEPHONE (OUTPATIENT)
Dept: ENDOCRINOLOGY | Facility: CLINIC | Age: 49
End: 2019-01-18

## 2019-01-18 DIAGNOSIS — E06.3 HYPOTHYROIDISM DUE TO HASHIMOTO'S THYROIDITIS: Primary | ICD-10-CM

## 2019-01-18 DIAGNOSIS — E03.8 HYPOTHYROIDISM DUE TO HASHIMOTO'S THYROIDITIS: Primary | ICD-10-CM

## 2019-01-18 RX ORDER — LEVOTHYROXINE SODIUM 125 UG/1
125 TABLET ORAL DAILY
Qty: 30 TABLET | Refills: 5 | Status: SHIPPED | OUTPATIENT
Start: 2019-01-18 | End: 2019-07-19 | Stop reason: SDUPTHER

## 2019-01-18 NOTE — TELEPHONE ENCOUNTER
Spoke to patient about lab results. See clinic note from 01/14/2019 for details.   Camilla Gray MD

## 2019-02-18 NOTE — TELEPHONE ENCOUNTER
Received a fax requesting a new script for the patient. Patient is to see us on 7/11/2019 for a 1 year f/u

## 2019-02-19 RX ORDER — GABAPENTIN 300 MG/1
300 CAPSULE ORAL NIGHTLY
Qty: 30 CAPSULE | Refills: 5 | Status: SHIPPED | OUTPATIENT
Start: 2019-02-19 | End: 2019-07-10 | Stop reason: SDUPTHER

## 2019-03-11 ENCOUNTER — RESULTS ENCOUNTER (OUTPATIENT)
Dept: ENDOCRINOLOGY | Facility: CLINIC | Age: 49
End: 2019-03-11

## 2019-03-11 DIAGNOSIS — E06.3 HYPOTHYROIDISM DUE TO HASHIMOTO'S THYROIDITIS: ICD-10-CM

## 2019-03-11 DIAGNOSIS — E03.8 HYPOTHYROIDISM DUE TO HASHIMOTO'S THYROIDITIS: ICD-10-CM

## 2019-06-05 RX ORDER — CARVEDILOL 6.25 MG/1
6.25 TABLET ORAL 2 TIMES DAILY WITH MEALS
Qty: 60 TABLET | Refills: 6 | Status: SHIPPED | OUTPATIENT
Start: 2019-06-05 | End: 2019-12-27

## 2019-07-10 ENCOUNTER — OFFICE VISIT (OUTPATIENT)
Dept: NEUROLOGY | Facility: CLINIC | Age: 49
End: 2019-07-10

## 2019-07-10 VITALS
DIASTOLIC BLOOD PRESSURE: 88 MMHG | HEART RATE: 81 BPM | RESPIRATION RATE: 18 BRPM | WEIGHT: 193 LBS | BODY MASS INDEX: 29.25 KG/M2 | OXYGEN SATURATION: 99 % | HEIGHT: 68 IN | SYSTOLIC BLOOD PRESSURE: 128 MMHG

## 2019-07-10 DIAGNOSIS — G25.81 RESTLESS LEG SYNDROME: Primary | ICD-10-CM

## 2019-07-10 PROCEDURE — 99213 OFFICE O/P EST LOW 20 MIN: CPT | Performed by: PSYCHIATRY & NEUROLOGY

## 2019-07-10 RX ORDER — GABAPENTIN 300 MG/1
900 CAPSULE ORAL NIGHTLY
Qty: 90 CAPSULE | Refills: 5 | Status: SHIPPED | OUTPATIENT
Start: 2019-07-10 | End: 2020-03-27

## 2019-07-10 RX ORDER — ROPINIROLE 1 MG/1
.5-1 TABLET, FILM COATED ORAL NIGHTLY
Qty: 30 TABLET | Refills: 11 | Status: SHIPPED | OUTPATIENT
Start: 2019-07-10 | End: 2020-11-23

## 2019-07-10 NOTE — PROGRESS NOTES
Subjective   Patient ID: Dawna Lovell is a 48 y.o. female     Chief Complaint   Patient presents with   • Restless Legs Syndrome        History of Present Illness    48 y.o. female with RLS returns in follow up.  Last visit on 7/10/18 started  mg qhs.      She increased  mg qhs and decreased sx by 25%.     Intensity varies from a 0 - 8/10.  Wearing a whole in sheets from movements.         Problem history:    Sx started two years ago.  Sx started suddenly.  Legs feel a fluttering in legs mainly lying down at night.  Sx improved during the day and walking.  Improved of late.  Mild intensity.  Gralise 1200 mg qhs.    Legs ache during the day when sitting still.      EMG/NCS - normal       Past Medical History:   Diagnosis Date   • Anxiety    • Asthma    • GERD (gastroesophageal reflux disease)    • Hyperlipidemia    • Hypothyroidism    • Lyme disease 2016   • Migraine headache    • POTS (postural orthostatic tachycardia syndrome)    • Syncope 2016   • Tachycardia 2016     Family History   Problem Relation Age of Onset   • Esophageal cancer Father    • Dementia Other      Social History     Socioeconomic History   • Marital status:      Spouse name: Not on file   • Number of children: Not on file   • Years of education: Not on file   • Highest education level: Not on file   Tobacco Use   • Smoking status: Former Smoker     Types: Cigarettes     Last attempt to quit:      Years since quittin.5   • Smokeless tobacco: Never Used   Substance and Sexual Activity   • Alcohol use: Yes     Alcohol/week: 0.6 oz     Types: 1 Glasses of wine per week   • Drug use: No   • Sexual activity: Defer       Review of Systems   Constitutional: Positive for fatigue. Negative for activity change and unexpected weight change.   HENT: Negative for tinnitus and trouble swallowing.    Eyes: Negative for photophobia and visual disturbance.   Respiratory: Negative for apnea, cough and choking.   "  Cardiovascular: Negative for leg swelling.   Gastrointestinal: Negative for nausea and vomiting.   Endocrine: Negative for cold intolerance and heat intolerance.   Genitourinary: Negative for difficulty urinating, frequency, menstrual problem and urgency.   Musculoskeletal: Positive for myalgias. Negative for back pain, gait problem and neck pain.   Skin: Negative for color change and rash.   Allergic/Immunologic: Negative for immunocompromised state.   Neurological: Positive for numbness. Negative for dizziness, tremors, seizures, syncope, facial asymmetry, speech difficulty, weakness, light-headedness and headaches.   Hematological: Negative for adenopathy. Does not bruise/bleed easily.   Psychiatric/Behavioral: Positive for decreased concentration and sleep disturbance. Negative for behavioral problems, confusion and hallucinations.       Objective     Vitals:    07/10/19 1150   BP: 128/88   BP Location: Right arm   Patient Position: Sitting   Cuff Size: Adult   Pulse: 81   Resp: 18   SpO2: 99%   Weight: 87.5 kg (193 lb)   Height: 172.7 cm (68\")       Neurologic Exam     Mental Status   Registration: recalls 3 of 3 objects. Recall at 5 minutes: recalls 3 of 3 objects. Follows 3 step commands.   Attention: normal. Concentration: normal.   Level of consciousness: alert  Knowledge: good and consistent with education.   Able to name object. Able to read. Able to repeat. Able to write. Normal comprehension.     Cranial Nerves     CN II   Visual fields full to confrontation.   Visual acuity: normal  Right visual field deficit: none  Left visual field deficit: none     CN III, IV, VI   Right pupil: Shape: regular. Reactivity: brisk. Consensual response: intact.   Left pupil: Shape: regular. Reactivity: brisk. Consensual response: intact.   Nystagmus: none   Diplopia: none  Ophthalmoparesis: none  Upgaze: normal  Downgaze: normal  Conjugate gaze: present  Vestibulo-ocular reflex: present    CN V   Facial sensation " intact.   Right corneal reflex: normal  Left corneal reflex: normal    CN VII   Right facial weakness: none  Left facial weakness: none    CN VIII   Hearing: intact    CN IX, X   Palate: symmetric  Right gag reflex: normal  Left gag reflex: normal    CN XI   Right sternocleidomastoid strength: normal  Left sternocleidomastoid strength: normal    CN XII   Tongue: not atrophic  Fasciculations: absent  Tongue deviation: none    Motor Exam   Muscle bulk: normal  Overall muscle tone: normal  Right arm tone: normal  Left arm tone: normal  Right leg tone: normal  Left leg tone: normal    Sensory Exam   Light touch normal.   Vibration normal.   Proprioception normal.   Pinprick normal.     Gait, Coordination, and Reflexes     Tremor   Resting tremor: absent  Intention tremor: absent  Action tremor: absent    Reflexes   Reflexes 2+ except as noted.       Physical Exam   Constitutional: She appears well-developed and well-nourished.   Nursing note and vitals reviewed.      Office Visit on 01/14/2019   Component Date Value Ref Range Status   • TSH 01/14/2019 0.177* 0.350 - 5.350 mIU/mL Final   • 25 Hydroxy, Vitamin D 01/14/2019 31.0  ng/ml Final    Comment: Reference Ranges for Total Vitamin D 25(OH)  Deficiency      <20.0 ng/ml  Insufficiency   20-30 ng/ml  Sufficiency     ng/ml  Toxicity         >100 ng/ml           Assessment/Plan     Problem List Items Addressed This Visit        Other    Restless leg syndrome - Primary    Current Assessment & Plan     Sx worsening on GBP    Increase  mg qhs and restart Requip 0.5 - 1 mg qhs          Relevant Medications    traMADol (ULTRAM) 50 MG tablet             No Follow-up on file.

## 2019-07-17 ENCOUNTER — OFFICE VISIT (OUTPATIENT)
Dept: ENDOCRINOLOGY | Facility: CLINIC | Age: 49
End: 2019-07-17

## 2019-07-17 VITALS
HEART RATE: 72 BPM | SYSTOLIC BLOOD PRESSURE: 120 MMHG | BODY MASS INDEX: 29.4 KG/M2 | OXYGEN SATURATION: 99 % | WEIGHT: 194 LBS | DIASTOLIC BLOOD PRESSURE: 80 MMHG | HEIGHT: 68 IN

## 2019-07-17 DIAGNOSIS — F41.8 DEPRESSION WITH ANXIETY: ICD-10-CM

## 2019-07-17 DIAGNOSIS — E06.3 HYPOTHYROIDISM DUE TO HASHIMOTO'S THYROIDITIS: Primary | ICD-10-CM

## 2019-07-17 DIAGNOSIS — E03.8 HYPOTHYROIDISM DUE TO HASHIMOTO'S THYROIDITIS: Primary | ICD-10-CM

## 2019-07-17 PROCEDURE — 99213 OFFICE O/P EST LOW 20 MIN: CPT | Performed by: INTERNAL MEDICINE

## 2019-07-17 NOTE — PROGRESS NOTES
"Chief Complaint   Patient presents with   • Hypothyroidism due to Hashimotos thyroiditis     f/u        HPI:   Dawna Lovell is a 48 y.o.female who returns to endocrine clinic for follow-up evaluation of her hypothyroidism. Last visit 01/14/2019.  Her history is as follows:    Interim events:   - pt's PCP recently retired. She is in the process of obtaining a new PCP  - feeling better on 20 mg celexa  - Pt's neurologist recently increased her gabapentin to 900 mg for her restless leg syndrome.    1) hypothyroidism due to Hashimoto's thyroiditis:  - Diagnosed approximately 2001    Current dose: Brand Levoxyl 125 µg daily  - Takes tablet in the morning on an empty stomach. Waits at least 30-60 minutes before eating/hot liquids.    - Takes multivitamin in the evening  - not on biotin  - using Brand due to fluctuations in TSH on generic    2) h/o abnormal endocrine tests:  - Pt had random serum cortisol levels collected at various time in the day which were elevated from 11/2015 to 4/2016. However, she was on an estrogen containing OCP at the time of these collections which falsely elevated the random serum cortisols. 3PM - ACTH was 11.6. Had elevated hepatic enzymes, normal glucose  - I evaluated pt in 2016 for possible Cushing's. Evaluation off birth control proved to be negative:  ( 06/2016) 24 hour urinary cortisol: was normal at 3 mcg/24 hours  (06/2016)  Midnight salivary cortisols ([facility]): normal  #1 <0.010, #2 0.013 ( normal range <0.010 - 0.090)  (07/2016) 1 mg overnight dexamethasone suppression test: 8AM cortisol was appropriately suppressed to 0.5 (normal < 1.8)    Had pt complete 1 mg overnight dex suppression test again on 4/28/2017:   04/28/2017)  1 mg overnight dexamethasone suppression test: 8AM cortisol was appropriately suppressed to 0.4    Review of Imaging:   - CT ABD 05/02/2016 - \"showed marked inhomogeneous geographic appearing liver with a mosaic pattern, most typical for marked " "inhomogeneous geographic steatosis with focal areas of fat sparing. This pattern was noted by ultrasound on 11/10/2015.\" and normal adrenal glands  - CT Head w/o contrast 1/21/2016: \"Old lacunar infarct seen in the left basal ganglia. No acute intracranial abnormalities identified.\"    Review of Systems   Constitutional:        Mild weight gain since January 2019   Eyes: Negative.    Respiratory: Negative.    Cardiovascular: Negative for palpitations (on carvediolol).   Gastrointestinal: Negative.  Negative for diarrhea and nausea.   Endocrine: Negative.    Genitourinary: Negative.    Musculoskeletal: Positive for myalgias. Negative for arthralgias and back pain.   Skin: Negative.    Allergic/Immunologic: Negative.    Neurological: Positive for headaches (Occasional). Negative for tremors and syncope.   Hematological: Negative.    Psychiatric/Behavioral:        Intermittent \"brain fog\" better  Depression with anxiety better on celexa 20mg     The following portions of the patient's history were reviewed and updated as appropriate: allergies, current medications, past family history, past medical history, past social history, past surgical history and problem list.    /80   Pulse 72   Ht 172.7 cm (68\")   Wt 88 kg (194 lb)   SpO2 99%   BMI 29.50 kg/m²   Physical Exam   Constitutional: She is oriented to person, place, and time. She appears well-developed. No distress.   HENT:   Head: Normocephalic.   Mouth/Throat: Oropharynx is clear and moist.   Eyes: Conjunctivae and EOM are normal. Pupils are equal, round, and reactive to light.   Neck: No tracheal deviation present. No thyromegaly present.   No palpable thyroid nodules     Cardiovascular: Normal rate, regular rhythm and normal heart sounds.   No murmur heard.  Pulmonary/Chest: Effort normal and breath sounds normal. No respiratory distress.   Abdominal: Soft. Bowel sounds are normal. She exhibits no mass. There is no tenderness.   Musculoskeletal: She " exhibits no edema or tenderness.   Lymphadenopathy:     She has no cervical adenopathy.   Neurological: She is alert and oriented to person, place, and time. No cranial nerve deficit.   Skin: Skin is warm and dry. She is not diaphoretic. No erythema.   Psychiatric: She has a normal mood and affect. Her behavior is normal.   Vitals reviewed.    LABS/IMAGING: prior records reviewed and summarized in HPI  Office Visit on 07/17/2019   Component Date Value Ref Range Status   • TSH 07/17/2019 3.790  0.450 - 4.500 uIU/mL Final       ASSESSMENT/PLAN:  1) hypothyroidism due to Hashimoto's thyroiditis:  - clinically euthyroid on exam  - TSH today WNL   - Will continue Brand Levoxyl 125 mcg daily.  - Reviewed proper levothyroxine administration, and factors to avoid that decrease medication potency and medication absorption.     2) depression with anxiety:  - Pt requesting refill of Celexa to 20 mg daily until she can get established with a new PCP  - new Rx sent to her pharmacy    RTC 6 months

## 2019-07-18 LAB — TSH SERPL DL<=0.005 MIU/L-ACNC: 3.79 UIU/ML (ref 0.45–4.5)

## 2019-07-19 ENCOUNTER — TELEPHONE (OUTPATIENT)
Dept: INTERNAL MEDICINE | Facility: CLINIC | Age: 49
End: 2019-07-19

## 2019-07-19 RX ORDER — LEVOTHYROXINE SODIUM 125 UG/1
125 TABLET ORAL DAILY
Qty: 30 TABLET | Refills: 5 | Status: SHIPPED | OUTPATIENT
Start: 2019-07-19 | End: 2020-02-10

## 2019-07-19 RX ORDER — CITALOPRAM 20 MG/1
20 TABLET ORAL DAILY
Qty: 30 TABLET | Refills: 5 | Status: SHIPPED | OUTPATIENT
Start: 2019-07-19 | End: 2019-11-08

## 2019-07-19 NOTE — TELEPHONE ENCOUNTER
Spoke to patient about lab results. See clinic note from 07/17/2019 for details.   Camilla Gary MD

## 2019-11-08 ENCOUNTER — OFFICE VISIT (OUTPATIENT)
Dept: INTERNAL MEDICINE | Facility: CLINIC | Age: 49
End: 2019-11-08

## 2019-11-08 ENCOUNTER — HOSPITAL ENCOUNTER (OUTPATIENT)
Dept: GENERAL RADIOLOGY | Facility: HOSPITAL | Age: 49
Discharge: HOME | End: 2019-11-08
Admitting: PHYSICIAN ASSISTANT

## 2019-11-08 VITALS
OXYGEN SATURATION: 99 % | WEIGHT: 213.4 LBS | HEIGHT: 68 IN | DIASTOLIC BLOOD PRESSURE: 70 MMHG | BODY MASS INDEX: 32.34 KG/M2 | SYSTOLIC BLOOD PRESSURE: 120 MMHG | HEART RATE: 85 BPM

## 2019-11-08 DIAGNOSIS — G25.81 RESTLESS LEG SYNDROME: ICD-10-CM

## 2019-11-08 DIAGNOSIS — F41.9 ANXIETY: ICD-10-CM

## 2019-11-08 DIAGNOSIS — M54.50 ACUTE BILATERAL LOW BACK PAIN WITHOUT SCIATICA: Primary | ICD-10-CM

## 2019-11-08 DIAGNOSIS — J30.1 SEASONAL ALLERGIC RHINITIS DUE TO POLLEN: ICD-10-CM

## 2019-11-08 DIAGNOSIS — M54.50 ACUTE BILATERAL LOW BACK PAIN WITHOUT SCIATICA: ICD-10-CM

## 2019-11-08 DIAGNOSIS — J30.89 ENVIRONMENTAL AND SEASONAL ALLERGIES: ICD-10-CM

## 2019-11-08 PROCEDURE — 99214 OFFICE O/P EST MOD 30 MIN: CPT | Performed by: PHYSICIAN ASSISTANT

## 2019-11-08 PROCEDURE — 72110 X-RAY EXAM L-2 SPINE 4/>VWS: CPT

## 2019-11-08 PROCEDURE — 96372 THER/PROPH/DIAG INJ SC/IM: CPT | Performed by: PHYSICIAN ASSISTANT

## 2019-11-08 RX ORDER — METHOCARBAMOL 500 MG/1
500 TABLET, FILM COATED ORAL 3 TIMES DAILY PRN
Qty: 30 TABLET | Refills: 2 | Status: SHIPPED | OUTPATIENT
Start: 2019-11-08 | End: 2021-04-05

## 2019-11-08 RX ORDER — FLUTICASONE PROPIONATE 50 MCG
1 SPRAY, SUSPENSION (ML) NASAL DAILY
Qty: 16 ML | Refills: 5 | Status: SHIPPED | OUTPATIENT
Start: 2019-11-08 | End: 2019-11-11 | Stop reason: SDUPTHER

## 2019-11-08 RX ORDER — MONTELUKAST SODIUM 10 MG/1
10 TABLET ORAL
Qty: 30 TABLET | Refills: 5 | Status: SHIPPED | OUTPATIENT
Start: 2019-11-08 | End: 2020-05-15

## 2019-11-08 RX ORDER — LEVONORGESTREL AND ETHINYL ESTRADIOL AND ETHINYL ESTRADIOL 150-30(84)
1 KIT ORAL DAILY
Refills: 0 | COMMUNITY
Start: 2019-08-16 | End: 2023-01-17

## 2019-11-08 RX ORDER — METHYLPREDNISOLONE ACETATE 40 MG/ML
40 INJECTION, SUSPENSION INTRA-ARTICULAR; INTRALESIONAL; INTRAMUSCULAR; SOFT TISSUE ONCE
Status: COMPLETED | OUTPATIENT
Start: 2019-11-08 | End: 2019-11-08

## 2019-11-08 RX ORDER — AZELASTINE HYDROCHLORIDE 0.5 MG/ML
1 SOLUTION/ DROPS OPHTHALMIC DAILY
Qty: 6 ML | Refills: 1 | Status: SHIPPED | OUTPATIENT
Start: 2019-11-08 | End: 2020-03-27

## 2019-11-08 RX ORDER — PREDNISONE 10 MG/1
TABLET ORAL
Qty: 30 TABLET | Refills: 0 | Status: SHIPPED | OUTPATIENT
Start: 2019-11-08 | End: 2019-12-13

## 2019-11-08 RX ADMIN — METHYLPREDNISOLONE ACETATE 40 MG: 40 INJECTION, SUSPENSION INTRA-ARTICULAR; INTRALESIONAL; INTRAMUSCULAR; SOFT TISSUE at 11:16

## 2019-11-08 NOTE — PROGRESS NOTES
"Chief Complaint   Patient presents with   • Establish Care   • Back Pain       Subjective   Dawna Lovell is a 48 y.o. female.       History of Present Illness     Pt was previously a patient of Dr Alejandro for [facility].    She has history of \"throwing her back out\" a couple of times. Both related to overuse injuries. Pt was at work on 2 days ago and got up from her chair and had pain in her lower back. Has pain across her lower back, not radiating into her legs. She is taking advil prn, hurts when it wears off. She did PT after her back injury several years ago. She has been going to the gym daily for the past couple months.     Has seasonal allergies, takes flonase and singulair to control her symptoms.    Pt has taken several different medications for anxiety over the years. She has been seeing a therapist and now a psychiatric APRN. She has weaned off the celexa and was going to switch to Effexor but was worried about having trouble getting off of it.    Pt sees Dr Pisano for restless legs and takes 900 mg of gabapentin qhs. Takes requip as well prn.    She takes imitrex prn for migraines. Prescribed by her gyn, Dr Markle. Takes seasonique and that helps control her migraines.    She sees Cardiology for POTS, stable with carvedilol.           Current Outpatient Medications:   •  azelastine (OPTIVAR) 0.05 % ophthalmic solution, Administer 1 drop to both eyes Daily., Disp: 6 mL, Rfl: 1  •  carvedilol (COREG) 6.25 MG tablet, Take 1 tablet by mouth 2 (Two) Times a Day With Meals., Disp: 60 tablet, Rfl: 6  •  fluticasone (FLONASE) 50 MCG/ACT nasal spray, 1 spray into the nostril(s) as directed by provider Daily., Disp: 16 mL, Rfl: 5  •  gabapentin (NEURONTIN) 300 MG capsule, Take 3 capsules by mouth Every Night., Disp: 90 capsule, Rfl: 5  •  hydrOXYzine (ATARAX) 25 MG tablet, TAKE 1 TABLET Twice daily PRN, Disp: 40 tablet, Rfl: 1  •  LEVOXYL 125 MCG tablet, Take 1 tablet by mouth Daily., Disp: 30 tablet, " Rfl: 5  •  loratadine (CLARITIN) 10 MG tablet, take 1 tablet by mouth once daily, Disp: 30 tablet, Rfl: 1  •  methocarbamol (ROBAXIN) 500 MG tablet, Take 1 tablet by mouth 3 (Three) Times a Day As Needed for Muscle Spasms., Disp: 30 tablet, Rfl: 2  •  montelukast (SINGULAIR) 10 MG tablet, Take 1 tablet by mouth every night at bedtime., Disp: 30 tablet, Rfl: 5  •  Multiple Vitamins-Minerals (MULTIVITAMIN ADULT PO), Take  by mouth Daily., Disp: , Rfl:   •  rOPINIRole (REQUIP) 1 MG tablet, Take 0.5-1 tablets by mouth Every Night., Disp: 30 tablet, Rfl: 11  •  SUMAtriptan (IMITREX) 25 MG tablet, As Needed., Disp: , Rfl: 0  •  ASHLYNA 0.15-0.03 &0.01 MG tablet, Take 1 tablet by mouth Daily., Disp: , Rfl: 0  •  predniSONE (DELTASONE) 10 MG tablet, Take 4 tablets for 3 days, then 3 tablets for 3 days, then 2 tablets for 3 days, then 1 tablet for 3 days, Disp: 30 tablet, Rfl: 0     PMFSH  The following portions of the patient's history were reviewed and updated as appropriate: allergies, current medications, past family history, past medical history, past social history, past surgical history and problem list.    Review of Systems   Constitutional: Negative for appetite change, fever and unexpected weight change.   HENT: Negative.    Eyes: Negative for pain and visual disturbance.   Respiratory: Negative for chest tightness, shortness of breath and wheezing.    Cardiovascular: Negative for chest pain and palpitations.   Gastrointestinal: Negative for abdominal pain, blood in stool, diarrhea, nausea and vomiting.   Endocrine: Negative.    Genitourinary: Negative for difficulty urinating, flank pain, frequency and urgency.   Musculoskeletal: Positive for back pain. Negative for joint swelling.   Skin: Negative for color change and rash.   Neurological: Negative for tremors and weakness.   Hematological: Negative for adenopathy.   Psychiatric/Behavioral: Negative for confusion and decreased concentration.   All other systems  "reviewed and are negative.      Objective   /70   Pulse 85   Ht 172.7 cm (68\")   Wt 96.8 kg (213 lb 6.4 oz)   SpO2 99%   BMI 32.45 kg/m²     Physical Exam   Constitutional: She is oriented to person, place, and time. She appears well-developed and well-nourished. No distress.   HENT:   Head: Normocephalic and atraumatic.   Eyes: Conjunctivae and EOM are normal. Pupils are equal, round, and reactive to light. No scleral icterus.   Neck: Normal range of motion. Neck supple.   Cardiovascular: Normal rate, regular rhythm and normal heart sounds. Exam reveals no gallop.   No murmur heard.  Pulmonary/Chest: Effort normal and breath sounds normal. No respiratory distress. She has no wheezes. She has no rales. She exhibits no tenderness.   Abdominal: Soft. There is no tenderness.   Musculoskeletal: She exhibits tenderness. She exhibits no deformity.   Neurological: She is alert and oriented to person, place, and time. She has normal reflexes. She displays no atrophy, no tremor and normal reflexes. No sensory deficit. She exhibits normal muscle tone. Coordination normal.   Reflex Scores:       Patellar reflexes are 2+ on the right side and 2+ on the left side.       Achilles reflexes are 2+ on the right side and 2+ on the left side.  Skin: Skin is warm and dry. No rash noted. She is not diaphoretic.   Psychiatric: She has a normal mood and affect. Her behavior is normal. Judgment and thought content normal.   Nursing note and vitals reviewed.      ASSESSMENT/PLAN    Problem List Items Addressed This Visit        Respiratory    Allergic rhinitis     Stable with singulair. Refill ordered.         Relevant Medications    montelukast (SINGULAIR) 10 MG tablet    predniSONE (DELTASONE) 10 MG tablet       Other    Anxiety     Currently not on medication. Seeing psychiatrist for medication management.         Restless leg syndrome     Managed by Dr Pisano, managed with gabapentin and requip.           Other Visit " Diagnoses     Acute bilateral low back pain without sciatica    -  Primary    Depomedrol 40 mg IM in office. Start prednisone taper and robaxin as directed. Check lumbar spine xray and refer for PT evaluation.    Relevant Medications    methocarbamol (ROBAXIN) 500 MG tablet    predniSONE (DELTASONE) 10 MG tablet    methylPREDNISolone acetate (DEPO-medrol) injection 40 mg (Completed)    Other Relevant Orders    Ambulatory Referral to Physical Therapy Evaluate and treat    XR Spine Lumbar 4+ View (Completed)    Environmental and seasonal allergies        Relevant Medications    azelastine (OPTIVAR) 0.05 % ophthalmic solution    fluticasone (FLONASE) 50 MCG/ACT nasal spray               Return for Annual with fasting labs.

## 2019-11-11 RX ORDER — FLUTICASONE PROPIONATE 50 MCG
1 SPRAY, SUSPENSION (ML) NASAL DAILY
Qty: 16 ML | Refills: 5 | Status: SHIPPED | OUTPATIENT
Start: 2019-11-11 | End: 2021-06-25

## 2019-11-11 NOTE — PROGRESS NOTES
The xray of your lower back shows minimal arthritis with some straightening of the normal curvature of your spine. This is likely from muscle spasms. Please continue with the plan to treat with muscle relaxers, steroids and physical therapy.

## 2019-11-14 ENCOUNTER — TELEPHONE (OUTPATIENT)
Dept: INTERNAL MEDICINE | Facility: CLINIC | Age: 49
End: 2019-11-14

## 2019-11-14 NOTE — TELEPHONE ENCOUNTER
PT CALLED TO SAY THAT HER BACK WAS BETTER BUT YESTERDAY AND TODAY IT GETTING WORSE    DO WE NEED TO SEE AGAIN OR WHAT CAN SHE DO    PLEASE CALL THE [phone]

## 2019-11-14 NOTE — TELEPHONE ENCOUNTER
lvm for pt that Jesenia was out for the rest of the afternoon, if she would like to be seen she can call and make an appointment for tomorrow

## 2019-11-15 ENCOUNTER — OFFICE VISIT (OUTPATIENT)
Dept: INTERNAL MEDICINE | Facility: CLINIC | Age: 49
End: 2019-11-15

## 2019-11-15 VITALS
BODY MASS INDEX: 32.69 KG/M2 | SYSTOLIC BLOOD PRESSURE: 120 MMHG | WEIGHT: 215 LBS | DIASTOLIC BLOOD PRESSURE: 78 MMHG | HEART RATE: 78 BPM | OXYGEN SATURATION: 99 %

## 2019-11-15 DIAGNOSIS — M54.50 ACUTE BILATERAL LOW BACK PAIN WITHOUT SCIATICA: Primary | ICD-10-CM

## 2019-11-15 DIAGNOSIS — Z88.7 HISTORY OF INFLUENZA VACCINE ALLERGY: ICD-10-CM

## 2019-11-15 PROCEDURE — 96372 THER/PROPH/DIAG INJ SC/IM: CPT | Performed by: PHYSICIAN ASSISTANT

## 2019-11-15 PROCEDURE — 99213 OFFICE O/P EST LOW 20 MIN: CPT | Performed by: PHYSICIAN ASSISTANT

## 2019-11-15 RX ORDER — KETOROLAC TROMETHAMINE 30 MG/ML
60 INJECTION, SOLUTION INTRAMUSCULAR; INTRAVENOUS ONCE
Status: COMPLETED | OUTPATIENT
Start: 2019-11-15 | End: 2019-11-15

## 2019-11-15 RX ORDER — CYCLOBENZAPRINE HCL 10 MG
10 TABLET ORAL 3 TIMES DAILY PRN
Qty: 30 TABLET | Refills: 0 | Status: SHIPPED | OUTPATIENT
Start: 2019-11-15 | End: 2021-06-25

## 2019-11-15 RX ADMIN — KETOROLAC TROMETHAMINE 60 MG: 30 INJECTION, SOLUTION INTRAMUSCULAR; INTRAVENOUS at 15:22

## 2019-11-15 NOTE — PROGRESS NOTES
Chief Complaint   Patient presents with   • Back Pain     Follow Up       Subjective   Dawna Lovell is a 48 y.o. female.       History of Present Illness     Pt was here a week ago and treated for back pain. She did feel better for about 3 days and then her symptoms returned worse than they were before. She is taking about 2 muscle relaxers a day. Having difficulty finding a comfortable position. Hurts to change positions, better in her recliner. Tried some stretching but did not seem to help.    She did clean the house this week and may have overdone it.      Current Outpatient Medications:   •  ASHLYNA 0.15-0.03 &0.01 MG tablet, Take 1 tablet by mouth Daily., Disp: , Rfl: 0  •  azelastine (OPTIVAR) 0.05 % ophthalmic solution, Administer 1 drop to both eyes Daily., Disp: 6 mL, Rfl: 1  •  carvedilol (COREG) 6.25 MG tablet, Take 1 tablet by mouth 2 (Two) Times a Day With Meals., Disp: 60 tablet, Rfl: 6  •  fluticasone (FLONASE) 50 MCG/ACT nasal spray, 1 spray into the nostril(s) as directed by provider Daily., Disp: 16 mL, Rfl: 5  •  gabapentin (NEURONTIN) 300 MG capsule, Take 3 capsules by mouth Every Night., Disp: 90 capsule, Rfl: 5  •  hydrOXYzine (ATARAX) 25 MG tablet, TAKE 1 TABLET Twice daily PRN, Disp: 40 tablet, Rfl: 1  •  LEVOXYL 125 MCG tablet, Take 1 tablet by mouth Daily., Disp: 30 tablet, Rfl: 5  •  loratadine (CLARITIN) 10 MG tablet, take 1 tablet by mouth once daily, Disp: 30 tablet, Rfl: 1  •  methocarbamol (ROBAXIN) 500 MG tablet, Take 1 tablet by mouth 3 (Three) Times a Day As Needed for Muscle Spasms., Disp: 30 tablet, Rfl: 2  •  montelukast (SINGULAIR) 10 MG tablet, Take 1 tablet by mouth every night at bedtime., Disp: 30 tablet, Rfl: 5  •  Multiple Vitamins-Minerals (MULTIVITAMIN ADULT PO), Take  by mouth Daily., Disp: , Rfl:   •  predniSONE (DELTASONE) 10 MG tablet, Take 4 tablets for 3 days, then 3 tablets for 3 days, then 2 tablets for 3 days, then 1 tablet for 3 days, Disp: 30 tablet, Rfl:  0  •  rOPINIRole (REQUIP) 1 MG tablet, Take 0.5-1 tablets by mouth Every Night., Disp: 30 tablet, Rfl: 11  •  SUMAtriptan (IMITREX) 25 MG tablet, As Needed., Disp: , Rfl: 0  •  cyclobenzaprine (FLEXERIL) 10 MG tablet, Take 1 tablet by mouth 3 (Three) Times a Day As Needed for Muscle Spasms., Disp: 30 tablet, Rfl: 0     PMFSH  The following portions of the patient's history were reviewed and updated as appropriate: allergies, current medications, past family history, past medical history, past social history, past surgical history and problem list.    Review of Systems   Constitutional: Negative for appetite change, fever and unexpected weight change.   HENT: Negative.    Eyes: Negative for pain and visual disturbance.   Respiratory: Negative for chest tightness, shortness of breath and wheezing.    Cardiovascular: Negative for chest pain and palpitations.   Gastrointestinal: Negative for abdominal pain, blood in stool, diarrhea, nausea and vomiting.   Endocrine: Negative.    Genitourinary: Negative for difficulty urinating, flank pain, frequency and urgency.   Musculoskeletal: Positive for back pain. Negative for joint swelling.   Skin: Negative for color change and rash.   Neurological: Negative for tremors and weakness.   Hematological: Negative for adenopathy.   Psychiatric/Behavioral: Negative for confusion and decreased concentration.   All other systems reviewed and are negative.      Objective   /78   Pulse 78   Wt 97.5 kg (215 lb)   SpO2 99%   BMI 32.69 kg/m²     Physical Exam   Constitutional: She is oriented to person, place, and time. She appears well-developed and well-nourished. No distress.   HENT:   Head: Normocephalic and atraumatic.   Eyes: Conjunctivae and EOM are normal. Pupils are equal, round, and reactive to light. No scleral icterus.   Neck: Normal range of motion. Neck supple.   Cardiovascular: Normal rate, regular rhythm and normal heart sounds. Exam reveals no gallop.   No murmur  heard.  Pulmonary/Chest: Effort normal and breath sounds normal. No respiratory distress. She has no wheezes. She has no rales. She exhibits no tenderness.   Abdominal: Soft. There is no tenderness.   Musculoskeletal: She exhibits tenderness. She exhibits no deformity.   Neurological: She is alert and oriented to person, place, and time. She has normal reflexes. She displays no atrophy, no tremor and normal reflexes. No sensory deficit. She exhibits normal muscle tone. Coordination normal.   Reflex Scores:       Patellar reflexes are 2+ on the right side and 2+ on the left side.       Achilles reflexes are 2+ on the right side and 2+ on the left side.  Skin: Skin is warm and dry. No rash noted. She is not diaphoretic.   Psychiatric: She has a normal mood and affect. Her behavior is normal. Judgment and thought content normal.   Nursing note and vitals reviewed.      Results for orders placed or performed in visit on 07/17/19   TSH   Result Value Ref Range    TSH 3.790 0.450 - 4.500 uIU/mL        ASSESSMENT/PLAN    Problem List Items Addressed This Visit     None      Visit Diagnoses     Acute bilateral low back pain without sciatica    -  Primary    Toradol 60 mg IM in office. Change muscle relaxer to flexeril 10 mg prn. Keep scheduled appt with physical therapy.    Relevant Medications    cyclobenzaprine (FLEXERIL) 10 MG tablet    ketorolac (TORADOL) injection 60 mg (Completed)    History of influenza vaccine allergy        Relevant Orders    Ambulatory Referral to Allergy               Return for Next scheduled follow up.

## 2019-12-13 ENCOUNTER — OFFICE VISIT (OUTPATIENT)
Dept: INTERNAL MEDICINE | Facility: CLINIC | Age: 49
End: 2019-12-13

## 2019-12-13 VITALS
HEART RATE: 92 BPM | DIASTOLIC BLOOD PRESSURE: 90 MMHG | WEIGHT: 212.6 LBS | SYSTOLIC BLOOD PRESSURE: 130 MMHG | BODY MASS INDEX: 32.33 KG/M2 | OXYGEN SATURATION: 99 %

## 2019-12-13 DIAGNOSIS — F41.9 ANXIETY: ICD-10-CM

## 2019-12-13 DIAGNOSIS — Z23 FLU VACCINE NEED: Primary | ICD-10-CM

## 2019-12-13 PROCEDURE — 90471 IMMUNIZATION ADMIN: CPT | Performed by: PHYSICIAN ASSISTANT

## 2019-12-13 PROCEDURE — 99213 OFFICE O/P EST LOW 20 MIN: CPT | Performed by: PHYSICIAN ASSISTANT

## 2019-12-13 PROCEDURE — 90674 CCIIV4 VAC NO PRSV 0.5 ML IM: CPT | Performed by: PHYSICIAN ASSISTANT

## 2019-12-13 RX ORDER — VENLAFAXINE HYDROCHLORIDE 37.5 MG/1
37.5 CAPSULE, EXTENDED RELEASE ORAL DAILY
Qty: 7 CAPSULE | Refills: 0 | Status: SHIPPED | OUTPATIENT
Start: 2019-12-13 | End: 2020-01-13 | Stop reason: SDUPTHER

## 2019-12-13 NOTE — PROGRESS NOTES
Chief Complaint   Patient presents with   • Anxiety     Acute       Subjective   Dawna Lovell is a 48 y.o. female.       History of Present Illness     Pt's back pain has improved drastically since last visit.    She saw allergist and has started allergy shots based on allergy testing. He switched her to nasacort from flonase. Said her flu shot reaction was a localized reaction and recommended she get 1/2 the shot in one arm and the other 1/2 in the other arm.    Pt sees counselor for ongoing situational stressors. She was referred to [facility] and suggested she switched from Celexa to Effexor. Pt had been on effexor in the past (cannot remember why she stopped it) so she decided to just taper off Celexa to see what she would feel like without any meds. She has been off of the Celexa for the 6 weeks. Anxiety is now worse and depression is returning. Would like to get prescription from me rather than return to psych prescriber.        Current Outpatient Medications:   •  ASHLYNA 0.15-0.03 &0.01 MG tablet, Take 1 tablet by mouth Daily., Disp: , Rfl: 0  •  azelastine (OPTIVAR) 0.05 % ophthalmic solution, Administer 1 drop to both eyes Daily., Disp: 6 mL, Rfl: 1  •  carvedilol (COREG) 6.25 MG tablet, Take 1 tablet by mouth 2 (Two) Times a Day With Meals., Disp: 60 tablet, Rfl: 6  •  cyclobenzaprine (FLEXERIL) 10 MG tablet, Take 1 tablet by mouth 3 (Three) Times a Day As Needed for Muscle Spasms., Disp: 30 tablet, Rfl: 0  •  fluticasone (FLONASE) 50 MCG/ACT nasal spray, 1 spray into the nostril(s) as directed by provider Daily., Disp: 16 mL, Rfl: 5  •  gabapentin (NEURONTIN) 300 MG capsule, Take 3 capsules by mouth Every Night., Disp: 90 capsule, Rfl: 5  •  hydrOXYzine (ATARAX) 25 MG tablet, TAKE 1 TABLET Twice daily PRN, Disp: 40 tablet, Rfl: 1  •  LEVOXYL 125 MCG tablet, Take 1 tablet by mouth Daily., Disp: 30 tablet, Rfl: 5  •  loratadine (CLARITIN) 10 MG tablet, take 1 tablet by mouth once daily, Disp: 30  tablet, Rfl: 1  •  methocarbamol (ROBAXIN) 500 MG tablet, Take 1 tablet by mouth 3 (Three) Times a Day As Needed for Muscle Spasms., Disp: 30 tablet, Rfl: 2  •  montelukast (SINGULAIR) 10 MG tablet, Take 1 tablet by mouth every night at bedtime., Disp: 30 tablet, Rfl: 5  •  Multiple Vitamins-Minerals (MULTIVITAMIN ADULT PO), Take  by mouth Daily., Disp: , Rfl:   •  rOPINIRole (REQUIP) 1 MG tablet, Take 0.5-1 tablets by mouth Every Night., Disp: 30 tablet, Rfl: 11  •  SUMAtriptan (IMITREX) 25 MG tablet, As Needed., Disp: , Rfl: 0  •  venlafaxine XR (EFFEXOR XR) 37.5 MG 24 hr capsule, Take 1 capsule by mouth Daily. Take for 1 week, then increase to 75 mg daily., Disp: 7 capsule, Rfl: 0     PMFSH  The following portions of the patient's history were reviewed and updated as appropriate: allergies, current medications, past family history, past medical history, past social history, past surgical history and problem list.    Review of Systems   Constitutional: Negative for chills, fever and unexpected weight change.   HENT: Negative.    Eyes: Negative for pain and visual disturbance.   Respiratory: Negative for chest tightness and shortness of breath.    Cardiovascular: Negative for chest pain.   Gastrointestinal: Negative for abdominal pain and blood in stool.   Endocrine: Negative.    Genitourinary: Negative.    Musculoskeletal: Negative for joint swelling.   Skin: Negative for color change, rash and wound.   Allergic/Immunologic: Negative.    Neurological: Negative for syncope and speech difficulty.   Hematological: Negative for adenopathy.   Psychiatric/Behavioral: Positive for decreased concentration. Negative for confusion, hallucinations and suicidal ideas. The patient is nervous/anxious.        Objective   /90   Pulse 92   Wt 96.4 kg (212 lb 9.6 oz)   SpO2 99%   BMI 32.33 kg/m²     Physical Exam   Constitutional: She is oriented to person, place, and time. She appears well-developed and well-nourished.  No distress.   HENT:   Head: Normocephalic.   Eyes: Pupils are equal, round, and reactive to light. Conjunctivae and EOM are normal.   Neck: Normal range of motion. Neck supple.   Cardiovascular: Normal rate, regular rhythm and normal heart sounds.   No murmur heard.  Pulmonary/Chest: Effort normal and breath sounds normal.   Musculoskeletal: Normal range of motion.   Neurological: She is alert and oriented to person, place, and time.   Skin: Skin is warm and dry. No rash noted. She is not diaphoretic.   Psychiatric: Her behavior is normal. Judgment and thought content normal. Her affect is not inappropriate. She is not actively hallucinating. Cognition and memory are normal.   Nursing note and vitals reviewed.      Results for orders placed or performed in visit on 07/17/19   TSH   Result Value Ref Range    TSH 3.790 0.450 - 4.500 uIU/mL        ASSESSMENT/PLAN    Problem List Items Addressed This Visit        Other    Anxiety     Discussed options including another SSRI or SNRI. Pt has some perimenopausal symptoms and would like to try something other than celexa. She has effexor at home and will start it since she cannot remember why it was stopped. Sent in 37.5 mg capsules to take for 5-7 days until starting 75 mg capsules.         Relevant Medications    venlafaxine XR (EFFEXOR XR) 37.5 MG 24 hr capsule      Other Visit Diagnoses     Flu vaccine need    -  Primary    Relevant Orders    Flucelvax Quad=>4Years ([id number]) (Completed)               Return in about 4 weeks (around 1/10/2020) for Recheck.

## 2019-12-16 NOTE — ASSESSMENT & PLAN NOTE
Discussed options including another SSRI or SNRI. Pt has some perimenopausal symptoms and would like to try something other than celexa. She has effexor at home and will start it since she cannot remember why it was stopped. Sent in 37.5 mg capsules to take for 5-7 days until starting 75 mg capsules.

## 2019-12-18 DIAGNOSIS — F41.9 ANXIETY: ICD-10-CM

## 2019-12-18 RX ORDER — VENLAFAXINE HYDROCHLORIDE 37.5 MG/1
CAPSULE, EXTENDED RELEASE ORAL
Qty: 7 CAPSULE | Refills: 0 | OUTPATIENT
Start: 2019-12-18

## 2019-12-27 RX ORDER — CARVEDILOL 6.25 MG/1
6.25 TABLET ORAL 2 TIMES DAILY WITH MEALS
Qty: 60 TABLET | Refills: 0 | Status: SHIPPED | OUTPATIENT
Start: 2019-12-27 | End: 2020-10-14 | Stop reason: SDUPTHER

## 2020-01-11 DIAGNOSIS — F41.8 DEPRESSION WITH ANXIETY: ICD-10-CM

## 2020-01-13 ENCOUNTER — OFFICE VISIT (OUTPATIENT)
Dept: INTERNAL MEDICINE | Facility: CLINIC | Age: 50
End: 2020-01-13

## 2020-01-13 VITALS
BODY MASS INDEX: 32.54 KG/M2 | HEART RATE: 82 BPM | SYSTOLIC BLOOD PRESSURE: 120 MMHG | WEIGHT: 214 LBS | OXYGEN SATURATION: 99 % | DIASTOLIC BLOOD PRESSURE: 84 MMHG

## 2020-01-13 DIAGNOSIS — F41.9 ANXIETY: ICD-10-CM

## 2020-01-13 PROCEDURE — 99212 OFFICE O/P EST SF 10 MIN: CPT | Performed by: PHYSICIAN ASSISTANT

## 2020-01-13 RX ORDER — VENLAFAXINE HYDROCHLORIDE 75 MG/1
75 CAPSULE, EXTENDED RELEASE ORAL DAILY
Qty: 90 CAPSULE | Refills: 1 | Status: SHIPPED | OUTPATIENT
Start: 2020-01-13 | End: 2020-04-14 | Stop reason: SDUPTHER

## 2020-01-13 RX ORDER — CITALOPRAM 20 MG/1
TABLET ORAL
Qty: 90 TABLET | OUTPATIENT
Start: 2020-01-13

## 2020-01-13 NOTE — PROGRESS NOTES
Chief Complaint   Patient presents with   • Anxiety     Follow Up       Subjective   Dawna Lovell is a 49 y.o. female.       History of Present Illness     Pt did start the effexor 37.5 mg and worked up to 75 mg and has done well with it. Notes improvement in anxiety and some improvement in sleep and hot flashes. No SE that she has noticed.  also feels she is less anxious.    She has been working hard to improve diet with decrease carbohydrates and higher protein. She has been doing 45 minutes on the treadmill. Started back on track about a month ago.        Current Outpatient Medications:   •  ASHLYNA 0.15-0.03 &0.01 MG tablet, Take 1 tablet by mouth Daily., Disp: , Rfl: 0  •  azelastine (OPTIVAR) 0.05 % ophthalmic solution, Administer 1 drop to both eyes Daily., Disp: 6 mL, Rfl: 1  •  carvedilol (COREG) 6.25 MG tablet, Take 1 tablet by mouth 2 (Two) Times a Day With Meals. Please call to schedule an appt or defer further refills to PCP, Disp: 60 tablet, Rfl: 0  •  cyclobenzaprine (FLEXERIL) 10 MG tablet, Take 1 tablet by mouth 3 (Three) Times a Day As Needed for Muscle Spasms., Disp: 30 tablet, Rfl: 0  •  fluticasone (FLONASE) 50 MCG/ACT nasal spray, 1 spray into the nostril(s) as directed by provider Daily., Disp: 16 mL, Rfl: 5  •  gabapentin (NEURONTIN) 300 MG capsule, Take 3 capsules by mouth Every Night., Disp: 90 capsule, Rfl: 5  •  hydrOXYzine (ATARAX) 25 MG tablet, TAKE 1 TABLET Twice daily PRN, Disp: 40 tablet, Rfl: 1  •  LEVOXYL 125 MCG tablet, Take 1 tablet by mouth Daily., Disp: 30 tablet, Rfl: 5  •  loratadine (CLARITIN) 10 MG tablet, take 1 tablet by mouth once daily, Disp: 30 tablet, Rfl: 1  •  methocarbamol (ROBAXIN) 500 MG tablet, Take 1 tablet by mouth 3 (Three) Times a Day As Needed for Muscle Spasms., Disp: 30 tablet, Rfl: 2  •  montelukast (SINGULAIR) 10 MG tablet, Take 1 tablet by mouth every night at bedtime., Disp: 30 tablet, Rfl: 5  •  Multiple Vitamins-Minerals (MULTIVITAMIN  ADULT PO), Take  by mouth Daily., Disp: , Rfl:   •  rOPINIRole (REQUIP) 1 MG tablet, Take 0.5-1 tablets by mouth Every Night., Disp: 30 tablet, Rfl: 11  •  SUMAtriptan (IMITREX) 25 MG tablet, As Needed., Disp: , Rfl: 0  •  venlafaxine XR (EFFEXOR-XR) 75 MG 24 hr capsule, Take 1 capsule by mouth Daily. Take for 1 week, then increase to 75 mg daily., Disp: 90 capsule, Rfl: 1     PMFSH  The following portions of the patient's history were reviewed and updated as appropriate: allergies, current medications, past family history, past medical history, past social history, past surgical history and problem list.    Review of Systems   Constitutional: Negative for activity change, appetite change and fatigue.   HENT: Negative for congestion and rhinorrhea.    Respiratory: Negative for chest tightness and shortness of breath.    Cardiovascular: Negative for chest pain and palpitations.   Gastrointestinal: Negative for abdominal pain.   Genitourinary: Negative for dysuria.   Musculoskeletal: Negative for arthralgias and myalgias.   Neurological: Negative for dizziness, weakness, light-headedness and headaches.   Psychiatric/Behavioral: Negative for dysphoric mood. The patient is not nervous/anxious.        Objective   /84   Pulse 82   Wt 97.1 kg (214 lb)   SpO2 99%   BMI 32.54 kg/m²     Physical Exam   Constitutional: She is oriented to person, place, and time. She appears well-developed and well-nourished.   HENT:   Head: Normocephalic and atraumatic.   Eyes: Conjunctivae are normal.   Neck: Normal range of motion.   Cardiovascular: Normal rate and regular rhythm.   Pulmonary/Chest: Effort normal and breath sounds normal.   Musculoskeletal: Normal range of motion.   Neurological: She is alert and oriented to person, place, and time.   Skin: Skin is warm and dry.   Psychiatric: She has a normal mood and affect. Her behavior is normal. Judgment and thought content normal.            ASSESSMENT/PLAN    Problem List  Items Addressed This Visit        Other    Anxiety     Improving with effexor xr 75 mg. Continue current dose, refill ordered.          Relevant Medications    venlafaxine XR (EFFEXOR-XR) 75 MG 24 hr capsule               Return in about 3 months (around 4/13/2020) for Recheck.

## 2020-01-17 ENCOUNTER — TELEPHONE (OUTPATIENT)
Dept: ENDOCRINOLOGY | Facility: CLINIC | Age: 50
End: 2020-01-17

## 2020-01-17 DIAGNOSIS — E03.8 HYPOTHYROIDISM DUE TO HASHIMOTO'S THYROIDITIS: Primary | ICD-10-CM

## 2020-01-17 DIAGNOSIS — E06.3 HYPOTHYROIDISM DUE TO HASHIMOTO'S THYROIDITIS: Primary | ICD-10-CM

## 2020-01-17 NOTE — TELEPHONE ENCOUNTER
PATIENT IS HAVING TO CANCEL UPCOMING APPT DUE TO A WORK CONFLICT AND WOULD LIKE TO KNOW IF SHE CAN GO AHEAD AND GET HER LABS DRAWN.    CALL BACK [phone]

## 2020-01-30 ENCOUNTER — TELEPHONE (OUTPATIENT)
Dept: INTERNAL MEDICINE | Facility: CLINIC | Age: 50
End: 2020-01-30

## 2020-01-30 DIAGNOSIS — E78.2 MIXED HYPERLIPIDEMIA: Primary | ICD-10-CM

## 2020-01-30 DIAGNOSIS — K21.9 GASTROESOPHAGEAL REFLUX DISEASE, ESOPHAGITIS PRESENCE NOT SPECIFIED: ICD-10-CM

## 2020-01-30 NOTE — TELEPHONE ENCOUNTER
Pt called and cancelled appointment for 1/31/20.  Pt stated that she had a work meeting.  Pt asked if Dr. Leahy would put in her labs so that Dr. Leahy can review them before her next appointment, then she and Dr. Leahy can talk about it at that appointment.  Pt stated that she will call back and reschedule once she has completed her labs.      Pt callback: [phone]

## 2020-01-31 ENCOUNTER — LAB REQUISITION (OUTPATIENT)
Dept: LAB | Facility: HOSPITAL | Age: 50
End: 2020-01-31

## 2020-01-31 DIAGNOSIS — Z00.00 ROUTINE GENERAL MEDICAL EXAMINATION AT A HEALTH CARE FACILITY: ICD-10-CM

## 2020-01-31 PROCEDURE — 36415 COLL VENOUS BLD VENIPUNCTURE: CPT | Performed by: INTERNAL MEDICINE

## 2020-01-31 NOTE — TELEPHONE ENCOUNTER
She has an appointment already scheduled in April. She can keep that one and do the labs prior to it if she would like. I have ordered the labs.

## 2020-02-03 LAB — TSH SERPL DL<=0.005 MIU/L-ACNC: 2.02 UIU/ML (ref 0.45–4.5)

## 2020-02-10 DIAGNOSIS — E03.8 HYPOTHYROIDISM DUE TO HASHIMOTO'S THYROIDITIS: ICD-10-CM

## 2020-02-10 DIAGNOSIS — E06.3 HYPOTHYROIDISM DUE TO HASHIMOTO'S THYROIDITIS: ICD-10-CM

## 2020-02-10 RX ORDER — LEVOTHYROXINE SODIUM 125 UG/1
TABLET ORAL
Qty: 30 TABLET | Refills: 5 | Status: SHIPPED | OUTPATIENT
Start: 2020-02-10 | End: 2020-06-11 | Stop reason: CLARIF

## 2020-02-10 NOTE — TELEPHONE ENCOUNTER
Please advise on refill. Thanks!    LOV: 7/17/19  FOV: none  **Pt no showed apt 1/22/20 with Dr Gray.

## 2020-02-15 DIAGNOSIS — E06.3 HYPOTHYROIDISM DUE TO HASHIMOTO'S THYROIDITIS: ICD-10-CM

## 2020-02-15 DIAGNOSIS — E03.8 HYPOTHYROIDISM DUE TO HASHIMOTO'S THYROIDITIS: ICD-10-CM

## 2020-02-17 RX ORDER — LEVOTHYROXINE SODIUM 125 UG/1
TABLET ORAL
Qty: 30 TABLET | Refills: 5 | OUTPATIENT
Start: 2020-02-17

## 2020-03-27 ENCOUNTER — TELEPHONE (OUTPATIENT)
Dept: ENDOCRINOLOGY | Facility: CLINIC | Age: 50
End: 2020-03-27

## 2020-03-27 ENCOUNTER — TELEMEDICINE (OUTPATIENT)
Dept: ENDOCRINOLOGY | Facility: CLINIC | Age: 50
End: 2020-03-27

## 2020-03-27 ENCOUNTER — TELEPHONE (OUTPATIENT)
Dept: INTERNAL MEDICINE | Facility: CLINIC | Age: 50
End: 2020-03-27

## 2020-03-27 DIAGNOSIS — E03.8 HYPOTHYROIDISM DUE TO HASHIMOTO'S THYROIDITIS: Primary | ICD-10-CM

## 2020-03-27 DIAGNOSIS — E06.3 HYPOTHYROIDISM DUE TO HASHIMOTO'S THYROIDITIS: Primary | ICD-10-CM

## 2020-03-27 PROCEDURE — 99212 OFFICE O/P EST SF 10 MIN: CPT | Performed by: INTERNAL MEDICINE

## 2020-03-27 RX ORDER — OLOPATADINE HYDROCHLORIDE 1 MG/ML
SOLUTION/ DROPS OPHTHALMIC
COMMUNITY
Start: 2020-03-26

## 2020-03-27 NOTE — TELEPHONE ENCOUNTER
M for patient to call back to schedule a follow up with Dr. Gray in July or August. In office or telehealth visit.

## 2020-03-27 NOTE — PROGRESS NOTES
CC: Hypothyroidism follow-up    HPI:   Dawna Lovell is a 49 y.o.female who presents as a telemedicine visit for follow-up evaluation of her hypothyroidism. Last clinic visit 07/17/2019.  Her history is as follows:    Interim events:   - Has establish care with a new PCP  - Changed her anxiety medicine from Celexa to Effexor in January 2020  - Is now receiving allergy injections monthly  -Patient's insurance would not cover brand-name Levoxyl. has been receiving generic levothyroxine from her pharmacy    1) hypothyroidism due to Hashimoto's thyroiditis:  - Diagnosed approximately 2001    Current dose: Generic levothyroxine 125 µg daily  - Takes tablet in the morning on an empty stomach. Waits at least 30-60 minutes before eating/hot liquids.    - Takes multivitamin in the evening  - not on biotin  -Had tried using Brand Levoxyl in the past due to fluctuations in TSH on generic.  However her insurance will not cover brand name formulations.    Overall feeling well on the generic 125 dose.    2) h/o abnormal endocrine tests:  - Pt had random serum cortisol levels collected at various time in the day which were elevated from 11/2015 to 4/2016. However, she was on an estrogen containing OCP at the time of these collections which falsely elevated the random serum cortisols. 3PM - ACTH was 11.6. Had elevated hepatic enzymes, normal glucose  - I evaluated pt in 2016 for possible Cushing's. Evaluation off birth control proved to be negative:  ( 06/2016) 24 hour urinary cortisol: was normal at 3 mcg/24 hours  (06/2016)  Midnight salivary cortisols ([facility]): normal  #1 <0.010, #2 0.013 ( normal range <0.010 - 0.090)  (07/2016) 1 mg overnight dexamethasone suppression test: 8AM cortisol was appropriately suppressed to 0.5 (normal < 1.8)    Had pt complete 1 mg overnight dex suppression test again on 4/28/2017:   04/28/2017)  1 mg overnight dexamethasone suppression test: 8AM cortisol was appropriately suppressed to  "0.4    Review of Imaging:   - CT ABD 05/02/2016 - \"showed marked inhomogeneous geographic appearing liver with a mosaic pattern, most typical for marked inhomogeneous geographic steatosis with focal areas of fat sparing. This pattern was noted by ultrasound on 11/10/2015.\" and normal adrenal glands  - CT Head w/o contrast 1/21/2016: \"Old lacunar infarct seen in the left basal ganglia. No acute intracranial abnormalities identified.\"    Review of Systems   Constitutional: Negative for fatigue.        Mild weight gain since January 2020   Eyes: Negative.    Respiratory: Negative.    Cardiovascular: Negative for palpitations (on carvediolol).   Gastrointestinal: Negative.  Negative for diarrhea and nausea.   Endocrine: Negative.    Genitourinary: Negative.    Musculoskeletal: Positive for myalgias. Negative for arthralgias and back pain.   Skin: Negative.    Allergic/Immunologic: Negative.    Neurological: Positive for headaches (Occasional). Negative for tremors and syncope.   Hematological: Negative.    Psychiatric/Behavioral:        Anxiety better on effexor     The following portions of the patient's history were reviewed and updated as appropriate: allergies, current medications, past family history, past medical history, past social history, past surgical history and problem list.    There were no vitals taken for this visit.  Physical Exam   Constitutional: She appears well-developed and well-nourished. No distress.     LABS/IMAGING: prior records reviewed and summarized in HPI  No visits with results within 1 Day(s) from this visit.   Latest known visit with results is:   Orders Only on 01/31/2020   Component Date Value Ref Range Status   • TSH 01/31/2020 2.020  0.450 - 4.500 uIU/mL Final       ASSESSMENT/PLAN:  1) hypothyroidism due to Hashimoto's thyroiditis:  - clinically euthyroid on exam  - TSH recently on 01/31/2020 was WNL   - Will continue generic levothyroxine 125 mcg for now as she is tolerating and " overall feeling well on this formulation.   - Reviewed proper levothyroxine administration, and factors to avoid that decrease medication potency and medication absorption.     RTC 4-5 months

## 2020-04-14 ENCOUNTER — TELEMEDICINE (OUTPATIENT)
Dept: INTERNAL MEDICINE | Facility: CLINIC | Age: 50
End: 2020-04-14

## 2020-04-14 DIAGNOSIS — F41.9 ANXIETY: ICD-10-CM

## 2020-04-14 DIAGNOSIS — J30.1 SEASONAL ALLERGIC RHINITIS DUE TO POLLEN: Primary | ICD-10-CM

## 2020-04-14 PROCEDURE — 99213 OFFICE O/P EST LOW 20 MIN: CPT | Performed by: PHYSICIAN ASSISTANT

## 2020-04-14 RX ORDER — VENLAFAXINE HYDROCHLORIDE 150 MG/1
150 CAPSULE, EXTENDED RELEASE ORAL DAILY
Qty: 30 CAPSULE | Refills: 5 | Status: SHIPPED | OUTPATIENT
Start: 2020-04-14 | End: 2020-09-28 | Stop reason: SDUPTHER

## 2020-04-14 NOTE — ASSESSMENT & PLAN NOTE
Increase dose of Effexor XR to 150 mg daily. Pt will let me know if she feels she needs to return to lower dose of 75 mg daily.

## 2020-04-14 NOTE — PROGRESS NOTES
Chief Complaint   Patient presents with   • Anxiety       Subjective   Dawna Lovell is a 49 y.o. female.       History of Present Illness     This was an audio and video enabled telemedicine encounter.    Pt is still doing well with Effexor but does feel like she could use a slightly higher dose with the increased stress and anxiety related to ongoing pandemic. She has not developed any side effects from the medication.    She has been getting allergy shots weekly from Dr Barnes- still on a low dose and has not noticed much difference yet.    Saw Endo recently and TSH level was where she wanted it to be. Had to switch to generic levothyroxine when she previously was on Synthroid. Cannot tell a difference yet.    No other refills needed. She will check in with Cardiologist later in the year- due for yearly follow up. Can tell when she misses a dose of carvedilol- has palpitations.      Current Outpatient Medications:   •  ASHLYNA 0.15-0.03 &0.01 MG tablet, Take 1 tablet by mouth Daily., Disp: , Rfl: 0  •  carvedilol (COREG) 6.25 MG tablet, Take 1 tablet by mouth 2 (Two) Times a Day With Meals. Please call to schedule an appt or defer further refills to PCP, Disp: 60 tablet, Rfl: 0  •  cyclobenzaprine (FLEXERIL) 10 MG tablet, Take 1 tablet by mouth 3 (Three) Times a Day As Needed for Muscle Spasms., Disp: 30 tablet, Rfl: 0  •  fluticasone (FLONASE) 50 MCG/ACT nasal spray, 1 spray into the nostril(s) as directed by provider Daily., Disp: 16 mL, Rfl: 5  •  hydrOXYzine (ATARAX) 25 MG tablet, TAKE 1 TABLET Twice daily PRN, Disp: 40 tablet, Rfl: 1  •  LEVOXYL 125 MCG tablet, TAKE 1 TABLET BY MOUTH ONCE DAILY, Disp: 30 tablet, Rfl: 5  •  loratadine (CLARITIN) 10 MG tablet, take 1 tablet by mouth once daily, Disp: 30 tablet, Rfl: 1  •  methocarbamol (ROBAXIN) 500 MG tablet, Take 1 tablet by mouth 3 (Three) Times a Day As Needed for Muscle Spasms., Disp: 30 tablet, Rfl: 2  •  montelukast (SINGULAIR) 10 MG tablet, Take 1  tablet by mouth every night at bedtime., Disp: 30 tablet, Rfl: 5  •  Multiple Vitamins-Minerals (MULTIVITAMIN ADULT PO), Take  by mouth Daily., Disp: , Rfl:   •  olopatadine (PATANOL) 0.1 % ophthalmic solution, , Disp: , Rfl:   •  rOPINIRole (REQUIP) 1 MG tablet, Take 0.5-1 tablets by mouth Every Night., Disp: 30 tablet, Rfl: 11  •  SUMAtriptan (IMITREX) 25 MG tablet, As Needed., Disp: , Rfl: 0  •  venlafaxine XR (EFFEXOR-XR) 150 MG 24 hr capsule, Take 1 capsule by mouth Daily., Disp: 30 capsule, Rfl: 5     PMFSH  The following portions of the patient's history were reviewed and updated as appropriate: allergies, current medications, past family history, past medical history, past social history, past surgical history and problem list.    Review of Systems   Constitutional: Negative for chills, fever and unexpected weight change.   HENT: Negative.    Eyes: Negative for pain and visual disturbance.   Respiratory: Negative for chest tightness and shortness of breath.    Cardiovascular: Negative for chest pain.   Gastrointestinal: Negative for abdominal pain and blood in stool.   Endocrine: Negative.    Genitourinary: Negative.    Musculoskeletal: Negative for joint swelling.   Skin: Negative for color change, rash and wound.   Allergic/Immunologic: Negative.    Neurological: Negative for syncope, speech difficulty and headaches.   Hematological: Negative for adenopathy.   Psychiatric/Behavioral: Positive for decreased concentration. Negative for confusion, hallucinations and suicidal ideas. The patient is nervous/anxious.        Objective   There were no vitals taken for this visit.    Physical Exam   Constitutional: She is oriented to person, place, and time. She appears well-developed and well-nourished.   HENT:   Head: Normocephalic and atraumatic.   Right Ear: External ear normal.   Left Ear: External ear normal.   Nose: Nose normal.   Eyes: Conjunctivae and EOM are normal.   Neck: Normal range of motion.    Cardiovascular: Normal rate.   Pulmonary/Chest: Effort normal.   Musculoskeletal: Normal range of motion.   Neurological: She is alert and oriented to person, place, and time.   Psychiatric: She has a normal mood and affect. Her behavior is normal. Judgment and thought content normal.            ASSESSMENT/PLAN    Problem List Items Addressed This Visit        Respiratory    Allergic rhinitis - Primary     Ongoing allergy shots per allergist, Dr Barnes.            Other    Anxiety     Increase dose of Effexor XR to 150 mg daily. Pt will let me know if she feels she needs to return to lower dose of 75 mg daily.         Relevant Medications    venlafaxine XR (EFFEXOR-XR) 150 MG 24 hr capsule               Return in about 6 months (around 10/14/2020) for Annual with fasting labs.

## 2020-05-12 ENCOUNTER — TELEMEDICINE (OUTPATIENT)
Dept: INTERNAL MEDICINE | Facility: CLINIC | Age: 50
End: 2020-05-12

## 2020-05-12 DIAGNOSIS — R05.9 COUGH: Primary | ICD-10-CM

## 2020-05-12 PROCEDURE — 99442 PR PHYS/QHP TELEPHONE EVALUATION 11-20 MIN: CPT | Performed by: PHYSICIAN ASSISTANT

## 2020-05-12 RX ORDER — LEVONORGESTREL AND ETHINYL ESTRADIOL 100-20(84)
KIT ORAL
COMMUNITY
Start: 2020-05-08 | End: 2021-04-05

## 2020-05-12 NOTE — PROGRESS NOTES
Chief Complaint   Patient presents with   • URI   • Cough     No fever  X 2 days       Subjective   Dawna Lovell is a 49 y.o. female.       History of Present Illness     You have chosen to receive care through a telephone visit. Do you consent to use a telephone visit for your medical care today? Yes    This visit has been rescheduled as a phone visit to comply with patient safety concerns in accordance with CDC recommendations. Total time of discussion was 12 minutes.    Pt has developed a bad chest cold. No fever. Pt started feeling bad about 2 days ago with what she thought were allergy symptoms. Has constant drainage down her throat. Her chest feels tight, has nasal drainage. Taking xyzal for allergies and it has helped some. She is going in to her office 3-4 days a week, works with 8 other people, although not in close proximity. Lives with teenage sons and , they are not sick.      Current Outpatient Medications:   •  ASHLYNA 0.15-0.03 &0.01 MG tablet, Take 1 tablet by mouth Daily., Disp: , Rfl: 0  •  carvedilol (COREG) 6.25 MG tablet, Take 1 tablet by mouth 2 (Two) Times a Day With Meals. Please call to schedule an appt or defer further refills to PCP, Disp: 60 tablet, Rfl: 0  •  cyclobenzaprine (FLEXERIL) 10 MG tablet, Take 1 tablet by mouth 3 (Three) Times a Day As Needed for Muscle Spasms., Disp: 30 tablet, Rfl: 0  •  fluticasone (FLONASE) 50 MCG/ACT nasal spray, 1 spray into the nostril(s) as directed by provider Daily., Disp: 16 mL, Rfl: 5  •  hydrOXYzine (ATARAX) 25 MG tablet, TAKE 1 TABLET Twice daily PRN, Disp: 40 tablet, Rfl: 1  •  Levonorgest-Eth Estrad 91-Day 0.1-0.02 & 0.01 MG tablet, TAKE 1 TABLET DAILY UNTIL FINISHED., Disp: , Rfl:   •  LEVOXYL 125 MCG tablet, TAKE 1 TABLET BY MOUTH ONCE DAILY, Disp: 30 tablet, Rfl: 5  •  loratadine (CLARITIN) 10 MG tablet, take 1 tablet by mouth once daily, Disp: 30 tablet, Rfl: 1  •  methocarbamol (ROBAXIN) 500 MG tablet, Take 1 tablet by mouth 3  (Three) Times a Day As Needed for Muscle Spasms., Disp: 30 tablet, Rfl: 2  •  montelukast (SINGULAIR) 10 MG tablet, Take 1 tablet by mouth every night at bedtime., Disp: 30 tablet, Rfl: 5  •  Multiple Vitamins-Minerals (MULTIVITAMIN ADULT PO), Take  by mouth Daily., Disp: , Rfl:   •  olopatadine (PATANOL) 0.1 % ophthalmic solution, , Disp: , Rfl:   •  rOPINIRole (REQUIP) 1 MG tablet, Take 0.5-1 tablets by mouth Every Night., Disp: 30 tablet, Rfl: 11  •  SUMAtriptan (IMITREX) 25 MG tablet, As Needed., Disp: , Rfl: 0  •  venlafaxine XR (EFFEXOR-XR) 150 MG 24 hr capsule, Take 1 capsule by mouth Daily., Disp: 30 capsule, Rfl: 5     PMFSH  The following portions of the patient's history were reviewed and updated as appropriate: allergies, current medications, past family history, past medical history, past social history, past surgical history and problem list.    Review of Systems   Constitutional: Positive for fatigue. Negative for activity change, fever and unexpected weight change.   HENT: Positive for congestion, postnasal drip and sore throat. Negative for ear pain.    Eyes: Negative for pain and discharge.   Respiratory: Positive for cough. Negative for chest tightness, shortness of breath and wheezing.    Cardiovascular: Negative for chest pain and palpitations.   Gastrointestinal: Negative for abdominal pain, diarrhea and vomiting.   Endocrine: Negative.    Genitourinary: Negative.    Musculoskeletal: Negative for joint swelling.   Skin: Negative for color change, rash and wound.   Allergic/Immunologic: Negative.    Neurological: Negative for seizures and syncope.   Psychiatric/Behavioral: Negative.        Objective   There were no vitals taken for this visit.    Physical Exam   Constitutional: She is oriented to person, place, and time. No distress.   Pulmonary/Chest: Effort normal.   Neurological: She is alert and oriented to person, place, and time.   Psychiatric: She has a normal mood and affect. Her  behavior is normal. Judgment and thought content normal.            ASSESSMENT/PLAN    Problem List Items Addressed This Visit     None      Visit Diagnoses     Cough    -  Primary    Due to potential exposure and risk of spread, recommended pt go to  for COVID testing. Instructed to wear a mask and no work until test results back.                Return if symptoms worsen or fail to improve.

## 2020-05-13 PROCEDURE — U0003 INFECTIOUS AGENT DETECTION BY NUCLEIC ACID (DNA OR RNA); SEVERE ACUTE RESPIRATORY SYNDROME CORONAVIRUS 2 (SARS-COV-2) (CORONAVIRUS DISEASE [COVID-19]), AMPLIFIED PROBE TECHNIQUE, MAKING USE OF HIGH THROUGHPUT TECHNOLOGIES AS DESCRIBED BY CMS-2020-01-R: HCPCS | Performed by: EMERGENCY MEDICINE

## 2020-05-15 DIAGNOSIS — J30.1 SEASONAL ALLERGIC RHINITIS DUE TO POLLEN: ICD-10-CM

## 2020-05-15 RX ORDER — MONTELUKAST SODIUM 10 MG/1
TABLET ORAL
Qty: 30 TABLET | Refills: 2 | Status: SHIPPED | OUTPATIENT
Start: 2020-05-15 | End: 2020-09-01 | Stop reason: SDUPTHER

## 2020-05-16 ENCOUNTER — TELEPHONE (OUTPATIENT)
Dept: URGENT CARE | Facility: CLINIC | Age: 50
End: 2020-05-16

## 2020-06-11 ENCOUNTER — TELEPHONE (OUTPATIENT)
Dept: ENDOCRINOLOGY | Facility: CLINIC | Age: 50
End: 2020-06-11

## 2020-06-11 DIAGNOSIS — E03.8 HYPOTHYROIDISM DUE TO HASHIMOTO'S THYROIDITIS: Primary | ICD-10-CM

## 2020-06-11 DIAGNOSIS — E06.3 HYPOTHYROIDISM DUE TO HASHIMOTO'S THYROIDITIS: Primary | ICD-10-CM

## 2020-06-11 RX ORDER — LEVOTHYROXINE SODIUM 0.12 MG/1
125 TABLET ORAL DAILY
Qty: 90 TABLET | Refills: 3 | Status: SHIPPED | OUTPATIENT
Start: 2020-06-11 | End: 2020-08-10 | Stop reason: DRUGHIGH

## 2020-06-11 NOTE — TELEPHONE ENCOUNTER
Eric,     I spoke with patient. She has been taking generic levothyroxine for past 2 months. I  Have sent a new Rx for the generic levothyroxine 125 mcg to her pharmacy.    Signed: Camilla Gray MD

## 2020-06-11 NOTE — TELEPHONE ENCOUNTER
Received letter from [employer] stating they will no longer cover patients Levoxyl 125mcg tablet. They did not give a formulary would you like for me to change it? I did see in the last OV that patients insurance will not cover name brand, but generic instead. Please advise ?                                                                                                                                                                                                                                                                                                                         .                                                                                                                                                                                                                                                                                                                                                                                                                                                                                                                                                                                                                                                                                                                                                                                                                                                                                                                                                                                                                                                                                                                                                                                                                                                                                                                                                                                                                                                                                                                                                                                                                                                                                                                                                                                                                                                                                                                                                                                                                                                                                                                                                                                                                                                                                                                                                                                                                                                                                                                                                                                                                                             .............

## 2020-08-05 ENCOUNTER — LAB REQUISITION (OUTPATIENT)
Dept: LAB | Facility: HOSPITAL | Age: 50
End: 2020-08-05

## 2020-08-05 DIAGNOSIS — Z00.00 ROUTINE GENERAL MEDICAL EXAMINATION AT A HEALTH CARE FACILITY: ICD-10-CM

## 2020-08-05 PROCEDURE — 36415 COLL VENOUS BLD VENIPUNCTURE: CPT

## 2020-08-06 LAB — TSH SERPL DL<=0.005 MIU/L-ACNC: 15.5 UIU/ML (ref 0.45–4.5)

## 2020-08-10 ENCOUNTER — OFFICE VISIT (OUTPATIENT)
Dept: ENDOCRINOLOGY | Facility: CLINIC | Age: 50
End: 2020-08-10

## 2020-08-10 VITALS
BODY MASS INDEX: 30.22 KG/M2 | RESPIRATION RATE: 18 BRPM | DIASTOLIC BLOOD PRESSURE: 78 MMHG | WEIGHT: 199.4 LBS | OXYGEN SATURATION: 97 % | HEART RATE: 94 BPM | HEIGHT: 68 IN | SYSTOLIC BLOOD PRESSURE: 124 MMHG

## 2020-08-10 DIAGNOSIS — E06.3 HYPOTHYROIDISM DUE TO HASHIMOTO'S THYROIDITIS: Primary | ICD-10-CM

## 2020-08-10 DIAGNOSIS — R73.09 ABNORMAL GLUCOSE: ICD-10-CM

## 2020-08-10 DIAGNOSIS — E03.8 HYPOTHYROIDISM DUE TO HASHIMOTO'S THYROIDITIS: Primary | ICD-10-CM

## 2020-08-10 LAB
EXPIRATION DATE: NORMAL
HBA1C MFR BLD: 5.3 %
Lab: NORMAL

## 2020-08-10 PROCEDURE — 83036 HEMOGLOBIN GLYCOSYLATED A1C: CPT | Performed by: INTERNAL MEDICINE

## 2020-08-10 PROCEDURE — 99213 OFFICE O/P EST LOW 20 MIN: CPT | Performed by: INTERNAL MEDICINE

## 2020-08-10 RX ORDER — LEVOTHYROXINE SODIUM 137 UG/1
137 TABLET ORAL DAILY
Qty: 90 TABLET | Refills: 0 | Status: SHIPPED | OUTPATIENT
Start: 2020-08-10 | End: 2020-11-06

## 2020-08-10 NOTE — PROGRESS NOTES
"Chief Complaint   Patient presents with   • Hypothyroidism     follow-up       HPI:   Dawna Lovell is a 49 y.o.female who returns to endocrine clinic for follow-up evaluation of her hypothyroidism. Last visit (telemedicine)  03/27/2020. Her history is as follows:    Interim events:   - pt is now on Effexor  - receiving allergy injections weekly  - has had mild weight loss    1) hypothyroidism due to Hashimoto's thyroiditis:  - Diagnosed approximately 2001  - switched from Brand Levoxyl to levothyroxine due to cost    Current dose: generic levothyroxine 125 µg daily  - Takes tablet in the morning on an empty stomach. Waits at least 30-60 minutes before eating/hot liquids.    - Takes multivitamin in the evening  - not on biotin    2) h/o abnormal endocrine tests:  - Pt had random serum cortisol levels collected at various time in the day which were elevated from 11/2015 to 4/2016. However, she was on an estrogen containing OCP at the time of these collections which falsely elevated the random serum cortisols. 3PM - ACTH was 11.6. Had elevated hepatic enzymes, normal glucose  - I evaluated pt in 2016 for possible Cushing's. Evaluation off birth control proved to be negative:  ( 06/2016) 24 hour urinary cortisol: was normal at 3 mcg/24 hours  (06/2016)  Midnight salivary cortisols ([facility]): normal  #1 <0.010, #2 0.013 ( normal range <0.010 - 0.090)  (07/2016) 1 mg overnight dexamethasone suppression test: 8AM cortisol was appropriately suppressed to 0.5 (normal < 1.8)    Had pt complete 1 mg overnight dex suppression test again on 4/28/2017:   04/28/2017)  1 mg overnight dexamethasone suppression test: 8AM cortisol was appropriately suppressed to 0.4    Review of Imaging:   - CT ABD 05/02/2016 - \"showed marked inhomogeneous geographic appearing liver with a mosaic pattern, most typical for marked inhomogeneous geographic steatosis with focal areas of fat sparing. This pattern was noted by ultrasound on 11/10/2015.\" " "and normal adrenal glands  - CT Head w/o contrast 1/21/2016: \"Old lacunar infarct seen in the left basal ganglia. No acute intracranial abnormalities identified.\"    Review of Systems   Constitutional:        Mild weight loss since last visit   Eyes:        Dry eyes   Respiratory: Negative.    Cardiovascular: Negative for palpitations (on carvediolol).   Gastrointestinal: Negative.  Negative for diarrhea and nausea.   Endocrine: Negative.    Genitourinary: Negative.    Musculoskeletal: Positive for myalgias. Negative for arthralgias and back pain.   Skin: Negative.    Allergic/Immunologic: Negative.    Neurological: Positive for headaches (Occasional). Negative for tremors and syncope.   Hematological: Negative.    Psychiatric/Behavioral:        Depression with anxiety better on effexor     The following portions of the patient's history were reviewed and updated as appropriate: allergies, current medications, past family history, past medical history, past social history, past surgical history and problem list.    /78   Pulse 94   Resp 18   Ht 173 cm (68.11\")   Wt 90.4 kg (199 lb 6.4 oz)   SpO2 97%   BMI 30.22 kg/m²   Physical Exam   Constitutional: She is oriented to person, place, and time. She appears well-developed. No distress.   HENT:   Head: Normocephalic.   Mouth/Throat: Oropharynx is clear and moist.   Eyes: Pupils are equal, round, and reactive to light. Conjunctivae and EOM are normal.   Neck: No tracheal deviation present. No thyromegaly present.   No palpable thyroid nodules     Cardiovascular: Normal rate, regular rhythm and normal heart sounds.   No murmur heard.  Pulmonary/Chest: Effort normal and breath sounds normal. No respiratory distress.   Musculoskeletal: She exhibits no edema or tenderness.   Lymphadenopathy:     She has no cervical adenopathy.   Neurological: She is alert and oriented to person, place, and time. No cranial nerve deficit.   Skin: Skin is warm and dry. She is not " diaphoretic. No erythema.   Psychiatric: She has a normal mood and affect. Her behavior is normal.   Vitals reviewed.    LABS/IMAGING: prior records reviewed and summarized in HPI  Office Visit on 08/10/2020   Component Date Value Ref Range Status   • Hemoglobin A1C 08/10/2020 5.3  % Final   • Lot Number 08/10/2020 [id number]   Final   • Expiration Date 08/10/2020 03/13/2022   Final     Lab Results   Component Value Date    TSH 15.500 (H) 08/05/2020         ASSESSMENT/PLAN:  1) hypothyroidism due to Hashimoto's thyroiditis: uncontrolled  - clinically euthyroid on exam  - TSH on 08/05/2020 was elevated to 15.50. Confirmed with patient that she has been taking her medication as directed.  - Will increase her levothyroxine to 137 mcg  - Reviewed proper levothyroxine administration, and factors to avoid that decrease medication potency and medication absorption.   - Will check TSH in 8 weeks and adjust dose as indicated    RTC 6 months    Signed: Camilla Gray MD

## 2020-08-14 ENCOUNTER — TELEPHONE (OUTPATIENT)
Dept: ENDOCRINOLOGY | Facility: CLINIC | Age: 50
End: 2020-08-14

## 2020-09-01 ENCOUNTER — TELEPHONE (OUTPATIENT)
Dept: INTERNAL MEDICINE | Facility: CLINIC | Age: 50
End: 2020-09-01

## 2020-09-01 DIAGNOSIS — J30.1 SEASONAL ALLERGIC RHINITIS DUE TO POLLEN: ICD-10-CM

## 2020-09-01 RX ORDER — MONTELUKAST SODIUM 10 MG/1
10 TABLET ORAL
Qty: 30 TABLET | Refills: 5 | Status: SHIPPED | OUTPATIENT
Start: 2020-09-01 | End: 2021-03-17

## 2020-09-24 ENCOUNTER — TELEPHONE (OUTPATIENT)
Dept: INTERNAL MEDICINE | Facility: CLINIC | Age: 50
End: 2020-09-24

## 2020-09-24 NOTE — TELEPHONE ENCOUNTER
HUB TO READ:  LVM WITH PATIENT TO R/S APPOINTMENT FROM 10/15 DUE TO PROVIDER BEING OOTO.   PLEASE R/S WITH MARY AS SHE IS HER PCP.   10/22 HAS BEEN OPENED.

## 2020-09-28 ENCOUNTER — TELEPHONE (OUTPATIENT)
Dept: INTERNAL MEDICINE | Facility: CLINIC | Age: 50
End: 2020-09-28

## 2020-09-28 DIAGNOSIS — F41.9 ANXIETY: ICD-10-CM

## 2020-09-28 RX ORDER — VENLAFAXINE HYDROCHLORIDE 150 MG/1
150 CAPSULE, EXTENDED RELEASE ORAL DAILY
Qty: 30 CAPSULE | Refills: 5 | Status: SHIPPED | OUTPATIENT
Start: 2020-09-28 | End: 2021-04-05 | Stop reason: SDUPTHER

## 2020-10-14 ENCOUNTER — TELEPHONE (OUTPATIENT)
Dept: INTERNAL MEDICINE | Facility: CLINIC | Age: 50
End: 2020-10-14

## 2020-10-14 RX ORDER — CARVEDILOL 6.25 MG/1
6.25 TABLET ORAL 2 TIMES DAILY WITH MEALS
Qty: 60 TABLET | Refills: 2 | Status: SHIPPED | OUTPATIENT
Start: 2020-10-14 | End: 2021-01-18 | Stop reason: SDUPTHER

## 2020-10-14 NOTE — TELEPHONE ENCOUNTER
lvm for Dawna that Jesenia sent in medication but she needs appointment to be eval for her ankle and can call back and see if Gisella has any availability tonight or tomorrow

## 2020-10-14 NOTE — TELEPHONE ENCOUNTER
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 PATIENT THINKS SHE SPRAINED HER ANKLE AND DOESN'T BELIEVE IT TO BE BROKEN BUT IS EXPERIENCING PAIN. PATIENT WOULD LIKE MEDICATION TO HELP WITH THE PAIN. PATIENT HAS BEEN USING ICE ON HER ANKLE AS WELL AS TAKING ADVIL AND TYLENOL.  PLEASE ADVISE.      Caller: ZAKIYA WELLS    Relationship: SELF    Best call back number: [phone]    Medication needed:   Requested Prescriptions     Pending Prescriptions Disp Refills   • carvedilol (COREG) 6.25 MG tablet 60 tablet 0     Sig: Take 1 tablet by mouth 2 (Two) Times a Day With Meals. Please call to schedule an appt or defer further refills to PCP       When do you need the refill by: ASAP    What details did the patient provide when requesting the medication: PATIENT HAS MORE THAN A 3 DAY SUPPLY  Does the patient have less than a 3 day supply:  [] Yes  [x] No    What is the patient's preferred pharmacy: [id number] [place], [place] - [address] - [phone] [facility] [phone] FX

## 2020-10-14 NOTE — TELEPHONE ENCOUNTER
Sent in refill of carvedilol. She would need to be evaluated, preferably in office, to be able to prescribe anything for her ankle pain. May also need an xray.

## 2020-10-15 ENCOUNTER — TELEPHONE (OUTPATIENT)
Dept: INTERNAL MEDICINE | Facility: CLINIC | Age: 50
End: 2020-10-15

## 2020-10-15 DIAGNOSIS — Z00.00 HEALTH CARE MAINTENANCE: ICD-10-CM

## 2020-10-15 DIAGNOSIS — E78.2 MIXED HYPERLIPIDEMIA: Primary | ICD-10-CM

## 2020-10-15 NOTE — TELEPHONE ENCOUNTER
Caller: ZAKIYA WELLS    Relationship: PATIENT    Best call back number: [phone]    What orders are you requesting (i.e. lab or imaging): LABS    In what timeframe would the patient need to come in:PRIOR TO 10/21/20    Where will you receive your lab/imaging services: IN OFFICE    Additional notes: PATIENT CALLED IN REQUESTING LAB ORDER FOR UPCOMING APPT ON 10/21/20 SHE WOULD LIKE THOSE IN AS EARLY AS Monday 10/19/20 PLEASE ADVISE

## 2020-10-23 ENCOUNTER — OFFICE VISIT (OUTPATIENT)
Dept: ORTHOPEDIC SURGERY | Facility: CLINIC | Age: 50
End: 2020-10-23

## 2020-10-23 VITALS — HEART RATE: 103 BPM | HEIGHT: 68 IN | BODY MASS INDEX: 28.03 KG/M2 | WEIGHT: 184.97 LBS | OXYGEN SATURATION: 98 %

## 2020-10-23 DIAGNOSIS — S93.402A SPRAIN OF LEFT ANKLE, UNSPECIFIED LIGAMENT, INITIAL ENCOUNTER: Primary | ICD-10-CM

## 2020-10-23 PROCEDURE — 99213 OFFICE O/P EST LOW 20 MIN: CPT | Performed by: PHYSICIAN ASSISTANT

## 2020-10-23 RX ORDER — MELOXICAM 15 MG/1
15 TABLET ORAL DAILY
Qty: 30 TABLET | Refills: 2 | Status: SHIPPED | OUTPATIENT
Start: 2020-10-23 | End: 2021-04-05

## 2020-10-28 ENCOUNTER — TELEPHONE (OUTPATIENT)
Dept: ORTHOPEDIC SURGERY | Facility: CLINIC | Age: 50
End: 2020-10-28

## 2020-10-28 DIAGNOSIS — S93.402A SPRAIN OF LEFT ANKLE, UNSPECIFIED LIGAMENT, INITIAL ENCOUNTER: Primary | ICD-10-CM

## 2020-10-28 NOTE — TELEPHONE ENCOUNTER
PATIENT CALLED AND STATED SHE IS STILL IN A LOT OF PAIN AND BOOT IS NOT HELPING, STATED THAT THE LEFT FOOT IS STILL SWOLLEN AND THAT SHE CAN BARELY TOLERATE THE PAIN TODAY. PLEASE CALL [phone]

## 2020-10-28 NOTE — TELEPHONE ENCOUNTER
I spoke with the patient and advised that Cynthia had ordered an MRI for her ankle and we should be able to get the results back for that to see what is really going on with her ankle. She understood and I mentioned that I would go ahead and push her out one more week to go over those results. I also recommend that she keep doing the RICE method as well to help with her pain and swelling. She is to call back if she has any other questions.    Keshia

## 2020-10-31 ENCOUNTER — HOSPITAL ENCOUNTER (OUTPATIENT)
Dept: MRI IMAGING | Facility: HOSPITAL | Age: 50
Discharge: HOME | End: 2020-10-31
Admitting: PHYSICIAN ASSISTANT

## 2020-10-31 DIAGNOSIS — S93.402A SPRAIN OF LEFT ANKLE, UNSPECIFIED LIGAMENT, INITIAL ENCOUNTER: ICD-10-CM

## 2020-10-31 PROCEDURE — 73721 MRI JNT OF LWR EXTRE W/O DYE: CPT

## 2020-11-03 ENCOUNTER — OFFICE VISIT (OUTPATIENT)
Dept: ORTHOPEDIC SURGERY | Facility: CLINIC | Age: 50
End: 2020-11-03

## 2020-11-03 VITALS — HEIGHT: 68 IN | WEIGHT: 169.97 LBS | OXYGEN SATURATION: 99 % | HEART RATE: 90 BPM | BODY MASS INDEX: 25.76 KG/M2

## 2020-11-03 DIAGNOSIS — S93.402D SPRAIN OF LEFT ANKLE, UNSPECIFIED LIGAMENT, SUBSEQUENT ENCOUNTER: Primary | ICD-10-CM

## 2020-11-03 PROCEDURE — 99213 OFFICE O/P EST LOW 20 MIN: CPT | Performed by: PHYSICIAN ASSISTANT

## 2020-11-03 NOTE — PROGRESS NOTES
[facility] [place] [facility]opa[facility] Surgery Clinic Note    Subjective     Patient: Dawna Lovell  : 1970    Primary Care Provider: Jesenia Leahy PA    Requesting Provider: As above    Follow-up (1.5 week MRI ( 10/31/20  ) recheck - Sprain of left ankle)      History    Chief Complaint: f/up left ankle MRI    History of Present Illness: Patient returns today for f/up of her left ankle MRI.  She reports no new symptoms with persisting lateral ankle pain.  She has been WB in the boot, icing, elevating.  It has been 3 weeks since injury    Current Outpatient Medications on File Prior to Visit   Medication Sig Dispense Refill   • ASHLYNA 0.15-0.03 &0.01 MG tablet Take 1 tablet by mouth Daily.  0   • azithromycin (ZITHROMAX) 250 MG tablet Take 1 tablet by mouth Daily. Take 2 tablets the first day, then 1 tablet daily for 4 days. 6 tablet 0   • carvedilol (COREG) 6.25 MG tablet Take 1 tablet by mouth 2 (Two) Times a Day With Meals. 60 tablet 2   • cyclobenzaprine (FLEXERIL) 10 MG tablet Take 1 tablet by mouth 3 (Three) Times a Day As Needed for Muscle Spasms. 30 tablet 0   • fluticasone (FLONASE) 50 MCG/ACT nasal spray 1 spray into the nostril(s) as directed by provider Daily. 16 mL 5   • hydrOXYzine (ATARAX) 25 MG tablet TAKE 1 TABLET Twice daily PRN 40 tablet 1   • Levonorgest-Eth Estrad 91-Day 0.1-0.02 & 0.01 MG tablet TAKE 1 TABLET DAILY UNTIL FINISHED.     • levothyroxine (SYNTHROID, LEVOTHROID) 137 MCG tablet Take 1 tablet by mouth Daily. 90 tablet 0   • loratadine (CLARITIN) 10 MG tablet take 1 tablet by mouth once daily 30 tablet 1   • meloxicam (MOBIC) 15 MG tablet Take 1 tablet by mouth Daily. 30 tablet 2   • methocarbamol (ROBAXIN) 500 MG tablet Take 1 tablet by mouth 3 (Three) Times a Day As Needed for Muscle Spasms. 30 tablet 2   • montelukast (SINGULAIR) 10 MG tablet Take 1 tablet by mouth every night at bedtime. 30 tablet 5   • Multiple Vitamins-Minerals (MULTIVITAMIN ADULT PO) Take  by mouth Daily.     •  olopatadine (PATANOL) 0.1 % ophthalmic solution      • rOPINIRole (REQUIP) 1 MG tablet Take 0.5-1 tablets by mouth Every Night. 30 tablet 11   • SUMAtriptan (IMITREX) 25 MG tablet As Needed.  0   • venlafaxine XR (EFFEXOR-XR) 150 MG 24 hr capsule Take 1 capsule by mouth Daily. 30 capsule 5     No current facility-administered medications on file prior to visit.       Allergies   Allergen Reactions   • Morphine And Related Itching      Past Medical History:   Diagnosis Date   • Anxiety    • Asthma    • GERD (gastroesophageal reflux disease)    • Hyperlipidemia    • Hypothyroidism    • Irritable bowel syndrome    • Lyme disease 2016   • Migraine headache    • POTS (postural orthostatic tachycardia syndrome)    • Syncope 2016   • Tachycardia 2016     Past Surgical History:   Procedure Laterality Date   •  SECTION  2003     Family History   Problem Relation Age of Onset   • Esophageal cancer Father    • Cancer Father         Esophogical   • Dementia Other       Social History     Socioeconomic History   • Marital status:      Spouse name: Not on file   • Number of children: Not on file   • Years of education: Not on file   • Highest education level: Not on file   Tobacco Use   • Smoking status: Former Smoker     Packs/day: 0.00     Years: 0.00     Pack years: 0.00     Types: Cigarettes     Quit date:      Years since quittin.8   • Smokeless tobacco: Never Used   Substance and Sexual Activity   • Alcohol use: Yes     Alcohol/week: 1.0 standard drinks     Types: 1 Glasses of wine per week   • Drug use: No   • Sexual activity: Yes     Partners: Male     Birth control/protection: Other     Comment: Pill        Review of Systems   Constitutional: Negative.    HENT: Negative.    Eyes: Negative.    Respiratory: Negative.    Cardiovascular: Negative.    Gastrointestinal: Negative.    Endocrine: Negative.    Genitourinary: Negative.    Musculoskeletal: Positive for arthralgias and joint  "swelling.   Skin: Negative.    Allergic/Immunologic: Negative.    Neurological: Negative.    Hematological: Negative.    Psychiatric/Behavioral: Negative.        The following portions of the patient's history were reviewed and updated as appropriate: allergies, current medications, past family history, past medical history, past social history, past surgical history and problem list.      Objective      Physical Exam  Pulse 90   Ht 172.7 cm (67.99\")   Wt 77.1 kg (169 lb 15.6 oz)   SpO2 99%   BMI 25.85 kg/m²     Body mass index is 25.85 kg/m².    Patient is well developed, well nourished and in no acute distress.  Alert and oriented x 3.    Ortho Exam  Right ankle exam: tender to palpation around the lateral malleous with no bony tenderness.  Motor function intact.  Mild lateral swelling.  NVI with pulses 2+    Medical Decision Making    Data Review:   reviewed radiology images    Assessment:  1. Sprain of left ankle, unspecified ligament, subsequent encounter        Plan:  Left ankle sprain.  I reviewed MRI findings from 10/31/2020 and clinical findings with the patient.  I showed the MRI images to the patient as well.  MRI shows evidence of injury to the ATFL and anterior tib-fib ligament.  There is no evidence of syndesmotic injury.  Recommendation today is that she she continue with the boot as needed.  I encouraged her to wean out of the boot in the next week or two and begin PT.  I reminded her that ankle/foot injury swelling for months.  I have given her a rx for PT.  RTC 8 weeks or sooner if needed.    History, diagnosis and treatment plan discussed with Dr. Valdez.      Cynthia Guzman PA-C  11/04/20  14:07 EST    "

## 2020-11-05 DIAGNOSIS — E06.3 HYPOTHYROIDISM DUE TO HASHIMOTO'S THYROIDITIS: ICD-10-CM

## 2020-11-05 DIAGNOSIS — E03.8 HYPOTHYROIDISM DUE TO HASHIMOTO'S THYROIDITIS: ICD-10-CM

## 2020-11-05 NOTE — TELEPHONE ENCOUNTER
Dr. Gray, refill request.  Thank you.      LOV:  08/10/20  Future visit:  02/10/21    Last refill:  08/10/20  Q.  90  R.  0  Sig:  Take 1 tablet by mouth daily.    [place]

## 2020-11-06 RX ORDER — LEVOTHYROXINE SODIUM 137 UG/1
137 TABLET ORAL DAILY
Qty: 90 TABLET | Refills: 1 | Status: SHIPPED | OUTPATIENT
Start: 2020-11-06 | End: 2021-03-23 | Stop reason: SDUPTHER

## 2020-11-16 ENCOUNTER — LAB REQUISITION (OUTPATIENT)
Dept: LAB | Facility: HOSPITAL | Age: 50
End: 2020-11-16

## 2020-11-16 ENCOUNTER — TREATMENT (OUTPATIENT)
Dept: PHYSICAL THERAPY | Facility: CLINIC | Age: 50
End: 2020-11-16

## 2020-11-16 DIAGNOSIS — M25.60 RANGE OF MOTION DEFICIT: ICD-10-CM

## 2020-11-16 DIAGNOSIS — R53.1 WEAKNESS: Primary | ICD-10-CM

## 2020-11-16 DIAGNOSIS — Z00.00 ROUTINE GENERAL MEDICAL EXAMINATION AT A HEALTH CARE FACILITY: ICD-10-CM

## 2020-11-16 PROCEDURE — 97161 PT EVAL LOW COMPLEX 20 MIN: CPT | Performed by: PHYSICAL THERAPIST

## 2020-11-16 PROCEDURE — 36415 COLL VENOUS BLD VENIPUNCTURE: CPT

## 2020-11-16 NOTE — PROGRESS NOTES
Physical Therapy Initial Evaluation and Plan of Care      Patient: Dawna Lovell   : 1970  Diagnosis/ICD-10 Code:  No primary diagnosis found.  Referring practitioner: ROLANDA Alonso*  Date of Initial Visit: 2020  Today's Date: 2020  Patient seen for 1 sessions           Subjective Questionnaire: LEFS: 0      Subjective Evaluation    History of Present Illness  Mechanism of injury: The pt stated that she twisted her L ankle on the stairs at work on 10/13 and had immediate onset of pain and swelling. She was ultimately diagnosed with an ankle sprain with an avulsion fracture of the distal fibula. She was in a boot for 1 month and has been out of it now for appx 2 weeks. She has been using an ankle brace that her son had previously used but stated she does not like it. Pain is spread throughout the shin and into the top of the L foot and is relatively constant. Pain is increased with prolonged walking and standing and is decreased with rest and ice. She also feels that pain is increased at night when she is not doing anything. She has been using high doses of Aleve and Advil to manage symptoms and stated that pain is generally severe. She continues to note swelling in the joint that has been relatively unchanged in the last few weeks. She hopes to return to the gym and enjoys walking on the treadmill.      Patient Occupation: Works in office Pain  Current pain ratin  At worst pain ratin  Location: L shin, ankle, and foot   Quality: sharp and knife-like  Relieving factors: rest, medications and ice  Aggravating factors: stairs, ambulation, squatting, standing and movement  Progression: improved    Social Support  Lives in: multiple-level home  Lives with: spouse and young children    Hand dominance: right    Diagnostic Tests  X-ray: abnormal (avulsion fx of R fibula)  MRI studies: abnormal (torn ATFL and strained ant tib-fib lig)    Treatments  Previous treatment:  immobilization  Patient Goals  Patient goals for therapy: decreased edema, decreased pain, improved balance, increased motion, return to sport/leisure activities and increased strength             Objective          Palpation   Left   Tenderness of the anterior tibialis, peroneus and soleus.     Right   No palpable tenderness to the anterior tibialis, peroneus and soleus.     Tenderness   Left Ankle/Foot   Tenderness in the anterior talofibular ligament, calcaneofibular ligament and posterior tibial tendon. No tenderness in the posterior talofibular ligament.     Neurological Testing     Sensation     Ankle/Foot   Left Ankle/Foot   Diminished: light touch    Right Ankle/Foot   Intact: light touch     Comments   Left light touch: slightly diminished on dorsum of foot .     Active Range of Motion   Left Ankle/Foot   Dorsiflexion (ke): 12 degrees   Plantar flexion: 50 degrees   Inversion: 35 degrees   Eversion: 20 degrees     Right Ankle/Foot   Dorsiflexion (ke): 15 degrees   Plantar flexion: 60 degrees   Inversion: 45 degrees   Eversion: 25 degrees     Joint Play   Left Ankle/Foot  Joints within functional limits are the talocrural joint and subtalar joint. Hypermobile in the midfoot and forefoot.     Strength/Myotome Testing     Left Ankle/Foot   Dorsiflexion: 4  Plantar flexion: 4  Inversion: 4  Eversion: 4    Right Ankle/Foot   Dorsiflexion: 5  Plantar flexion: 5  Inversion: 5  Eversion: 5    Swelling   Left Ankle/Foot   Figure 8: 59 cm    Right Ankle/Foot   Figure 8: 58 cm    Ambulation     Comments   Antalgic gait pattern with dec WB on the L LE and poor pushoff from the L calf          Assessment & Plan     Assessment  Impairments: abnormal gait, abnormal muscle firing, abnormal or restricted ROM, activity intolerance, impaired balance, impaired physical strength, lacks appropriate home exercise program, pain with function and weight-bearing intolerance  Assessment details: The patient is a 48 yo female who  presented to PT with evolving characteristics of L ankle weakness, ROM deficits, and gait deviations with low complexity. Signs and symptoms are consistent with a subacute L ankle sprain with a distal fibular avulsion fracture. She had very good AROM given her period of immobilization, but ROM is still inequivalent to the R ankle, which is hypermobile. She also had hypermobility of the forefoot and will require significant strength improvements to stabilize the ankle in functional positions. She was prescribed an HEP for ankle AROM and for strengthening in multiple planes. She had some TTP in the plantar fascia so she was also prescribed stretches and STM for independent performance. I expect the patient to make a timely recovery with skilled PT intervention.   Prognosis: good  Functional Limitations: lifting, walking, uncomfortable because of pain, standing, stooping and unable to perform repetitive tasks  Goals  Plan Goals: Short Term Goals (4 weeks):     1. The patient will be independent and compliant with initial HEP.     2. The patient will report pain at rest 0/10 or less and worst pain 5/10 or less.    3. The patient will display decreased TTP in the ATFL and PF and dec mm tension in the surrounding musculature.    4.  L ankle AROM will improve to DF 15 deg, PF 60 deg, Inv 45 deg, Ev 25 deg.    5. The patient will demonstrate inc strength evidenced by MMT as follows: DF 4+/5, PF 4+/5, Inv 4+/5, and Ev 4+/5.    6. LEFS will improve by 9 points or more.     7. The pt will ambulate 200 feet with no AD and gait pattern free of apparent deviations.    8. Ankle swelling will decrease as evidenced by Fig 8 girth within 0.5 cm of the contralateral side.      Long Term Goals (8 weeks):     1. The patient will be appropriate for independent management and compliant with progressed HEP.     2. The patient will report pain at rest 0/10 or less and worst pain 2/10 or less.    3.  R ankle AROM will improve to DF 15 deg,  PF 65 deg, Inv 45 deg, Ev 25 deg.    5. The patient will return to work duties and/or ADLs with no limitations due to ankle pain or dysfunction.    6. The patient will return to recreational and community activities with no limitations due to ankle pain or dysfunction.      Plan  Therapy options: will be seen for skilled physical therapy services  Planned modality interventions: cryotherapy, electrical stimulation/Russian stimulation, iontophoresis, TENS and thermotherapy (hydrocollator packs)  Planned therapy interventions: ADL retraining, body mechanics training, balance/weight-bearing training, flexibility, functional ROM exercises, gait training, home exercise program, joint mobilization, manual therapy, neuromuscular re-education, soft tissue mobilization, strengthening, stretching and therapeutic activities  Frequency: 1x week  Duration in visits: 8  Duration in weeks: 8  Treatment plan discussed with: patient  Plan details: The pt will likely benefit from TE/TA/NMED to improve ankle and proximal kinetic chain strength, to improve balance, and to restore normal proprioception. MT will be utilized to improve ROM and jt mobility. Modalities will be used as needed for pain modulation. Dry needling as indicated.        Visit Diagnoses:  No diagnosis found.    Timed:  Manual Therapy:    0     mins  53315;  Therapeutic Exercise:    0     mins  14859;     Neuromuscular Alonzo:    0    mins  82501;    Therapeutic Activity:     0     mins  65781;     Gait Trainin     mins  48355;     Ultrasound:     0     mins  00351;    Electrical Stimulation:    0     mins  70222 ( );    Untimed:  Electrical Stimulation:    0     mins  16387 ( );  Mechanical Traction:    0     mins  19302;     Timed Treatment:   0   mins   Total Treatment:     45   mins    PT SIGNATURE: Theodore Hill PT   DATE TREATMENT INITIATED: 2020    Initial Certification  Certification Period: 2021  I certify that the therapy  services are furnished while this patient is under my care.  The services outlined above are required by this patient, and will be reviewed every 90 days.     PHYSICIAN: Cynthia Guzman PA-C      DATE:     Please sign and return via fax to [phone].. Thank you, [facility].

## 2020-11-23 ENCOUNTER — OFFICE VISIT (OUTPATIENT)
Dept: NEUROLOGY | Facility: CLINIC | Age: 50
End: 2020-11-23

## 2020-11-23 VITALS
HEIGHT: 68 IN | WEIGHT: 169 LBS | TEMPERATURE: 98 F | OXYGEN SATURATION: 98 % | BODY MASS INDEX: 25.61 KG/M2 | HEART RATE: 117 BPM | SYSTOLIC BLOOD PRESSURE: 132 MMHG | DIASTOLIC BLOOD PRESSURE: 68 MMHG

## 2020-11-23 DIAGNOSIS — G25.81 RESTLESS LEG SYNDROME: Primary | ICD-10-CM

## 2020-11-23 PROCEDURE — 99213 OFFICE O/P EST LOW 20 MIN: CPT | Performed by: PSYCHIATRY & NEUROLOGY

## 2020-11-23 RX ORDER — GABAPENTIN 300 MG/1
CAPSULE ORAL
Qty: 90 CAPSULE | Refills: 5 | Status: SHIPPED | OUTPATIENT
Start: 2020-11-23 | End: 2021-06-25 | Stop reason: SDUPTHER

## 2020-11-23 NOTE — PROGRESS NOTES
Subjective   Patient ID: Dawna Lovell is a 49 y.o. female     Chief Complaint   Patient presents with   • Restless Leg Syndrome     Follow up         History of Present Illness    49 y.o. female with RLS returns in follow up.  Last visit on 7/10/19 increased  mg qhs, rx Requip 0.5 - 1.0 mg qhs.      Stopped GBP 6 months and sx worsened of tingling and restless sensation.  Severe intensity.  GBP controls sx.     Requip causes nausea.           Problem history:    Sx started two years ago.  Sx started suddenly.  Legs feel a fluttering in legs mainly lying down at night.  Sx improved during the day and walking.  Improved of late.  Mild intensity.  Gralise 1200 mg qhs.    Legs ache during the day when sitting still.      EMG/NCS - normal       Past Medical History:   Diagnosis Date   • Anxiety    • Asthma    • GERD (gastroesophageal reflux disease)    • Hyperlipidemia    • Hypothyroidism    • Irritable bowel syndrome    • Lyme disease 2016   • Migraine headache    • POTS (postural orthostatic tachycardia syndrome)    • Syncope 2016   • Tachycardia 2016     Family History   Problem Relation Age of Onset   • Esophageal cancer Father    • Cancer Father         Esophogical   • Dementia Other      Social History     Socioeconomic History   • Marital status:      Spouse name: Not on file   • Number of children: Not on file   • Years of education: Not on file   • Highest education level: Not on file   Tobacco Use   • Smoking status: Former Smoker     Packs/day: 0.00     Years: 0.00     Pack years: 0.00     Types: Cigarettes     Quit date:      Years since quittin.9   • Smokeless tobacco: Never Used   Substance and Sexual Activity   • Alcohol use: Yes     Alcohol/week: 1.0 standard drinks     Types: 1 Glasses of wine per week   • Drug use: No   • Sexual activity: Yes     Partners: Male     Birth control/protection: Other     Comment: Pill       Review of Systems   Constitutional: Positive  "for fatigue. Negative for activity change and unexpected weight change.   HENT: Negative for tinnitus and trouble swallowing.    Eyes: Negative for photophobia and visual disturbance.   Respiratory: Negative for apnea, cough and choking.    Cardiovascular: Negative for leg swelling.   Gastrointestinal: Negative for nausea and vomiting.   Endocrine: Negative for cold intolerance and heat intolerance.   Genitourinary: Negative for difficulty urinating, frequency, menstrual problem and urgency.   Musculoskeletal: Positive for myalgias. Negative for back pain, gait problem and neck pain.   Skin: Negative for color change and rash.   Allergic/Immunologic: Negative for immunocompromised state.   Neurological: Positive for numbness. Negative for dizziness, tremors, seizures, syncope, facial asymmetry, speech difficulty, weakness, light-headedness and headaches.   Hematological: Negative for adenopathy. Does not bruise/bleed easily.   Psychiatric/Behavioral: Positive for decreased concentration and sleep disturbance. Negative for behavioral problems, confusion and hallucinations.       Objective     Vitals:    11/23/20 1518   BP: 132/68   Pulse: 117   Temp: 98 °F (36.7 °C)   SpO2: 98%   Weight: 76.7 kg (169 lb)   Height: 172.7 cm (68\")       Neurologic Exam     Mental Status   Registration: recalls 3 of 3 objects. Recall at 5 minutes: recalls 3 of 3 objects. Follows 3 step commands.   Attention: normal. Concentration: normal.   Level of consciousness: alert  Knowledge: good and consistent with education.   Able to name object. Able to read. Able to repeat. Able to write. Normal comprehension.     Cranial Nerves     CN II   Visual fields full to confrontation.   Visual acuity: normal  Right visual field deficit: none  Left visual field deficit: none     CN III, IV, VI   Right pupil: Shape: regular. Reactivity: brisk. Consensual response: intact.   Left pupil: Shape: regular. Reactivity: brisk. Consensual response: intact. "   Nystagmus: none   Diplopia: none  Ophthalmoparesis: none  Upgaze: normal  Downgaze: normal  Conjugate gaze: present  Vestibulo-ocular reflex: present    CN V   Facial sensation intact.   Right corneal reflex: normal  Left corneal reflex: normal    CN VII   Right facial weakness: none  Left facial weakness: none    CN VIII   Hearing: intact    CN IX, X   Palate: symmetric  Right gag reflex: normal  Left gag reflex: normal    CN XI   Right sternocleidomastoid strength: normal  Left sternocleidomastoid strength: normal    CN XII   Tongue: not atrophic  Fasciculations: absent  Tongue deviation: none    Motor Exam   Muscle bulk: normal  Overall muscle tone: normal  Right arm tone: normal  Left arm tone: normal  Right leg tone: normal  Left leg tone: normal    Sensory Exam   Light touch normal.   Vibration normal.   Proprioception normal.   Pinprick normal.     Gait, Coordination, and Reflexes     Tremor   Resting tremor: absent  Intention tremor: absent  Action tremor: absent    Reflexes   Reflexes 2+ except as noted.       Physical Exam   Constitutional: She appears well-developed.   Nursing note and vitals reviewed.      Office Visit on 08/10/2020   Component Date Value Ref Range Status   • Hemoglobin A1C 08/10/2020 5.3  % Final   • Lot Number 08/10/2020 [id number]   Final   • Expiration Date 08/10/2020 03/13/2022   Final         Assessment/Plan     Problem List Items Addressed This Visit        Other    Restless leg syndrome - Primary    Current Assessment & Plan     Start  mg and 600 mg qhs          Relevant Medications    gabapentin (NEURONTIN) 300 MG capsule             No follow-ups on file.

## 2020-11-24 ENCOUNTER — TELEPHONE (OUTPATIENT)
Dept: ENDOCRINOLOGY | Facility: CLINIC | Age: 50
End: 2020-11-24

## 2020-11-24 LAB — TSH SERPL DL<=0.005 MIU/L-ACNC: 3.27 UIU/ML (ref 0.45–4.5)

## 2020-11-24 NOTE — TELEPHONE ENCOUNTER
Spoke to patient about lab results. Continuing levothyroxine 137 mcg daily.   Signed: Camilla Gray MD

## 2020-12-01 ENCOUNTER — TREATMENT (OUTPATIENT)
Dept: PHYSICAL THERAPY | Facility: CLINIC | Age: 50
End: 2020-12-01

## 2020-12-01 DIAGNOSIS — R53.1 WEAKNESS: Primary | ICD-10-CM

## 2020-12-01 DIAGNOSIS — M25.60 RANGE OF MOTION DEFICIT: ICD-10-CM

## 2020-12-01 PROCEDURE — 97110 THERAPEUTIC EXERCISES: CPT | Performed by: PHYSICAL THERAPIST

## 2020-12-01 PROCEDURE — 97112 NEUROMUSCULAR REEDUCATION: CPT | Performed by: PHYSICAL THERAPIST

## 2020-12-01 NOTE — PROGRESS NOTES
Physical Therapy Daily Progress Note      Patient: Dawna Lovell   : 1970  Referring practitioner: ROLANDA Alonso*  Date of Initial Visit: Type: THERAPY  Noted: 2020  Today's Date: 2020  Patient seen for 2 sessions           Subjective Evaluation    History of Present Illness  Mechanism of injury: The pt stated that her ankle has continued to swell and cause pain with weightbearing, particularly at night, though she feels she has made some progress. She lost her HEP printout so has been trying to perform her exercises from memory but is unsure if she is doing them correctly. She has returned to the gym and has been walking on a treadmill about 15-20 minutes today with no increase in pain. She remains concerned with swelling and ongoing pain.    Pain  Current pain ratin  Location: L ankle           Objective   See Exercise, Manual, and Modality Logs for complete treatment.       Assessment & Plan     Assessment  Assessment details: The pt remains concerned with swelling and pain levels but was reassured that mild swelling and some degree of pain may persist for several months with this type of injury. She has very good AROM of the ankle but her strength is insufficient for support through the full range, which may be leading to aggravation throughout the day. She lost her HEP printout and was unfamiliar with the setup for 4-way ankle strengthening so these positions were practiced in the clinic and a new printout was provided. She was challenged with weight bearing stabilization activities in the clinic today and tolerated them very well, despite this being a fairly aggressive intervention at this time frame. I feel it is important to introduce this now due to the need for stability given her ROM. She was reassured that she was doing well and that nothing appeared abnormal and was advised to stay active within the limits of pain.    Plan  Plan details: Continue progressions of  ankle stabilization exercises and progress HEP accordingly.         Visit Diagnoses:    ICD-10-CM ICD-9-CM   1. Weakness  R53.1 780.79   2. Range of motion deficit  M25.60 719.50       Progress per Plan of Care           Timed:  Manual Therapy:    0     mins  33368;  Therapeutic Exercise:    25     mins  14155;     Neuromuscular Alonzo:    15    mins  51911;    Therapeutic Activity:     0     mins  20063;     Gait Trainin     mins  47222;     Ultrasound:     0     mins  53150;    Electrical Stimulation:    0     mins  52749 ( );    Untimed:  Electrical Stimulation:    0     mins  26625 ( );  Mechanical Traction:    0     mins  00583;     Timed Treatment:   40   mins   Total Treatment:     40   mins  Theodore Hill PT  Physical Therapist

## 2020-12-08 ENCOUNTER — TELEPHONE (OUTPATIENT)
Dept: PHYSICAL THERAPY | Facility: CLINIC | Age: 50
End: 2020-12-08

## 2020-12-08 DIAGNOSIS — M25.60 RANGE OF MOTION DEFICIT: ICD-10-CM

## 2020-12-08 DIAGNOSIS — R53.1 WEAKNESS: Primary | ICD-10-CM

## 2020-12-09 NOTE — TELEPHONE ENCOUNTER
Discharge Summary  Discharge Summary from Physical Therapy Report      Date of initial PT visit: 11/16/20    Number of Visits: 2     Goals: Not Met    Discharge Plan: Future need for rehabilitation activities    Comments: The pt was evaluated and treated for an ankle sprain and distal fibula fracture. She was only formally treated in one session. She called to state that she no longer needs rehab and requested d/c. She had not met any of her rehab goals.    Date of Discharge: 12/9/20        Theodore Hill, PT  Physical Therapist

## 2021-01-18 RX ORDER — CARVEDILOL 6.25 MG/1
6.25 TABLET ORAL 2 TIMES DAILY WITH MEALS
Qty: 60 TABLET | Refills: 2 | Status: SHIPPED | OUTPATIENT
Start: 2021-01-18 | End: 2021-04-05 | Stop reason: SDUPTHER

## 2021-02-10 ENCOUNTER — TELEPHONE (OUTPATIENT)
Dept: ENDOCRINOLOGY | Facility: CLINIC | Age: 51
End: 2021-02-10

## 2021-02-10 DIAGNOSIS — E06.3 HYPOTHYROIDISM DUE TO HASHIMOTO'S THYROIDITIS: Primary | ICD-10-CM

## 2021-02-10 DIAGNOSIS — E03.8 HYPOTHYROIDISM DUE TO HASHIMOTO'S THYROIDITIS: Primary | ICD-10-CM

## 2021-02-10 NOTE — TELEPHONE ENCOUNTER
Patients wants to have her labs for her thyroid done before she sees Dr. Gray on 3/23. Please order some active labs for her to have and get done before she comes in. Thank you.

## 2021-03-17 DIAGNOSIS — J30.1 SEASONAL ALLERGIC RHINITIS DUE TO POLLEN: ICD-10-CM

## 2021-03-17 RX ORDER — MONTELUKAST SODIUM 10 MG/1
10 TABLET ORAL
Qty: 30 TABLET | Refills: 5 | Status: SHIPPED | OUTPATIENT
Start: 2021-03-17 | End: 2021-09-14

## 2021-03-17 NOTE — TELEPHONE ENCOUNTER
Leukotriene Inhibitors Protocol Passed    Last Office Visit: 5/12/20  Next Office Visit: 4/5/21    Labs completed in past 6 months? yes  Labs completed in past year? yes    Last Refill Date: 9/1/20  Quantity: 30  Refills: 5    Pharmacy: On File

## 2021-03-23 ENCOUNTER — OFFICE VISIT (OUTPATIENT)
Dept: ENDOCRINOLOGY | Facility: CLINIC | Age: 51
End: 2021-03-23

## 2021-03-23 VITALS
OXYGEN SATURATION: 98 % | DIASTOLIC BLOOD PRESSURE: 94 MMHG | BODY MASS INDEX: 29.86 KG/M2 | HEIGHT: 68 IN | HEART RATE: 103 BPM | WEIGHT: 197 LBS | SYSTOLIC BLOOD PRESSURE: 150 MMHG

## 2021-03-23 DIAGNOSIS — E06.3 HYPOTHYROIDISM DUE TO HASHIMOTO'S THYROIDITIS: Primary | ICD-10-CM

## 2021-03-23 DIAGNOSIS — E03.8 HYPOTHYROIDISM DUE TO HASHIMOTO'S THYROIDITIS: Primary | ICD-10-CM

## 2021-03-23 LAB — TSH SERPL DL<=0.005 MIU/L-ACNC: 3.38 UIU/ML (ref 0.45–4.5)

## 2021-03-23 PROCEDURE — 99213 OFFICE O/P EST LOW 20 MIN: CPT | Performed by: INTERNAL MEDICINE

## 2021-03-23 RX ORDER — LEVOTHYROXINE SODIUM 137 UG/1
137 TABLET ORAL DAILY
Qty: 90 TABLET | Refills: 3 | Status: SHIPPED | OUTPATIENT
Start: 2021-03-23 | End: 2021-05-10

## 2021-03-23 NOTE — PROGRESS NOTES
"Chief Complaint   Patient presents with   • Hypothyroidism     follow up       HPI:   Dawna Lovell is a 50 y.o.female who returns to endocrine clinic for follow-up evaluation of her hypothyroidism. Last visit (telemedicine)  08/10/2020. Her history is as follows:    Interim Events:    - is being treated for dry eye syndrome by [facility]. Currently on steroid eye drops.  - Had recurrent urticaria in Feb/March 2020. Slowly resolving. Took prednisone taper for a few weeks. Now on hydroxyzine.     1) hypothyroidism due to Hashimoto's thyroiditis:  - Diagnosed approximately 2001  - switched from Brand Levoxyl to levothyroxine due to cost    Current dose: generic levothyroxine 137 µg daily  - Takes tablet in the morning on an empty stomach. Waits at least 30-60 minutes before eating/hot liquids.    - Takes multivitamin in the evening  - not on biotin supplement    2) h/o abnormal endocrine tests:  - Pt had random serum cortisol levels collected at various time in the day which were elevated from 11/2015 to 4/2016. However, she was on an estrogen containing OCP at the time of these collections which falsely elevated the random serum cortisols. 3PM - ACTH was 11.6. Had elevated hepatic enzymes, normal glucose  - I evaluated pt in 2016 for possible Cushing's. Evaluation off birth control proved to be negative:  ( 06/2016) 24 hour urinary cortisol: was normal at 3 mcg/24 hours  (06/2016)  Midnight salivary cortisols ([facility]): normal  #1 <0.010, #2 0.013 ( normal range <0.010 - 0.090)  (07/2016) 1 mg overnight dexamethasone suppression test: 8AM cortisol was appropriately suppressed to 0.5 (normal < 1.8)    Had pt complete 1 mg overnight dex suppression test again on 4/28/2017:   04/28/2017)  1 mg overnight dexamethasone suppression test: 8AM cortisol was appropriately suppressed to 0.4    Review of Imaging:   - CT ABD 05/02/2016 - \"showed marked inhomogeneous geographic appearing liver with a mosaic pattern, most " "typical for marked inhomogeneous geographic steatosis with focal areas of fat sparing. This pattern was noted by ultrasound on 11/10/2015.\" and normal adrenal glands  - CT Head w/o contrast 1/21/2016: \"Old lacunar infarct seen in the left basal ganglia. No acute intracranial abnormalities identified.\"    Review of Systems   Constitutional:        Wt stable   Eyes:        Dry eyes   Respiratory: Negative.    Cardiovascular: Negative for palpitations (on carvediolol).   Gastrointestinal: Negative.  Negative for diarrhea and nausea.   Endocrine: Negative.    Genitourinary: Negative.    Musculoskeletal: Negative for arthralgias, back pain and myalgias.   Skin: Negative.    Allergic/Immunologic: Negative.    Neurological: Positive for headaches (Occasional). Negative for tremors and syncope.   Hematological: Negative.    Psychiatric/Behavioral:        H/o Depression with anxiety, better on effexor     The following portions of the patient's history were reviewed and updated as appropriate: allergies, current medications, past family history, past medical history, past social history, past surgical history and problem list.    /94   Pulse 103   Ht 172.7 cm (68\")   Wt 89.4 kg (197 lb)   SpO2 98%   BMI 29.95 kg/m²   Physical Exam   Constitutional: She is oriented to person, place, and time. She appears well-developed. No distress.   HENT:   Head: Normocephalic.   Eyes: Pupils are equal, round, and reactive to light. Conjunctivae are normal.   Neck: No tracheal deviation present. No thyromegaly present.   No palpable thyroid nodules     Cardiovascular: Normal rate, regular rhythm and normal heart sounds.   No murmur heard.  Pulmonary/Chest: Effort normal and breath sounds normal. No respiratory distress.   Musculoskeletal: No tenderness.   Lymphadenopathy:     She has no cervical adenopathy.   Neurological: She is alert and oriented to person, place, and time. No cranial nerve deficit.   Skin: Skin is warm and " dry. She is not diaphoretic. No erythema.   Psychiatric: Her behavior is normal.   Vitals reviewed.    LABS/IMAGING: prior records reviewed and summarized in HPI  No visits with results within 1 Day(s) from this visit.   Latest known visit with results is:   Orders Only on 03/22/2021   Component Date Value Ref Range Status   • TSH 03/22/2021 3.380  0.450 - 4.500 uIU/mL Final       ASSESSMENT/PLAN:  1) hypothyroidism due to Hashimoto's thyroiditis: controlled  - clinically euthyroid on exam  - TSH on 03/22/201 was normal at 3.380  - Will continue levothyroxine 137 mcg  - Reviewed proper levothyroxine administration, and factors to avoid that decrease medication potency and medication absorption.   - pt to call to have labs sooner than on follow-up if notes any symptoms concerning for hypothyroidism    RTC 12 months    Signed: Camilla Gray MD    Counseling was given to patient for the following topics:  diagnostic results, instructions for management and impressions, see details in assessment/plan. Total face to face time of the encounter was 20 minutes and 15 minutes was spent counseling.

## 2021-04-05 ENCOUNTER — OFFICE VISIT (OUTPATIENT)
Dept: INTERNAL MEDICINE | Facility: CLINIC | Age: 51
End: 2021-04-05

## 2021-04-05 VITALS
SYSTOLIC BLOOD PRESSURE: 120 MMHG | OXYGEN SATURATION: 99 % | BODY MASS INDEX: 29.55 KG/M2 | DIASTOLIC BLOOD PRESSURE: 82 MMHG | HEART RATE: 95 BPM | HEIGHT: 68 IN | RESPIRATION RATE: 16 BRPM | WEIGHT: 195 LBS | TEMPERATURE: 97.5 F

## 2021-04-05 DIAGNOSIS — Z00.00 HEALTH CARE MAINTENANCE: Primary | ICD-10-CM

## 2021-04-05 DIAGNOSIS — F41.9 ANXIETY: ICD-10-CM

## 2021-04-05 DIAGNOSIS — Z12.31 ENCOUNTER FOR SCREENING MAMMOGRAM FOR MALIGNANT NEOPLASM OF BREAST: ICD-10-CM

## 2021-04-05 DIAGNOSIS — Z12.11 COLON CANCER SCREENING: ICD-10-CM

## 2021-04-05 PROCEDURE — 99396 PREV VISIT EST AGE 40-64: CPT | Performed by: PHYSICIAN ASSISTANT

## 2021-04-05 RX ORDER — VENLAFAXINE HYDROCHLORIDE 150 MG/1
150 CAPSULE, EXTENDED RELEASE ORAL DAILY
Qty: 90 CAPSULE | Refills: 3 | Status: SHIPPED | OUTPATIENT
Start: 2021-04-05 | End: 2022-04-02

## 2021-04-05 RX ORDER — CARVEDILOL 6.25 MG/1
6.25 TABLET ORAL 2 TIMES DAILY WITH MEALS
Qty: 180 TABLET | Refills: 3 | Status: SHIPPED | OUTPATIENT
Start: 2021-04-05 | End: 2022-04-04

## 2021-04-05 NOTE — PROGRESS NOTES
"Chief Complaint   Patient presents with   • Annual Exam       Subjective     History of Present Illness    Dawna Lovell is a 50 y.o. female.     The patient is being seen for a health maintenance evaluation.  The last health maintenance was 1 year(s) ago.    Social History  Dawna  does not smoke cigarettes.   She drinks occasional alcohol. Occasional wine.  She does not use illicit drugs.    General History  Dawna  does have regular dental visits.  She does complain of vision problems. Wears glasses. Last eye exam was 2021.  Immunizations are not up to date. The patient needs the following immunizations: TDaP needed    Lifestyle  Dawna  consumes in general, a \"healthy\" diet  .  She exercises daily.    Reproductive Health  Dawna  is premenopausal.  She reports periods are regular every 12 weeks.  She is sexually active. Her contraceptive plan is oral contraceptives (estrogen/progesterone).    Screening  Last pap was 2019 by  gyn, Oumou Avila. History of abnormal pap smear or family history of gyn cancer: remote history, repeat normal  Last mammogram was  unknown. Personal or family history of abnormal mammograms or breast cancer: none  Last colonoscopy was never. Family history of colon cancer: none  Last DEXA was never.     Pt has been seeing allergist for about a year. She recently had an outbreak of hives and was started on prednisone. Hives came back after stopping the prednisone. Allergist put her on singulair, claritin, xyzal, hydroxyzine. The hives have now resolved.    She has been told she has dry eyes syndrome, on a steroid eye drop and lubricant eye drop and Restasis. Worse since being on anti-histamin                Current Outpatient Medications:   •  ASHLYNA 0.15-0.03 &0.01 MG tablet, Take 1 tablet by mouth Daily., Disp: , Rfl: 0  •  carvedilol (COREG) 6.25 MG tablet, Take 1 tablet by mouth 2 (Two) Times a Day With Meals., Disp: 180 tablet, Rfl: 3  •  cyclobenzaprine (FLEXERIL) 10 MG " tablet, Take 1 tablet by mouth 3 (Three) Times a Day As Needed for Muscle Spasms., Disp: 30 tablet, Rfl: 0  •  fluticasone (FLONASE) 50 MCG/ACT nasal spray, 1 spray into the nostril(s) as directed by provider Daily., Disp: 16 mL, Rfl: 5  •  gabapentin (NEURONTIN) 300 MG capsule, Take one tablet in am and two tablets in pm, Disp: 90 capsule, Rfl: 5  •  hydrOXYzine (ATARAX) 25 MG tablet, TAKE 1 TABLET Twice daily PRN, Disp: 40 tablet, Rfl: 1  •  levothyroxine (SYNTHROID, LEVOTHROID) 137 MCG tablet, Take 1 tablet by mouth Daily., Disp: 90 tablet, Rfl: 3  •  loratadine (CLARITIN) 10 MG tablet, take 1 tablet by mouth once daily, Disp: 30 tablet, Rfl: 1  •  montelukast (SINGULAIR) 10 MG tablet, TAKE 1 TABLET BY MOUTH EVERY NIGHT AT BEDTIME, Disp: 30 tablet, Rfl: 5  •  Multiple Vitamins-Minerals (MULTIVITAMIN ADULT PO), Take  by mouth Daily., Disp: , Rfl:   •  olopatadine (PATANOL) 0.1 % ophthalmic solution, , Disp: , Rfl:   •  SUMAtriptan (IMITREX) 25 MG tablet, As Needed., Disp: , Rfl: 0  •  venlafaxine XR (EFFEXOR-XR) 150 MG 24 hr capsule, Take 1 capsule by mouth Daily., Disp: 90 capsule, Rfl: 3     PMFSH  The following portions of the patient's history were reviewed and updated as appropriate: allergies, current medications, past family history, past medical history, past social history, past surgical history and problem list.    Review of Systems   Constitutional: Negative for appetite change, fever and unexpected weight change.   HENT: Negative for ear pain, facial swelling and sore throat.    Eyes: Negative for pain and visual disturbance.   Respiratory: Negative for chest tightness, shortness of breath and wheezing.    Cardiovascular: Negative for chest pain and palpitations.   Gastrointestinal: Negative for abdominal pain and blood in stool.   Endocrine: Negative.    Genitourinary: Negative for difficulty urinating and hematuria.   Musculoskeletal: Negative for joint swelling.   Neurological: Negative for  "dizziness, tremors, seizures, syncope and headaches.   Hematological: Negative for adenopathy.   Psychiatric/Behavioral: Negative.        Objective   /82   Pulse 95   Temp 97.5 °F (36.4 °C)   Resp 16   Ht 172.5 cm (67.91\")   Wt 88.5 kg (195 lb)   SpO2 99%   BMI 29.73 kg/m²     Physical Exam  Vitals and nursing note reviewed.   Constitutional:       General: She is not in acute distress.     Appearance: She is well-developed. She is not diaphoretic.   HENT:      Head: Normocephalic and atraumatic. Hair is normal.      Right Ear: Hearing, tympanic membrane, ear canal and external ear normal. No decreased hearing noted. No drainage.      Left Ear: Hearing, tympanic membrane, ear canal and external ear normal. No decreased hearing noted.      Nose: No nasal deformity.   Eyes:      General: Lids are normal. Lids are everted, no foreign bodies appreciated.         Right eye: No discharge.         Left eye: No discharge.      Conjunctiva/sclera: Conjunctivae normal.      Pupils: Pupils are equal, round, and reactive to light.   Neck:      Thyroid: No thyromegaly.      Vascular: No JVD.      Trachea: No tracheal deviation.   Cardiovascular:      Rate and Rhythm: Normal rate and regular rhythm.      Pulses: Normal pulses.      Heart sounds: Normal heart sounds. No murmur heard.   No friction rub. No gallop.    Pulmonary:      Effort: Pulmonary effort is normal. No respiratory distress.      Breath sounds: Normal breath sounds. No wheezing or rales.   Chest:      Chest wall: No tenderness.   Abdominal:      General: Bowel sounds are normal. There is no distension.      Palpations: Abdomen is soft. There is no mass.      Tenderness: There is no abdominal tenderness. There is no guarding or rebound.      Hernia: No hernia is present.   Musculoskeletal:         General: No tenderness or deformity. Normal range of motion.      Cervical back: Normal range of motion and neck supple.   Lymphadenopathy:      Cervical: No " cervical adenopathy.   Skin:     General: Skin is warm and dry.      Findings: No erythema or rash.   Neurological:      Mental Status: She is alert and oriented to person, place, and time.      Cranial Nerves: No cranial nerve deficit.      Motor: No abnormal muscle tone.      Coordination: Coordination normal.      Deep Tendon Reflexes: Reflexes are normal and symmetric. Reflexes normal.   Psychiatric:         Behavior: Behavior normal.         Thought Content: Thought content normal.         Judgment: Judgment normal.              ASSESSMENT/PLAN    Diagnoses and all orders for this visit:    1. Health care maintenance (Primary)  Assessment & Plan:  Immunizations: TDaP done today  Eye exam: done 2021  Pap Smear: done 2019 by  gyn  Mammogram: ordered  Dexa: due post menopausal  Colonoscopy: ordered  Labs: fasting labs ordered    Counseled patient regarding multimodal approach with healthy nutrition, healthy sleep, regular physical activity, social activities, counseling, safety measures and medications.     Orders:  -     CBC & Differential; Future  -     Comprehensive Metabolic Panel; Future  -     Lipid Panel; Future  -     Cancel: TSH; Future    2. Encounter for screening mammogram for malignant neoplasm of breast  -     Mammo Screening Digital Tomosynthesis Bilateral With CAD; Future    3. Colon cancer screening  -     Ambulatory Referral For Screening Colonoscopy    4. Anxiety  -     venlafaxine XR (EFFEXOR-XR) 150 MG 24 hr capsule; Take 1 capsule by mouth Daily.  Dispense: 90 capsule; Refill: 3    Other orders  -     carvedilol (COREG) 6.25 MG tablet; Take 1 tablet by mouth 2 (Two) Times a Day With Meals.  Dispense: 180 tablet; Refill: 3           Return in about 1 year (around 4/5/2022) for Annual with fasting labs.

## 2021-04-07 PROBLEM — Z00.00 HEALTH CARE MAINTENANCE: Status: ACTIVE | Noted: 2021-04-07

## 2021-04-08 NOTE — ASSESSMENT & PLAN NOTE
Immunizations: TDaP done today  Eye exam: done 2021  Pap Smear: done 2019 by [facility] gyn  Mammogram: ordered  Dexa: due post menopausal  Colonoscopy: ordered  Labs: fasting labs ordered    Counseled patient regarding multimodal approach with healthy nutrition, healthy sleep, regular physical activity, social activities, counseling, safety measures and medications.

## 2021-04-15 RX ORDER — CARVEDILOL 6.25 MG/1
TABLET ORAL
Qty: 60 TABLET | OUTPATIENT
Start: 2021-04-15

## 2021-04-16 ENCOUNTER — PATIENT MESSAGE (OUTPATIENT)
Dept: INTERNAL MEDICINE | Facility: CLINIC | Age: 51
End: 2021-04-16

## 2021-04-16 NOTE — TELEPHONE ENCOUNTER
Could you clarify with the pharmacy exactly what we need to do? I have never encountered this before.

## 2021-04-20 DIAGNOSIS — E78.5 HYPERLIPIDEMIA, UNSPECIFIED HYPERLIPIDEMIA TYPE: ICD-10-CM

## 2021-04-20 DIAGNOSIS — R74.8 ELEVATED LIVER ENZYMES: Primary | ICD-10-CM

## 2021-04-20 LAB
ALBUMIN SERPL-MCNC: 4.3 G/DL (ref 3.8–4.8)
ALBUMIN/GLOB SERPL: 1.4 {RATIO} (ref 1.2–2.2)
ALP SERPL-CCNC: 70 IU/L (ref 39–117)
ALT SERPL-CCNC: 59 IU/L (ref 0–32)
AST SERPL-CCNC: 134 IU/L (ref 0–40)
BASOPHILS # BLD AUTO: 0 X10E3/UL (ref 0–0.2)
BASOPHILS NFR BLD AUTO: 1 %
BILIRUB SERPL-MCNC: 1.6 MG/DL (ref 0–1.2)
BUN SERPL-MCNC: 5 MG/DL (ref 6–24)
BUN/CREAT SERPL: 6 (ref 9–23)
CALCIUM SERPL-MCNC: 9.3 MG/DL (ref 8.7–10.2)
CHLORIDE SERPL-SCNC: 96 MMOL/L (ref 96–106)
CHOLEST SERPL-MCNC: 301 MG/DL (ref 100–199)
CO2 SERPL-SCNC: 22 MMOL/L (ref 20–29)
CREAT SERPL-MCNC: 0.86 MG/DL (ref 0.57–1)
EOSINOPHIL # BLD AUTO: 0.1 X10E3/UL (ref 0–0.4)
EOSINOPHIL NFR BLD AUTO: 2 %
ERYTHROCYTE [DISTWIDTH] IN BLOOD BY AUTOMATED COUNT: 13.1 % (ref 11.7–15.4)
GLOBULIN SER CALC-MCNC: 3 G/DL (ref 1.5–4.5)
GLUCOSE SERPL-MCNC: 105 MG/DL (ref 65–99)
HCT VFR BLD AUTO: 38.6 % (ref 34–46.6)
HDLC SERPL-MCNC: 59 MG/DL
HGB BLD-MCNC: 13.5 G/DL (ref 11.1–15.9)
IMM GRANULOCYTES # BLD AUTO: 0 X10E3/UL (ref 0–0.1)
IMM GRANULOCYTES NFR BLD AUTO: 1 %
LDLC SERPL CALC-MCNC: 218 MG/DL (ref 0–99)
LYMPHOCYTES # BLD AUTO: 1.5 X10E3/UL (ref 0.7–3.1)
LYMPHOCYTES NFR BLD AUTO: 21 %
MCH RBC QN AUTO: 35.4 PG (ref 26.6–33)
MCHC RBC AUTO-ENTMCNC: 35 G/DL (ref 31.5–35.7)
MCV RBC AUTO: 101 FL (ref 79–97)
MONOCYTES # BLD AUTO: 0.6 X10E3/UL (ref 0.1–0.9)
MONOCYTES NFR BLD AUTO: 9 %
NEUTROPHILS # BLD AUTO: 4.5 X10E3/UL (ref 1.4–7)
NEUTROPHILS NFR BLD AUTO: 66 %
PLATELET # BLD AUTO: 297 X10E3/UL (ref 150–450)
POTASSIUM SERPL-SCNC: 5 MMOL/L (ref 3.5–5.2)
PROT SERPL-MCNC: 7.3 G/DL (ref 6–8.5)
RBC # BLD AUTO: 3.81 X10E6/UL (ref 3.77–5.28)
SODIUM SERPL-SCNC: 138 MMOL/L (ref 134–144)
TRIGL SERPL-MCNC: 133 MG/DL (ref 0–149)
VLDLC SERPL CALC-MCNC: 24 MG/DL (ref 5–40)
WBC # BLD AUTO: 6.8 X10E3/UL (ref 3.4–10.8)

## 2021-04-20 RX ORDER — ROSUVASTATIN CALCIUM 10 MG/1
10 TABLET, COATED ORAL DAILY
Qty: 30 TABLET | Refills: 3 | Status: SHIPPED | OUTPATIENT
Start: 2021-04-20 | End: 2021-08-10

## 2021-04-20 NOTE — PROGRESS NOTES
Your recent labs show that your liver enzymes, cholesterol and glucose are elevated. Your liver enzymes may be elevated as a result of some fatty liver from the high cholesterol. I will send in a cholesterol medication for you to start taking at night to get these levels down. Please decrease the carbohydrates in your diet which will help all of these abnormalities.    I would like to recheck your liver enzymes and some additional liver tests in the next few weeks. Please stop by the lab to have them drawn. Please also avoid Tylenol and alcohol in the meantime.    Everything else is stable and looks fine.

## 2021-05-06 RX ORDER — MELOXICAM 15 MG/1
15 TABLET ORAL DAILY
Qty: 30 TABLET | Refills: 2 | Status: SHIPPED | OUTPATIENT
Start: 2021-05-06 | End: 2021-07-23

## 2021-05-10 DIAGNOSIS — E06.3 HYPOTHYROIDISM DUE TO HASHIMOTO'S THYROIDITIS: ICD-10-CM

## 2021-05-10 DIAGNOSIS — E03.8 HYPOTHYROIDISM DUE TO HASHIMOTO'S THYROIDITIS: ICD-10-CM

## 2021-05-10 RX ORDER — LEVOTHYROXINE SODIUM 137 UG/1
137 TABLET ORAL DAILY
Qty: 90 TABLET | Refills: 3 | Status: SHIPPED | OUTPATIENT
Start: 2021-05-10 | End: 2022-04-29

## 2021-05-17 DIAGNOSIS — Z12.11 SCREENING FOR COLON CANCER: Primary | ICD-10-CM

## 2021-05-24 ENCOUNTER — APPOINTMENT (OUTPATIENT)
Dept: PREADMISSION TESTING | Facility: HOSPITAL | Age: 51
End: 2021-05-24

## 2021-06-25 ENCOUNTER — OFFICE VISIT (OUTPATIENT)
Dept: NEUROLOGY | Facility: CLINIC | Age: 51
End: 2021-06-25

## 2021-06-25 VITALS
DIASTOLIC BLOOD PRESSURE: 78 MMHG | HEART RATE: 112 BPM | HEIGHT: 68 IN | SYSTOLIC BLOOD PRESSURE: 122 MMHG | BODY MASS INDEX: 29.1 KG/M2 | OXYGEN SATURATION: 97 % | WEIGHT: 192 LBS

## 2021-06-25 DIAGNOSIS — G25.81 RESTLESS LEG SYNDROME: Primary | ICD-10-CM

## 2021-06-25 PROCEDURE — 99214 OFFICE O/P EST MOD 30 MIN: CPT | Performed by: PSYCHIATRY & NEUROLOGY

## 2021-06-25 RX ORDER — LOTEPREDNOL ETABONATE 2.5 MG/ML
1 SUSPENSION/ DROPS OPHTHALMIC 2 TIMES DAILY
COMMUNITY
Start: 2021-03-22 | End: 2023-01-17 | Stop reason: ALTCHOICE

## 2021-06-25 RX ORDER — ROPINIROLE 1 MG/1
1 TABLET, FILM COATED ORAL NIGHTLY
Qty: 90 TABLET | Refills: 3 | Status: SHIPPED | OUTPATIENT
Start: 2021-06-25 | End: 2022-07-12 | Stop reason: SDUPTHER

## 2021-06-25 RX ORDER — CYCLOSPORINE 0.5 MG/ML
1 EMULSION OPHTHALMIC 2 TIMES DAILY
COMMUNITY
Start: 2021-05-04

## 2021-06-25 RX ORDER — GABAPENTIN 300 MG/1
CAPSULE ORAL
Qty: 450 CAPSULE | Refills: 1 | Status: SHIPPED | OUTPATIENT
Start: 2021-06-25 | End: 2021-09-07 | Stop reason: SDUPTHER

## 2021-06-25 RX ORDER — LEVOCETIRIZINE DIHYDROCHLORIDE 5 MG/1
5 TABLET, FILM COATED ORAL DAILY
COMMUNITY
Start: 2021-05-22 | End: 2023-01-17

## 2021-06-25 RX ORDER — TRAZODONE HYDROCHLORIDE 50 MG/1
50 TABLET ORAL NIGHTLY PRN
COMMUNITY
Start: 2021-06-09 | End: 2022-02-01 | Stop reason: SDUPTHER

## 2021-06-25 NOTE — PROGRESS NOTES
"Chief Complaint  Restless Legs Syndrome    Subjective          Dawna Lovell presents to [facility]     History of Present Illness    50 y.o. female returns in follow up.  Last visit on 11/23/2020  mg and 600 mg qhs       If taking  mg qam and 900 mg qhs controls sx.       Requip 1 mg qhs        Problem history:     Sx started two years ago.  Sx started suddenly.  Legs feel a fluttering in legs mainly lying down at night.  Sx improved during the day and walking.  Improved of late.  Mild intensity.  Gralise 1200 mg qhs.    Legs ache during the day when sitting still.       EMG/NCS - normal      Objective   Vital Signs:   /78   Pulse 112   Ht 172.5 cm (67.91\")   Wt 87.1 kg (192 lb)   SpO2 97%   BMI 29.27 kg/m²     Physical Exam  Eyes:      Extraocular Movements: EOM normal.      Pupils: Pupils are equal, round, and reactive to light.   Neurological:      Mental Status: She is oriented to person, place, and time.      Gait: Gait is intact.      Deep Tendon Reflexes: Strength normal.   Psychiatric:         Speech: Speech normal.          Neurologic Exam     Mental Status   Oriented to person, place, and time.   Speech: speech is normal   Level of consciousness: alert  Knowledge: good and consistent with education.   Normal comprehension.     Cranial Nerves   Cranial nerves II through XII intact.     CN II   Visual fields full to confrontation.   Visual acuity: normal  Right visual field deficit: none  Left visual field deficit: none     CN III, IV, VI   Pupils are equal, round, and reactive to light.  Extraocular motions are normal.   Nystagmus: none   Diplopia: none  Ophthalmoparesis: none  Upgaze: normal  Downgaze: normal  Conjugate gaze: present    CN V   Facial sensation intact.   Right corneal reflex: normal  Left corneal reflex: normal    CN VII   Right facial weakness: none  Left facial weakness: none    CN VIII   Hearing: intact    CN IX, X   Palate: " symmetric  Right gag reflex: normal  Left gag reflex: normal    CN XI   Right sternocleidomastoid strength: normal  Left sternocleidomastoid strength: normal    CN XII   Tongue: not atrophic  Fasciculations: absent  Tongue deviation: none    Motor Exam   Muscle bulk: normal  Overall muscle tone: normal    Strength   Strength 5/5 throughout.     Sensory Exam   Light touch normal.     Gait, Coordination, and Reflexes     Gait  Gait: normal    Tremor   Resting tremor: absent  Intention tremor: absent  Action tremor: absent    Reflexes   Reflexes 2+ except as noted.      Result Review :                 Assessment and Plan    Diagnoses and all orders for this visit:    1. Restless leg syndrome (Primary)  Assessment & Plan:  Sx worsening     Increased  mg qam and 900 mg qhs     Continue Requip 1 mg qhs     Orders:  -     gabapentin (NEURONTIN) 300 MG capsule; Take two tablets in am and three tablets in pm  Dispense: 450 capsule; Refill: 1    Other orders  -     rOPINIRole (REQUIP) 1 MG tablet; Take 1 tablet by mouth Every Night.  Dispense: 90 tablet; Refill: 3      Follow Up   No follow-ups on file.  Patient was given instructions and counseling regarding her condition or for health maintenance advice. Please see specific information pulled into the AVS if appropriate.

## 2021-07-19 DIAGNOSIS — Z12.11 ENCOUNTER FOR SCREENING COLONOSCOPY: Primary | ICD-10-CM

## 2021-07-23 RX ORDER — MELOXICAM 15 MG/1
15 TABLET ORAL DAILY
Qty: 30 TABLET | Refills: 2 | Status: SHIPPED | OUTPATIENT
Start: 2021-07-23 | End: 2021-10-20

## 2021-07-28 ENCOUNTER — APPOINTMENT (OUTPATIENT)
Dept: MAMMOGRAPHY | Facility: HOSPITAL | Age: 51
End: 2021-07-28

## 2021-08-03 RX ORDER — FLUTICASONE PROPIONATE 50 MCG
SPRAY, SUSPENSION (ML) NASAL
Qty: 16 G | OUTPATIENT
Start: 2021-08-03

## 2021-08-04 ENCOUNTER — TRANSCRIBE ORDERS (OUTPATIENT)
Dept: ADMINISTRATIVE | Facility: HOSPITAL | Age: 51
End: 2021-08-04

## 2021-08-04 DIAGNOSIS — J33.0 POLYP OF NASAL CAVITY: Primary | ICD-10-CM

## 2021-08-06 ENCOUNTER — APPOINTMENT (OUTPATIENT)
Dept: CT IMAGING | Facility: HOSPITAL | Age: 51
End: 2021-08-06

## 2021-08-10 DIAGNOSIS — E78.5 HYPERLIPIDEMIA, UNSPECIFIED HYPERLIPIDEMIA TYPE: ICD-10-CM

## 2021-08-10 RX ORDER — ROSUVASTATIN CALCIUM 10 MG/1
10 TABLET, COATED ORAL DAILY
Qty: 30 TABLET | Refills: 3 | Status: SHIPPED | OUTPATIENT
Start: 2021-08-10 | End: 2021-12-10

## 2021-08-10 NOTE — TELEPHONE ENCOUNTER
Last Office Visit: 04/05/21  Next Office Visit:04/07/22    Labs completed in past 6 months? yes  Labs completed in past year? yes

## 2021-08-13 ENCOUNTER — HOSPITAL ENCOUNTER (OUTPATIENT)
Dept: CT IMAGING | Facility: HOSPITAL | Age: 51
Discharge: HOME | End: 2021-08-13
Admitting: ALLERGY & IMMUNOLOGY

## 2021-08-13 DIAGNOSIS — J33.0 POLYP OF NASAL CAVITY: ICD-10-CM

## 2021-08-13 PROCEDURE — 70486 CT MAXILLOFACIAL W/O DYE: CPT

## 2021-08-19 ENCOUNTER — OUTSIDE FACILITY SERVICE (OUTPATIENT)
Dept: GASTROENTEROLOGY | Facility: CLINIC | Age: 51
End: 2021-08-19

## 2021-08-19 PROCEDURE — 45385 COLONOSCOPY W/LESION REMOVAL: CPT | Performed by: INTERNAL MEDICINE

## 2021-08-19 PROCEDURE — 45380 COLONOSCOPY AND BIOPSY: CPT | Performed by: INTERNAL MEDICINE

## 2021-08-19 PROCEDURE — 88305 TISSUE EXAM BY PATHOLOGIST: CPT | Performed by: INTERNAL MEDICINE

## 2021-08-20 ENCOUNTER — LAB REQUISITION (OUTPATIENT)
Dept: LAB | Facility: HOSPITAL | Age: 51
End: 2021-08-20

## 2021-08-20 DIAGNOSIS — K64.8 OTHER HEMORRHOIDS: ICD-10-CM

## 2021-08-20 DIAGNOSIS — K63.5 POLYP OF COLON: ICD-10-CM

## 2021-08-20 DIAGNOSIS — Z12.11 ENCOUNTER FOR SCREENING FOR MALIGNANT NEOPLASM OF COLON: ICD-10-CM

## 2021-08-20 DIAGNOSIS — K63.89 OTHER SPECIFIED DISEASES OF INTESTINE: ICD-10-CM

## 2021-08-23 LAB
CYTO UR: NORMAL
LAB AP CASE REPORT: NORMAL
LAB AP CLINICAL INFORMATION: NORMAL
PATH REPORT.FINAL DX SPEC: NORMAL
PATH REPORT.GROSS SPEC: NORMAL

## 2021-08-30 ENCOUNTER — APPOINTMENT (OUTPATIENT)
Dept: OTHER | Facility: HOSPITAL | Age: 51
End: 2021-08-30

## 2021-08-30 ENCOUNTER — HOSPITAL ENCOUNTER (OUTPATIENT)
Dept: MAMMOGRAPHY | Facility: HOSPITAL | Age: 51
Discharge: HOME | End: 2021-08-30
Admitting: PHYSICIAN ASSISTANT

## 2021-08-30 DIAGNOSIS — Z12.31 ENCOUNTER FOR SCREENING MAMMOGRAM FOR MALIGNANT NEOPLASM OF BREAST: ICD-10-CM

## 2021-08-30 PROCEDURE — 77067 SCR MAMMO BI INCL CAD: CPT

## 2021-08-30 PROCEDURE — 77063 BREAST TOMOSYNTHESIS BI: CPT

## 2021-08-30 PROCEDURE — 77063 BREAST TOMOSYNTHESIS BI: CPT | Performed by: RADIOLOGY

## 2021-08-30 PROCEDURE — 77067 SCR MAMMO BI INCL CAD: CPT | Performed by: RADIOLOGY

## 2021-09-07 ENCOUNTER — TELEPHONE (OUTPATIENT)
Dept: NEUROLOGY | Facility: CLINIC | Age: 51
End: 2021-09-07

## 2021-09-07 DIAGNOSIS — G25.81 RESTLESS LEG SYNDROME: ICD-10-CM

## 2021-09-07 RX ORDER — GABAPENTIN 300 MG/1
CAPSULE ORAL
Qty: 720 CAPSULE | Refills: 1 | Status: SHIPPED | OUTPATIENT
Start: 2021-09-07 | End: 2022-07-12 | Stop reason: SDUPTHER

## 2021-09-07 NOTE — TELEPHONE ENCOUNTER
Provider: EDGAR  Caller: PT  Relationship to Patient: SELF  Pharmacy: [facility]  Phone Number: [phone]  Reason for Call: PT TEL TO ADVISE PROVIDER THAT HER RX OF GABAPENTIN (NEURONTIN) NEEDS TO BE BUMPED UP TO 3 IN THE A.M.; 1 TO 2 IN THE MIDDLE OF THE DAY AND 3 IN THE EVENING.    RLS IS NOW OCCURRING DURING THE DAY IN ADDITION TO THE NIGHT.    PLEASE CALL PT W/ANY QUESTIONS.    THANK YOU.

## 2021-09-14 ENCOUNTER — PRIOR AUTHORIZATION (OUTPATIENT)
Dept: NEUROLOGY | Facility: CLINIC | Age: 51
End: 2021-09-14

## 2021-09-14 DIAGNOSIS — J30.1 SEASONAL ALLERGIC RHINITIS DUE TO POLLEN: ICD-10-CM

## 2021-09-14 RX ORDER — MONTELUKAST SODIUM 10 MG/1
10 TABLET ORAL
Qty: 90 TABLET | Refills: 2 | Status: SHIPPED | OUTPATIENT
Start: 2021-09-14 | End: 2022-06-06

## 2021-09-14 NOTE — TELEPHONE ENCOUNTER
Provider: EDGAR  Caller: PT  Relationship to Patient: SELF  Pharmacy: [facility]  Phone Number: [phone]  Reason for Call: PHARMACY ADVISED PT THAT A PRE-AUTH IS REQUIRED FOR NEW RX DUE TO DOSAGE CHANGE.    PLEASE CALL & ADVISE.    THANK YOU.

## 2021-09-14 NOTE — TELEPHONE ENCOUNTER
Prior Authorization Approved    The authorization is effective for a maximum of 12 fill(s) from 09/14/2021 to 09/13/2022.     Approval faxed to [facility] at [phone]. Fax successful. Patient has been informed.   -TMT

## 2021-09-14 NOTE — TELEPHONE ENCOUNTER
Gabapentin 300mg     Prior Authorization submitted via CoverMyMeds   KEY: [id number]    Jamari is reviewing your PA request. You may close this dialog, return to your dashboard, and perform other tasks.

## 2021-10-18 ENCOUNTER — TELEMEDICINE (OUTPATIENT)
Dept: INTERNAL MEDICINE | Facility: CLINIC | Age: 51
End: 2021-10-18

## 2021-10-18 DIAGNOSIS — R05.9 COUGH: ICD-10-CM

## 2021-10-18 DIAGNOSIS — J01.00 ACUTE MAXILLARY SINUSITIS, RECURRENCE NOT SPECIFIED: Primary | ICD-10-CM

## 2021-10-18 PROCEDURE — U0004 COV-19 TEST NON-CDC HGH THRU: HCPCS

## 2021-10-18 PROCEDURE — 99213 OFFICE O/P EST LOW 20 MIN: CPT

## 2021-10-18 RX ORDER — AMOXICILLIN AND CLAVULANATE POTASSIUM 875; 125 MG/1; MG/1
1 TABLET, FILM COATED ORAL 2 TIMES DAILY
Qty: 14 TABLET | Refills: 0 | Status: SHIPPED | OUTPATIENT
Start: 2021-10-18 | End: 2021-10-25

## 2021-10-18 RX ORDER — BENZONATATE 100 MG/1
100 CAPSULE ORAL 3 TIMES DAILY PRN
Qty: 30 CAPSULE | Refills: 0 | Status: SHIPPED | OUTPATIENT
Start: 2021-10-18 | End: 2021-11-23

## 2021-10-18 NOTE — PROGRESS NOTES
You have chosen to receive care through a telehealth visit.  Do you consent to use a video/audio connection for your medical care today? Yes   Patient's location was verified as being within the [facility].     Chief Complaint  Dawna Lovell is a 50 y.o. female who presents via video-conference for cough, nasal congestion, and sinus pressure x 10 days.     History of Present Illness  Cough, nasal congestion, and sinus pressure x 10 days. Sinus pressure located in the bilateral maxillary region and feels achy. Symptoms have worsened over the past couple of days. Cough is producing greenish/yellowish sputum. Nasal drainage is thick and pale gray. Has tried albuterol, pseudoephedrine, and OTC cough syrups with mild relief in symptoms. Symptoms are worse at night. Pain is rated as a 0 out of 10.     The following portions of the patient's history were reviewed and updated as appropriate: allergies, current medications, past family history, past medical history, past social history, past surgical history and problem list.    [facility]  The following portions of the patient's history were reviewed and updated as appropriate: allergies, current medications, past family history, past medical history, past social history, past surgical history and problem list.     Past Medical History:   Diagnosis Date   • Anxiety    • Asthma    • GERD (gastroesophageal reflux disease)    • Hyperlipidemia    • Hypothyroidism    • Irritable bowel syndrome    • Lyme disease 2016   • Migraine headache    • POTS (postural orthostatic tachycardia syndrome)    • Syncope 2016   • Tachycardia 2016      Allergies   Allergen Reactions   • Morphine And Related Itching      Social History     Tobacco Use   • Smoking status: Former Smoker     Packs/day: 0.00     Years: 0.00     Pack years: 0.00     Types: Cigarettes     Quit date:      Years since quittin.8   • Smokeless tobacco: Never Used   Vaping Use   • Vaping Use: Never  used   Substance Use Topics   • Alcohol use: Yes     Alcohol/week: 1.0 standard drink     Types: 1 Glasses of wine per week   • Drug use: No     Past Surgical History:   Procedure Laterality Date   •  SECTION  2003      Family History   Problem Relation Age of Onset   • Esophageal cancer Father    • Cancer Father         Esophogical   • Dementia Other    • Breast cancer Paternal Grandmother    • Ovarian cancer Neg Hx          Current Outpatient Medications:   •  amoxicillin-clavulanate (Augmentin) 875-125 MG per tablet, Take 1 tablet by mouth 2 (Two) Times a Day for 7 days., Disp: 14 tablet, Rfl: 0  •  ASHLYNA 0.15-0.03 &0.01 MG tablet, Take 1 tablet by mouth Daily., Disp: , Rfl: 0  •  benzonatate (Tessalon Perles) 100 MG capsule, Take 1 capsule by mouth 3 (Three) Times a Day As Needed for Cough., Disp: 30 capsule, Rfl: 0  •  carvedilol (COREG) 6.25 MG tablet, Take 1 tablet by mouth 2 (Two) Times a Day With Meals., Disp: 180 tablet, Rfl: 3  •  Eysuvis 0.25 % suspension, Administer 1 drop to both eyes 2 (Two) Times a Day., Disp: , Rfl:   •  gabapentin (NEURONTIN) 300 MG capsule, Take three tablets in am, 2 tablets in the pm and three tablets in hs, Disp: 720 capsule, Rfl: 1  •  hydrOXYzine (ATARAX) 25 MG tablet, TAKE 1 TABLET Twice daily PRN, Disp: 40 tablet, Rfl: 1  •  levocetirizine (XYZAL) 5 MG tablet, Take 5 mg by mouth Daily., Disp: , Rfl:   •  levothyroxine (SYNTHROID, LEVOTHROID) 137 MCG tablet, TAKE 1 TABLET BY MOUTH DAILY, Disp: 90 tablet, Rfl: 3  •  meloxicam (MOBIC) 15 MG tablet, TAKE 1 TABLET BY MOUTH DAILY, Disp: 30 tablet, Rfl: 2  •  montelukast (SINGULAIR) 10 MG tablet, TAKE 1 TABLET BY MOUTH EVERY NIGHT AT BEDTIME, Disp: 90 tablet, Rfl: 2  •  Multiple Vitamins-Minerals (MULTIVITAMIN ADULT PO), Take  by mouth Daily., Disp: , Rfl:   •  olopatadine (PATANOL) 0.1 % ophthalmic solution, , Disp: , Rfl:   •  Restasis 0.05 % ophthalmic emulsion, Administer 1 drop to both eyes 2 (Two) Times a  Day., Disp: , Rfl:   •  rOPINIRole (REQUIP) 1 MG tablet, Take 1 tablet by mouth Every Night., Disp: 90 tablet, Rfl: 3  •  rosuvastatin (CRESTOR) 10 MG tablet, TAKE 1 TABLET BY MOUTH DAILY, Disp: 30 tablet, Rfl: 3  •  Sod Picosulfate-Mag Ox-Cit Acd 10-3.5-12 MG-GM -GM/160ML solution, Take 1 kit by mouth Take As Directed., Disp: 320 mL, Rfl: 0  •  SUMAtriptan (IMITREX) 25 MG tablet, As Needed., Disp: , Rfl: 0  •  traZODone (DESYREL) 50 MG tablet, Take 50 mg by mouth At Night As Needed., Disp: , Rfl:   •  venlafaxine XR (EFFEXOR-XR) 150 MG 24 hr capsule, Take 1 capsule by mouth Daily., Disp: 90 capsule, Rfl: 3    Review of Systems   Constitutional: Negative for chills, fatigue and fever.   HENT: Positive for congestion, rhinorrhea and sinus pressure. Negative for dental problem, drooling, ear discharge, ear pain, facial swelling, hearing loss, mouth sores, nosebleeds, postnasal drip, sneezing, sore throat, swollen glands, tinnitus, trouble swallowing and voice change.    Respiratory: Positive for cough. Negative for apnea, choking, chest tightness, shortness of breath, wheezing and stridor.    Cardiovascular: Negative for chest pain, palpitations and leg swelling.   Musculoskeletal: Negative for arthralgias, back pain and myalgias.   Neurological: Negative for headache.       Objective  Vital signs available: Yes  Temp 97.2     Assessment/Plan   1. Acute maxillary sinusitis, recurrence not specified  - amoxicillin-clavulanate (Augmentin) 875-125 MG per tablet; Take 1 tablet by mouth 2 (Two) Times a Day for 7 days.  Dispense: 14 tablet; Refill: 0  - benzonatate (Tessalon Perles) 100 MG capsule; Take 1 capsule by mouth 3 (Three) Times a Day As Needed for Cough.  Dispense: 30 capsule; Refill: 0  - Encouraged adequate rest, humidification, and increase clear fluid intake.   - Encouraged warm salt water gargles, lozenges, honey as a natural antitussive, and/or Delsym syrup as needed for cough.   - Encouraged OTC Mucinex 600  mg twice daily for chest congestion  - Encouraged nasal saline irrigation with sterile saline nasal spray twice daily for sinus congestion  - Encouraged continue use of Flonase for nasal and/or ear congestion  - Encouraged use warm compresses over sinuses for comfort.   - Please follow-up in office if symptoms worsen or fail to improve.     2. Cough  - COVID-19 PCR, [facility], NP SWAB IN [facility]SP[facility] MEDIA/ORAL SWISH 24-30 HR TAT - Swab, Nasopharynx; Future  - Encouraged to self-quarantine until COVID results are made available and found to be negative.     Return for Next scheduled follow up.    I spent 21 minutes caring for Dawna on this date of service. This time includes time spent by me in the following activities:preparing for the visit, obtaining and/or reviewing a separately obtained history, counseling and educating the patient/family/caregiver, ordering medications, tests, or procedures and documenting information in the medical record    Part of this note may be an electronic transcription/translation of spoken language to printed text using the Dragon Dictation System.    Electronically signed by:      SANGEETHA Scott  10/18/2021

## 2021-10-19 LAB — SARS-COV-2 RNA NOSE QL NAA+PROBE: NOT DETECTED

## 2021-10-20 RX ORDER — MELOXICAM 15 MG/1
15 TABLET ORAL DAILY
Qty: 30 TABLET | Refills: 2 | Status: SHIPPED | OUTPATIENT
Start: 2021-10-20 | End: 2022-02-01

## 2021-11-03 ENCOUNTER — TELEPHONE (OUTPATIENT)
Dept: INTERNAL MEDICINE | Facility: CLINIC | Age: 51
End: 2021-11-03

## 2021-11-03 DIAGNOSIS — R05.9 COUGH: Primary | ICD-10-CM

## 2021-11-03 NOTE — TELEPHONE ENCOUNTER
Caller: Dawna Lovell    Relationship: Self    Best call back number: [phone]    What is the medical concern/diagnosis: CHEST X-RAY    Any additional details: PATIENT HAD A TELEHEATLTH APPT WITH [facility] AND SHE IS STILL NOT GETTING ANY BETTER . THE NEXT STEP WAS FOR A CHEST X-RTAY

## 2021-11-04 ENCOUNTER — HOSPITAL ENCOUNTER (OUTPATIENT)
Dept: GENERAL RADIOLOGY | Facility: HOSPITAL | Age: 51
Discharge: HOME | End: 2021-11-04
Admitting: PHYSICIAN ASSISTANT

## 2021-11-04 DIAGNOSIS — R05.9 COUGH: ICD-10-CM

## 2021-11-04 PROCEDURE — 71046 X-RAY EXAM CHEST 2 VIEWS: CPT

## 2021-11-05 NOTE — PROGRESS NOTES
Your chest xray does not show any acute abnormalities. Please come in for an appointment if you are not feeling better.

## 2021-11-23 DIAGNOSIS — J01.00 ACUTE MAXILLARY SINUSITIS, RECURRENCE NOT SPECIFIED: ICD-10-CM

## 2021-11-23 RX ORDER — BENZONATATE 100 MG/1
CAPSULE ORAL
Qty: 30 CAPSULE | Refills: 0 | Status: SHIPPED | OUTPATIENT
Start: 2021-11-23 | End: 2022-02-01

## 2021-12-10 DIAGNOSIS — E78.5 HYPERLIPIDEMIA, UNSPECIFIED HYPERLIPIDEMIA TYPE: ICD-10-CM

## 2021-12-10 RX ORDER — ROSUVASTATIN CALCIUM 10 MG/1
10 TABLET, COATED ORAL DAILY
Qty: 30 TABLET | Refills: 3 | Status: SHIPPED | OUTPATIENT
Start: 2021-12-10 | End: 2022-03-29

## 2022-01-06 DIAGNOSIS — J01.00 ACUTE MAXILLARY SINUSITIS, RECURRENCE NOT SPECIFIED: ICD-10-CM

## 2022-01-06 RX ORDER — BENZONATATE 100 MG/1
CAPSULE ORAL
Qty: 30 CAPSULE | Refills: 0 | OUTPATIENT
Start: 2022-01-06

## 2022-02-01 ENCOUNTER — OFFICE VISIT (OUTPATIENT)
Dept: INTERNAL MEDICINE | Facility: CLINIC | Age: 52
End: 2022-02-01

## 2022-02-01 VITALS
BODY MASS INDEX: 30.18 KG/M2 | OXYGEN SATURATION: 97 % | WEIGHT: 198 LBS | DIASTOLIC BLOOD PRESSURE: 90 MMHG | SYSTOLIC BLOOD PRESSURE: 140 MMHG | HEART RATE: 92 BPM

## 2022-02-01 DIAGNOSIS — M54.50 ACUTE BILATERAL LOW BACK PAIN WITHOUT SCIATICA: Primary | ICD-10-CM

## 2022-02-01 DIAGNOSIS — F51.04 PSYCHOPHYSIOLOGICAL INSOMNIA: ICD-10-CM

## 2022-02-01 PROCEDURE — 96372 THER/PROPH/DIAG INJ SC/IM: CPT | Performed by: PHYSICIAN ASSISTANT

## 2022-02-01 PROCEDURE — 99214 OFFICE O/P EST MOD 30 MIN: CPT | Performed by: PHYSICIAN ASSISTANT

## 2022-02-01 RX ORDER — METHYLPREDNISOLONE 4 MG/1
TABLET ORAL
Qty: 21 TABLET | Refills: 0 | Status: SHIPPED | OUTPATIENT
Start: 2022-02-01 | End: 2022-03-23

## 2022-02-01 RX ORDER — TIZANIDINE 4 MG/1
4 TABLET ORAL EVERY 8 HOURS PRN
Qty: 30 TABLET | Refills: 1 | Status: SHIPPED | OUTPATIENT
Start: 2022-02-01 | End: 2022-03-03

## 2022-02-01 RX ORDER — FLUTICASONE PROPIONATE 93 UG/1
SPRAY, METERED NASAL
COMMUNITY
Start: 2021-12-03 | End: 2023-01-17

## 2022-02-01 RX ORDER — METHYLPREDNISOLONE ACETATE 40 MG/ML
40 INJECTION, SUSPENSION INTRA-ARTICULAR; INTRALESIONAL; INTRAMUSCULAR; SOFT TISSUE ONCE
Status: COMPLETED | OUTPATIENT
Start: 2022-02-01 | End: 2022-02-01

## 2022-02-01 RX ORDER — TRAZODONE HYDROCHLORIDE 100 MG/1
100 TABLET ORAL NIGHTLY PRN
Qty: 90 TABLET | Refills: 1 | Status: SHIPPED | OUTPATIENT
Start: 2022-02-01 | End: 2022-06-22

## 2022-02-01 RX ORDER — MELOXICAM 15 MG/1
15 TABLET ORAL DAILY
Qty: 30 TABLET | Refills: 2 | Status: SHIPPED | OUTPATIENT
Start: 2022-02-01 | End: 2022-04-04

## 2022-02-01 RX ADMIN — METHYLPREDNISOLONE ACETATE 40 MG: 40 INJECTION, SUSPENSION INTRA-ARTICULAR; INTRALESIONAL; INTRAMUSCULAR; SOFT TISSUE at 08:51

## 2022-02-01 NOTE — PROGRESS NOTES
Immunization  Immunization performed in left upper outer quadrant by Roberta Wesley MA. Patient tolerated the procedure well without complications.  02/01/22   Roberta Wesley MA

## 2022-02-01 NOTE — PROGRESS NOTES
Chief Complaint   Patient presents with   • Back Pain     Acute        Subjective     Dawna Lovell is a 51 y.o. female.        History of Present Illness     Pt is having flare of back pain similar to what she had 1-2 years ago. Hurts on both sides and radiates around to her sides. Has tried some exercises that she learned from PT. Tried some Aleve and advil occasionally.    Had Covid 3 weeks ago.      Current Outpatient Medications:   •  ASHLYNA 0.15-0.03 &0.01 MG tablet, Take 1 tablet by mouth Daily., Disp: , Rfl: 0  •  carvedilol (COREG) 6.25 MG tablet, Take 1 tablet by mouth 2 (Two) Times a Day With Meals., Disp: 180 tablet, Rfl: 3  •  Eysuvis 0.25 % suspension, Administer 1 drop to both eyes 2 (Two) Times a Day., Disp: , Rfl:   •  gabapentin (NEURONTIN) 300 MG capsule, Take three tablets in am, 2 tablets in the pm and three tablets in hs, Disp: 720 capsule, Rfl: 1  •  hydrOXYzine (ATARAX) 25 MG tablet, TAKE 1 TABLET Twice daily PRN, Disp: 40 tablet, Rfl: 1  •  levocetirizine (XYZAL) 5 MG tablet, Take 5 mg by mouth Daily., Disp: , Rfl:   •  levothyroxine (SYNTHROID, LEVOTHROID) 137 MCG tablet, TAKE 1 TABLET BY MOUTH DAILY, Disp: 90 tablet, Rfl: 3  •  montelukast (SINGULAIR) 10 MG tablet, TAKE 1 TABLET BY MOUTH EVERY NIGHT AT BEDTIME, Disp: 90 tablet, Rfl: 2  •  Multiple Vitamins-Minerals (MULTIVITAMIN ADULT PO), Take  by mouth Daily., Disp: , Rfl:   •  olopatadine (PATANOL) 0.1 % ophthalmic solution, , Disp: , Rfl:   •  Restasis 0.05 % ophthalmic emulsion, Administer 1 drop to both eyes 2 (Two) Times a Day., Disp: , Rfl:   •  rOPINIRole (REQUIP) 1 MG tablet, Take 1 tablet by mouth Every Night., Disp: 90 tablet, Rfl: 3  •  rosuvastatin (CRESTOR) 10 MG tablet, TAKE 1 TABLET BY MOUTH DAILY, Disp: 30 tablet, Rfl: 3  •  SUMAtriptan (IMITREX) 25 MG tablet, As Needed., Disp: , Rfl: 0  •  traZODone (DESYREL) 100 MG tablet, Take 1 tablet by mouth At Night As Needed for Sleep., Disp: 90 tablet, Rfl: 1  •  venlafaxine  XR (EFFEXOR-XR) 150 MG 24 hr capsule, Take 1 capsule by mouth Daily., Disp: 90 capsule, Rfl: 3  •  meloxicam (MOBIC) 15 MG tablet, TAKE 1 TABLET BY MOUTH DAILY, Disp: 30 tablet, Rfl: 2  •  methylPREDNISolone (Medrol) 4 MG dose pack, follow package directions, Disp: 21 tablet, Rfl: 0  •  tiZANidine (ZANAFLEX) 4 MG tablet, Take 1 tablet by mouth Every 8 (Eight) Hours As Needed for Muscle Spasms., Disp: 30 tablet, Rfl: 1  •  Xhance 93 MCG/ACT Exhaler Suspension, USE 1 SPRAY TO EACH NOSTRIL EVERY DAY, Disp: , Rfl:      [facility]  The following portions of the patient's history were reviewed and updated as appropriate: allergies, current medications, past family history, past medical history, past social history, past surgical history and problem list.    Review of Systems   Constitutional: Negative for appetite change, fever and unexpected weight change.   HENT: Negative.    Eyes: Negative for pain and visual disturbance.   Respiratory: Negative for chest tightness, shortness of breath and wheezing.    Cardiovascular: Negative for chest pain and palpitations.   Gastrointestinal: Negative for abdominal pain, blood in stool, diarrhea, nausea and vomiting.   Endocrine: Negative.    Genitourinary: Negative for difficulty urinating, flank pain, frequency and urgency.   Musculoskeletal: Positive for back pain and myalgias. Negative for joint swelling.   Skin: Negative for color change and rash.   Neurological: Negative for dizziness, tremors, weakness, light-headedness and headaches.   Hematological: Negative for adenopathy.   Psychiatric/Behavioral: Negative for confusion and decreased concentration.   All other systems reviewed and are negative.      Objective   /90   Pulse 92   Wt 89.8 kg (198 lb)   SpO2 97%   BMI 30.18 kg/m²     Physical Exam  Vitals and nursing note reviewed.   Constitutional:       General: She is not in acute distress.     Appearance: She is well-developed. She is not diaphoretic.   HENT:       Head: Normocephalic and atraumatic.   Eyes:      General: No scleral icterus.     Conjunctiva/sclera: Conjunctivae normal.      Pupils: Pupils are equal, round, and reactive to light.   Cardiovascular:      Rate and Rhythm: Normal rate and regular rhythm.      Heart sounds: Normal heart sounds. No murmur heard.  No gallop.    Pulmonary:      Effort: Pulmonary effort is normal. No respiratory distress.      Breath sounds: Normal breath sounds. No wheezing or rales.   Chest:      Chest wall: No tenderness.   Abdominal:      Palpations: Abdomen is soft.      Tenderness: There is no abdominal tenderness.   Musculoskeletal:         General: Tenderness present. No deformity.      Cervical back: Normal range of motion and neck supple.   Skin:     General: Skin is warm and dry.      Findings: No rash.   Neurological:      Mental Status: She is alert and oriented to person, place, and time.      Sensory: No sensory deficit.      Motor: No tremor, atrophy or abnormal muscle tone.      Coordination: Coordination normal.      Deep Tendon Reflexes: Reflexes are normal and symmetric. Reflexes normal.      Reflex Scores:       Patellar reflexes are 2+ on the right side and 2+ on the left side.       Achilles reflexes are 2+ on the right side and 2+ on the left side.  Psychiatric:         Behavior: Behavior normal.         Thought Content: Thought content normal.         Judgment: Judgment normal.              ASSESSMENT/PLAN    Diagnoses and all orders for this visit:    1. Acute bilateral low back pain without sciatica (Primary)  Comments:  Depomedrol 40 mg IM in office. Start medrol dose pack tomorrow. Use tizanidine prn.   Orders:  -     methylPREDNISolone (Medrol) 4 MG dose pack; follow package directions  Dispense: 21 tablet; Refill: 0  -     methylPREDNISolone acetate (DEPO-medrol) injection 40 mg  -     tiZANidine (ZANAFLEX) 4 MG tablet; Take 1 tablet by mouth Every 8 (Eight) Hours As Needed for Muscle Spasms.  Dispense: 30  tablet; Refill: 1    2. Psychophysiological insomnia  Assessment & Plan:  Continue trazodone as ordered.    Orders:  -     traZODone (DESYREL) 100 MG tablet; Take 1 tablet by mouth At Night As Needed for Sleep.  Dispense: 90 tablet; Refill: 1           Return for Next scheduled follow up.

## 2022-03-03 DIAGNOSIS — M54.50 ACUTE BILATERAL LOW BACK PAIN WITHOUT SCIATICA: ICD-10-CM

## 2022-03-03 RX ORDER — TIZANIDINE 4 MG/1
TABLET ORAL
Qty: 30 TABLET | Refills: 1 | Status: SHIPPED | OUTPATIENT
Start: 2022-03-03 | End: 2022-05-23

## 2022-03-23 ENCOUNTER — OFFICE VISIT (OUTPATIENT)
Dept: ENDOCRINOLOGY | Facility: CLINIC | Age: 52
End: 2022-03-23

## 2022-03-23 VITALS
BODY MASS INDEX: 30.46 KG/M2 | SYSTOLIC BLOOD PRESSURE: 108 MMHG | DIASTOLIC BLOOD PRESSURE: 68 MMHG | HEIGHT: 68 IN | WEIGHT: 201 LBS | HEART RATE: 94 BPM | OXYGEN SATURATION: 98 %

## 2022-03-23 DIAGNOSIS — E06.3 HYPOTHYROIDISM DUE TO HASHIMOTO'S THYROIDITIS: Primary | ICD-10-CM

## 2022-03-23 DIAGNOSIS — E03.8 HYPOTHYROIDISM DUE TO HASHIMOTO'S THYROIDITIS: Primary | ICD-10-CM

## 2022-03-23 PROCEDURE — 99213 OFFICE O/P EST LOW 20 MIN: CPT | Performed by: INTERNAL MEDICINE

## 2022-03-23 NOTE — PROGRESS NOTES
"Chief Complaint   Patient presents with   • Hypothyroidism     Follow up        HPI:   Dawna Lovell is a 51 y.o.female who returns to endocrine clinic for follow-up evaluation of her hypothyroidism. Last visit (03/23/2021). Her history is as follows:    Interim Events:   - Had COVID in January. Reports residual fatigue    1) hypothyroidism due to Hashimoto's thyroiditis:  - Diagnosed approximately 2001  - switched from Brand Levoxyl to levothyroxine due to cost    Current dose: generic levothyroxine 137 µg daily  - Takes tablet in the morning on an empty stomach. Waits at least 30-60 minutes before eating/hot liquids.    - Takes multivitamin in the evening  - not on biotin supplement    2) h/o abnormal endocrine tests:  - Pt had random serum cortisol levels collected at various time in the day which were elevated from 11/2015 to 4/2016. However, she was on an estrogen containing OCP at the time of these collections which falsely elevated the random serum cortisols. 3PM - ACTH was 11.6. Had elevated hepatic enzymes, normal glucose  - I evaluated pt in 2016 for possible Cushing's. Evaluation off birth control proved to be negative:  ( 06/2016) 24 hour urinary cortisol: was normal at 3 mcg/24 hours  (06/2016)  Midnight salivary cortisols ([facility]): normal  #1 <0.010, #2 0.013 ( normal range <0.010 - 0.090)  (07/2016) 1 mg overnight dexamethasone suppression test: 8AM cortisol was appropriately suppressed to 0.5 (normal < 1.8)    Had pt complete 1 mg overnight dex suppression test again on 4/28/2017:   04/28/2017)  1 mg overnight dexamethasone suppression test: 8AM cortisol was appropriately suppressed to 0.4    Review of Imaging:   - CT ABD 05/02/2016 - \"showed marked inhomogeneous geographic appearing liver with a mosaic pattern, most typical for marked inhomogeneous geographic steatosis with focal areas of fat sparing. This pattern was noted by ultrasound on 11/10/2015.\" and normal adrenal glands  - CT Head w/o " "contrast 1/21/2016: \"Old lacunar infarct seen in the left basal ganglia. No acute intracranial abnormalities identified.\"    Review of Systems   Constitutional:        Wt stable   Eyes:        Dry eyes   Respiratory: Negative.    Cardiovascular: Negative for palpitations (on carvediolol).   Gastrointestinal: Negative.  Negative for diarrhea and nausea.   Endocrine: Negative.    Genitourinary: Negative.    Musculoskeletal: Negative for arthralgias, back pain and myalgias.   Skin: Negative.    Allergic/Immunologic: Negative.    Neurological: Positive for headaches (Occasional). Negative for tremors and syncope.   Hematological: Negative.    Psychiatric/Behavioral:        H/o Depression with anxiety, better on effexor       The following portions of the patient's history were reviewed and updated as appropriate: allergies, current medications, past family history, past medical history, past social history, past surgical history and problem list.      /68   Pulse 94   Ht 172.7 cm (68\")   Wt 91.2 kg (201 lb)   SpO2 98%   BMI 30.56 kg/m²   Physical Exam   Constitutional: She is oriented to person, place, and time. She appears well-developed. No distress.   HENT:   Head: Normocephalic.   Eyes: Pupils are equal, round, and reactive to light. Conjunctivae are normal.   Neck: No tracheal deviation present. No thyromegaly present.   No palpable thyroid nodules     Cardiovascular: Normal rate, regular rhythm and normal heart sounds.   No murmur heard.  Pulmonary/Chest: Effort normal and breath sounds normal. No respiratory distress.   Musculoskeletal: No tenderness.   Lymphadenopathy:     She has no cervical adenopathy.   Neurological: She is alert and oriented to person, place, and time. No cranial nerve deficit.   Skin: Skin is warm and dry. She is not diaphoretic. No erythema.   Psychiatric: Her behavior is normal.   Vitals reviewed.    LABS/IMAGING: prior records reviewed and summarized in HPI    TSH, free T4 " pending    ASSESSMENT/PLAN:  1) hypothyroidism due to Hashimoto's thyroiditis:   - clinically euthyroid on exam  - TSH, free T4 pending: pt requested to complete on 03/29/2022  - Will continue levothyroxine 137 mcg pending review of lab results  - Will refill Rx. Medicaid currently requiring Brand Synthorid  - Reviewed proper levothyroxine administration, and factors to avoid that decrease medication potency and medication absorption.   - pt to call to have labs sooner than on follow-up if she notes any symptoms concerning for hypothyroidism    RTC 12 months    Signed: Camilla Gray MD

## 2022-03-24 RX ORDER — ROPINIROLE 1 MG/1
1 TABLET, FILM COATED ORAL NIGHTLY
Qty: 90 TABLET | Refills: 3 | OUTPATIENT
Start: 2022-03-24

## 2022-03-24 NOTE — TELEPHONE ENCOUNTER
Rx Refill Note  Requested Prescriptions     Pending Prescriptions Disp Refills   • rOPINIRole (REQUIP) 1 MG tablet [Pharmacy Med Name: ROPINIROLE 1MG TABLETS] 90 tablet 3     Sig: TAKE 1 TABLET BY MOUTH EVERY NIGHT      Last office visit with prescribing clinician: 6/25/2021      Next office visit with prescribing clinician: 7/1/2022            Neda Rowe MA  03/24/22, 08:48 EDT   Last refill 6/25/2021 90 day supply with 3 refills  Too soon to fill.

## 2022-03-29 DIAGNOSIS — E78.5 HYPERLIPIDEMIA, UNSPECIFIED HYPERLIPIDEMIA TYPE: ICD-10-CM

## 2022-03-29 RX ORDER — ROSUVASTATIN CALCIUM 10 MG/1
10 TABLET, COATED ORAL DAILY
Qty: 30 TABLET | Refills: 0 | Status: SHIPPED | OUTPATIENT
Start: 2022-03-29 | End: 2022-04-12

## 2022-04-01 DIAGNOSIS — F41.9 ANXIETY: ICD-10-CM

## 2022-04-04 RX ORDER — CARVEDILOL 6.25 MG/1
TABLET ORAL
Qty: 180 TABLET | Refills: 3 | Status: SHIPPED | OUTPATIENT
Start: 2022-04-04 | End: 2023-01-03

## 2022-04-04 RX ORDER — MELOXICAM 15 MG/1
15 TABLET ORAL DAILY
Qty: 30 TABLET | Refills: 0 | Status: SHIPPED | OUTPATIENT
Start: 2022-04-04

## 2022-04-07 ENCOUNTER — OFFICE VISIT (OUTPATIENT)
Dept: INTERNAL MEDICINE | Facility: CLINIC | Age: 52
End: 2022-04-07

## 2022-04-07 VITALS
HEIGHT: 68 IN | RESPIRATION RATE: 18 BRPM | DIASTOLIC BLOOD PRESSURE: 74 MMHG | BODY MASS INDEX: 30.92 KG/M2 | SYSTOLIC BLOOD PRESSURE: 120 MMHG | HEART RATE: 76 BPM | WEIGHT: 204 LBS

## 2022-04-07 DIAGNOSIS — F41.9 ANXIETY: ICD-10-CM

## 2022-04-07 DIAGNOSIS — Z00.00 HEALTH CARE MAINTENANCE: Primary | ICD-10-CM

## 2022-04-07 DIAGNOSIS — R53.83 FATIGUE, UNSPECIFIED TYPE: ICD-10-CM

## 2022-04-07 DIAGNOSIS — F51.04 PSYCHOPHYSIOLOGICAL INSOMNIA: ICD-10-CM

## 2022-04-07 PROCEDURE — 90471 IMMUNIZATION ADMIN: CPT | Performed by: PHYSICIAN ASSISTANT

## 2022-04-07 PROCEDURE — 99396 PREV VISIT EST AGE 40-64: CPT | Performed by: PHYSICIAN ASSISTANT

## 2022-04-07 PROCEDURE — 90715 TDAP VACCINE 7 YRS/> IM: CPT | Performed by: PHYSICIAN ASSISTANT

## 2022-04-07 RX ORDER — BUSPIRONE HYDROCHLORIDE 5 MG/1
5 TABLET ORAL 3 TIMES DAILY
Qty: 90 TABLET | Refills: 2 | Status: SHIPPED | OUTPATIENT
Start: 2022-04-07 | End: 2022-07-12

## 2022-04-07 RX ORDER — SUMATRIPTAN 25 MG/1
25 TABLET, FILM COATED ORAL AS NEEDED
Qty: 9 TABLET | Refills: 3 | Status: SHIPPED | OUTPATIENT
Start: 2022-04-07 | End: 2022-09-22

## 2022-04-07 NOTE — PROGRESS NOTES
"Chief Complaint   Patient presents with   • Annual Exam       Subjective     History of Present Illness    Dawna Lovell is a 51 y.o. female.     The patient is being seen for a health maintenance evaluation.  The last health maintenance was 1 year(s) ago.    Social History  Dawna  does not smoke cigarettes.   She drinks rare alcohol.  She does not use illicit drugs.    General History  Dawna  does have regular dental visits.  She does complain of vision problems. Wears glasses. Last eye exam was 2022.  Immunizations are not up to date. The patient needs the following immunizations: TDaP today; shingrix recommended    Lifestyle  Dawna  consumes in general, a \"healthy\" diet  .  She exercises intermittently and three times a week.    Reproductive Health  Dawna  is premenopausal.  She reports periods are regular every 12 weeks.  She is sexually active. Her contraceptive plan is oral contraceptives (estrogen/progesterone).    Screening  Last pap was 2019 by  gyn, Oumou Avila. History of abnormal pap smear or family history of gyn cancer: remote history of abnormal pap; normal since  Last mammogram was  8/2021. Personal or family history of abnormal mammograms or breast cancer: none  Last colonoscopy was  by 8/2021, repeat 2031. Family history of colon cancer: none  Last DEXA was never.     Pt has had extreme fatigue since she had Covid in January. She does go to sleep but does not stay asleep. History of sleep apnea but does not use CPAP. Takes trazodone when she remembers and sometimes has to take 200 mg.     Taking xyzal, hydroxyzine and singulair. Getting allergy shots weekly.                Current Outpatient Medications:   •  ASHLYNA 0.15-0.03 &0.01 MG tablet, Take 1 tablet by mouth Daily., Disp: , Rfl: 0  •  carvedilol (COREG) 6.25 MG tablet, TAKE 1 TABLET BY MOUTH TWICE DAILY WITH MEALS, Disp: 180 tablet, Rfl: 3  •  Effexor  MG 24 hr capsule, TAKE 1 CAPSULE BY MOUTH DAILY, Disp: 90 capsule, " Rfl: 0  •  gabapentin (NEURONTIN) 300 MG capsule, Take three tablets in am, 2 tablets in the pm and three tablets in hs, Disp: 720 capsule, Rfl: 1  •  hydrOXYzine (ATARAX) 25 MG tablet, TAKE 1 TABLET Twice daily PRN, Disp: 40 tablet, Rfl: 1  •  levocetirizine (XYZAL) 5 MG tablet, Take 5 mg by mouth Daily., Disp: , Rfl:   •  levothyroxine (SYNTHROID, LEVOTHROID) 137 MCG tablet, TAKE 1 TABLET BY MOUTH DAILY, Disp: 90 tablet, Rfl: 3  •  meloxicam (MOBIC) 15 MG tablet, TAKE 1 TABLET BY MOUTH DAILY, Disp: 30 tablet, Rfl: 0  •  montelukast (SINGULAIR) 10 MG tablet, TAKE 1 TABLET BY MOUTH EVERY NIGHT AT BEDTIME, Disp: 90 tablet, Rfl: 2  •  Multiple Vitamins-Minerals (MULTIVITAMIN ADULT PO), Take  by mouth Daily., Disp: , Rfl:   •  Restasis 0.05 % ophthalmic emulsion, Administer 1 drop to both eyes 2 (Two) Times a Day., Disp: , Rfl:   •  rOPINIRole (REQUIP) 1 MG tablet, Take 1 tablet by mouth Every Night., Disp: 90 tablet, Rfl: 3  •  rosuvastatin (CRESTOR) 10 MG tablet, TAKE 1 TABLET BY MOUTH DAILY, Disp: 30 tablet, Rfl: 0  •  SUMAtriptan (IMITREX) 25 MG tablet, Take 1 tablet by mouth As Needed for Migraine., Disp: 9 tablet, Rfl: 3  •  tiZANidine (ZANAFLEX) 4 MG tablet, TAKE 1 TABLET BY MOUTH EVERY 8 HOURS AS NEEDED FOR MUSCLE SPASMS, Disp: 30 tablet, Rfl: 1  •  traZODone (DESYREL) 100 MG tablet, Take 1 tablet by mouth At Night As Needed for Sleep., Disp: 90 tablet, Rfl: 1  •  Xhance 93 MCG/ACT Exhaler Suspension, USE 1 SPRAY TO EACH NOSTRIL EVERY DAY, Disp: , Rfl:   •  busPIRone (BUSPAR) 5 MG tablet, Take 1 tablet by mouth 3 (Three) Times a Day., Disp: 90 tablet, Rfl: 2  •  Eysuvis 0.25 % suspension, Administer 1 drop to both eyes 2 (Two) Times a Day., Disp: , Rfl:   •  olopatadine (PATANOL) 0.1 % ophthalmic solution, , Disp: , Rfl:      PMFSH  The following portions of the patient's history were reviewed and updated as appropriate: allergies, current medications, past family history, past medical history, past social  "history, past surgical history and problem list.    Review of Systems   Constitutional: Positive for fatigue. Negative for appetite change, fever and unexpected weight change.   HENT: Negative for ear pain, facial swelling and sore throat.    Eyes: Negative for pain and visual disturbance.   Respiratory: Negative for chest tightness, shortness of breath and wheezing.    Cardiovascular: Negative for chest pain and palpitations.   Gastrointestinal: Negative for abdominal pain and blood in stool.   Endocrine: Negative.    Genitourinary: Negative for difficulty urinating and hematuria.   Musculoskeletal: Negative for joint swelling.   Neurological: Negative for dizziness, tremors, seizures, syncope and light-headedness.   Hematological: Negative for adenopathy.   Psychiatric/Behavioral: Negative.        Objective   /74 (BP Location: Left arm, Patient Position: Sitting, Cuff Size: Adult)   Pulse 76   Resp 18   Ht 172.7 cm (67.99\")   Wt 92.5 kg (204 lb)   BMI 31.03 kg/m²     Physical Exam  Vitals and nursing note reviewed.   Constitutional:       General: She is not in acute distress.     Appearance: She is well-developed. She is not diaphoretic.   HENT:      Head: Normocephalic and atraumatic. Hair is normal.      Right Ear: Hearing, tympanic membrane, ear canal and external ear normal. No decreased hearing noted. No drainage.      Left Ear: Hearing, tympanic membrane, ear canal and external ear normal. No decreased hearing noted.      Nose: No nasal deformity.   Eyes:      General: Lids are normal. Lids are everted, no foreign bodies appreciated.         Right eye: No discharge.         Left eye: No discharge.      Conjunctiva/sclera: Conjunctivae normal.      Pupils: Pupils are equal, round, and reactive to light.   Neck:      Thyroid: No thyromegaly.      Vascular: No JVD.      Trachea: No tracheal deviation.   Cardiovascular:      Rate and Rhythm: Normal rate and regular rhythm.      Pulses: Normal pulses. "      Heart sounds: Normal heart sounds. No murmur heard.    No friction rub. No gallop.   Pulmonary:      Effort: Pulmonary effort is normal. No respiratory distress.      Breath sounds: Normal breath sounds. No wheezing or rales.   Chest:      Chest wall: No tenderness.   Abdominal:      General: Bowel sounds are normal. There is no distension.      Palpations: Abdomen is soft. There is no mass.      Tenderness: There is no abdominal tenderness. There is no guarding or rebound.      Hernia: No hernia is present.   Musculoskeletal:         General: No tenderness or deformity. Normal range of motion.      Cervical back: Normal range of motion and neck supple.   Lymphadenopathy:      Cervical: No cervical adenopathy.   Skin:     General: Skin is warm and dry.      Findings: No erythema or rash.   Neurological:      Mental Status: She is alert and oriented to person, place, and time.      Cranial Nerves: No cranial nerve deficit.      Motor: No abnormal muscle tone.      Coordination: Coordination normal.      Deep Tendon Reflexes: Reflexes are normal and symmetric. Reflexes normal.   Psychiatric:         Behavior: Behavior normal.         Thought Content: Thought content normal.         Judgment: Judgment normal.              ASSESSMENT/PLAN    Diagnoses and all orders for this visit:    1. Health care maintenance (Primary)  Assessment & Plan:  Immunizations: TDaP done today; shingrix recommended  Eye exam: done 2022  Pap Smear: done 2019 by  gyn; pt will schedule  Mammogram: done 8/2021  Dexa: due post menopausal  Colonoscopy: done 8/2021, repeat 2031  Labs: fasting labs ordered    Counseled patient regarding multimodal approach with healthy nutrition, healthy sleep, regular physical activity, social activities, counseling, safety measures and medications.     Orders:  -     CBC & Differential; Future  -     Comprehensive Metabolic Panel; Future  -     Lipid Panel; Future    2. Psychophysiological  insomnia  Assessment & Plan:  Increase trazodone to 100 mg qhs.      3. Fatigue, unspecified type  Assessment & Plan:  Refer to sleep medicine to evaluate for sleep apnea. Check labs as ordered.    Orders:  -     Ambulatory Referral to Sleep Medicine  -     Vitamin D 25 Hydroxy; Future  -     Vitamin B12; Future    4. Anxiety  Assessment & Plan:  Continue Effexor. Add Buspar 5 mg TID. Discussed referral to behavioral health if no improvement.    Orders:  -     busPIRone (BUSPAR) 5 MG tablet; Take 1 tablet by mouth 3 (Three) Times a Day.  Dispense: 90 tablet; Refill: 2    Other orders  -     SUMAtriptan (IMITREX) 25 MG tablet; Take 1 tablet by mouth As Needed for Migraine.  Dispense: 9 tablet; Refill: 3  -     Tdap Vaccine Greater Than or Equal To 6yo IM           Return in about 1 year (around 4/7/2023) for Annual with fasting labs.

## 2022-04-07 NOTE — ASSESSMENT & PLAN NOTE
Immunizations: TDaP done today; shingrix recommended  Eye exam: done 2022  Pap Smear: done 2019 by  gyn; pt will schedule  Mammogram: done 8/2021  Dexa: due post menopausal  Colonoscopy: done 8/2021, repeat 2031  Labs: fasting labs ordered    Counseled patient regarding multimodal approach with healthy nutrition, healthy sleep, regular physical activity, social activities, counseling, safety measures and medications.

## 2022-04-12 DIAGNOSIS — E78.5 HYPERLIPIDEMIA, UNSPECIFIED HYPERLIPIDEMIA TYPE: ICD-10-CM

## 2022-04-12 RX ORDER — ROSUVASTATIN CALCIUM 10 MG/1
10 TABLET, COATED ORAL DAILY
Qty: 30 TABLET | Refills: 0 | Status: SHIPPED | OUTPATIENT
Start: 2022-04-12 | End: 2022-05-17

## 2022-04-29 ENCOUNTER — LAB (OUTPATIENT)
Dept: LAB | Facility: HOSPITAL | Age: 52
End: 2022-04-29

## 2022-04-29 DIAGNOSIS — R53.83 FATIGUE, UNSPECIFIED TYPE: ICD-10-CM

## 2022-04-29 DIAGNOSIS — E03.8 HYPOTHYROIDISM DUE TO HASHIMOTO'S THYROIDITIS: ICD-10-CM

## 2022-04-29 DIAGNOSIS — Z00.00 HEALTH CARE MAINTENANCE: ICD-10-CM

## 2022-04-29 DIAGNOSIS — E06.3 HYPOTHYROIDISM DUE TO HASHIMOTO'S THYROIDITIS: ICD-10-CM

## 2022-04-29 LAB
25(OH)D3+25(OH)D2 SERPL-MCNC: 26.2 NG/ML (ref 30–100)
ALBUMIN SERPL-MCNC: 3.8 G/DL (ref 3.5–5.2)
ALBUMIN/GLOB SERPL: 1.4 G/DL
ALP SERPL-CCNC: 70 U/L (ref 39–117)
ALT SERPL-CCNC: 23 U/L (ref 1–33)
AST SERPL-CCNC: 77 U/L (ref 1–32)
BASOPHILS # BLD AUTO: 0.03 10*3/MM3 (ref 0–0.2)
BASOPHILS NFR BLD AUTO: 0.5 % (ref 0–1.5)
BILIRUB SERPL-MCNC: 0.8 MG/DL (ref 0–1.2)
BUN SERPL-MCNC: 4 MG/DL (ref 6–20)
BUN/CREAT SERPL: 5.1 (ref 7–25)
CALCIUM SERPL-MCNC: 8.6 MG/DL (ref 8.6–10.5)
CHLORIDE SERPL-SCNC: 98 MMOL/L (ref 98–107)
CHOLEST SERPL-MCNC: 130 MG/DL (ref 0–200)
CO2 SERPL-SCNC: 24.1 MMOL/L (ref 22–29)
CREAT SERPL-MCNC: 0.78 MG/DL (ref 0.57–1)
EGFRCR SERPLBLD CKD-EPI 2021: 92.7 ML/MIN/1.73
EOSINOPHIL # BLD AUTO: 0.09 10*3/MM3 (ref 0–0.4)
EOSINOPHIL NFR BLD AUTO: 1.4 % (ref 0.3–6.2)
ERYTHROCYTE [DISTWIDTH] IN BLOOD BY AUTOMATED COUNT: 19.3 % (ref 12.3–15.4)
GLOBULIN SER CALC-MCNC: 2.8 GM/DL
GLUCOSE SERPL-MCNC: 103 MG/DL (ref 65–99)
HCT VFR BLD AUTO: 31.4 % (ref 34–46.6)
HDLC SERPL-MCNC: 44 MG/DL (ref 40–60)
HGB BLD-MCNC: 10.5 G/DL (ref 12–15.9)
IMM GRANULOCYTES # BLD AUTO: 0.03 10*3/MM3 (ref 0–0.05)
IMM GRANULOCYTES NFR BLD AUTO: 0.5 % (ref 0–0.5)
LDLC SERPL CALC-MCNC: 36 MG/DL (ref 0–100)
LYMPHOCYTES # BLD AUTO: 1.44 10*3/MM3 (ref 0.7–3.1)
LYMPHOCYTES NFR BLD AUTO: 22.5 % (ref 19.6–45.3)
MCH RBC QN AUTO: 32.3 PG (ref 26.6–33)
MCHC RBC AUTO-ENTMCNC: 33.4 G/DL (ref 31.5–35.7)
MCV RBC AUTO: 96.6 FL (ref 79–97)
MONOCYTES # BLD AUTO: 0.48 10*3/MM3 (ref 0.1–0.9)
MONOCYTES NFR BLD AUTO: 7.5 % (ref 5–12)
NEUTROPHILS # BLD AUTO: 4.34 10*3/MM3 (ref 1.7–7)
NEUTROPHILS NFR BLD AUTO: 67.6 % (ref 42.7–76)
NRBC BLD AUTO-RTO: 0.3 /100 WBC (ref 0–0.2)
PLATELET # BLD AUTO: 290 10*3/MM3 (ref 140–450)
POTASSIUM SERPL-SCNC: 4.3 MMOL/L (ref 3.5–5.2)
PROT SERPL-MCNC: 6.6 G/DL (ref 6–8.5)
RBC # BLD AUTO: 3.25 10*6/MM3 (ref 3.77–5.28)
SODIUM SERPL-SCNC: 137 MMOL/L (ref 136–145)
T4 FREE SERPL-MCNC: 1.43 NG/DL (ref 0.93–1.7)
TRIGL SERPL-MCNC: 343 MG/DL (ref 0–150)
TSH SERPL DL<=0.005 MIU/L-ACNC: 1.9 UIU/ML (ref 0.27–4.2)
VIT B12 SERPL-MCNC: 337 PG/ML (ref 211–946)
VLDLC SERPL CALC-MCNC: 50 MG/DL (ref 5–40)
WBC # BLD AUTO: 6.41 10*3/MM3 (ref 3.4–10.8)

## 2022-04-29 RX ORDER — LEVOTHYROXINE SODIUM 137 UG/1
137 TABLET ORAL DAILY
Qty: 90 TABLET | Refills: 3 | Status: SHIPPED | OUTPATIENT
Start: 2022-04-29 | End: 2023-01-27

## 2022-05-08 DIAGNOSIS — D64.9 ANEMIA, UNSPECIFIED TYPE: Primary | ICD-10-CM

## 2022-05-08 RX ORDER — ERGOCALCIFEROL 1.25 MG/1
50000 CAPSULE ORAL WEEKLY
Qty: 12 CAPSULE | Refills: 3 | Status: SHIPPED | OUTPATIENT
Start: 2022-05-08

## 2022-05-09 NOTE — PROGRESS NOTES
Your labs show that your vitamin D is low. I will send in a high dose of vitamin D for you to start.    Your triglycerides were higher than they have been in the past. Please work to decrease the carbohydrates in your diet to get this back under control.    Your liver enzymes are much better.    Your blood counts show a new anemia. Please stop by so we can recheck these levels along with some additional labs.    Everything else looks fine!

## 2022-05-17 DIAGNOSIS — E78.5 HYPERLIPIDEMIA, UNSPECIFIED HYPERLIPIDEMIA TYPE: ICD-10-CM

## 2022-05-17 RX ORDER — ROSUVASTATIN CALCIUM 10 MG/1
10 TABLET, COATED ORAL DAILY
Qty: 30 TABLET | Refills: 0 | Status: SHIPPED | OUTPATIENT
Start: 2022-05-17 | End: 2022-09-07 | Stop reason: SDUPTHER

## 2022-05-21 DIAGNOSIS — E78.5 HYPERLIPIDEMIA, UNSPECIFIED HYPERLIPIDEMIA TYPE: ICD-10-CM

## 2022-05-21 RX ORDER — ROSUVASTATIN CALCIUM 10 MG/1
10 TABLET, COATED ORAL DAILY
Qty: 30 TABLET | Refills: 0 | OUTPATIENT
Start: 2022-05-21

## 2022-05-23 DIAGNOSIS — M54.50 ACUTE BILATERAL LOW BACK PAIN WITHOUT SCIATICA: ICD-10-CM

## 2022-05-23 RX ORDER — TIZANIDINE 4 MG/1
TABLET ORAL
Qty: 30 TABLET | Refills: 1 | Status: SHIPPED | OUTPATIENT
Start: 2022-05-23 | End: 2022-06-26

## 2022-05-31 RX ORDER — MELOXICAM 15 MG/1
15 TABLET ORAL DAILY
Qty: 30 TABLET | Refills: 0 | OUTPATIENT
Start: 2022-05-31

## 2022-06-05 DIAGNOSIS — J30.1 SEASONAL ALLERGIC RHINITIS DUE TO POLLEN: ICD-10-CM

## 2022-06-06 RX ORDER — MONTELUKAST SODIUM 10 MG/1
10 TABLET ORAL
Qty: 90 TABLET | Refills: 2 | Status: SHIPPED | OUTPATIENT
Start: 2022-06-06 | End: 2023-01-17

## 2022-06-22 DIAGNOSIS — F41.9 ANXIETY: ICD-10-CM

## 2022-06-22 DIAGNOSIS — F51.04 PSYCHOPHYSIOLOGICAL INSOMNIA: ICD-10-CM

## 2022-06-22 RX ORDER — VENLAFAXINE HYDROCHLORIDE 150 MG/1
CAPSULE, EXTENDED RELEASE ORAL
Qty: 90 CAPSULE | Refills: 0 | Status: SHIPPED | OUTPATIENT
Start: 2022-06-22 | End: 2022-06-26

## 2022-06-22 RX ORDER — TRAZODONE HYDROCHLORIDE 100 MG/1
100 TABLET ORAL NIGHTLY PRN
Qty: 90 TABLET | Refills: 0 | Status: SHIPPED | OUTPATIENT
Start: 2022-06-22 | End: 2022-10-26

## 2022-06-23 ENCOUNTER — OFFICE VISIT (OUTPATIENT)
Dept: SLEEP MEDICINE | Facility: HOSPITAL | Age: 52
End: 2022-06-23

## 2022-06-23 VITALS
BODY MASS INDEX: 30.65 KG/M2 | WEIGHT: 202.2 LBS | SYSTOLIC BLOOD PRESSURE: 134 MMHG | HEIGHT: 68 IN | OXYGEN SATURATION: 97 % | HEART RATE: 97 BPM | DIASTOLIC BLOOD PRESSURE: 89 MMHG

## 2022-06-23 DIAGNOSIS — R06.83 SNORING: Primary | ICD-10-CM

## 2022-06-23 DIAGNOSIS — E66.09 CLASS 1 OBESITY DUE TO EXCESS CALORIES WITHOUT SERIOUS COMORBIDITY WITH BODY MASS INDEX (BMI) OF 30.0 TO 30.9 IN ADULT: ICD-10-CM

## 2022-06-23 DIAGNOSIS — G47.33 OBSTRUCTIVE SLEEP APNEA, ADULT: ICD-10-CM

## 2022-06-23 PROCEDURE — 99203 OFFICE O/P NEW LOW 30 MIN: CPT | Performed by: INTERNAL MEDICINE

## 2022-06-25 DIAGNOSIS — M54.50 ACUTE BILATERAL LOW BACK PAIN WITHOUT SCIATICA: ICD-10-CM

## 2022-06-25 DIAGNOSIS — F41.9 ANXIETY: ICD-10-CM

## 2022-06-26 RX ORDER — VENLAFAXINE HYDROCHLORIDE 150 MG/1
CAPSULE, EXTENDED RELEASE ORAL
Qty: 90 CAPSULE | Refills: 0 | Status: SHIPPED | OUTPATIENT
Start: 2022-06-26 | End: 2022-10-26

## 2022-06-26 RX ORDER — TIZANIDINE 4 MG/1
TABLET ORAL
Qty: 30 TABLET | Refills: 1 | Status: SHIPPED | OUTPATIENT
Start: 2022-06-26

## 2022-07-05 NOTE — PROGRESS NOTES
Chief Complaint  Snoring and nonrestorative sleep    Subjective        Dawna Lovell presents to [facility] for the evaluation of snoring and nonrestorative sleep.  Her primary care provider is Jesenia ORANTES.  She is seen in person in the sleep clinic  History of Present Illness  Patient had a history of snoring for about 10 years.  She says she had a sleep study 7 ago and was diagnosed with obstructive sleep apnea.  She used CPAP for about 6 months but has been without it since then.  She says her weight is about the same.  She awakens frequently at night.  She has difficulty getting back to sleep.  She denies awakening gasping for breath.  She occasionally awakens with tachycardia.    She will awaken with a dry mouth.  She denies ever breaking her nose.  She will fall asleep if sitting quietly during the day.  She does have a history of nasal allergies.  She frequently takes a nap on her lunch hour.  She has occasional kicking of legs and is on gabapentin    She goes to bed about 10 PM.  She will fall asleep quickly.  She awakens 5-6 times per night.  She gets about 4 hours of sleep but is not rested.  She denies any history of hypertension or diabetes.  She has been diagnosed with POTS syndrome    Past medical history:    Allergies: She has seasonal environmental allergies    Habits: Smoking: Without    Ethanol: She has 1 drink 2-3 times a week    Caffeine: She has 1 cup of coffee per day    Medical illnesses: She has POTS, RLS, thyroid problems    Medications:ASHLYNA 0.15-0.03 &0.01 MG tablet    busPIRone (BUSPAR) 5 MG tablet    carvedilol (COREG) 6.25 MG tablet    Eysuvis 0.25 % suspension    gabapentin (NEURONTIN) 300 MG capsule    hydrOXYzine (ATARAX) 25 MG tablet    levocetirizine (XYZAL) 5 MG tablet    levothyroxine (SYNTHROID, LEVOTHROID) 137 MCG tablet    meloxicam (MOBIC) 15 MG tablet    montelukast (SINGULAIR) 10 MG tablet    Multiple Vitamins-Minerals (MULTIVITAMIN  "ADULT PO)    olopatadine (PATANOL) 0.1 % ophthalmic solution    Restasis 0.05 % ophthalmic emulsion    rOPINIRole (REQUIP) 1 MG tablet    rosuvastatin (CRESTOR) 10 MG tablet    SUMAtriptan (IMITREX) 25 MG tablet    tiZANidine (ZANAFLEX) 4 MG tablet    traZODone (DESYREL) 100 MG tablet    venlafaxine XR (EFFEXOR-XR) 150 MG 24 hr capsule    vitamin D (ERGOCALCIFEROL) 1.25 MG (98212 UT) capsule capsule    Xhance 93 MCG/ACT Exhaler Suspension      Surgeries: She had wisdom teeth extraction     Family history: Positive for esophageal cancer    Review of systems: Positive for appetite change, chills, fatigue, congestion, facial swelling, postnasal drip, sinus pressure, tinnitus, dry eyes, palpitations, neck pain, environmental allergies, headaches, decreased concentration, nervousness, anxiety, sleep disturbance.  Other systems are reviewed and reported as negative.    [place] score is 10/24  Objective   Vital Signs:  /89   Pulse 97   Ht 172.7 cm (68\")   Wt 91.7 kg (202 lb 3.2 oz)   SpO2 97%   BMI 30.74 kg/m²   Estimated body mass index is 30.74 kg/m² as calculated from the following:    Height as of this encounter: 172.7 cm (68\").    Weight as of this encounter: 91.7 kg (202 lb 3.2 oz).          Physical Exam patient appears to be awake and alert.  She does not appear to be in acute respiratory distress    She is normocephalic.  She has Mallampati class III anatomy.    Lungs are clear.    Cardiac exam revealed normal S1-S2.    Extremities showed no edema.  Result Review :         Assessment and Plan   Diagnoses and all orders for this visit:    1. Snoring (Primary)  -     Home Sleep Study; Future    2. Obstructive sleep apnea, adult  -     Home Sleep Study; Future    3. Class 1 obesity due to excess calories without serious comorbidity with body mass index (BMI) of 30.0 to 30.9 in adult    Patient has a history of snoring and obstructive sleep apnea.  She thinks her weight is about the same as when " she had a previous study.  She still has a history of snoring and nonrestful sleep.  I think she gives an excellent story for obstructive sleep apnea.  We will plan to proceed to home sleep testing.  We have discussed potential therapies including CPAP, weight control, oral appliance, and surgery.  We have discussed consequences of longstanding untreated obstructive sleep apnea.  These include hypertension, diabetes, heart disease,, and dementia.  She is encouraged to lose weight.  She declines referral to a dietitian.  She is encouraged avoid alcohol and sedatives close to bedtime.  She is encouraged to practice lateral position sleep.  She is to return in after her study.       I spent 35 minutes caring for Dawna on this date of service. This time includes time spent by me in the following activities:obtaining and/or reviewing a separately obtained history, performing a medically appropriate examination and/or evaluation , counseling and educating the patient/family/caregiver, ordering medications, tests, or procedures and documenting information in the medical record  Follow Up   Return for Follow up after study, Next scheduled follow-up.  Patient was given instructions and counseling regarding her condition or for health maintenance advice. Please see specific information pulled into the AVS if appropriate.   Navneet Collins MD [facility]  Sleep Medicine  Pulmonary and Critical Care Medicine

## 2022-07-11 NOTE — TELEPHONE ENCOUNTER
Refill was provided today; however, any further refills will have to be prescribed by PCP.  
[phone]

## 2022-07-12 ENCOUNTER — OFFICE VISIT (OUTPATIENT)
Dept: NEUROLOGY | Facility: CLINIC | Age: 52
End: 2022-07-12

## 2022-07-12 VITALS
HEIGHT: 68 IN | BODY MASS INDEX: 30.62 KG/M2 | HEART RATE: 104 BPM | WEIGHT: 202 LBS | DIASTOLIC BLOOD PRESSURE: 72 MMHG | OXYGEN SATURATION: 95 % | SYSTOLIC BLOOD PRESSURE: 118 MMHG | TEMPERATURE: 96.9 F

## 2022-07-12 DIAGNOSIS — F41.9 ANXIETY: ICD-10-CM

## 2022-07-12 DIAGNOSIS — G25.81 RESTLESS LEG SYNDROME: Primary | Chronic | ICD-10-CM

## 2022-07-12 PROCEDURE — 99213 OFFICE O/P EST LOW 20 MIN: CPT | Performed by: PSYCHIATRY & NEUROLOGY

## 2022-07-12 RX ORDER — BUSPIRONE HYDROCHLORIDE 5 MG/1
TABLET ORAL
Qty: 90 TABLET | Refills: 1 | Status: SHIPPED | OUTPATIENT
Start: 2022-07-12

## 2022-07-12 RX ORDER — ROPINIROLE 1 MG/1
1 TABLET, FILM COATED ORAL NIGHTLY
Qty: 90 TABLET | Refills: 3 | Status: SHIPPED | OUTPATIENT
Start: 2022-07-12

## 2022-07-12 RX ORDER — GABAPENTIN 300 MG/1
CAPSULE ORAL
Qty: 720 CAPSULE | Refills: 1 | Status: SHIPPED | OUTPATIENT
Start: 2022-07-12

## 2022-07-12 NOTE — PROGRESS NOTES
"Chief Complaint    Restless Legs Syndrome    Subjective        Dawna Lovell presents to [facility] NEUR[facility]     History of Present Illness    51 y.o. female returns in follow up.  Last visit on 6/25/21 increased GBP, continue Requip.      Electrical buzzing in feet.      Intensity 6/10.       mg qam and 900 mg qhs controls sx.       Requip 1 mg qhs        Problem history:     Sx started two years ago.  Sx started suddenly.  Legs feel a fluttering in legs mainly lying down at night.  Sx improved during the day and walking.  Improved of late.  Mild intensity.  Gralise 1200 mg qhs.    Legs ache during the day when sitting still.       EMG/NCS - normal     Objective   Vital Signs:  /72   Pulse 104   Temp 96.9 °F (36.1 °C)   Ht 172.7 cm (67.99\")   Wt 91.6 kg (202 lb)   SpO2 95%   BMI 30.72 kg/m²   Estimated body mass index is 30.72 kg/m² as calculated from the following:    Height as of this encounter: 172.7 cm (67.99\").    Weight as of this encounter: 91.6 kg (202 lb).          Physical Exam  Eyes:      Extraocular Movements: EOM normal.      Pupils: Pupils are equal, round, and reactive to light.   Neurological:      Mental Status: She is oriented to person, place, and time.      Gait: Gait is intact.      Deep Tendon Reflexes: Strength normal.   Psychiatric:         Speech: Speech normal.          Neurologic Exam     Mental Status   Oriented to person, place, and time.   Speech: speech is normal   Level of consciousness: alert  Knowledge: good and consistent with education.   Normal comprehension.     Cranial Nerves   Cranial nerves II through XII intact.     CN II   Visual fields full to confrontation.   Visual acuity: normal  Right visual field deficit: none  Left visual field deficit: none     CN III, IV, VI   Pupils are equal, round, and reactive to light.  Extraocular motions are normal.   Nystagmus: none   Diplopia: none  Ophthalmoparesis: none  Upgaze: normal  Downgaze: " normal  Conjugate gaze: present    CN V   Facial sensation intact.   Right corneal reflex: normal  Left corneal reflex: normal    CN VII   Right facial weakness: none  Left facial weakness: none    CN VIII   Hearing: intact    CN IX, X   Palate: symmetric  Right gag reflex: normal  Left gag reflex: normal    CN XI   Right sternocleidomastoid strength: normal  Left sternocleidomastoid strength: normal    CN XII   Tongue: not atrophic  Fasciculations: absent  Tongue deviation: none    Motor Exam   Muscle bulk: normal  Overall muscle tone: normal    Strength   Strength 5/5 throughout.     Sensory Exam   Light touch normal.     Gait, Coordination, and Reflexes     Gait  Gait: normal    Tremor   Resting tremor: absent  Intention tremor: absent  Action tremor: absent    Reflexes   Reflexes 2+ except as noted.      Result Review :  The following data was reviewed by: Jasson Pisano MD on 07/12/2022:  Common labs    Common Labsle 4/29/22 4/29/22 4/29/22    0821 0821 0821   Glucose  103 (A)    BUN  4 (A)    Creatinine  0.78    Sodium  137    Potassium  4.3    Chloride  98    Calcium  8.6    Total Protein  6.6    Albumin  3.80    Total Bilirubin  0.8    Alkaline Phosphatase  70    AST (SGOT)  77 (A)    ALT (SGPT)  23    WBC   6.41   Hemoglobin   10.5 (A)   Hematocrit   31.4 (A)   Platelets   290   Total Cholesterol 130     Triglycerides 343 (A)     HDL Cholesterol 44     LDL Cholesterol  36     (A) Abnormal value       Comments are available for some flowsheets but are not being displayed.                     Assessment and Plan   Diagnoses and all orders for this visit:    1. Restless leg syndrome (Primary)  Assessment & Plan:  Continue  mg and 900 mg qhs    Requip 1 mg qhs     Orders:  -     gabapentin (NEURONTIN) 300 MG capsule; Take three tablets in am, 2 tablets in the pm and three tablets in hs  Dispense: 720 capsule; Refill: 1    Other orders  -     rOPINIRole (REQUIP) 1 MG tablet; Take 1 tablet by mouth  Every Night.  Dispense: 90 tablet; Refill: 3           Follow Up   No follow-ups on file.  Patient was given instructions and counseling regarding her condition or for health maintenance advice. Please see specific information pulled into the AVS if appropriate.

## 2022-07-14 ENCOUNTER — LAB (OUTPATIENT)
Dept: LAB | Facility: HOSPITAL | Age: 52
End: 2022-07-14

## 2022-07-14 DIAGNOSIS — D64.9 ANEMIA, UNSPECIFIED TYPE: ICD-10-CM

## 2022-07-14 LAB
BASOPHILS # BLD AUTO: 0.05 10*3/MM3 (ref 0–0.2)
BASOPHILS NFR BLD AUTO: 0.8 % (ref 0–1.5)
EOSINOPHIL # BLD AUTO: 0.11 10*3/MM3 (ref 0–0.4)
EOSINOPHIL NFR BLD AUTO: 1.8 % (ref 0.3–6.2)
ERYTHROCYTE [DISTWIDTH] IN BLOOD BY AUTOMATED COUNT: 19 % (ref 12.3–15.4)
FERRITIN SERPL-MCNC: ABNORMAL NG/ML (ref 13–150)
HCT VFR BLD AUTO: 35.7 % (ref 34–46.6)
HGB BLD-MCNC: 11.6 G/DL (ref 12–15.9)
IMM GRANULOCYTES # BLD AUTO: 0.02 10*3/MM3 (ref 0–0.05)
IMM GRANULOCYTES NFR BLD AUTO: 0.3 % (ref 0–0.5)
IRON SATN MFR SERPL: 31 % (ref 20–50)
IRON SERPL-MCNC: 123 MCG/DL (ref 37–145)
LYMPHOCYTES # BLD AUTO: 1.62 10*3/MM3 (ref 0.7–3.1)
LYMPHOCYTES NFR BLD AUTO: 26.9 % (ref 19.6–45.3)
MCH RBC QN AUTO: 30.9 PG (ref 26.6–33)
MCHC RBC AUTO-ENTMCNC: 32.5 G/DL (ref 31.5–35.7)
MCV RBC AUTO: 94.9 FL (ref 79–97)
MONOCYTES # BLD AUTO: 0.5 10*3/MM3 (ref 0.1–0.9)
MONOCYTES NFR BLD AUTO: 8.3 % (ref 5–12)
NEUTROPHILS # BLD AUTO: 3.72 10*3/MM3 (ref 1.7–7)
NEUTROPHILS NFR BLD AUTO: 61.9 % (ref 42.7–76)
NRBC BLD AUTO-RTO: 0 /100 WBC (ref 0–0.2)
PLATELET # BLD AUTO: 268 10*3/MM3 (ref 140–450)
RBC # BLD AUTO: 3.76 10*6/MM3 (ref 3.77–5.28)
TIBC SERPL-MCNC: 396 MCG/DL
UIBC SERPL-MCNC: 273 MCG/DL (ref 112–346)
VIT B12 SERPL-MCNC: 382 PG/ML (ref 211–946)
WBC # BLD AUTO: 6.02 10*3/MM3 (ref 3.4–10.8)

## 2022-07-22 DIAGNOSIS — R79.89 ELEVATED FERRITIN: Primary | ICD-10-CM

## 2022-07-23 NOTE — PROGRESS NOTES
Please let her know that her follow up labs showed that her anemia was better. Her iron levels are normal but her ferritin level was extremely high. I noticed in her history that she had high iron levels in the past (6 years ago) but not necessarily high ferritin. Does she remember anything about that? Did she see a hematologist? I would like her to stop by again for recheck of ferritin and some other labs and then will decide if we need to have a specialist look at her labs.
36.2

## 2022-08-13 DIAGNOSIS — M54.50 ACUTE BILATERAL LOW BACK PAIN WITHOUT SCIATICA: ICD-10-CM

## 2022-08-13 RX ORDER — TIZANIDINE 4 MG/1
TABLET ORAL
Qty: 30 TABLET | Refills: 1 | OUTPATIENT
Start: 2022-08-13

## 2022-09-07 DIAGNOSIS — E78.5 HYPERLIPIDEMIA, UNSPECIFIED HYPERLIPIDEMIA TYPE: ICD-10-CM

## 2022-09-07 RX ORDER — ROSUVASTATIN CALCIUM 10 MG/1
10 TABLET, COATED ORAL DAILY
Qty: 30 TABLET | Refills: 2 | Status: SHIPPED | OUTPATIENT
Start: 2022-09-07 | End: 2022-12-02

## 2022-09-07 NOTE — TELEPHONE ENCOUNTER
Caller: Nas Dawna J    Relationship: Self    Best call back number: [phone]    Requested Prescriptions:   Requested Prescriptions     Pending Prescriptions Disp Refills   • rosuvastatin (CRESTOR) 10 MG tablet 30 tablet 0     Sig: Take 1 tablet by mouth Daily.        Pharmacy where request should be sent: [id number] [address] [place] [address] DR JACQUES AT [facility] [address] - [phone] [facility] [phone] FX     Additional details provided by patient: PT IS OUT OF THIS MEDICATION    Does the patient have less than a 3 day supply:  [x] Yes  [] No    Tyrone Edwards Rep   09/07/22 11:24 EDT

## 2022-09-11 DIAGNOSIS — F41.9 ANXIETY: ICD-10-CM

## 2022-09-11 RX ORDER — BUSPIRONE HYDROCHLORIDE 5 MG/1
TABLET ORAL
Qty: 90 TABLET | Refills: 1 | OUTPATIENT
Start: 2022-09-11

## 2022-09-22 RX ORDER — SUMATRIPTAN 25 MG/1
25 TABLET, FILM COATED ORAL AS NEEDED
Qty: 9 TABLET | Refills: 3 | Status: SHIPPED | OUTPATIENT
Start: 2022-09-22 | End: 2023-02-13

## 2022-10-17 ENCOUNTER — HOSPITAL ENCOUNTER (OUTPATIENT)
Dept: GENERAL RADIOLOGY | Facility: HOSPITAL | Age: 52
Discharge: HOME | End: 2022-10-17
Admitting: NURSE PRACTITIONER

## 2022-10-17 ENCOUNTER — TRANSCRIBE ORDERS (OUTPATIENT)
Dept: ADMINISTRATIVE | Facility: HOSPITAL | Age: 52
End: 2022-10-17

## 2022-10-17 DIAGNOSIS — R05.9 COUGH, UNSPECIFIED TYPE: Primary | ICD-10-CM

## 2022-10-17 PROCEDURE — 71046 X-RAY EXAM CHEST 2 VIEWS: CPT

## 2022-10-26 DIAGNOSIS — F41.9 ANXIETY: ICD-10-CM

## 2022-10-26 DIAGNOSIS — F51.04 PSYCHOPHYSIOLOGICAL INSOMNIA: ICD-10-CM

## 2022-10-26 RX ORDER — VENLAFAXINE HYDROCHLORIDE 150 MG/1
CAPSULE, EXTENDED RELEASE ORAL
Qty: 90 CAPSULE | Refills: 0 | Status: SHIPPED | OUTPATIENT
Start: 2022-10-26 | End: 2023-03-16

## 2022-10-26 RX ORDER — TRAZODONE HYDROCHLORIDE 100 MG/1
100 TABLET ORAL NIGHTLY PRN
Qty: 90 TABLET | Refills: 0 | Status: SHIPPED | OUTPATIENT
Start: 2022-10-26 | End: 2023-03-02 | Stop reason: SDUPTHER

## 2022-11-16 ENCOUNTER — OFFICE VISIT (OUTPATIENT)
Dept: INTERNAL MEDICINE | Facility: CLINIC | Age: 52
End: 2022-11-16

## 2022-11-16 ENCOUNTER — LAB (OUTPATIENT)
Dept: LAB | Facility: HOSPITAL | Age: 52
End: 2022-11-16

## 2022-11-16 VITALS
HEART RATE: 100 BPM | HEIGHT: 68 IN | DIASTOLIC BLOOD PRESSURE: 78 MMHG | OXYGEN SATURATION: 100 % | TEMPERATURE: 97.3 F | SYSTOLIC BLOOD PRESSURE: 126 MMHG | BODY MASS INDEX: 31.98 KG/M2 | WEIGHT: 211 LBS

## 2022-11-16 DIAGNOSIS — R09.82 POST-NASAL DRAINAGE: ICD-10-CM

## 2022-11-16 DIAGNOSIS — R05.3 CHRONIC COUGH: ICD-10-CM

## 2022-11-16 DIAGNOSIS — R79.89 ELEVATED FERRITIN LEVEL: ICD-10-CM

## 2022-11-16 DIAGNOSIS — R79.89 ELEVATED FERRITIN: ICD-10-CM

## 2022-11-16 DIAGNOSIS — Z23 NEED FOR INFLUENZA VACCINATION: Primary | ICD-10-CM

## 2022-11-16 LAB
ALBUMIN SERPL-MCNC: 3.9 G/DL (ref 3.5–5.2)
ALBUMIN/GLOB SERPL: 1.1 G/DL
ALP SERPL-CCNC: 325 U/L (ref 39–117)
ALT SERPL W P-5'-P-CCNC: 129 U/L (ref 1–33)
ANION GAP SERPL CALCULATED.3IONS-SCNC: 21 MMOL/L (ref 5–15)
AST SERPL-CCNC: 342 U/L (ref 1–32)
BILIRUB SERPL-MCNC: 0.8 MG/DL (ref 0–1.2)
BUN SERPL-MCNC: 4 MG/DL (ref 6–20)
BUN/CREAT SERPL: 4.4 (ref 7–25)
CALCIUM SPEC-SCNC: 9.4 MG/DL (ref 8.6–10.5)
CHLORIDE SERPL-SCNC: 92 MMOL/L (ref 98–107)
CO2 SERPL-SCNC: 23 MMOL/L (ref 22–29)
CREAT SERPL-MCNC: 0.91 MG/DL (ref 0.57–1)
EGFRCR SERPLBLD CKD-EPI 2021: 77 ML/MIN/1.73
GLOBULIN UR ELPH-MCNC: 3.4 GM/DL
GLUCOSE SERPL-MCNC: 120 MG/DL (ref 65–99)
POTASSIUM SERPL-SCNC: 3.6 MMOL/L (ref 3.5–5.2)
PROT SERPL-MCNC: 7.3 G/DL (ref 6–8.5)
SODIUM SERPL-SCNC: 136 MMOL/L (ref 136–145)

## 2022-11-16 PROCEDURE — 81256 HFE GENE: CPT

## 2022-11-16 PROCEDURE — 90471 IMMUNIZATION ADMIN: CPT | Performed by: PHYSICIAN ASSISTANT

## 2022-11-16 PROCEDURE — 99213 OFFICE O/P EST LOW 20 MIN: CPT | Performed by: PHYSICIAN ASSISTANT

## 2022-11-16 PROCEDURE — 90686 IIV4 VACC NO PRSV 0.5 ML IM: CPT | Performed by: PHYSICIAN ASSISTANT

## 2022-11-16 PROCEDURE — 36415 COLL VENOUS BLD VENIPUNCTURE: CPT

## 2022-11-16 PROCEDURE — 83615 LACTATE (LD) (LDH) ENZYME: CPT | Performed by: PHYSICIAN ASSISTANT

## 2022-11-16 PROCEDURE — 80053 COMPREHEN METABOLIC PANEL: CPT

## 2022-11-16 PROCEDURE — 82728 ASSAY OF FERRITIN: CPT | Performed by: PHYSICIAN ASSISTANT

## 2022-11-16 RX ORDER — AZELASTINE HYDROCHLORIDE AND FLUTICASONE PROPIONATE 137; 50 UG/1; UG/1
SPRAY, METERED NASAL
COMMUNITY
Start: 2022-11-15 | End: 2023-01-17

## 2022-11-16 RX ORDER — ALBUTEROL SULFATE 90 UG/1
2 AEROSOL, METERED RESPIRATORY (INHALATION) EVERY 4 HOURS PRN
Qty: 18 G | Refills: 2 | Status: SHIPPED | OUTPATIENT
Start: 2022-11-16 | End: 2023-01-18

## 2022-11-16 NOTE — PROGRESS NOTES
Chief Complaint   Patient presents with   • Nasal Congestion   • Cough     Cough since June Subjective     Dawna Lovell is a 51 y.o. female.        History of Present Illness     Dawna Lovell is a 51-year-old female who presents today for nasal congestion and cough.     The patient states that she has had the cough for awhile now and notes that she has made 06/2022 the marker for when it became noticeable for her on a daily basis. She denies any illness in 06/2022 that could have started the cough to occur. She notes that she did see a nurse practitioner or physician assistant at the allergist office twice, once in 09/2022 and another time in 10/2022. The patient was prescribed prednisone in 09/2022 and again at her second visit in 10/2022 where she received a second round of prednisone along with a Z-Constantino. The first round of prednisone improved symptoms for a few days ; however, she recently saw her eye specialist who believes the prednisone use is causing her to develop cataracts and prefers not to go back on prednisone again. She was also provided with inhaler samples, Trelegy, from the first office It did provide relief for the patient, but did not stop the cough.  She was started on 100 mg dosage and then on the second office visit in 10/2022, her dosage of the Trelegy Ellipta was increased. The Dymista nasal spray was provided at her second office visit as she was previously on a different nasal spray. The albuterol inhaler has been used once a day or at least once every other day. Was on sudafed put stopped it as well. She stopped her other allergy medications about a month ago.     Had a CXR 10/2022 that did not show anything abnormal from what the patient reports. Had normal PFT at allergist. She has not followed back up with the allergy clinic, was not sure what else they could offer. She receives allergy injections every Monday due to regular allergies she experiences daily. Her usual allergy  symptoms include; watery eyes, runny nose, sinus pressure, as well as post-nasal drainage.     Cough is not necessarily worse at any particular time of day or night; the cough gets to a point where it becomes hard to breathe for a period of time and will have to keep coughing until she has relief or by drinking some water. Cough can be productive with clear sputum.    The patient was previously taking Singular, Zyrtec, and Xyzal. She thought by stopping these medications for allergies, it would stop the drainage she was experiencing. Stopping all the medications helped her cough and drainage for a couple of days before it just came right back.     The patient states that she had COVID-19 virus in 01/2022, but denies having a cough at the time of her infection.     Denies reflux. She mentions that her dad recently had surgery where they removed majority of his esophagus due to cancer that he was diagnosed with approximately 4 to 5 years ago. The patient has not had a scope performed before. She denies any voice changes since the cough started in 06/2022.     The patient mentions that she has not completed the previous order for blood work to be rechecked for her ferritin and liver enzymes levels as it was elevated at a previous visit. She was to have check for hemochromatosis to determine if elevations are related to an underlying genetic mutation.      Current Outpatient Medications:   •  ASHLYNA 0.15-0.03 &0.01 MG tablet, Take 1 tablet by mouth Daily., Disp: , Rfl: 0  •  busPIRone (BUSPAR) 5 MG tablet, TAKE 1 TABLET BY MOUTH THREE TIMES DAILY, Disp: 90 tablet, Rfl: 1  •  carvedilol (COREG) 6.25 MG tablet, TAKE 1 TABLET BY MOUTH TWICE DAILY WITH MEALS, Disp: 180 tablet, Rfl: 3  •  Dymista 137-50 MCG/ACT suspension, , Disp: , Rfl:   •  Eysuvis 0.25 % suspension, Administer 1 drop to both eyes 2 (Two) Times a Day., Disp: , Rfl:   •  gabapentin (NEURONTIN) 300 MG capsule, Take three tablets in am, 2 tablets in the pm  and three tablets in hs, Disp: 720 capsule, Rfl: 1  •  hydrOXYzine (ATARAX) 25 MG tablet, TAKE 1 TABLET Twice daily PRN, Disp: 40 tablet, Rfl: 1  •  levocetirizine (XYZAL) 5 MG tablet, Take 5 mg by mouth Daily., Disp: , Rfl:   •  levothyroxine (SYNTHROID, LEVOTHROID) 137 MCG tablet, TAKE 1 TABLET BY MOUTH DAILY, Disp: 90 tablet, Rfl: 3  •  meloxicam (MOBIC) 15 MG tablet, TAKE 1 TABLET BY MOUTH DAILY, Disp: 30 tablet, Rfl: 0  •  montelukast (SINGULAIR) 10 MG tablet, TAKE 1 TABLET BY MOUTH EVERY NIGHT AT BEDTIME, Disp: 90 tablet, Rfl: 2  •  Multiple Vitamins-Minerals (MULTIVITAMIN ADULT PO), Take  by mouth Daily., Disp: , Rfl:   •  olopatadine (PATANOL) 0.1 % ophthalmic solution, , Disp: , Rfl:   •  Restasis 0.05 % ophthalmic emulsion, Administer 1 drop to both eyes 2 (Two) Times a Day., Disp: , Rfl:   •  rOPINIRole (REQUIP) 1 MG tablet, Take 1 tablet by mouth Every Night., Disp: 90 tablet, Rfl: 3  •  rosuvastatin (CRESTOR) 10 MG tablet, Take 1 tablet by mouth Daily., Disp: 30 tablet, Rfl: 2  •  SUMAtriptan (IMITREX) 25 MG tablet, TAKE 1 TABLET BY MOUTH AS NEEDED FOR MIGRAINE, Disp: 9 tablet, Rfl: 3  •  tiZANidine (ZANAFLEX) 4 MG tablet, TAKE 1 TABLET BY MOUTH EVERY 8 HOURS AS NEEDED FOR MUSCLE SPASMS, Disp: 30 tablet, Rfl: 1  •  traZODone (DESYREL) 100 MG tablet, TAKE 1 TABLET BY MOUTH AT NIGHT AS NEEDED FOR SLEEP, Disp: 90 tablet, Rfl: 0  •  venlafaxine XR (EFFEXOR-XR) 150 MG 24 hr capsule, TAKE 1 CAPSULE BY MOUTH DAILY, Disp: 90 capsule, Rfl: 0  •  vitamin D (ERGOCALCIFEROL) 1.25 MG (64732 UT) capsule capsule, Take 1 capsule by mouth 1 (One) Time Per Week., Disp: 12 capsule, Rfl: 3  •  Xhance 93 MCG/ACT Exhaler Suspension, USE 1 SPRAY TO EACH NOSTRIL EVERY DAY, Disp: , Rfl:   •  albuterol sulfate  (90 Base) MCG/ACT inhaler, Inhale 2 puffs Every 4 (Four) Hours As Needed for Wheezing., Disp: 18 g, Rfl: 2     PMFSH  The following portions of the patient's history were reviewed and updated as appropriate:  "allergies, current medications, past family history, past medical history, past social history, past surgical history and problem list.    Review of Systems   Constitutional: Negative for activity change, appetite change and fatigue.   HENT: Positive for postnasal drip and rhinorrhea. Negative for congestion.    Respiratory: Positive for cough. Negative for chest tightness, shortness of breath and wheezing.    Cardiovascular: Negative for chest pain and palpitations.   Gastrointestinal: Negative for abdominal pain.   Genitourinary: Negative for dysuria.   Musculoskeletal: Negative for arthralgias and myalgias.   Neurological: Negative for dizziness, weakness, light-headedness and headaches.   Psychiatric/Behavioral: Negative for dysphoric mood. The patient is not nervous/anxious.        Objective   /78   Pulse 100   Temp 97.3 °F (36.3 °C)   Ht 172.7 cm (68\")   Wt 95.7 kg (211 lb)   SpO2 100%   BMI 32.08 kg/m²     Physical Exam  Vitals and nursing note reviewed.   Constitutional:       Appearance: She is well-developed.   HENT:      Head: Normocephalic.      Right Ear: Hearing, tympanic membrane, ear canal and external ear normal.      Left Ear: Hearing, tympanic membrane, ear canal and external ear normal.      Nose: Nose normal.   Eyes:      Conjunctiva/sclera: Conjunctivae normal.      Pupils: Pupils are equal, round, and reactive to light.   Cardiovascular:      Rate and Rhythm: Normal rate and regular rhythm.      Heart sounds: Normal heart sounds.   Pulmonary:      Effort: Pulmonary effort is normal.      Breath sounds: Normal breath sounds. No decreased breath sounds, wheezing, rhonchi or rales.   Musculoskeletal:         General: Normal range of motion.      Cervical back: Normal range of motion.   Skin:     General: Skin is warm and dry.   Neurological:      Mental Status: She is alert.   Psychiatric:         Behavior: Behavior normal.         ASSESSMENT/PLAN    Diagnoses and all orders for this " visit:    1. Need for influenza vaccination (Primary)  -     FluLaval/Fluarix/Fluzone >6 Months    2. Chronic cough  Comments:  Advised to restart allergy medications one at a time, starting with Singular. Wait 5 to 7 days before another is added back. Continue trelegy, prn albuterol inhaler and dymista. Refer to ENT for laryngoscope if indicated. Discussed EGD as possible next step.  Orders:  -     Ambulatory Referral to ENT (Otolaryngology)  -     albuterol sulfate  (90 Base) MCG/ACT inhaler; Inhale 2 puffs Every 4 (Four) Hours As Needed for Wheezing.  Dispense: 18 g; Refill: 2    3. Post-nasal drainage  -     Ambulatory Referral to ENT (Otolaryngology)    Diagnoses and all orders for this visit:    1. Need for influenza vaccination (Primary)  -     FluLaval/Fluarix/Fluzone >6 Months    2. Chronic cough  Comments:  Advised to restart allergy medications one at a time, starting with Singular. Wait 5 to 7 days before another is added back.   Orders:  -     Ambulatory Referral to ENT (Otolaryngology)  -     albuterol sulfate  (90 Base) MCG/ACT inhaler; Inhale 2 puffs Every 4 (Four) Hours As Needed for Wheezing.  Dispense: 18 g; Refill: 2    3. Post-nasal drainage  -     Ambulatory Referral to ENT (Otolaryngology)    4. Elevated ferritin level  Comments:  Due to combination of elevated ferritin and elevated liver enzymes, check for hemachromatosis with genetic testing. If present, will refer to Hematology to discuss management, could include scheduled phlebotomy.               Return if symptoms worsen or fail to improve, for Next scheduled follow up.       Transcribed from ambient dictation for ROLANDA Renee by Sujey Armstrong.  11/16/22   12:40 EST    Patient or patient representative verbalized consent to the visit recording.  I have personally performed the services described in this document as transcribed by the above individual, and it is both accurate and complete.

## 2022-11-17 ENCOUNTER — TELEPHONE (OUTPATIENT)
Dept: INTERNAL MEDICINE | Facility: CLINIC | Age: 52
End: 2022-11-17

## 2022-11-17 NOTE — TELEPHONE ENCOUNTER
Caller: ALBERT PAEZ [employer] NURSE    Relationship: Provider    Best call back number:[phone] -OK TO LEAVE DETAILED MESSAGE IF NO ANSWER    What is the medical concern/diagnosis: OTHER ABNORMAL FINDINGS, BLOOD CHEMESTRY    What specialty or service is being requested: GENETIC TESTING    What is the provider, practice or medical service name: ORDERED BY ALBERT HERNANDEZ SENT TO [facility]    What is the office location: [facility]    What is the office phone number: [phone]    Any additional details: RECEIVED ORDER FOR GENETIC TESTING. OFFICE NOTES DO NOT SUPPORT THE CRITERIA STATING GENETIC COUNSELING WAS COMPLETED PRIOR TO ORDERING. PLEASE DOCUMENT IF GENETIC COUNSELING WAS COMPLETED. PLEASE FAX NEW OFFICE NOTE WITH ADDENDUM TO FAX: [phone].    THE FIRST DATE OF SERVICE WAS 11/16/22. INDICATING LABS HAVE BEEN DRAWN.

## 2022-11-19 DIAGNOSIS — F51.04 PSYCHOPHYSIOLOGICAL INSOMNIA: ICD-10-CM

## 2022-11-19 RX ORDER — TRAZODONE HYDROCHLORIDE 100 MG/1
100 TABLET ORAL NIGHTLY PRN
Qty: 90 TABLET | Refills: 0 | OUTPATIENT
Start: 2022-11-19

## 2022-11-21 LAB — HFE GENE MUT ANL BLD/T: NORMAL

## 2022-11-23 NOTE — TELEPHONE ENCOUNTER
Added addendum to note from 11/16/22. Please call to see if this is adequate documentation and fax as requested. Thanks

## 2022-11-29 ENCOUNTER — TELEPHONE (OUTPATIENT)
Dept: INTERNAL MEDICINE | Facility: CLINIC | Age: 52
End: 2022-11-29

## 2022-11-30 ENCOUNTER — PATIENT MESSAGE (OUTPATIENT)
Dept: INTERNAL MEDICINE | Facility: CLINIC | Age: 52
End: 2022-11-30

## 2022-11-30 DIAGNOSIS — R74.8 ELEVATED LIVER ENZYMES: ICD-10-CM

## 2022-11-30 DIAGNOSIS — R05.3 CHRONIC COUGH: Primary | ICD-10-CM

## 2022-11-30 DIAGNOSIS — R06.02 SHORTNESS OF BREATH: ICD-10-CM

## 2022-11-30 DIAGNOSIS — R79.89 ELEVATED FERRITIN: Primary | ICD-10-CM

## 2022-11-30 NOTE — TELEPHONE ENCOUNTER
Called patient to discuss test results. Left message that I would send result note on [employer]. If she returns call, okay to read her the result note message.

## 2022-11-30 NOTE — PROGRESS NOTES
Donis Toney,    Your labs show that your ferritin remains significantly elevated, but it is lower than before. Your liver enzymes are also elevated. The hemochromatosis gene is partially detected and this can contribute to iron overload. If that is the case, it can be treated with periodic phlebotomy (blood donation) to keep your iron levels controlled.     I would like you to stop by for a few more labs (sorry!) and I am going to refer you to a Gastroenterologist and Hematologist so they can do further eval and make recommendations.    Let me know if you have questions!    Thanks,  ROLANDA Renee

## 2022-12-01 NOTE — TELEPHONE ENCOUNTER
Left a voice message that if she is feeling that short of breath that she needs to be evaluated in the ER or go to Urgent Care this evening where they can also do a chest xray, any questions feel free to call

## 2022-12-02 DIAGNOSIS — E78.5 HYPERLIPIDEMIA, UNSPECIFIED HYPERLIPIDEMIA TYPE: ICD-10-CM

## 2022-12-02 RX ORDER — ROSUVASTATIN CALCIUM 10 MG/1
10 TABLET, COATED ORAL DAILY
Qty: 30 TABLET | Refills: 2 | Status: SHIPPED | OUTPATIENT
Start: 2022-12-02 | End: 2023-03-02

## 2022-12-12 ENCOUNTER — CONSULT (OUTPATIENT)
Dept: ONCOLOGY | Facility: CLINIC | Age: 52
End: 2022-12-12

## 2022-12-12 VITALS
SYSTOLIC BLOOD PRESSURE: 136 MMHG | RESPIRATION RATE: 18 BRPM | WEIGHT: 206 LBS | DIASTOLIC BLOOD PRESSURE: 87 MMHG | HEART RATE: 106 BPM | TEMPERATURE: 97.6 F | BODY MASS INDEX: 31.22 KG/M2 | HEIGHT: 68 IN | OXYGEN SATURATION: 97 %

## 2022-12-12 DIAGNOSIS — E83.110 HEREDITARY HEMOCHROMATOSIS: Primary | Chronic | ICD-10-CM

## 2022-12-12 PROCEDURE — 99204 OFFICE O/P NEW MOD 45 MIN: CPT | Performed by: INTERNAL MEDICINE

## 2022-12-12 RX ORDER — FLUTICASONE FUROATE, UMECLIDINIUM BROMIDE AND VILANTEROL TRIFENATATE 200; 62.5; 25 UG/1; UG/1; UG/1
1 POWDER RESPIRATORY (INHALATION) DAILY
COMMUNITY
Start: 2022-11-17 | End: 2023-01-17

## 2022-12-12 RX ORDER — NYSTATIN 100000 U/G
OINTMENT TOPICAL
COMMUNITY
Start: 2022-12-06

## 2022-12-12 RX ORDER — OMEPRAZOLE 40 MG/1
CAPSULE, DELAYED RELEASE ORAL
COMMUNITY
Start: 2022-12-08 | End: 2023-02-23 | Stop reason: SDUPTHER

## 2022-12-12 RX ORDER — DEXAMETHASONE 0.5 MG/5ML
SOLUTION ORAL
COMMUNITY
Start: 2022-11-23 | End: 2023-01-17

## 2022-12-12 NOTE — PROGRESS NOTES
CHIEF COMPLAINT: Heterozygous hemochromatosis gene mutation with elevated liver enzymes with history of fatty liver disease    REASON FOR REFERRAL: Same      RECORDS OBTAINED  Records of the patients history including those obtained from primary care were reviewed and summarized in detail.    Oncology/Hematology History Overview Note   1.  Elevated ferritin with heterozygous H63D mutation with hepatic steatosis on CT 2016 and ultrasound 2015.  Elevated liver enzymes.  We will  2.  Hyperlipidemia  3.  Restless legs  4.  Sleep apnea  5.  Hypothyroidism with Hashimoto's  6.  Migraine  7.  Sinusitis  8.  Tubular adenoma without dysplasia ascending colon polypectomy with transverse colon polyp 8/19/2021 Dr. Almodovar.  9.  Syncope    Hematology history timeline:  -2/27/2015 iron 301.  .  -4/7/2016 iron 276  -5-16 CT abdomen without contrast shows marked inhomogeneous geographic appearing liver with mosaic pattern typical for marked steatosis with focal area is of fat sparing.  Pattern noted by ultrasound on 11/10/2015.  No focal liver mass.  No ascites.  No adenopathy or stranding in the mesentery.  Left kidney stone nonobstructing noted  -7/14/2022 ferritin 1609 ng/mL, iron 123 with saturation 31%.  Total iron binding capacity 396.  Calculated transferrin saturation 31%  -11/16/2022 white count 2159 ng/mL.  , , alkaline phosphatase 325.     Hereditary hemochromatosis (HCC)       HISTORY OF PRESENT ILLNESS:  The patient is a 51 y.o.  female, referred for management of heterozygous hemochromatosis gene mutation with elevated ferritin and elevated liver enzymes with history of fatty liver disease on imaging    REVIEW OF SYSTEMS:  Hypothyroidism due to Hashimoto's but no other autoimmune or other endocrine abnormalities.  No significant diabetes and no bronzing of her skin.    Past Medical History:   Diagnosis Date   • Anxiety    • Asthma    • GERD (gastroesophageal reflux disease)    • Hyperlipidemia    •  Hypothyroidism    • Irritable bowel syndrome    • Lyme disease 2016   • Migraine headache    • POTS (postural orthostatic tachycardia syndrome)    • Syncope 2016   • Tachycardia 2016     Past Surgical History:   Procedure Laterality Date   •  SECTION  2003       Current Outpatient Medications on File Prior to Visit   Medication Sig Dispense Refill   • albuterol sulfate  (90 Base) MCG/ACT inhaler Inhale 2 puffs Every 4 (Four) Hours As Needed for Wheezing. 18 g 2   • ASHLYNA 0.15-0.03 &0.01 MG tablet Take 1 tablet by mouth Daily.  0   • busPIRone (BUSPAR) 5 MG tablet TAKE 1 TABLET BY MOUTH THREE TIMES DAILY 90 tablet 1   • carvedilol (COREG) 6.25 MG tablet TAKE 1 TABLET BY MOUTH TWICE DAILY WITH MEALS 180 tablet 3   • dexamethasone 0.5 MG/5ML solution FINSE WITH 1 TEASPOONFUL FOR 1 MINUTE 4 TO 5 TIMES A DAY THEN SPIT     • Dymista 137-50 MCG/ACT suspension      • Eysuvis 0.25 % suspension Administer 1 drop to both eyes 2 (Two) Times a Day.     • gabapentin (NEURONTIN) 300 MG capsule Take three tablets in am, 2 tablets in the pm and three tablets in hs 720 capsule 1   • hydrOXYzine (ATARAX) 25 MG tablet TAKE 1 TABLET Twice daily PRN 40 tablet 1   • levocetirizine (XYZAL) 5 MG tablet Take 5 mg by mouth Daily.     • levothyroxine (SYNTHROID, LEVOTHROID) 137 MCG tablet TAKE 1 TABLET BY MOUTH DAILY 90 tablet 3   • meloxicam (MOBIC) 15 MG tablet TAKE 1 TABLET BY MOUTH DAILY 30 tablet 0   • montelukast (SINGULAIR) 10 MG tablet TAKE 1 TABLET BY MOUTH EVERY NIGHT AT BEDTIME 90 tablet 2   • Multiple Vitamins-Minerals (MULTIVITAMIN ADULT PO) Take  by mouth Daily.     • nystatin (MYCOSTATIN) 932233 UNIT/GM ointment APPLY TO LIPS 3 TIMES DAILY     • olopatadine (PATANOL) 0.1 % ophthalmic solution      • omeprazole (priLOSEC) 40 MG capsule TAKE 1 CAPSULE BY MOUTH EVERY DAY AS DIRECTED     • Restasis 0.05 % ophthalmic emulsion Administer 1 drop to both eyes 2 (Two) Times a Day.     • rOPINIRole  "(REQUIP) 1 MG tablet Take 1 tablet by mouth Every Night. 90 tablet 3   • rosuvastatin (CRESTOR) 10 MG tablet TAKE 1 TABLET BY MOUTH DAILY 30 tablet 2   • SUMAtriptan (IMITREX) 25 MG tablet TAKE 1 TABLET BY MOUTH AS NEEDED FOR MIGRAINE 9 tablet 3   • tiZANidine (ZANAFLEX) 4 MG tablet TAKE 1 TABLET BY MOUTH EVERY 8 HOURS AS NEEDED FOR MUSCLE SPASMS 30 tablet 1   • traZODone (DESYREL) 100 MG tablet TAKE 1 TABLET BY MOUTH AT NIGHT AS NEEDED FOR SLEEP 90 tablet 0   • Trelegy Ellipta 200-62.5-25 MCG/ACT aerosol powder  Inhale 1 puff Daily.     • venlafaxine XR (EFFEXOR-XR) 150 MG 24 hr capsule TAKE 1 CAPSULE BY MOUTH DAILY 90 capsule 0   • vitamin D (ERGOCALCIFEROL) 1.25 MG (48478 UT) capsule capsule Take 1 capsule by mouth 1 (One) Time Per Week. 12 capsule 3   • Xhance 93 MCG/ACT Exhaler Suspension USE 1 SPRAY TO EACH NOSTRIL EVERY DAY       No current facility-administered medications on file prior to visit.       Allergies   Allergen Reactions   • Morphine And Related Itching       Social History     Socioeconomic History   • Marital status:    Tobacco Use   • Smoking status: Former     Packs/day: 0.00     Years: 0.00     Pack years: 0.00     Types: Cigarettes     Quit date:      Years since quittin.9   • Smokeless tobacco: Never   Vaping Use   • Vaping Use: Never used   Substance and Sexual Activity   • Alcohol use: Yes     Alcohol/week: 5.0 standard drinks     Types: 5 Glasses of wine per week   • Drug use: No   • Sexual activity: Yes     Partners: Male     Birth control/protection: Other     Comment: Pill       Family History   Problem Relation Age of Onset   • Esophageal cancer Father    • Cancer Father         Esophogical   • Breast cancer Paternal Grandmother    • Dementia Other    • Ovarian cancer Neg Hx        PHYSICAL EXAM:  No bronzing of her skin.  No hepatosplenomegaly on exam.    No jaundice or icterus  /87   Pulse 106   Temp 97.6 °F (36.4 °C)   Resp 18   Ht 172.7 cm (68\")   Wt " 93.4 kg (206 lb)   SpO2 97%   BMI 31.32 kg/m²     ECOG score: 0           ECOG: (0) Fully Active - Able to Carry On All Pre-disease Performance Without Restriction    Lab Results   Component Value Date    HGB 11.6 (L) 07/14/2022    HCT 35.7 07/14/2022    MCV 94.9 07/14/2022     07/14/2022    WBC 6.02 07/14/2022    NEUTROABS 3.72 07/14/2022    LYMPHSABS 1.62 07/14/2022    MONOSABS 0.50 07/14/2022    EOSABS 0.11 07/14/2022    BASOSABS 0.05 07/14/2022     Lab Results   Component Value Date    GLUCOSE 120 (H) 11/16/2022    BUN 4 (L) 11/16/2022    CREATININE 0.91 11/16/2022     11/16/2022    K 3.6 11/16/2022    CL 92 (L) 11/16/2022    CO2 23.0 11/16/2022    CALCIUM 9.4 11/16/2022    PROTEINTOT 7.3 11/16/2022    ALBUMIN 3.90 11/16/2022    BILITOT 0.8 11/16/2022    ALKPHOS 325 (H) 11/16/2022     (H) 11/16/2022     (H) 11/16/2022         Assessment & Plan     1.  Elevated ferritin with heterozygous H63D mutation with hepatic steatosis on CT 2016 and ultrasound 2015.  Elevated liver enzymes.  We will  2.  Hyperlipidemia  3.  Restless legs  4.  Sleep apnea  5.  Hypothyroidism with Hashimoto's  6.  Migraine  7.  Sinusitis  8.  Tubular adenoma without dysplasia ascending colon polypectomy with transverse colon polyp 8/19/2021 Dr. lAmodovar.  9.  Syncope    Hematology history timeline:  -2/27/2015 iron 301.  .  -4/7/2016 iron 276  -5/2/16 CT abdomen without contrast shows marked inhomogeneous geographic appearing liver with mosaic pattern typical for marked steatosis with focal area is of fat sparing.  Pattern noted by ultrasound on 11/10/2015.  No focal liver mass.  No ascites.  No adenopathy or stranding in the mesentery.  Left kidney stone nonobstructing noted  -7/14/2022 ferritin 1609 ng/mL, iron 123 with saturation 31%.  Total iron binding capacity 396.  Calculated transferrin saturation 31%  -11/16/2022 white count 2159 ng/mL.  , , alkaline phosphatase 325.    -12/12/2022  [facility] hematology consult: Patient being seen for elevated ferritin as outlined above with elevated liver enzymes and history of hepatic steatosis on prior CT and ultrasound as outlined above.  The liver dysfunction can be due to multiple simultaneous culprits and I will check a liver iron quantitation but there is very little downside to phlebotomy but I would not want to ascribe all of this to hemochromatosis as it is unusual to have heterozygosity to cause significant clinical iron overload and she may have multifactorial reasons for liver dysfunction.  She is due to see Dr. Almodovar who she apparently has seen in the past within the next month.  I will let him decide on liver biopsy to sort this out but in the near term I will check a liver iron quantitation and in the near term we will set her up to start phlebotomies with a goal to get her ferritin down to 100 provided that I do not make her significantly anemic before that happens.  She has had elevated iron dating back at least 7 years with an elevated GGT in 2015 and iron of 301 at that point.  We will start with 1 unit phlebotomy weekly for the next month and I will see her back in about a month to see what we find from the above tests.  We will hold for hemoglobin less than 11.  No signs or symptoms of cardiomyopathy or pituitary dysfunction.  We will also check her sedimentation rate and C-reactive protein as ferritin can be an acute phase reactant.  Her transferrin saturation should normally be greater than 50% to be diagnostic for hemochromatosis where hers was just 30%.    Total time of care today 45 minutes inclusive of time spent today prior to patient's arrival reviewing past medical records and cataloging multiple laboratory values and scan results as outlined above and after visit instituting the plan as I described and during visit going over the pathophysiology of iron overload and the compounding of the steatosis and the oddity of  heterozygosity causing iron overload.      French Shearer MD    12/12/2022

## 2022-12-13 DIAGNOSIS — E83.110 HEREDITARY HEMOCHROMATOSIS: Primary | ICD-10-CM

## 2022-12-14 ENCOUNTER — LAB (OUTPATIENT)
Dept: LAB | Facility: HOSPITAL | Age: 52
End: 2022-12-14

## 2022-12-14 ENCOUNTER — HOSPITAL ENCOUNTER (OUTPATIENT)
Dept: ONCOLOGY | Facility: HOSPITAL | Age: 52
Setting detail: INFUSION SERIES
Discharge: HOME | End: 2022-12-14

## 2022-12-14 VITALS
HEIGHT: 68 IN | WEIGHT: 203 LBS | SYSTOLIC BLOOD PRESSURE: 99 MMHG | HEART RATE: 111 BPM | RESPIRATION RATE: 20 BRPM | DIASTOLIC BLOOD PRESSURE: 62 MMHG | TEMPERATURE: 96.5 F | BODY MASS INDEX: 30.77 KG/M2

## 2022-12-14 DIAGNOSIS — E83.110 HEREDITARY HEMOCHROMATOSIS: ICD-10-CM

## 2022-12-14 DIAGNOSIS — E83.110 HEREDITARY HEMOCHROMATOSIS: Primary | ICD-10-CM

## 2022-12-14 LAB
ALBUMIN SERPL-MCNC: 4 G/DL (ref 3.5–5.2)
ALBUMIN/GLOB SERPL: 0.9 G/DL
ALP SERPL-CCNC: 311 U/L (ref 39–117)
ALT SERPL W P-5'-P-CCNC: 51 U/L (ref 1–33)
ANION GAP SERPL CALCULATED.3IONS-SCNC: 13 MMOL/L (ref 5–15)
AST SERPL-CCNC: 185 U/L (ref 1–32)
BASOPHILS # BLD AUTO: 0.03 10*3/MM3 (ref 0–0.2)
BASOPHILS NFR BLD AUTO: 0.3 % (ref 0–1.5)
BILIRUB SERPL-MCNC: 0.8 MG/DL (ref 0–1.2)
BUN SERPL-MCNC: 3 MG/DL (ref 6–20)
BUN/CREAT SERPL: 3.9 (ref 7–25)
CALCIUM SPEC-SCNC: 9.4 MG/DL (ref 8.6–10.5)
CHLORIDE SERPL-SCNC: 97 MMOL/L (ref 98–107)
CO2 SERPL-SCNC: 28 MMOL/L (ref 22–29)
CREAT SERPL-MCNC: 0.77 MG/DL (ref 0.57–1)
DEPRECATED RDW RBC AUTO: 66.3 FL (ref 37–54)
EGFRCR SERPLBLD CKD-EPI 2021: 94.1 ML/MIN/1.73
EOSINOPHIL # BLD AUTO: 0.05 10*3/MM3 (ref 0–0.4)
EOSINOPHIL NFR BLD AUTO: 0.5 % (ref 0.3–6.2)
ERYTHROCYTE [DISTWIDTH] IN BLOOD BY AUTOMATED COUNT: 15.6 % (ref 12.3–15.4)
ERYTHROCYTE [SEDIMENTATION RATE] IN BLOOD: 109 MM/HR (ref 0–30)
FERRITIN SERPL-MCNC: 1575 NG/ML (ref 13–150)
GLOBULIN UR ELPH-MCNC: 4.4 GM/DL
GLUCOSE SERPL-MCNC: 122 MG/DL (ref 65–99)
HCT VFR BLD AUTO: 36.6 % (ref 34–46.6)
HGB BLD-MCNC: 12.1 G/DL (ref 12–15.9)
IMM GRANULOCYTES # BLD AUTO: 0.04 10*3/MM3 (ref 0–0.05)
IMM GRANULOCYTES NFR BLD AUTO: 0.4 % (ref 0–0.5)
IRON 24H UR-MRATE: 98 MCG/DL (ref 37–145)
IRON SATN MFR SERPL: 35 % (ref 20–50)
LYMPHOCYTES # BLD AUTO: 2.46 10*3/MM3 (ref 0.7–3.1)
LYMPHOCYTES NFR BLD AUTO: 22.1 % (ref 19.6–45.3)
MCH RBC QN AUTO: 38.1 PG (ref 26.6–33)
MCHC RBC AUTO-ENTMCNC: 33.1 G/DL (ref 31.5–35.7)
MCV RBC AUTO: 115.1 FL (ref 79–97)
MONOCYTES # BLD AUTO: 0.75 10*3/MM3 (ref 0.1–0.9)
MONOCYTES NFR BLD AUTO: 6.8 % (ref 5–12)
NEUTROPHILS NFR BLD AUTO: 69.9 % (ref 42.7–76)
NEUTROPHILS NFR BLD AUTO: 7.78 10*3/MM3 (ref 1.7–7)
PLATELET # BLD AUTO: 269 10*3/MM3 (ref 140–450)
PMV BLD AUTO: 9 FL (ref 6–12)
POTASSIUM SERPL-SCNC: 4 MMOL/L (ref 3.5–5.2)
PROT SERPL-MCNC: 8.4 G/DL (ref 6–8.5)
RBC # BLD AUTO: 3.18 10*6/MM3 (ref 3.77–5.28)
SODIUM SERPL-SCNC: 138 MMOL/L (ref 136–145)
TIBC SERPL-MCNC: 280 MCG/DL (ref 298–536)
TRANSFERRIN SERPL-MCNC: 188 MG/DL (ref 200–360)
WBC NRBC COR # BLD: 11.11 10*3/MM3 (ref 3.4–10.8)

## 2022-12-14 PROCEDURE — 83540 ASSAY OF IRON: CPT

## 2022-12-14 PROCEDURE — 80053 COMPREHEN METABOLIC PANEL: CPT

## 2022-12-14 PROCEDURE — 84466 ASSAY OF TRANSFERRIN: CPT

## 2022-12-14 PROCEDURE — 36415 COLL VENOUS BLD VENIPUNCTURE: CPT

## 2022-12-14 PROCEDURE — 99195 PHLEBOTOMY: CPT

## 2022-12-14 PROCEDURE — 85652 RBC SED RATE AUTOMATED: CPT

## 2022-12-14 PROCEDURE — 82728 ASSAY OF FERRITIN: CPT

## 2022-12-14 PROCEDURE — 85025 COMPLETE CBC W/AUTO DIFF WBC: CPT

## 2022-12-21 ENCOUNTER — HOSPITAL ENCOUNTER (OUTPATIENT)
Dept: ONCOLOGY | Facility: HOSPITAL | Age: 52
Setting detail: INFUSION SERIES
Discharge: HOME | End: 2022-12-21

## 2022-12-21 VITALS
TEMPERATURE: 96.8 F | WEIGHT: 205 LBS | RESPIRATION RATE: 18 BRPM | SYSTOLIC BLOOD PRESSURE: 114 MMHG | HEART RATE: 105 BPM | BODY MASS INDEX: 31.07 KG/M2 | DIASTOLIC BLOOD PRESSURE: 75 MMHG | HEIGHT: 68 IN

## 2022-12-21 DIAGNOSIS — E83.110 HEREDITARY HEMOCHROMATOSIS: ICD-10-CM

## 2022-12-21 LAB
BASOPHILS # BLD AUTO: 0.01 10*3/MM3 (ref 0–0.2)
BASOPHILS NFR BLD AUTO: 0.1 % (ref 0–1.5)
DEPRECATED RDW RBC AUTO: 63.6 FL (ref 37–54)
EOSINOPHIL # BLD AUTO: 0.05 10*3/MM3 (ref 0–0.4)
EOSINOPHIL NFR BLD AUTO: 0.6 % (ref 0.3–6.2)
ERYTHROCYTE [DISTWIDTH] IN BLOOD BY AUTOMATED COUNT: 14.8 % (ref 12.3–15.4)
FERRITIN SERPL-MCNC: 1607 NG/ML (ref 13–150)
HCT VFR BLD AUTO: 30.7 % (ref 34–46.6)
HGB BLD-MCNC: 10.2 G/DL (ref 12–15.9)
IMM GRANULOCYTES # BLD AUTO: 0.01 10*3/MM3 (ref 0–0.05)
IMM GRANULOCYTES NFR BLD AUTO: 0.1 % (ref 0–0.5)
LYMPHOCYTES # BLD AUTO: 1.6 10*3/MM3 (ref 0.7–3.1)
LYMPHOCYTES NFR BLD AUTO: 18.9 % (ref 19.6–45.3)
MCH RBC QN AUTO: 38.8 PG (ref 26.6–33)
MCHC RBC AUTO-ENTMCNC: 33.2 G/DL (ref 31.5–35.7)
MCV RBC AUTO: 116.7 FL (ref 79–97)
MONOCYTES # BLD AUTO: 0.51 10*3/MM3 (ref 0.1–0.9)
MONOCYTES NFR BLD AUTO: 6 % (ref 5–12)
NEUTROPHILS NFR BLD AUTO: 6.27 10*3/MM3 (ref 1.7–7)
NEUTROPHILS NFR BLD AUTO: 74.3 % (ref 42.7–76)
PLATELET # BLD AUTO: 210 10*3/MM3 (ref 140–450)
PMV BLD AUTO: 8.9 FL (ref 6–12)
RBC # BLD AUTO: 2.63 10*6/MM3 (ref 3.77–5.28)
WBC NRBC COR # BLD: 8.45 10*3/MM3 (ref 3.4–10.8)

## 2022-12-21 PROCEDURE — 36415 COLL VENOUS BLD VENIPUNCTURE: CPT

## 2022-12-21 PROCEDURE — 85025 COMPLETE CBC W/AUTO DIFF WBC: CPT | Performed by: INTERNAL MEDICINE

## 2022-12-21 PROCEDURE — 82728 ASSAY OF FERRITIN: CPT | Performed by: INTERNAL MEDICINE

## 2022-12-28 ENCOUNTER — HOSPITAL ENCOUNTER (OUTPATIENT)
Dept: ONCOLOGY | Facility: HOSPITAL | Age: 52
Setting detail: INFUSION SERIES
Discharge: HOME | End: 2022-12-28

## 2022-12-28 VITALS
RESPIRATION RATE: 16 BRPM | TEMPERATURE: 97.5 F | HEIGHT: 68 IN | SYSTOLIC BLOOD PRESSURE: 114 MMHG | DIASTOLIC BLOOD PRESSURE: 67 MMHG | HEART RATE: 102 BPM | WEIGHT: 209 LBS | BODY MASS INDEX: 31.67 KG/M2

## 2022-12-28 DIAGNOSIS — E83.110 HEREDITARY HEMOCHROMATOSIS: ICD-10-CM

## 2022-12-28 LAB
BASOPHILS # BLD AUTO: 0.02 10*3/MM3 (ref 0–0.2)
BASOPHILS NFR BLD AUTO: 0.3 % (ref 0–1.5)
DEPRECATED RDW RBC AUTO: 70.6 FL (ref 37–54)
EOSINOPHIL # BLD AUTO: 0.06 10*3/MM3 (ref 0–0.4)
EOSINOPHIL NFR BLD AUTO: 0.8 % (ref 0.3–6.2)
ERYTHROCYTE [DISTWIDTH] IN BLOOD BY AUTOMATED COUNT: 15.9 % (ref 12.3–15.4)
FERRITIN SERPL-MCNC: 1094 NG/ML (ref 13–150)
HCT VFR BLD AUTO: 31.5 % (ref 34–46.6)
HGB BLD-MCNC: 10.2 G/DL (ref 12–15.9)
IMM GRANULOCYTES # BLD AUTO: 0.03 10*3/MM3 (ref 0–0.05)
IMM GRANULOCYTES NFR BLD AUTO: 0.4 % (ref 0–0.5)
LYMPHOCYTES # BLD AUTO: 2.16 10*3/MM3 (ref 0.7–3.1)
LYMPHOCYTES NFR BLD AUTO: 27.1 % (ref 19.6–45.3)
MCH RBC QN AUTO: 38.6 PG (ref 26.6–33)
MCHC RBC AUTO-ENTMCNC: 32.4 G/DL (ref 31.5–35.7)
MCV RBC AUTO: 119.3 FL (ref 79–97)
MONOCYTES # BLD AUTO: 0.65 10*3/MM3 (ref 0.1–0.9)
MONOCYTES NFR BLD AUTO: 8.2 % (ref 5–12)
NEUTROPHILS NFR BLD AUTO: 5.04 10*3/MM3 (ref 1.7–7)
NEUTROPHILS NFR BLD AUTO: 63.2 % (ref 42.7–76)
PLATELET # BLD AUTO: 196 10*3/MM3 (ref 140–450)
PMV BLD AUTO: 9.3 FL (ref 6–12)
RBC # BLD AUTO: 2.64 10*6/MM3 (ref 3.77–5.28)
WBC NRBC COR # BLD: 7.96 10*3/MM3 (ref 3.4–10.8)

## 2022-12-28 PROCEDURE — 85025 COMPLETE CBC W/AUTO DIFF WBC: CPT | Performed by: INTERNAL MEDICINE

## 2022-12-28 PROCEDURE — 82728 ASSAY OF FERRITIN: CPT | Performed by: INTERNAL MEDICINE

## 2022-12-28 PROCEDURE — 36415 COLL VENOUS BLD VENIPUNCTURE: CPT

## 2022-12-28 NOTE — PROGRESS NOTES
Patient here for phlebotomy. Hold for Hgb less than 11. Patient's Hgb 10.2 today. Message sent to [facility] onc nurse [place] to notify that phlebotomy has been held x 2 now. ROMAN, RN

## 2023-01-03 ENCOUNTER — TELEPHONE (OUTPATIENT)
Dept: SLEEP MEDICINE | Facility: HOSPITAL | Age: 53
End: 2023-01-03
Payer: MEDICAID

## 2023-01-03 RX ORDER — CARVEDILOL 6.25 MG/1
TABLET ORAL
Qty: 180 TABLET | Refills: 1 | Status: SHIPPED | OUTPATIENT
Start: 2023-01-03

## 2023-01-03 NOTE — TELEPHONE ENCOUNTER
LVM WITH PATIENT TO CALL AND SCHEDULE FOLLOW UP APPT FOR A NEW SLEEP STUDY ORDER.      (PREV ORDER HAS LAPSED)

## 2023-01-04 ENCOUNTER — APPOINTMENT (OUTPATIENT)
Dept: SLEEP MEDICINE | Facility: HOSPITAL | Age: 53
End: 2023-01-04

## 2023-01-05 ENCOUNTER — HOSPITAL ENCOUNTER (OUTPATIENT)
Dept: ONCOLOGY | Facility: HOSPITAL | Age: 53
Setting detail: INFUSION SERIES
Discharge: HOME | End: 2023-01-05
Payer: MEDICAID

## 2023-01-05 ENCOUNTER — APPOINTMENT (OUTPATIENT)
Dept: SLEEP MEDICINE | Facility: HOSPITAL | Age: 53
End: 2023-01-05
Payer: MEDICAID

## 2023-01-05 VITALS
SYSTOLIC BLOOD PRESSURE: 109 MMHG | TEMPERATURE: 97.8 F | RESPIRATION RATE: 16 BRPM | HEART RATE: 104 BPM | DIASTOLIC BLOOD PRESSURE: 68 MMHG | HEIGHT: 68 IN | WEIGHT: 212 LBS | BODY MASS INDEX: 32.13 KG/M2

## 2023-01-05 DIAGNOSIS — E83.110 HEREDITARY HEMOCHROMATOSIS: ICD-10-CM

## 2023-01-05 LAB
BASOPHILS # BLD AUTO: 0.03 10*3/MM3 (ref 0–0.2)
BASOPHILS NFR BLD AUTO: 0.4 % (ref 0–1.5)
DEPRECATED RDW RBC AUTO: 73 FL (ref 37–54)
EOSINOPHIL # BLD AUTO: 0.06 10*3/MM3 (ref 0–0.4)
EOSINOPHIL NFR BLD AUTO: 0.8 % (ref 0.3–6.2)
ERYTHROCYTE [DISTWIDTH] IN BLOOD BY AUTOMATED COUNT: 15.9 % (ref 12.3–15.4)
FERRITIN SERPL-MCNC: 928.9 NG/ML (ref 13–150)
HCT VFR BLD AUTO: 34.2 % (ref 34–46.6)
HGB BLD-MCNC: 10.8 G/DL (ref 12–15.9)
IMM GRANULOCYTES # BLD AUTO: 0.03 10*3/MM3 (ref 0–0.05)
IMM GRANULOCYTES NFR BLD AUTO: 0.4 % (ref 0–0.5)
LYMPHOCYTES # BLD AUTO: 1.8 10*3/MM3 (ref 0.7–3.1)
LYMPHOCYTES NFR BLD AUTO: 25.4 % (ref 19.6–45.3)
MCH RBC QN AUTO: 38.3 PG (ref 26.6–33)
MCHC RBC AUTO-ENTMCNC: 31.6 G/DL (ref 31.5–35.7)
MCV RBC AUTO: 121.3 FL (ref 79–97)
MONOCYTES # BLD AUTO: 0.6 10*3/MM3 (ref 0.1–0.9)
MONOCYTES NFR BLD AUTO: 8.5 % (ref 5–12)
NEUTROPHILS NFR BLD AUTO: 4.58 10*3/MM3 (ref 1.7–7)
NEUTROPHILS NFR BLD AUTO: 64.5 % (ref 42.7–76)
PLATELET # BLD AUTO: 230 10*3/MM3 (ref 140–450)
PMV BLD AUTO: 9.1 FL (ref 6–12)
RBC # BLD AUTO: 2.82 10*6/MM3 (ref 3.77–5.28)
WBC NRBC COR # BLD: 7.1 10*3/MM3 (ref 3.4–10.8)

## 2023-01-05 PROCEDURE — 85025 COMPLETE CBC W/AUTO DIFF WBC: CPT | Performed by: INTERNAL MEDICINE

## 2023-01-05 PROCEDURE — 82728 ASSAY OF FERRITIN: CPT | Performed by: INTERNAL MEDICINE

## 2023-01-05 PROCEDURE — 36415 COLL VENOUS BLD VENIPUNCTURE: CPT

## 2023-01-10 ENCOUNTER — OFFICE VISIT (OUTPATIENT)
Dept: GASTROENTEROLOGY | Facility: CLINIC | Age: 53
End: 2023-01-10
Payer: MEDICAID

## 2023-01-10 VITALS
WEIGHT: 209.2 LBS | HEIGHT: 68 IN | BODY MASS INDEX: 31.71 KG/M2 | OXYGEN SATURATION: 96 % | TEMPERATURE: 97.5 F | SYSTOLIC BLOOD PRESSURE: 112 MMHG | HEART RATE: 101 BPM | DIASTOLIC BLOOD PRESSURE: 70 MMHG

## 2023-01-10 DIAGNOSIS — R79.89 ABNORMAL LIVER FUNCTION TESTS: Primary | ICD-10-CM

## 2023-01-10 PROCEDURE — 99214 OFFICE O/P EST MOD 30 MIN: CPT | Performed by: INTERNAL MEDICINE

## 2023-01-10 NOTE — PROGRESS NOTES
PCP: Glenda Reed PA    Chief Complaint   Patient presents with   • Elevated LFTs        History of Present Illness:   Dawna Lovell is a 52 y.o. female who presents to the GI clinic as a f/u from colonoscopy. She is referred by glenda reed for abnormal liver enzymes. 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  She has ast 342, alt 129. She drinks around 2 glasses of wine nightly. Also h/o obesity.    Her main complaint is chronic cough. Reports a family h/o esophageal cancer. Denies heartburn or reflux. She is on many medications including meloxicam, xyzal, neurontin, zanaflex, trazodone, venlafaxine. She takes imitrex prn. No ascites or jaundice.     Past Medical History:   Diagnosis Date   • Anxiety    • Asthma    • GERD (gastroesophageal reflux disease)    • Hyperlipidemia    • Hypothyroidism    • Irritable bowel syndrome    • Lyme disease 2016   • Migraine headache    • POTS (postural orthostatic tachycardia syndrome)    • Syncope 2016   • Tachycardia 2016       Past Surgical History:   Procedure Laterality Date   •  SECTION  2003   • COLONOSCOPY           Current Outpatient Medications:   •  albuterol sulfate  (90 Base) MCG/ACT inhaler, Inhale 2 puffs Every 4 (Four) Hours As Needed for Wheezing., Disp: 18 g, Rfl: 2  •  ASHLYNA 0.15-0.03 &0.01 MG tablet, Take 1 tablet by mouth Daily., Disp: , Rfl: 0  •  busPIRone (BUSPAR) 5 MG tablet, TAKE 1 TABLET BY MOUTH THREE TIMES DAILY, Disp: 90 tablet, Rfl: 1  •  carvedilol (COREG) 6.25 MG tablet, TAKE 1 TABLET BY MOUTH TWICE DAILY WITH MEALS, Disp: 180 tablet, Rfl: 1  •  dexamethasone 0.5 MG/5ML solution, FINSE WITH 1 TEASPOONFUL FOR 1 MINUTE 4 TO 5 TIMES A DAY THEN SPIT, Disp: , Rfl:   •  Dymista 137-50 MCG/ACT suspension, , Disp: , Rfl:   •  Eysuvis 0.25 % suspension, Administer 1 drop to both eyes 2 (Two) Times a Day., Disp: , Rfl:   •  gabapentin (NEURONTIN) 300 MG capsule,  Take three tablets in am, 2 tablets in the pm and three tablets in hs, Disp: 720 capsule, Rfl: 1  •  hydrOXYzine (ATARAX) 25 MG tablet, TAKE 1 TABLET Twice daily PRN, Disp: 40 tablet, Rfl: 1  •  levocetirizine (XYZAL) 5 MG tablet, Take 5 mg by mouth Daily., Disp: , Rfl:   •  levothyroxine (SYNTHROID, LEVOTHROID) 137 MCG tablet, TAKE 1 TABLET BY MOUTH DAILY, Disp: 90 tablet, Rfl: 3  •  meloxicam (MOBIC) 15 MG tablet, TAKE 1 TABLET BY MOUTH DAILY, Disp: 30 tablet, Rfl: 0  •  montelukast (SINGULAIR) 10 MG tablet, TAKE 1 TABLET BY MOUTH EVERY NIGHT AT BEDTIME, Disp: 90 tablet, Rfl: 2  •  Multiple Vitamins-Minerals (MULTIVITAMIN ADULT PO), Take  by mouth Daily., Disp: , Rfl:   •  nystatin (MYCOSTATIN) 273455 UNIT/GM ointment, APPLY TO LIPS 3 TIMES DAILY, Disp: , Rfl:   •  olopatadine (PATANOL) 0.1 % ophthalmic solution, , Disp: , Rfl:   •  omeprazole (priLOSEC) 40 MG capsule, TAKE 1 CAPSULE BY MOUTH EVERY DAY AS DIRECTED, Disp: , Rfl:   •  Restasis 0.05 % ophthalmic emulsion, Administer 1 drop to both eyes 2 (Two) Times a Day., Disp: , Rfl:   •  rOPINIRole (REQUIP) 1 MG tablet, Take 1 tablet by mouth Every Night., Disp: 90 tablet, Rfl: 3  •  rosuvastatin (CRESTOR) 10 MG tablet, TAKE 1 TABLET BY MOUTH DAILY, Disp: 30 tablet, Rfl: 2  •  SUMAtriptan (IMITREX) 25 MG tablet, TAKE 1 TABLET BY MOUTH AS NEEDED FOR MIGRAINE, Disp: 9 tablet, Rfl: 3  •  tiZANidine (ZANAFLEX) 4 MG tablet, TAKE 1 TABLET BY MOUTH EVERY 8 HOURS AS NEEDED FOR MUSCLE SPASMS, Disp: 30 tablet, Rfl: 1  •  traZODone (DESYREL) 100 MG tablet, TAKE 1 TABLET BY MOUTH AT NIGHT AS NEEDED FOR SLEEP, Disp: 90 tablet, Rfl: 0  •  Trelegy Ellipta 200-62.5-25 MCG/ACT aerosol powder , Inhale 1 puff Daily., Disp: , Rfl:   •  venlafaxine XR (EFFEXOR-XR) 150 MG 24 hr capsule, TAKE 1 CAPSULE BY MOUTH DAILY, Disp: 90 capsule, Rfl: 0  •  vitamin D (ERGOCALCIFEROL) 1.25 MG (87595 UT) capsule capsule, Take 1 capsule by mouth 1 (One) Time Per Week., Disp: 12 capsule, Rfl: 3  •   Xhance 93 MCG/ACT Exhaler Suspension, USE 1 SPRAY TO EACH NOSTRIL EVERY DAY, Disp: , Rfl:     Allergies   Allergen Reactions   • Morphine And Related Itching       Family History   Problem Relation Age of Onset   • Esophageal cancer Father    • Cancer Father         Esophogical   • Breast cancer Paternal Grandmother    • Dementia Other    • Ovarian cancer Neg Hx        Social History     Socioeconomic History   • Marital status:    Tobacco Use   • Smoking status: Former     Packs/day: 0.00     Years: 0.00     Pack years: 0.00     Types: Cigarettes     Quit date:      Years since quittin.0   • Smokeless tobacco: Never   Vaping Use   • Vaping Use: Never used   Substance and Sexual Activity   • Alcohol use: Yes     Alcohol/week: 5.0 standard drinks     Types: 5 Glasses of wine per week   • Drug use: No   • Sexual activity: Yes     Partners: Male     Birth control/protection: Other     Comment: Pill       Review of Systems  All other systems reviewed and are negative.    Vitals:    01/10/23 1446   BP: 112/70   Pulse: 101   Temp: 97.5 °F (36.4 °C)   SpO2: 96%       Physical Exam  General Appearance:  Vitals as above. no acute distress  Head/face:  Normocephalic, atraumatic  Eyes:   EOMI, no conjunctivitis or icterus   Nose/Sinuses:  Nares patent bilaterally without discharge or lesions  Mouth/Throat:  Normal oral movements without dyskinesia  Neck:  trachea is midline, no thyromegaly  Lungs:  Normal work of breathing effort, no overt rales  Heart:  No overt JVD or type VI murmur  Abdomen:  Nondistended, no guarding or rebound tenderness  Neurologic:  Alert; no focal deficits; age appropriate behavior and speech  Psychiatric: mood and affect are congruent  Vascular: extremities without edema  Skin: no rash or cyanosis.  High bmi    Assessment/Plan  1.) Chronic cough  2.) family history of esophageal cancer  Would empirically increase ppi to BID which she can do immediately.  I do not think GERD is the  cause but perhaps it is not helping her cough. Due to family history of esophageal cancer, will proceed to egd.     3.) Abnormal liver enzymes  4.) Obesity  5.) Alcohol dependence   and .  This is in range for alcoholic fatty liver.  Her ferritin is noted to be > 1k, ? Secondary hemosiderosis.     Plan:  mrcp  -Duplex of portal vessels  -Autoimmune hepatitis and pbc workup  -Strongly avoid alcohol, discussed with patient  -Polypharmacy may need to be addressed pending workup    6.) H63 heterozygosity  7.) Abnormal ferritin, > 1k  Patient does not have hemochromatosis.  However, she may have secondary iron overload and ferritin is coming down as expected with phlebotomy.  Would avoid alcohol and will consider liver biopsy pending workup.    8.) Adenomas  Repeat colonoscopy 2024    El Almodovar MD  1/10/2023

## 2023-01-12 ENCOUNTER — HOSPITAL ENCOUNTER (OUTPATIENT)
Dept: ONCOLOGY | Facility: HOSPITAL | Age: 53
Setting detail: INFUSION SERIES
Discharge: HOME | End: 2023-01-12
Payer: MEDICAID

## 2023-01-12 VITALS
WEIGHT: 209 LBS | DIASTOLIC BLOOD PRESSURE: 75 MMHG | HEIGHT: 68 IN | RESPIRATION RATE: 16 BRPM | TEMPERATURE: 97.5 F | HEART RATE: 106 BPM | BODY MASS INDEX: 31.67 KG/M2 | SYSTOLIC BLOOD PRESSURE: 119 MMHG

## 2023-01-12 DIAGNOSIS — E83.110 HEREDITARY HEMOCHROMATOSIS: Primary | ICD-10-CM

## 2023-01-12 LAB
BASOPHILS # BLD AUTO: 0.03 10*3/MM3 (ref 0–0.2)
BASOPHILS NFR BLD AUTO: 0.4 % (ref 0–1.5)
DEPRECATED RDW RBC AUTO: 68.3 FL (ref 37–54)
EOSINOPHIL # BLD AUTO: 0.06 10*3/MM3 (ref 0–0.4)
EOSINOPHIL NFR BLD AUTO: 0.7 % (ref 0.3–6.2)
ERYTHROCYTE [DISTWIDTH] IN BLOOD BY AUTOMATED COUNT: 15.2 % (ref 12.3–15.4)
FERRITIN SERPL-MCNC: 1030 NG/ML (ref 13–150)
HCT VFR BLD AUTO: 34.9 % (ref 34–46.6)
HGB BLD-MCNC: 11.4 G/DL (ref 12–15.9)
IMM GRANULOCYTES # BLD AUTO: 0.02 10*3/MM3 (ref 0–0.05)
IMM GRANULOCYTES NFR BLD AUTO: 0.2 % (ref 0–0.5)
LYMPHOCYTES # BLD AUTO: 2.25 10*3/MM3 (ref 0.7–3.1)
LYMPHOCYTES NFR BLD AUTO: 26.6 % (ref 19.6–45.3)
MCH RBC QN AUTO: 38.6 PG (ref 26.6–33)
MCHC RBC AUTO-ENTMCNC: 32.7 G/DL (ref 31.5–35.7)
MCV RBC AUTO: 118.3 FL (ref 79–97)
MONOCYTES # BLD AUTO: 0.73 10*3/MM3 (ref 0.1–0.9)
MONOCYTES NFR BLD AUTO: 8.6 % (ref 5–12)
NEUTROPHILS NFR BLD AUTO: 5.38 10*3/MM3 (ref 1.7–7)
NEUTROPHILS NFR BLD AUTO: 63.5 % (ref 42.7–76)
PLATELET # BLD AUTO: 241 10*3/MM3 (ref 140–450)
PMV BLD AUTO: 9 FL (ref 6–12)
RBC # BLD AUTO: 2.95 10*6/MM3 (ref 3.77–5.28)
WBC NRBC COR # BLD: 8.47 10*3/MM3 (ref 3.4–10.8)

## 2023-01-12 PROCEDURE — 99195 PHLEBOTOMY: CPT

## 2023-01-12 PROCEDURE — 36415 COLL VENOUS BLD VENIPUNCTURE: CPT

## 2023-01-12 PROCEDURE — 85025 COMPLETE CBC W/AUTO DIFF WBC: CPT | Performed by: INTERNAL MEDICINE

## 2023-01-12 PROCEDURE — 82728 ASSAY OF FERRITIN: CPT | Performed by: INTERNAL MEDICINE

## 2023-01-17 ENCOUNTER — OFFICE VISIT (OUTPATIENT)
Dept: PULMONOLOGY | Facility: CLINIC | Age: 53
End: 2023-01-17
Payer: MEDICAID

## 2023-01-17 VITALS
HEIGHT: 68 IN | WEIGHT: 208 LBS | SYSTOLIC BLOOD PRESSURE: 122 MMHG | TEMPERATURE: 97.6 F | HEART RATE: 93 BPM | OXYGEN SATURATION: 93 % | BODY MASS INDEX: 31.52 KG/M2 | DIASTOLIC BLOOD PRESSURE: 82 MMHG

## 2023-01-17 DIAGNOSIS — J30.1 SEASONAL ALLERGIC RHINITIS DUE TO POLLEN: ICD-10-CM

## 2023-01-17 DIAGNOSIS — K21.9 GASTROESOPHAGEAL REFLUX DISEASE, UNSPECIFIED WHETHER ESOPHAGITIS PRESENT: ICD-10-CM

## 2023-01-17 DIAGNOSIS — Z87.09 PERSONAL HISTORY OF ASTHMA: ICD-10-CM

## 2023-01-17 DIAGNOSIS — R06.02 SHORTNESS OF BREATH: Primary | ICD-10-CM

## 2023-01-17 DIAGNOSIS — R05.3 CHRONIC COUGH: ICD-10-CM

## 2023-01-17 PROCEDURE — 94726 PLETHYSMOGRAPHY LUNG VOLUMES: CPT | Performed by: INTERNAL MEDICINE

## 2023-01-17 PROCEDURE — 94729 DIFFUSING CAPACITY: CPT | Performed by: INTERNAL MEDICINE

## 2023-01-17 PROCEDURE — 94375 RESPIRATORY FLOW VOLUME LOOP: CPT | Performed by: INTERNAL MEDICINE

## 2023-01-17 PROCEDURE — 99205 OFFICE O/P NEW HI 60 MIN: CPT | Performed by: INTERNAL MEDICINE

## 2023-01-17 RX ORDER — IPRATROPIUM BROMIDE 42 UG/1
2 SPRAY, METERED NASAL 4 TIMES DAILY
Qty: 15 ML | Refills: 12 | Status: SHIPPED | OUTPATIENT
Start: 2023-01-17

## 2023-01-17 NOTE — PROGRESS NOTES
Initial Pulmonary Consult Note    Subjective   Reason for consultation/Chief Complaint: Cough    Dawna Lovell is a 52 y.o. female is being seen for consultation today at the request of ROLANDA Ang    History of Present Illness  Ms. Lovell is a 53yo F who is referred for chronic cough. The cough first started in the Spring of 2022 and then worsened by June 2022 and has persisted since that time. She has been treated with a couple of rounds of Prednisone which helps for approximately 10 days and then the cough returns. She has a Trelegy inhaler but has not noted much relief with this. She feels that Albuterol is more effective but is short lasting. She reports copious post-nasal drainage despite Singulair and OTC antihistamines. She notes that changing positions at night helps and cold air exposure helps.     She was seen by ENT who noted changes of GERD. She was prescribed Omeprazole. She does not have the head of her bed elevated. She eats approximately 2 hours prior to bedtime but does drink water just before bed and during the night. She has seen GI for a liver evaluation. She has an MRCP, liver US and EGD pending.     She does not have any history of childhood asthma. She has noted weight gain recently.     Active Ambulatory Problems     Diagnosis Date Noted   • Psychophysiological insomnia 06/27/2016   • Anxiety 06/27/2016   • GERD (gastroesophageal reflux disease) 06/27/2016   • Restless leg syndrome 06/27/2016   • Abnormal liver function test 09/14/2016   • Allergic rhinitis 09/14/2016   • Elevated AST (SGOT) 09/14/2016   • Fatigue 09/14/2016   • Hyperlipidemia 09/14/2016   • Hypothyroidism due to Hashimoto's thyroiditis 09/14/2016   • Syncope 09/27/2016   • Tachycardia 09/27/2016   • History of migraine headaches 01/11/2017   • Personal history of asthma 01/11/2017   • POTS (postural orthostatic tachycardia syndrome) 08/20/2018   • Health care maintenance 04/07/2021   • Hereditary hemochromatosis  ([facility]) 2022   • Chronic cough 2023     Resolved Ambulatory Problems     Diagnosis Date Noted   • Cushingoid facies 2016   • Palpitations 2016     Past Medical History:   Diagnosis Date   • Asthma    • Hypothyroidism    • Irritable bowel syndrome    • Lyme disease 2016   • Migraine headache        Past Surgical History:   Procedure Laterality Date   •  SECTION  2003   • COLONOSCOPY         Family History   Problem Relation Age of Onset   • Esophageal cancer Father    • Cancer Father         Esophogical   • Breast cancer Paternal Grandmother    • Dementia Other    • Ovarian cancer Neg Hx        Social History     Socioeconomic History   • Marital status:    Tobacco Use   • Smoking status: Former     Packs/day: 0.00     Years: 0.00     Pack years: 0.00     Types: Cigarettes     Quit date:      Years since quittin.0   • Smokeless tobacco: Never   Vaping Use   • Vaping Use: Never used   Substance and Sexual Activity   • Alcohol use: Yes     Alcohol/week: 5.0 standard drinks     Types: 5 Glasses of wine per week   • Drug use: No   • Sexual activity: Yes     Partners: Male     Birth control/protection: Other     Comment: Pill       Allergies   Allergen Reactions   • Morphine And Related Itching       Current Outpatient Medications   Medication Sig Dispense Refill   • albuterol sulfate  (90 Base) MCG/ACT inhaler Inhale 2 puffs Every 4 (Four) Hours As Needed for Wheezing. 18 g 2   • busPIRone (BUSPAR) 5 MG tablet TAKE 1 TABLET BY MOUTH THREE TIMES DAILY 90 tablet 1   • carvedilol (COREG) 6.25 MG tablet TAKE 1 TABLET BY MOUTH TWICE DAILY WITH MEALS 180 tablet 1   • gabapentin (NEURONTIN) 300 MG capsule Take three tablets in am, 2 tablets in the pm and three tablets in hs 720 capsule 1   • hydrOXYzine (ATARAX) 25 MG tablet TAKE 1 TABLET Twice daily PRN 40 tablet 1   • levothyroxine (SYNTHROID, LEVOTHROID) 137 MCG tablet TAKE 1 TABLET BY MOUTH DAILY 90 tablet 3   •  "meloxicam (MOBIC) 15 MG tablet TAKE 1 TABLET BY MOUTH DAILY 30 tablet 0   • Multiple Vitamins-Minerals (MULTIVITAMIN ADULT PO) Take  by mouth Daily.     • nystatin (MYCOSTATIN) 471646 UNIT/GM ointment APPLY TO LIPS 3 TIMES DAILY     • olopatadine (PATANOL) 0.1 % ophthalmic solution      • omeprazole (priLOSEC) 40 MG capsule TAKE 1 CAPSULE BY MOUTH EVERY DAY AS DIRECTED     • Restasis 0.05 % ophthalmic emulsion Administer 1 drop to both eyes 2 (Two) Times a Day.     • rOPINIRole (REQUIP) 1 MG tablet Take 1 tablet by mouth Every Night. 90 tablet 3   • rosuvastatin (CRESTOR) 10 MG tablet TAKE 1 TABLET BY MOUTH DAILY 30 tablet 2   • SUMAtriptan (IMITREX) 25 MG tablet TAKE 1 TABLET BY MOUTH AS NEEDED FOR MIGRAINE 9 tablet 3   • tiZANidine (ZANAFLEX) 4 MG tablet TAKE 1 TABLET BY MOUTH EVERY 8 HOURS AS NEEDED FOR MUSCLE SPASMS 30 tablet 1   • traZODone (DESYREL) 100 MG tablet TAKE 1 TABLET BY MOUTH AT NIGHT AS NEEDED FOR SLEEP 90 tablet 0   • venlafaxine XR (EFFEXOR-XR) 150 MG 24 hr capsule TAKE 1 CAPSULE BY MOUTH DAILY 90 capsule 0   • vitamin D (ERGOCALCIFEROL) 1.25 MG (71271 UT) capsule capsule Take 1 capsule by mouth 1 (One) Time Per Week. 12 capsule 3   • ipratropium (ATROVENT) 0.06 % nasal spray 2 sprays into the nostril(s) as directed by provider 4 (Four) Times a Day. 15 mL 12     No current facility-administered medications for this visit.       Review of Systems   Constitutional: Negative.    HENT: Positive for postnasal drip.    Eyes: Negative.    Respiratory: Positive for cough.    Cardiovascular: Negative.    Gastrointestinal: Negative.    Endocrine: Negative.    Genitourinary: Negative.    Musculoskeletal: Negative.    Skin: Negative.    Allergic/Immunologic: Negative.    Neurological: Negative.    Hematological: Negative.    Psychiatric/Behavioral: Positive for sleep disturbance.         Objective   Blood pressure 122/82, pulse 93, temperature 97.6 °F (36.4 °C), height 172.7 cm (67.99\"), weight 94.3 kg (208 " lb), SpO2 93 %.  Physical Exam  Vitals and nursing note reviewed.   Constitutional:       General: She is not in acute distress.     Appearance: She is well-developed.   HENT:      Head: Normocephalic and atraumatic.   Eyes:      General: No scleral icterus.     Conjunctiva/sclera: Conjunctivae normal.      Pupils: Pupils are equal, round, and reactive to light.   Neck:      Thyroid: No thyromegaly.      Trachea: No tracheal deviation.   Cardiovascular:      Rate and Rhythm: Normal rate and regular rhythm.      Heart sounds: Normal heart sounds.   Pulmonary:      Effort: Pulmonary effort is normal. No respiratory distress.      Breath sounds: Normal breath sounds.   Abdominal:      General: Bowel sounds are normal.      Palpations: Abdomen is soft.      Tenderness: There is no abdominal tenderness.   Musculoskeletal:         General: Normal range of motion.      Cervical back: Normal range of motion and neck supple.   Lymphadenopathy:      Cervical: No cervical adenopathy.   Skin:     General: Skin is warm and dry.      Findings: No erythema or rash.   Neurological:      Mental Status: She is alert and oriented to person, place, and time.      Motor: No abnormal muscle tone.      Coordination: Coordination normal.   Psychiatric:         Speech: Speech normal.         Behavior: Behavior normal.         Judgment: Judgment normal.         PFTs:  Performed in clinic and personally reviewed.  There is no airway obstruction.  The lung volumes are normal.  The DLCO is normal.     Imaging:  Chest xray performed today. This is a PA/Lateral film. The cardiac and mediastinal contours are within normal limits. The lungs are grossly clear bilaterally. There is no pneumothorax or pleural effusion.    Impression: No acute cardiopulmonary process.     Assessment & Plan   Diagnoses and all orders for this visit:    1. Shortness of breath (Primary)  -     XR Chest PA & Lateral    2. Chronic cough  -     CT Chest Without Contrast;  Future    3. Personal history of asthma    4. Seasonal allergic rhinitis due to pollen    5. Gastroesophageal reflux disease, unspecified whether esophagitis present    Other orders  -     ipratropium (ATROVENT) 0.06 % nasal spray; 2 sprays into the nostril(s) as directed by provider 4 (Four) Times a Day.  Dispense: 15 mL; Refill: 12        Discussion:  Ms. Lovell is a 51yo F who is referred for cough.    1. Chronic Cough  - We discussed the 3 most common etiologies for cough which include asthma/COPD, upper airway cough syndrome, and reflux.   - We will start by treating all 3 possible etiologies and then de-escalate as possible.   - There may also now be a component of psychogenic cough.   - We will get a CT of the chest to r/o an underlying etiology such as bronchiectasis.     2. Possible Asthma  - Change Trelegy to Asmanex 100 2 puffs twice daily. I have given her a spacer to use with this.   - Continue Albuterol as needed.  - We will try and avoid further courses of steroids secondary to side effects.   - Recent Eosinophil count was 0.06.     3. Vasomotor Rhinitis  - Stop OTC antihistamines. Use Sudafed as needed. Can use every other day if too drying.   - Start Atrovent nasal 2x daily. Can use up to 4x daily as needed.     4. GERD  - I have asked her to elevate the head of the bed.   - Counseled on lifestyle modifications and handout given on reflux.   - Following with GI.     Follow up in 6 weeks to assess the response to therapy.      I have spent 62 minutes reviewing the patient record, face to face with the patient in discussion of test results and plans for further management and in documentation and coordination of care. Excluding time spent on other separate services such as performing procedures or test interpretation, if applicable.        Yessy V Case, DO  Pulmonary and Critical Care Medicine  Note Electronically Signed

## 2023-01-18 DIAGNOSIS — R05.3 CHRONIC COUGH: ICD-10-CM

## 2023-01-18 RX ORDER — ALBUTEROL SULFATE 90 MCG
HFA AEROSOL WITH ADAPTER (GRAM) INHALATION
Qty: 6.7 G | Refills: 0 | Status: SHIPPED | OUTPATIENT
Start: 2023-01-18

## 2023-01-18 RX ORDER — ERGOCALCIFEROL 1.25 MG/1
CAPSULE ORAL
Qty: 12 CAPSULE | Refills: 3 | OUTPATIENT
Start: 2023-01-18

## 2023-01-19 ENCOUNTER — HOSPITAL ENCOUNTER (OUTPATIENT)
Dept: MRI IMAGING | Facility: HOSPITAL | Age: 53
Discharge: HOME | End: 2023-01-19
Admitting: INTERNAL MEDICINE
Payer: MEDICAID

## 2023-01-19 ENCOUNTER — HOSPITAL ENCOUNTER (OUTPATIENT)
Dept: ONCOLOGY | Facility: HOSPITAL | Age: 53
Setting detail: INFUSION SERIES
Discharge: HOME | End: 2023-01-19
Payer: MEDICAID

## 2023-01-19 ENCOUNTER — APPOINTMENT (OUTPATIENT)
Dept: MRI IMAGING | Facility: HOSPITAL | Age: 53
End: 2023-01-19
Payer: MEDICAID

## 2023-01-19 VITALS
WEIGHT: 211 LBS | HEART RATE: 107 BPM | RESPIRATION RATE: 16 BRPM | DIASTOLIC BLOOD PRESSURE: 67 MMHG | BODY MASS INDEX: 31.98 KG/M2 | HEIGHT: 68 IN | SYSTOLIC BLOOD PRESSURE: 106 MMHG | TEMPERATURE: 97.6 F

## 2023-01-19 DIAGNOSIS — R79.89 ABNORMAL LIVER FUNCTION TESTS: ICD-10-CM

## 2023-01-19 DIAGNOSIS — E83.110 HEREDITARY HEMOCHROMATOSIS: ICD-10-CM

## 2023-01-19 LAB
BASOPHILS # BLD AUTO: 0.04 10*3/MM3 (ref 0–0.2)
BASOPHILS NFR BLD AUTO: 0.4 % (ref 0–1.5)
DEPRECATED RDW RBC AUTO: 66.6 FL (ref 37–54)
EOSINOPHIL # BLD AUTO: 0.07 10*3/MM3 (ref 0–0.4)
EOSINOPHIL NFR BLD AUTO: 0.8 % (ref 0.3–6.2)
ERYTHROCYTE [DISTWIDTH] IN BLOOD BY AUTOMATED COUNT: 14.7 % (ref 12.3–15.4)
FERRITIN SERPL-MCNC: 1054 NG/ML (ref 13–150)
HCT VFR BLD AUTO: 32.3 % (ref 34–46.6)
HGB BLD-MCNC: 10.5 G/DL (ref 12–15.9)
IMM GRANULOCYTES # BLD AUTO: 0.02 10*3/MM3 (ref 0–0.05)
IMM GRANULOCYTES NFR BLD AUTO: 0.2 % (ref 0–0.5)
LYMPHOCYTES # BLD AUTO: 2.17 10*3/MM3 (ref 0.7–3.1)
LYMPHOCYTES NFR BLD AUTO: 23.9 % (ref 19.6–45.3)
MCH RBC QN AUTO: 38.9 PG (ref 26.6–33)
MCHC RBC AUTO-ENTMCNC: 32.5 G/DL (ref 31.5–35.7)
MCV RBC AUTO: 119.6 FL (ref 79–97)
MONOCYTES # BLD AUTO: 0.8 10*3/MM3 (ref 0.1–0.9)
MONOCYTES NFR BLD AUTO: 8.8 % (ref 5–12)
NEUTROPHILS NFR BLD AUTO: 5.97 10*3/MM3 (ref 1.7–7)
NEUTROPHILS NFR BLD AUTO: 65.9 % (ref 42.7–76)
PLATELET # BLD AUTO: 246 10*3/MM3 (ref 140–450)
PMV BLD AUTO: 8.9 FL (ref 6–12)
RBC # BLD AUTO: 2.7 10*6/MM3 (ref 3.77–5.28)
WBC NRBC COR # BLD: 9.07 10*3/MM3 (ref 3.4–10.8)

## 2023-01-19 PROCEDURE — 82728 ASSAY OF FERRITIN: CPT | Performed by: INTERNAL MEDICINE

## 2023-01-19 PROCEDURE — 85025 COMPLETE CBC W/AUTO DIFF WBC: CPT | Performed by: INTERNAL MEDICINE

## 2023-01-19 PROCEDURE — 74181 MRI ABDOMEN W/O CONTRAST: CPT

## 2023-01-19 PROCEDURE — 36415 COLL VENOUS BLD VENIPUNCTURE: CPT

## 2023-01-20 ENCOUNTER — HOSPITAL ENCOUNTER (OUTPATIENT)
Dept: CARDIOLOGY | Facility: HOSPITAL | Age: 53
Discharge: HOME | End: 2023-01-20
Admitting: INTERNAL MEDICINE
Payer: MEDICAID

## 2023-01-20 VITALS — WEIGHT: 211 LBS | HEIGHT: 68 IN | BODY MASS INDEX: 31.98 KG/M2

## 2023-01-20 DIAGNOSIS — R79.89 ABNORMAL LIVER FUNCTION TESTS: ICD-10-CM

## 2023-01-20 LAB
BH CV VAS HEPATIC PORTAL LIVER LENGTH: 23.6 CM
BH CV VAS HEPATIC PORTAL LIVER WIDTH: 10.8 CM
BH CV VAS HEPATIC PORTAL SPLEEN LENGTH: 14.31 CM
BH CV VAS HEPATIC PORTAL SPLEEN WIDTH: 5.2 CM
BH CV VAS HEPATIC PORTAL VEIN DIAMETER: 1.21 CM
BH CV VAS HEPATOPORTAL AORTA AP DIAM: 2.14 CM
BH CV VAS HEPATOPORTAL HEPATIC V LT DIRECTION: NORMAL
BH CV VAS HEPATOPORTAL HEPATIC V LT SPONT: NORMAL
BH CV VAS HEPATOPORTAL HEPATIC V MID DIRECTION: NORMAL
BH CV VAS HEPATOPORTAL HEPATIC V MID SPONT: NORMAL
BH CV VAS HEPATOPORTAL HEPATIC V RT DIRECTION: NORMAL
BH CV VAS HEPATOPORTAL IVC CONFLUENCE SPONT: NORMAL
BH CV VAS HEPATOPORTAL IVC SPONT: NORMAL
BH CV VAS HEPATOPORTAL PORTAL V EXTRAHEPATIC DIRECTION: NORMAL
BH CV VAS HEPATOPORTAL PORTAL V EXTRAHEPATIC SPONT: NORMAL
BH CV VAS HEPATOPORTAL PORTAL V LT INTRA DIRECTION: NORMAL
BH CV VAS HEPATOPORTAL PORTAL V LT INTRA SPONT: NORMAL
BH CV VAS HEPATOPORTAL PORTAL V MAIN INTRA DIRECTION: NORMAL
BH CV VAS HEPATOPORTAL PORTAL V MAIN INTRA SPONT: NORMAL
BH CV VAS HEPATOPORTAL PORTAL V PSV: 26 CM/S
BH CV VAS HEPATOPORTAL PORTAL V RT INTRA DIRECTION: NORMAL
BH CV VAS HEPATOPORTAL PORTAL V RT INTRA SPONT: NORMAL
BH CV VAS HEPATOPORTAL SMV DIRECTION: NORMAL
BH CV VAS HEPATOPORTAL SMV PSV: 24.1 CM/S
BH CV VAS HEPATOPORTAL SMV SPONT: NORMAL
BH CV VAS HEPATOPORTAL SPLENIC DIRECTION: NORMAL
BH CV VAS HEPATOPORTAL SPLENIC SPONT: NORMAL
BH CV VAS HEPATOPORTAL SPLENIC V PSV: 36.9 CM/S
BH CV VAS SMA AORTA EDV: 20.8 CM/S
BH CV VAS SMA AORTA PSV: 104 CM/S
BH CV VAS SMA HEPATIC EDV: 61.6 CM/S
BH CV VAS SMA HEPATIC PSV: 152.5 CM/S
BH CV VAS SMA SPLENIC EDV: 44.8 CM/S
BH CV VAS SMA SPLENIC PSV: 156 CM/S

## 2023-01-20 PROCEDURE — 93975 VASCULAR STUDY: CPT

## 2023-01-21 ENCOUNTER — APPOINTMENT (OUTPATIENT)
Dept: MRI IMAGING | Facility: HOSPITAL | Age: 53
End: 2023-01-21

## 2023-01-26 ENCOUNTER — APPOINTMENT (OUTPATIENT)
Dept: ONCOLOGY | Facility: HOSPITAL | Age: 53
End: 2023-01-26
Payer: MEDICAID

## 2023-01-26 ENCOUNTER — LAB (OUTPATIENT)
Dept: LAB | Facility: HOSPITAL | Age: 53
End: 2023-01-26
Payer: MEDICAID

## 2023-01-26 ENCOUNTER — PATIENT ROUNDING (BHMG ONLY) (OUTPATIENT)
Dept: PULMONOLOGY | Facility: CLINIC | Age: 53
End: 2023-01-26
Payer: COMMERCIAL

## 2023-01-26 ENCOUNTER — OFFICE VISIT (OUTPATIENT)
Dept: ONCOLOGY | Facility: CLINIC | Age: 53
End: 2023-01-26
Payer: MEDICAID

## 2023-01-26 VITALS
OXYGEN SATURATION: 93 % | DIASTOLIC BLOOD PRESSURE: 82 MMHG | HEART RATE: 100 BPM | SYSTOLIC BLOOD PRESSURE: 130 MMHG | WEIGHT: 208 LBS | RESPIRATION RATE: 18 BRPM | BODY MASS INDEX: 31.52 KG/M2 | TEMPERATURE: 98 F | HEIGHT: 68 IN

## 2023-01-26 DIAGNOSIS — E83.110 HEREDITARY HEMOCHROMATOSIS: Primary | Chronic | ICD-10-CM

## 2023-01-26 DIAGNOSIS — D64.9 ANEMIA, UNSPECIFIED TYPE: ICD-10-CM

## 2023-01-26 DIAGNOSIS — E83.110 HEREDITARY HEMOCHROMATOSIS: ICD-10-CM

## 2023-01-26 DIAGNOSIS — R79.89 ABNORMAL LIVER FUNCTION TESTS: ICD-10-CM

## 2023-01-26 LAB
ALBUMIN SERPL-MCNC: 3.8 G/DL (ref 3.5–5.2)
ALBUMIN/GLOB SERPL: 1 G/DL
ALP SERPL-CCNC: 266 U/L (ref 39–117)
ALT SERPL W P-5'-P-CCNC: 37 U/L (ref 1–33)
ANION GAP SERPL CALCULATED.3IONS-SCNC: 16 MMOL/L (ref 5–15)
AST SERPL-CCNC: 142 U/L (ref 1–32)
BASOPHILS # BLD AUTO: 0.05 10*3/MM3 (ref 0–0.2)
BASOPHILS NFR BLD AUTO: 0.5 % (ref 0–1.5)
BILIRUB CONJ SERPL-MCNC: 0.5 MG/DL (ref 0–0.3)
BILIRUB SERPL-MCNC: 1 MG/DL (ref 0–1.2)
BUN SERPL-MCNC: 2 MG/DL (ref 6–20)
BUN/CREAT SERPL: 2.8 (ref 7–25)
CALCIUM SPEC-SCNC: 9.1 MG/DL (ref 8.6–10.5)
CHLORIDE SERPL-SCNC: 98 MMOL/L (ref 98–107)
CO2 SERPL-SCNC: 26 MMOL/L (ref 22–29)
CREAT SERPL-MCNC: 0.72 MG/DL (ref 0.57–1)
DAT POLY-SP REAG RBC QL: NEGATIVE
DEPRECATED RDW RBC AUTO: 64.5 FL (ref 37–54)
EGFRCR SERPLBLD CKD-EPI 2021: 101.4 ML/MIN/1.73
EOSINOPHIL # BLD AUTO: 0.07 10*3/MM3 (ref 0–0.4)
EOSINOPHIL NFR BLD AUTO: 0.7 % (ref 0.3–6.2)
ERYTHROCYTE [DISTWIDTH] IN BLOOD BY AUTOMATED COUNT: 14.4 % (ref 12.3–15.4)
FERRITIN SERPL-MCNC: 695.1 NG/ML (ref 13–150)
GLOBULIN UR ELPH-MCNC: 3.9 GM/DL
GLUCOSE SERPL-MCNC: 99 MG/DL (ref 65–99)
HBV SURFACE AG SERPL QL IA: NORMAL
HCT VFR BLD AUTO: 36.3 % (ref 34–46.6)
HGB BLD-MCNC: 11.5 G/DL (ref 12–15.9)
IMM GRANULOCYTES # BLD AUTO: 0.04 10*3/MM3 (ref 0–0.05)
IMM GRANULOCYTES NFR BLD AUTO: 0.4 % (ref 0–0.5)
INR PPP: 1.09 (ref 0.84–1.13)
LDH SERPL-CCNC: 220 U/L (ref 135–214)
LYMPHOCYTES # BLD AUTO: 2.05 10*3/MM3 (ref 0.7–3.1)
LYMPHOCYTES NFR BLD AUTO: 20.9 % (ref 19.6–45.3)
MCH RBC QN AUTO: 38 PG (ref 26.6–33)
MCHC RBC AUTO-ENTMCNC: 31.7 G/DL (ref 31.5–35.7)
MCV RBC AUTO: 119.8 FL (ref 79–97)
MONOCYTES # BLD AUTO: 0.77 10*3/MM3 (ref 0.1–0.9)
MONOCYTES NFR BLD AUTO: 7.8 % (ref 5–12)
NEUTROPHILS NFR BLD AUTO: 6.84 10*3/MM3 (ref 1.7–7)
NEUTROPHILS NFR BLD AUTO: 69.7 % (ref 42.7–76)
PLATELET # BLD AUTO: 285 10*3/MM3 (ref 140–450)
PMV BLD AUTO: 9 FL (ref 6–12)
POTASSIUM SERPL-SCNC: 3.4 MMOL/L (ref 3.5–5.2)
PROT SERPL-MCNC: 7.7 G/DL (ref 6–8.5)
PROTHROMBIN TIME: 14 SECONDS (ref 11.4–14.4)
RBC # BLD AUTO: 3.03 10*6/MM3 (ref 3.77–5.28)
RETICS # AUTO: 0.12 10*6/MM3 (ref 0.02–0.13)
RETICS/RBC NFR AUTO: 4.05 % (ref 0.7–1.9)
SODIUM SERPL-SCNC: 140 MMOL/L (ref 136–145)
WBC NRBC COR # BLD: 9.82 10*3/MM3 (ref 3.4–10.8)

## 2023-01-26 PROCEDURE — 85025 COMPLETE CBC W/AUTO DIFF WBC: CPT

## 2023-01-26 PROCEDURE — 85060 BLOOD SMEAR INTERPRETATION: CPT

## 2023-01-26 PROCEDURE — 83051 HEMOGLOBIN PLASMA: CPT

## 2023-01-26 PROCEDURE — 82784 ASSAY IGA/IGD/IGG/IGM EACH: CPT

## 2023-01-26 PROCEDURE — 83615 LACTATE (LD) (LDH) ENZYME: CPT

## 2023-01-26 PROCEDURE — 86015 ACTIN ANTIBODY EACH: CPT

## 2023-01-26 PROCEDURE — 86706 HEP B SURFACE ANTIBODY: CPT

## 2023-01-26 PROCEDURE — 80053 COMPREHEN METABOLIC PANEL: CPT

## 2023-01-26 PROCEDURE — 82104 ALPHA-1-ANTITRYPSIN PHENO: CPT

## 2023-01-26 PROCEDURE — 82607 VITAMIN B-12: CPT

## 2023-01-26 PROCEDURE — 86038 ANTINUCLEAR ANTIBODIES: CPT

## 2023-01-26 PROCEDURE — 83070 ASSAY OF HEMOSIDERIN QUAL: CPT

## 2023-01-26 PROCEDURE — 83921 ORGANIC ACID SINGLE QUANT: CPT

## 2023-01-26 PROCEDURE — 82103 ALPHA-1-ANTITRYPSIN TOTAL: CPT

## 2023-01-26 PROCEDURE — 36415 COLL VENOUS BLD VENIPUNCTURE: CPT

## 2023-01-26 PROCEDURE — 86704 HEP B CORE ANTIBODY TOTAL: CPT

## 2023-01-26 PROCEDURE — 85610 PROTHROMBIN TIME: CPT

## 2023-01-26 PROCEDURE — 82248 BILIRUBIN DIRECT: CPT

## 2023-01-26 PROCEDURE — 82955 ASSAY OF G6PD ENZYME: CPT

## 2023-01-26 PROCEDURE — 86381 MITOCHONDRIAL ANTIBODY EACH: CPT

## 2023-01-26 PROCEDURE — 86431 RHEUMATOID FACTOR QUANT: CPT

## 2023-01-26 PROCEDURE — 82668 ASSAY OF ERYTHROPOIETIN: CPT

## 2023-01-26 PROCEDURE — 83090 ASSAY OF HOMOCYSTEINE: CPT

## 2023-01-26 PROCEDURE — 80074 ACUTE HEPATITIS PANEL: CPT

## 2023-01-26 PROCEDURE — 82746 ASSAY OF FOLIC ACID SERUM: CPT

## 2023-01-26 PROCEDURE — 82728 ASSAY OF FERRITIN: CPT

## 2023-01-26 PROCEDURE — 99215 OFFICE O/P EST HI 40 MIN: CPT | Performed by: INTERNAL MEDICINE

## 2023-01-26 PROCEDURE — 85045 AUTOMATED RETICULOCYTE COUNT: CPT

## 2023-01-26 PROCEDURE — 85041 AUTOMATED RBC COUNT: CPT

## 2023-01-26 PROCEDURE — 86880 COOMBS TEST DIRECT: CPT

## 2023-01-26 PROCEDURE — 83010 ASSAY OF HAPTOGLOBIN QUANT: CPT

## 2023-01-26 RX ORDER — AZELASTINE HYDROCHLORIDE AND FLUTICASONE PROPIONATE 137; 50 UG/1; UG/1
SPRAY, METERED NASAL
COMMUNITY
Start: 2023-01-18

## 2023-01-26 NOTE — PROGRESS NOTES
CHIEF COMPLAINT: No new somatic complaints    Problem List:  Oncology/Hematology History Overview Note   1.  Elevated ferritin with heterozygous H63D mutation with hepatic steatosis on CT 2016 and ultrasound 2015.  Elevated liver enzymes.    2.  Hyperlipidemia  3.  Restless legs  4.  Sleep apnea  5.  Hypothyroidism with Hashimoto's  6.  Migraine  7.  Sinusitis  8.  Tubular adenoma without dysplasia ascending colon polypectomy with transverse colon polyp 8/19/2021 Dr. Almodovar.  9.  Syncope    Hematology history timeline:  -2/27/2015 iron 301.  .  -4/7/2016 iron 276  -5/2/16 CT abdomen without contrast shows marked inhomogeneous geographic appearing liver with mosaic pattern typical for marked steatosis with focal area is of fat sparing.  Pattern noted by ultrasound on 11/10/2015.  No focal liver mass.  No ascites.  No adenopathy or stranding in the mesentery.  Left kidney stone nonobstructing noted  -7/14/2022 ferritin 1609 ng/mL, iron 123 with saturation 31%.  Total iron binding capacity 396.  Calculated transferrin saturation 31%  -11/16/2022 white count 2159 ng/mL.  , , alkaline phosphatase 325.    -12/12/2022 [facility] consult: Patient being seen for elevated ferritin as outlined above with elevated liver enzymes and history of hepatic steatosis on prior CT and ultrasound as outlined above.  The liver dysfunction can be due to multiple simultaneous culprits and I will check a liver iron quantitation but there is very little downside to phlebotomy but I would not want to ascribe all of this to hemochromatosis as it is unusual to have heterozygosity to cause significant clinical iron overload and she may have multifactorial reasons for liver dysfunction.  She is due to see Dr. Almodovar who she apparently has seen in the past within the next month.  I will let him decide on liver biopsy to sort this out but in the near term I will check a liver iron quantitation and in the near term we  will set her up to start phlebotomies with a goal to get her ferritin down to 100 provided that I do not make her significantly anemic before that happens.  She has had elevated iron dating back at least 7 years with an elevated GGT in 2015 and iron of 301 at that point.  We will start with 1 unit phlebotomy weekly for the next month and I will see her back in about a month to see what we find from the above tests.  We will hold for hemoglobin less than 11.  No signs or symptoms of cardiomyopathy or pituitary dysfunction.    -12/14/2022 ALT 51  alkaline phosphatase 311.    -1/5/2023 hemoglobin 10.8 .3 with ferritin 928  -1/10/2023 consultation Dr. Almodovar.  With family history of esophageal cancer, he will check EGD.  He did not think she had hemochromatosis but secondary iron overload but the reason for which is not clear to me.  He suggested duplex portal vessels, autoimmune hepatitis and primary biliary cirrhosis work-up and repeat colonoscopy in 2024.  -1/10/2023 multiplicity of labs ordered by Dr. Almodovar including antimitochondrial antibody, hepatitis serologies, anti-smooth muscle antibody, KIT, and alpha 1 antitrypsin but patient has not had drawn.    -1/12/2023 hemoglobin 11.4  ferritin 1030.    -1/17/2023 consultation Dr. Bear regarding chronic cough and possible asthma with vasomotor rhinitis.  Ordered chest x-ray and CT of chest and inhalers.    - 1/19/2023 MRI noncontrast as ordered by Dr. Almodovar was not read for iron quantitation as requested by my prior MRI abdomen noncontrast order for liver iron quantitation in December, but I had Dr. Byers look at this and he did not think that her 27 cm hepatomegaly on MRI was likely related iron overload but much more likely fatty liver infiltration.  Hemoglobin 10.5 .6 with ferritin 1054.    -1/20/2023 duplex hepatic portal ultrasound shows no portal hypertension but evidence of splenomegaly 14.3 cm     Hereditary hemochromatosis (HCC)  "  2022 -  Chemotherapy    OP Therapeutic Phlebotomy         HISTORY OF PRESENT ILLNESS:  The patient is a 52 y.o. female, here for follow up on management of elevated ferritin in the face of elevated liver enzymes with fatty steatosis    Past Medical History:   Diagnosis Date   • Anxiety    • Asthma    • GERD (gastroesophageal reflux disease)    • Hyperlipidemia    • Hypothyroidism    • Irritable bowel syndrome    • Lyme disease 2016   • Migraine headache    • POTS (postural orthostatic tachycardia syndrome)    • Syncope 2016   • Tachycardia 2016     Past Surgical History:   Procedure Laterality Date   •  SECTION  2003   • COLONOSCOPY         Allergies   Allergen Reactions   • Morphine And Related Itching       Family History and Social History reviewed and changed as necessary    REVIEW OF SYSTEM:   No new somatic complaints    PHYSICAL EXAM:  No jaundice or icterus    Vitals:    23 135   BP: 130/82   Pulse: 100   Resp: 18   Temp: 98 °F (36.7 °C)   SpO2: 93%   Weight: 94.3 kg (208 lb)   Height: 172.7 cm (68\")     Vitals:    23 135   PainSc: 0-No pain          ECOG score: 0           Vitals reviewed.    ECOG: (0) Fully Active - Able to Carry On All Pre-disease Performance Without Restriction    Lab Results   Component Value Date    HGB 11.5 (L) 2023    HCT 36.3 2023    .8 (H) 2023     2023    WBC 9.82 2023    NEUTROABS 6.84 2023    LYMPHSABS 2.05 2023    MONOSABS 0.77 2023    EOSABS 0.07 2023    BASOSABS 0.05 2023       Lab Results   Component Value Date    GLUCOSE 122 (H) 2022    BUN 3 (L) 2022    CREATININE 0.77 2022     2022    K 4.0 2022    CL 97 (L) 2022    CO2 28.0 2022    CALCIUM 9.4 2022    PROTEINTOT 8.4 2022    ALBUMIN 4.00 2022    BILITOT 0.8 2022    ALKPHOS 311 (H) 2022     (H) 2022    ALT 51 (H) " 12/14/2022             ASSESSMENT & PLAN:  1.  Elevated ferritin with heterozygous H63D mutation with hepatic steatosis on CT 2016 and ultrasound 2015.  Elevated liver enzymes. HSM on 1/2023 US.    2.  Hyperlipidemia  3.  Restless legs  4.  Sleep apnea  5.  Hypothyroidism with Hashimoto's  6.  Migraine  7.  Sinusitis  8.  Tubular adenoma without dysplasia ascending colon polypectomy with transverse colon polyp 8/19/2021 Dr. Almodovar.  9.  Syncope    Hematology history timeline:  -2/27/2015 iron 301.  .  -4/7/2016 iron 276  -5/2/16 CT abdomen without contrast shows marked inhomogeneous geographic appearing liver with mosaic pattern typical for marked steatosis with focal area is of fat sparing.  Pattern noted by ultrasound on 11/10/2015.  No focal liver mass.  No ascites.  No adenopathy or stranding in the mesentery.  Left kidney stone nonobstructing noted  -7/14/2022 ferritin 1609 ng/mL, iron 123 with saturation 31%.  Total iron binding capacity 396.  Calculated transferrin saturation 31%  -11/16/2022 white count 2159 ng/mL.  , , alkaline phosphatase 325.    -12/12/2022 [facility] hematology consult: Patient being seen for elevated ferritin as outlined above with elevated liver enzymes and history of hepatic steatosis on prior CT and ultrasound as outlined above.  The liver dysfunction can be due to multiple simultaneous culprits and I will check a liver iron quantitation but there is very little downside to phlebotomy but I would not want to ascribe all of this to hemochromatosis as it is unusual to have heterozygosity to cause significant clinical iron overload and she may have multifactorial reasons for liver dysfunction.  She is due to see Dr. Almodovar who she apparently has seen in the past within the next month.  I will let him decide on liver biopsy to sort this out but in the near term I will check a liver iron quantitation and in the near term we will set her up to start phlebotomies with a  goal to get her ferritin down to 100 provided that I do not make her significantly anemic before that happens.  She has had elevated iron dating back at least 7 years with an elevated GGT in 2015 and iron of 301 at that point.  We will start with 1 unit phlebotomy weekly for the next month and I will see her back in about a month to see what we find from the above tests.  We will hold for hemoglobin less than 11.  No signs or symptoms of cardiomyopathy or pituitary dysfunction.    -12/14/2022 ALT 51  alkaline phosphatase 311.    -1/5/2023 hemoglobin 10.8 .3 with ferritin 928  -1/10/2023 consultation Dr. Almodovar.  With family history of esophageal cancer, he will check EGD.  He did not think she had hemochromatosis but secondary iron overload but the reason for which is not clear to me.  He suggested duplex portal vessels, autoimmune hepatitis and primary biliary cirrhosis work-up and repeat colonoscopy in 2024.  -1/10/2023 multiplicity of labs ordered by Dr. Almodovar including antimitochondrial antibody, hepatitis serologies, anti-smooth muscle antibody, KIT, and alpha 1 antitrypsin but patient has not had drawn.    -1/12/2023 hemoglobin 11.4  ferritin 1030.    -1/17/2023 consultation Dr. Bear regarding chronic cough and possible asthma with vasomotor rhinitis.  Ordered chest x-ray and CT of chest and inhalers.    - 1/19/2023 MRI noncontrast as ordered by Dr. Almodovar was not read for iron quantitation as requested by my prior MRI abdomen noncontrast order for liver iron quantitation in December, but I had Dr. Byers look at this and he did not think that her 27 cm hepatomegaly on MRI was likely related iron overload but much more likely fatty liver infiltration.  Hemoglobin 10.5 .6 with ferritin 1054.    -1/20/2023 duplex hepatic portal ultrasound shows no portal hypertension but evidence of splenomegaly 14.3 cm    - 1/26/2023 [facility] follow-up: Today her hemoglobin is 11.5 which is  improving with .8.  She has not been able to get phlebotomized regularly due to her hemoglobin generally being less than 11 and Dr. Almodovar is not convinced of this being hereditary hemochromatosis and I would tend to agree but unfortunately the MRI that ended up being done was not a liver iron quantitation but nonetheless Dr. Byers has looked at this and said that if there is iron overload it would be borderline based on his gestalt of the images available.  She does have hepatosplenomegaly and that may be a source of her mild macrocytosis and anemia but I will do a formal evaluation for causes of anemia including megaloblastic panel, hemolytic panel, and though inflammatory markers.  Ultimately I will defer to Dr. Almodovar as to doing liver biopsy to look for other causes beyond hepatic steatosis but I think that is the most likely culprit and heterozygous H63D mutation is unlikely to be pathogenic but not impossible but she has no room for phlebotomies with a baseline hemoglobin running right at 11 give or take 0.5..    Total time of care today inclusive of time spent today prior to patient's arrival reviewing interval laboratory and scan and physician consultation reports and during visit translating that information to her and putting forth a plan as outlined above and after visit instituting this plan took 50 minutes of patient care time throughout the day today.    French Shearer MD    01/26/2023

## 2023-01-26 NOTE — LETTER
January 26, 2023       No Recipients    Patient: Dawna Lovell   YOB: 1970   Date of Visit: 1/26/2023       Dear ROLANDA Ang    Dawna Lovell was in my office today. Below is a copy of my note.    If you have questions, please do not hesitate to call me. I look forward to following Dawna along with you.         Sincerely,        French Shearer MD        CC:   No Recipients    CHIEF COMPLAINT: No new somatic complaints    Problem List:  Oncology/Hematology History Overview Note   1.  Elevated ferritin with heterozygous H63D mutation with hepatic steatosis on CT 2016 and ultrasound 2015.  Elevated liver enzymes.    2.  Hyperlipidemia  3.  Restless legs  4.  Sleep apnea  5.  Hypothyroidism with Hashimoto's  6.  Migraine  7.  Sinusitis  8.  Tubular adenoma without dysplasia ascending colon polypectomy with transverse colon polyp 8/19/2021 Dr. Almodovar.  9.  Syncope    Hematology history timeline:  -2/27/2015 iron 301.  .  -4/7/2016 iron 276  -5/2/16 CT abdomen without contrast shows marked inhomogeneous geographic appearing liver with mosaic pattern typical for marked steatosis with focal area is of fat sparing.  Pattern noted by ultrasound on 11/10/2015.  No focal liver mass.  No ascites.  No adenopathy or stranding in the mesentery.  Left kidney stone nonobstructing noted  -7/14/2022 ferritin 1609 ng/mL, iron 123 with saturation 31%.  Total iron binding capacity 396.  Calculated transferrin saturation 31%  -11/16/2022 white count 2159 ng/mL.  , , alkaline phosphatase 325.    -12/12/2022 [facility] hematology consult: Patient being seen for elevated ferritin as outlined above with elevated liver enzymes and history of hepatic steatosis on prior CT and ultrasound as outlined above.  The liver dysfunction can be due to multiple simultaneous culprits and I will check a liver iron quantitation but there is very little downside to phlebotomy but I would not want to ascribe all of  this to hemochromatosis as it is unusual to have heterozygosity to cause significant clinical iron overload and she may have multifactorial reasons for liver dysfunction.  She is due to see Dr. Almodovar who she apparently has seen in the past within the next month.  I will let him decide on liver biopsy to sort this out but in the near term I will check a liver iron quantitation and in the near term we will set her up to start phlebotomies with a goal to get her ferritin down to 100 provided that I do not make her significantly anemic before that happens.  She has had elevated iron dating back at least 7 years with an elevated GGT in 2015 and iron of 301 at that point.  We will start with 1 unit phlebotomy weekly for the next month and I will see her back in about a month to see what we find from the above tests.  We will hold for hemoglobin less than 11.  No signs or symptoms of cardiomyopathy or pituitary dysfunction.    -12/14/2022 ALT 51  alkaline phosphatase 311.    -1/5/2023 hemoglobin 10.8 .3 with ferritin 928  -1/10/2023 consultation Dr. Almodovar.  With family history of esophageal cancer, he will check EGD.  He did not think she had hemochromatosis but secondary iron overload but the reason for which is not clear to me.  He suggested duplex portal vessels, autoimmune hepatitis and primary biliary cirrhosis work-up and repeat colonoscopy in 2024.  -1/10/2023 multiplicity of labs ordered by Dr. Almodovar including antimitochondrial antibody, hepatitis serologies, anti-smooth muscle antibody, KIT, and alpha 1 antitrypsin but patient has not had drawn.    -1/12/2023 hemoglobin 11.4  ferritin 1030.    -1/17/2023 consultation Dr. Bear regarding chronic cough and possible asthma with vasomotor rhinitis.  Ordered chest x-ray and CT of chest and inhalers.    - 1/19/2023 MRI noncontrast as ordered by Dr. Almodovar was not read for iron quantitation as requested by my prior MRI abdomen noncontrast order for  "liver iron quantitation in December, but I had Dr. Byers look at this and he did not think that her 27 cm hepatomegaly on MRI was likely related iron overload but much more likely fatty liver infiltration.  Hemoglobin 10.5 .6 with ferritin 1054.    -2023 duplex hepatic portal ultrasound shows no portal hypertension but evidence of splenomegaly 14.3 cm     Hereditary hemochromatosis (HCC)   2022 -  Chemotherapy    OP Therapeutic Phlebotomy         HISTORY OF PRESENT ILLNESS:  The patient is a 52 y.o. female, here for follow up on management of elevated ferritin in the face of elevated liver enzymes with fatty steatosis    Past Medical History:   Diagnosis Date   • Anxiety    • Asthma    • GERD (gastroesophageal reflux disease)    • Hyperlipidemia    • Hypothyroidism    • Irritable bowel syndrome    • Lyme disease 2016   • Migraine headache    • POTS (postural orthostatic tachycardia syndrome)    • Syncope 2016   • Tachycardia 2016     Past Surgical History:   Procedure Laterality Date   •  SECTION  2003   • COLONOSCOPY         Allergies   Allergen Reactions   • Morphine And Related Itching       Family History and Social History reviewed and changed as necessary    REVIEW OF SYSTEM:   No new somatic complaints    PHYSICAL EXAM:  No jaundice or icterus    Vitals:    23 1352   BP: 130/82   Pulse: 100   Resp: 18   Temp: 98 °F (36.7 °C)   SpO2: 93%   Weight: 94.3 kg (208 lb)   Height: 172.7 cm (68\")     Vitals:    23 1352   PainSc: 0-No pain          ECOG score: 0           Vitals reviewed.    ECOG: (0) Fully Active - Able to Carry On All Pre-disease Performance Without Restriction    Lab Results   Component Value Date    HGB 11.5 (L) 2023    HCT 36.3 2023    .8 (H) 2023     2023    WBC 9.82 2023    NEUTROABS 6.84 2023    LYMPHSABS 2.05 2023    MONOSABS 0.77 2023    EOSABS 0.07 2023    BASOSABS 0.05 " 01/26/2023       Lab Results   Component Value Date    GLUCOSE 122 (H) 12/14/2022    BUN 3 (L) 12/14/2022    CREATININE 0.77 12/14/2022     12/14/2022    K 4.0 12/14/2022    CL 97 (L) 12/14/2022    CO2 28.0 12/14/2022    CALCIUM 9.4 12/14/2022    PROTEINTOT 8.4 12/14/2022    ALBUMIN 4.00 12/14/2022    BILITOT 0.8 12/14/2022    ALKPHOS 311 (H) 12/14/2022     (H) 12/14/2022    ALT 51 (H) 12/14/2022            ASSESSMENT & PLAN:  1.  Elevated ferritin with heterozygous H63D mutation with hepatic steatosis on CT 2016 and ultrasound 2015.  Elevated liver enzymes. HSM on 1/2023 US.    2.  Hyperlipidemia  3.  Restless legs  4.  Sleep apnea  5.  Hypothyroidism with Hashimoto's  6.  Migraine  7.  Sinusitis  8.  Tubular adenoma without dysplasia ascending colon polypectomy with transverse colon polyp 8/19/2021 Dr. Almodovar.  9.  Syncope    Hematology history timeline:  -2/27/2015 iron 301.  .  -4/7/2016 iron 276  -5/2/16 CT abdomen without contrast shows marked inhomogeneous geographic appearing liver with mosaic pattern typical for marked steatosis with focal area is of fat sparing.  Pattern noted by ultrasound on 11/10/2015.  No focal liver mass.  No ascites.  No adenopathy or stranding in the mesentery.  Left kidney stone nonobstructing noted  -7/14/2022 ferritin 1609 ng/mL, iron 123 with saturation 31%.  Total iron binding capacity 396.  Calculated transferrin saturation 31%  -11/16/2022 white count 2159 ng/mL.  , , alkaline phosphatase 325.    -12/12/2022 [facility] hematology consult: Patient being seen for elevated ferritin as outlined above with elevated liver enzymes and history of hepatic steatosis on prior CT and ultrasound as outlined above.  The liver dysfunction can be due to multiple simultaneous culprits and I will check a liver iron quantitation but there is very little downside to phlebotomy but I would not want to ascribe all of this to hemochromatosis as it is unusual to have  heterozygosity to cause significant clinical iron overload and she may have multifactorial reasons for liver dysfunction.  She is due to see Dr. Almodovar who she apparently has seen in the past within the next month.  I will let him decide on liver biopsy to sort this out but in the near term I will check a liver iron quantitation and in the near term we will set her up to start phlebotomies with a goal to get her ferritin down to 100 provided that I do not make her significantly anemic before that happens.  She has had elevated iron dating back at least 7 years with an elevated GGT in 2015 and iron of 301 at that point.  We will start with 1 unit phlebotomy weekly for the next month and I will see her back in about a month to see what we find from the above tests.  We will hold for hemoglobin less than 11.  No signs or symptoms of cardiomyopathy or pituitary dysfunction.    -12/14/2022 ALT 51  alkaline phosphatase 311.    -1/5/2023 hemoglobin 10.8 .3 with ferritin 928  -1/10/2023 consultation Dr. Almodovar.  With family history of esophageal cancer, he will check EGD.  He did not think she had hemochromatosis but secondary iron overload but the reason for which is not clear to me.  He suggested duplex portal vessels, autoimmune hepatitis and primary biliary cirrhosis work-up and repeat colonoscopy in 2024.  -1/10/2023 multiplicity of labs ordered by Dr. Almodovar including antimitochondrial antibody, hepatitis serologies, anti-smooth muscle antibody, KIT, and alpha 1 antitrypsin but patient has not had drawn.    -1/12/2023 hemoglobin 11.4  ferritin 1030.    -1/17/2023 consultation Dr. Bear regarding chronic cough and possible asthma with vasomotor rhinitis.  Ordered chest x-ray and CT of chest and inhalers.    - 1/19/2023 MRI noncontrast as ordered by Dr. Almodovar was not read for iron quantitation as requested by my prior MRI abdomen noncontrast order for liver iron quantitation in December, but I had  Dr. Byers look at this and he did not think that her 27 cm hepatomegaly on MRI was likely related iron overload but much more likely fatty liver infiltration.  Hemoglobin 10.5 .6 with ferritin 1054.    -1/20/2023 duplex hepatic portal ultrasound shows no portal hypertension but evidence of splenomegaly 14.3 cm    - 1/26/2023 [facility] hematology follow-up: Today her hemoglobin is 11.5 which is improving with .8.  She has not been able to get phlebotomized regularly due to her hemoglobin generally being less than 11 and Dr. Almodovar is not convinced of this being hereditary hemochromatosis and I would tend to agree but unfortunately the MRI that ended up being done was not a liver iron quantitation but nonetheless Dr. Byers has looked at this and said that if there is iron overload it would be borderline based on his gestalt of the images available.  She does have hepatosplenomegaly and that may be a source of her mild macrocytosis and anemia but I will do a formal evaluation for causes of anemia including megaloblastic panel, hemolytic panel, and though inflammatory markers.  Ultimately I will defer to Dr. Almodovar as to doing liver biopsy to look for other causes beyond hepatic steatosis but I think that is the most likely culprit and heterozygous H63D mutation is unlikely to be pathogenic but not impossible but she has no room for phlebotomies with a baseline hemoglobin running right at 11 give or take 0.5..    Total time of care today inclusive of time spent today prior to patient's arrival reviewing interval laboratory and scan and physician consultation reports and during visit translating that information to her and putting forth a plan as outlined above and after visit instituting this plan took 50 minutes of patient care time throughout the day today.    French Shearer MD    01/26/2023

## 2023-01-26 NOTE — PROGRESS NOTES
January 26, 2023    Hello, may I speak with Dawna Barnettwinnie Mohrey?    My name is Aida    I am  with [facility]  [facility]  [address] [place] [address] [place] [id number] [phone].    Before we get started may I verify your date of birth? 1970    I am calling to officially welcome you to our practice and ask about your recent visit. Is this a good time to talk? Yes    Tell me about your visit with us. What things went well? The staff was good.       We're always looking for ways to make our patients' experiences even better. Do you have recommendations on ways we may improve?  No    Overall were you satisfied with your first visit to our practice? Yes       I appreciate you taking the time to speak with me today. Is there anything else I can do for you? No      Thank you, and have a great day.

## 2023-01-27 ENCOUNTER — HOSPITAL ENCOUNTER (OUTPATIENT)
Dept: CARDIOLOGY | Facility: HOSPITAL | Age: 53
Discharge: HOME | End: 2023-01-27
Admitting: PHYSICIAN ASSISTANT
Payer: MEDICAID

## 2023-01-27 VITALS — HEIGHT: 68 IN | BODY MASS INDEX: 31.52 KG/M2 | WEIGHT: 208 LBS

## 2023-01-27 DIAGNOSIS — E03.8 HYPOTHYROIDISM DUE TO HASHIMOTO'S THYROIDITIS: ICD-10-CM

## 2023-01-27 DIAGNOSIS — E06.3 HYPOTHYROIDISM DUE TO HASHIMOTO'S THYROIDITIS: ICD-10-CM

## 2023-01-27 DIAGNOSIS — R06.02 SHORTNESS OF BREATH: ICD-10-CM

## 2023-01-27 LAB
BH CV ECHO MEAS - AO MAX PG: 5.5 MMHG
BH CV ECHO MEAS - AO MEAN PG: 3 MMHG
BH CV ECHO MEAS - AO ROOT DIAM: 4 CM
BH CV ECHO MEAS - AO V2 MAX: 117 CM/SEC
BH CV ECHO MEAS - AO V2 VTI: 25.3 CM
BH CV ECHO MEAS - AVA(I,D): 2.13 CM2
BH CV ECHO MEAS - EDV(CUBED): 44.7 ML
BH CV ECHO MEAS - EDV(MOD-SP2): 130 ML
BH CV ECHO MEAS - EDV(MOD-SP4): 125 ML
BH CV ECHO MEAS - EF(MOD-BP): 58 %
BH CV ECHO MEAS - EF(MOD-SP2): 67.7 %
BH CV ECHO MEAS - EF(MOD-SP4): 49.6 %
BH CV ECHO MEAS - ESV(CUBED): 16 ML
BH CV ECHO MEAS - ESV(MOD-SP2): 42 ML
BH CV ECHO MEAS - ESV(MOD-SP4): 63 ML
BH CV ECHO MEAS - FS: 29 %
BH CV ECHO MEAS - IVS/LVPW: 0.78 CM
BH CV ECHO MEAS - IVSD: 1.41 CM
BH CV ECHO MEAS - LA DIMENSION: 2.8 CM
BH CV ECHO MEAS - LV DIASTOLIC VOL/BSA (35-75): 60.1 CM2
BH CV ECHO MEAS - LV MASS(C)D: 221.5 GRAMS
BH CV ECHO MEAS - LV MAX PG: 4.1 MMHG
BH CV ECHO MEAS - LV MEAN PG: 2 MMHG
BH CV ECHO MEAS - LV SYSTOLIC VOL/BSA (12-30): 30.3 CM2
BH CV ECHO MEAS - LV V1 MAX: 101 CM/SEC
BH CV ECHO MEAS - LV V1 VTI: 21.2 CM
BH CV ECHO MEAS - LVIDD: 3.6 CM
BH CV ECHO MEAS - LVIDS: 2.5 CM
BH CV ECHO MEAS - LVOT AREA: 2.5 CM2
BH CV ECHO MEAS - LVOT DIAM: 1.8 CM
BH CV ECHO MEAS - LVPWD: 1.81 CM
BH CV ECHO MEAS - MV A MAX VEL: 67.6 CM/SEC
BH CV ECHO MEAS - MV DEC SLOPE: 670 CM/SEC2
BH CV ECHO MEAS - MV E MAX VEL: 67.1 CM/SEC
BH CV ECHO MEAS - MV E/A: 0.99
BH CV ECHO MEAS - MV MAX PG: 5.2 MMHG
BH CV ECHO MEAS - MV MEAN PG: 2 MMHG
BH CV ECHO MEAS - MV P1/2T: 44.6 MSEC
BH CV ECHO MEAS - MV V2 VTI: 24.8 CM
BH CV ECHO MEAS - MVA(P1/2T): 4.9 CM2
BH CV ECHO MEAS - MVA(VTI): 2.18 CM2
BH CV ECHO MEAS - PA ACC SLOPE: 569 CM/SEC2
BH CV ECHO MEAS - PA ACC TIME: 0.11 SEC
BH CV ECHO MEAS - PA PR(ACCEL): 28.2 MMHG
BH CV ECHO MEAS - SI(MOD-SP2): 42.3 ML/M2
BH CV ECHO MEAS - SI(MOD-SP4): 29.8 ML/M2
BH CV ECHO MEAS - SV(LVOT): 53.9 ML
BH CV ECHO MEAS - SV(MOD-SP2): 88 ML
BH CV ECHO MEAS - SV(MOD-SP4): 62 ML
BH CV ECHO MEAS - TAPSE (>1.6): 1.7 CM
BH CV XLRA - RV BASE: 3.7 CM
BH CV XLRA - RV LENGTH: 5.7 CM
BH CV XLRA - RV MID: 2.5 CM
BH CV XLRA - TDI S': 9.43 CM/SEC
CHROMATIN AB SERPL-ACNC: <10 IU/ML (ref 0–14)
CYTOLOGIST CVX/VAG CYTO: NORMAL
FOLATE SERPL-MCNC: 2.93 NG/ML (ref 4.78–24.2)
HAPTOGLOB SERPL-MCNC: 185 MG/DL (ref 30–200)
HCYS SERPL-MCNC: 27.5 UMOL/L (ref 0–15)
HEMOSIDERIN UR QL: NEGATIVE
IGA1 MFR SER: 896 MG/DL (ref 70–400)
IGG1 SER-MCNC: 1430 MG/DL (ref 700–1600)
IGM SERPL-MCNC: 240 MG/DL (ref 40–230)
LEFT ATRIUM VOLUME INDEX: 17.3 ML/M2
MAXIMAL PREDICTED HEART RATE: 169 BPM
PATH INTERP BLD-IMP: NORMAL
STRESS TARGET HR: 144 BPM
VIT B12 BLD-MCNC: 700 PG/ML (ref 211–946)

## 2023-01-27 PROCEDURE — 93306 TTE W/DOPPLER COMPLETE: CPT | Performed by: INTERNAL MEDICINE

## 2023-01-27 PROCEDURE — 93306 TTE W/DOPPLER COMPLETE: CPT

## 2023-01-27 RX ORDER — LEVOTHYROXINE SODIUM 137 UG/1
137 TABLET ORAL DAILY
Qty: 90 TABLET | Refills: 3 | Status: SHIPPED | OUTPATIENT
Start: 2023-01-27

## 2023-01-28 LAB
HAV AB SER QL IA: NEGATIVE
HAV IGM SERPL QL IA: NEGATIVE
HBV CORE AB SERPL QL IA: NEGATIVE
HBV CORE IGM SERPL QL IA: NEGATIVE
HBV SURFACE AB SER QL: NON REACTIVE
HCV AB S/CO SERPL IA: <0.1 S/CO RATIO (ref 0–0.9)
MITOCHONDRIA M2 IGG SER-ACNC: 27.6 UNITS (ref 0–20)
SMA IGG SER-ACNC: 5 UNITS (ref 0–19)

## 2023-01-30 LAB
ANA SER QL: NEGATIVE
EPO SERPL-ACNC: 44.9 MIU/ML (ref 2.6–18.5)

## 2023-01-31 DIAGNOSIS — K74.3 PRIMARY BILIARY CHOLANGITIS: Primary | ICD-10-CM

## 2023-01-31 LAB
G6PD BLD QN: 480 U/10E12 RBC (ref 127–427)
METHYLMALONATE SERPL-SCNC: 78 NMOL/L (ref 0–378)
RBC # BLD AUTO: 3.08 X10E6/UL (ref 3.77–5.28)

## 2023-01-31 RX ORDER — URSODIOL 300 MG/1
300 CAPSULE ORAL
Qty: 270 CAPSULE | Refills: 3 | Status: SHIPPED | OUTPATIENT
Start: 2023-01-31

## 2023-02-01 LAB — HGB FREE PLAS-MCNC: 7.6 MG/DL (ref 0–4.9)

## 2023-02-02 LAB
A1AT PHENOTYP SERPL IFE: ABNORMAL
A1AT SERPL-MCNC: 213 MG/DL (ref 101–187)

## 2023-02-08 ENCOUNTER — HOSPITAL ENCOUNTER (OUTPATIENT)
Dept: CT IMAGING | Facility: HOSPITAL | Age: 53
Discharge: HOME | End: 2023-02-08
Admitting: INTERNAL MEDICINE
Payer: MEDICAID

## 2023-02-08 DIAGNOSIS — R05.3 CHRONIC COUGH: ICD-10-CM

## 2023-02-08 PROCEDURE — 71250 CT THORAX DX C-: CPT

## 2023-02-13 RX ORDER — SUMATRIPTAN 25 MG/1
25 TABLET, FILM COATED ORAL AS NEEDED
Qty: 9 TABLET | Refills: 1 | Status: SHIPPED | OUTPATIENT
Start: 2023-02-13

## 2023-02-17 DIAGNOSIS — I51.7 MODERATE CONCENTRIC LEFT VENTRICULAR HYPERTROPHY: Primary | ICD-10-CM

## 2023-02-17 NOTE — PROGRESS NOTES
Your echocardiogram shows that your heart is strong and the valves are all functioning normally. There is some evidence of thickening in the wall of the left ventricle of your heart and as a result, some decrease in the relaxation of the left ventricle. This can be seen over time with elevated blood pressure or other reasons. I know you have been seeing multiple specialists but I would like to refer you to a Cardiologist to review your echo and see if the abnormalities are contributing to any of your symptoms. Let me know if you have any questions about this.

## 2023-02-23 ENCOUNTER — OFFICE VISIT (OUTPATIENT)
Dept: ONCOLOGY | Facility: CLINIC | Age: 53
End: 2023-02-23
Payer: MEDICAID

## 2023-02-23 ENCOUNTER — LAB (OUTPATIENT)
Dept: LAB | Facility: HOSPITAL | Age: 53
End: 2023-02-23
Payer: COMMERCIAL

## 2023-02-23 VITALS
TEMPERATURE: 97.5 F | HEIGHT: 68 IN | SYSTOLIC BLOOD PRESSURE: 119 MMHG | DIASTOLIC BLOOD PRESSURE: 74 MMHG | HEART RATE: 100 BPM | WEIGHT: 212 LBS | RESPIRATION RATE: 18 BRPM | BODY MASS INDEX: 32.13 KG/M2 | OXYGEN SATURATION: 94 %

## 2023-02-23 DIAGNOSIS — E83.110 HEREDITARY HEMOCHROMATOSIS: Primary | Chronic | ICD-10-CM

## 2023-02-23 DIAGNOSIS — D52.0 DIETARY FOLATE DEFICIENCY ANEMIA: ICD-10-CM

## 2023-02-23 PROCEDURE — 99214 OFFICE O/P EST MOD 30 MIN: CPT | Performed by: INTERNAL MEDICINE

## 2023-02-23 RX ORDER — FLUTICASONE FUROATE, UMECLIDINIUM BROMIDE AND VILANTEROL TRIFENATATE 200; 62.5; 25 UG/1; UG/1; UG/1
POWDER RESPIRATORY (INHALATION)
COMMUNITY
Start: 2023-02-14

## 2023-02-23 RX ORDER — FOLIC ACID 1 MG/1
TABLET ORAL
Qty: 30 TABLET | Refills: 90 | Status: SHIPPED | OUTPATIENT
Start: 2023-02-23

## 2023-02-23 RX ORDER — OMEPRAZOLE 40 MG/1
40 CAPSULE, DELAYED RELEASE ORAL 2 TIMES DAILY
Qty: 60 CAPSULE | Refills: 3 | Status: SHIPPED | OUTPATIENT
Start: 2023-02-23

## 2023-02-23 RX ORDER — AMOXICILLIN 500 MG/1
1000 CAPSULE ORAL 2 TIMES DAILY
COMMUNITY
End: 2023-03-27

## 2023-02-23 NOTE — PROGRESS NOTES
CHIEF COMPLAINT: Chronic cough    Problem List:  Oncology/Hematology History Overview Note   1.  Elevated ferritin with heterozygous H63D mutation with hepatic steatosis on CT 2016 and ultrasound 2015.  Elevated liver enzymes. HSM on 1/2023 US.    2.  Hyperlipidemia  3.  Restless legs  4.  Sleep apnea  5.  Hypothyroidism with Hashimoto's  6.  Migraine  7.  Sinusitis  8.  Tubular adenoma without dysplasia ascending colon polypectomy with transverse colon polyp 8/19/2021 Dr. Almodovar.  9.  Syncope  10.  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  .  -4/7/2016 iron 276  -5/2/16 CT abdomen without contrast shows marked inhomogeneous geographic appearing liver with mosaic pattern typical for marked steatosis with focal area is of fat sparing.  Pattern noted by ultrasound on 11/10/2015.  No focal liver mass.  No ascites.  No adenopathy or stranding in the mesentery.  Left kidney stone nonobstructing noted  -7/14/2022 ferritin 1609 ng/mL, iron 123 with saturation 31%.  Total iron binding capacity 396.  Calculated transferrin saturation 31%  -11/16/2022 white count 2159 ng/mL.  , , alkaline phosphatase 325.    -12/12/2022 [facility] hematology consult: Patient being seen for elevated ferritin as outlined above with elevated liver enzymes and history of hepatic steatosis on prior CT and ultrasound as outlined above.  The liver dysfunction can be due to multiple simultaneous culprits and I will check a liver iron quantitation but there is very little downside to phlebotomy but I would not want to ascribe all of this to hemochromatosis as it is unusual to have heterozygosity to cause significant clinical iron overload and she may have multifactorial reasons for liver dysfunction.  She is due to see Dr. Almodovar who she apparently has seen in the past within the next  month.  I will let him decide on liver biopsy to sort this out but in the near term I will check a liver iron quantitation and in the near term we will set her up to start phlebotomies with a goal to get her ferritin down to 100 provided that I do not make her significantly anemic before that happens.  She has had elevated iron dating back at least 7 years with an elevated GGT in 2015 and iron of 301 at that point.  We will start with 1 unit phlebotomy weekly for the next month and I will see her back in about a month to see what we find from the above tests.  We will hold for hemoglobin less than 11.  No signs or symptoms of cardiomyopathy or pituitary dysfunction.    -12/14/2022 ALT 51  alkaline phosphatase 311.    -1/5/2023 hemoglobin 10.8 .3 with ferritin 928  -1/10/2023 consultation Dr. Almodovar.  With family history of esophageal cancer, he will check EGD.  He did not think she had hemochromatosis but secondary iron overload but the reason for which is not clear to me.  He suggested duplex portal vessels, autoimmune hepatitis and primary biliary cirrhosis work-up and repeat colonoscopy in 2024.  -1/10/2023 multiplicity of labs ordered by Dr. Almodovar including antimitochondrial antibody, hepatitis serologies, anti-smooth muscle antibody, KIT, and alpha 1 antitrypsin but patient has not had drawn.    -1/12/2023 hemoglobin 11.4  ferritin 1030.    -1/17/2023 consultation Dr. Bear regarding chronic cough and possible asthma with vasomotor rhinitis.  Ordered chest x-ray and CT of chest and inhalers.    - 1/19/2023 MRI noncontrast as ordered by Dr. Almodovar was not read for iron quantitation as requested by my prior MRI abdomen noncontrast order for liver iron quantitation in December, but I had Dr. Byers look at this and he did not think that her 27 cm hepatomegaly on MRI was likely related iron overload but much more likely fatty liver infiltration.  Hemoglobin 10.5 .6 with ferritin  1054.    -1/20/2023 duplex hepatic portal ultrasound shows no portal hypertension but evidence of splenomegaly 14.3 cm    - 1/26/2023 [facility] hematology follow-up: Today her hemoglobin is 11.5 which is improving with .8.  She has not been able to get phlebotomized regularly due to her hemoglobin generally being less than 11 and Dr. Almodovar is not convinced of this being hereditary hemochromatosis and I would tend to agree but unfortunately the MRI that ended up being done was not a liver iron quantitation but nonetheless Dr. Byers has looked at this and said that if there is iron overload it would be borderline based on his gestalt of the images available.  She does have hepatosplenomegaly and that may be a source of her mild macrocytosis and anemia but I will do a formal evaluation for causes of anemia including megaloblastic panel, hemolytic panel, and though inflammatory markers.  Ultimately I will defer to Dr. Almodovar as to doing liver biopsy to look for other causes beyond hepatic steatosis but I think that is the most likely culprit and heterozygous H63D mutation is unlikely to be pathogenic but not impossible but she has no room for phlebotomies with a baseline hemoglobin running right at 11 give or take 0.5.    -1/26/2023 data:  Hemoglobin 11.5 .8 otherwise unremarkable CBC.  ALT 37 and  alkaline phosphatase 266 potassium 3.4 otherwise unremarkable CMP.  Elevations of quantitative IgM and IgA with normal immunoglobulin G likely all reactive.  Ferritin 695.1  Alpha 1 antitrypsin elevated to 13  KIT negative.  Anti-smooth muscle antibody negative  Antimitochondrial antibody +27.6 upper limit 24.9 (ursodiol started by Dr. Almodovar)  Hepatitis ABC negative.  Hemolytic panel: Reticulocyte count elevated 4%.  Haptoglobin normal 185.  G6PD elevated 480.  ARTI negative.  LDH minimally elevated to 20 upper limit 214 and compared to 560 11/16/2022.  Peripheral smear unremarkable with normal white blood  cell and platelet morphology and minimal mild macrocytic anemia.  Plasma free hemoglobin elevated 7.6 but urine hemosiderin negative.  Erythropoietin elevated 44.9  Megaloblastic panel: Methylmalonic acidNormal 78 with B12 normal 700.  Folic acid low 2.93 with homocystine elevated 27.5.    -2/8/2023 CT chest ordered by Dr. Bear for chronic cough shows no intrathoracic process but just diffuse hepatic steatosis with mild hepatomegaly and coronary artery calcifications    --2/23/2023 [facility] hematology follow-up: With decreased folic acid and high homocystine, she has folic acid deficiency which we will start 1 mg daily.  She knows to avoid alcohol in its entirety with the discordant elevation of AST as well as with fatty liver disease now on ursodiol with antimitochondrial antibody positive.  Suspect the macrocytosis is both due to folate deficiency and the dyserythropoiesis causing some elevation of her MCV and causing perhaps some reticulocytosis coming from splenic sequestration though not massive splenomegaly and not pancytopenic.  We will see what her MCV does over time as well as her hemoglobin and she has no room for phlebotomies for the elevated ferritin with the heterozygous H63D mutation.  Perhaps in the future her hemoglobin will improve with folate replacement and that will allow for phlebotomies albeit as I stated on my last note, the gestalt is that the liver is not iron overloaded on MRI.     Hereditary hemochromatosis (HCC)   12/14/2022 -  Chemotherapy    OP Therapeutic Phlebotomy         HISTORY OF PRESENT ILLNESS:  The patient is a 52 y.o. female, here for follow up on management of elevated ferritin with macrocytic anemia    Past Medical History:   Diagnosis Date   • Anxiety    • Asthma    • GERD (gastroesophageal reflux disease)    • Hyperlipidemia    • Hypothyroidism    • Irritable bowel syndrome    • Lyme disease 05/2016   • Migraine headache    • POTS (postural orthostatic tachycardia syndrome)  "   • Syncope 2016   • Tachycardia 2016     Past Surgical History:   Procedure Laterality Date   •  SECTION  2003   • COLONOSCOPY         Allergies   Allergen Reactions   • Morphine And Related Itching       Family History and Social History reviewed and changed as necessary    REVIEW OF SYSTEM:   No new somatic complaints    PHYSICAL EXAM:  Lungs clear.  No jaundice or icterus.    Vitals:    23 1447   BP: 119/74   Pulse: 100   Resp: 18   Temp: 97.5 °F (36.4 °C)   TempSrc: Temporal   SpO2: 94%   Weight: 96.2 kg (212 lb)   Height: 172.7 cm (67.99\")     Vitals:    23 1447   PainSc: 0-No pain          ECOG score: 0           Vitals reviewed.    ECOG: (0) Fully Active - Able to Carry On All Pre-disease Performance Without Restriction    Lab Results   Component Value Date    HGB 11.5 (L) 2023    HCT 36.3 2023    .8 (H) 2023     2023    WBC 9.82 2023    NEUTROABS 6.84 2023    LYMPHSABS 2.05 2023    MONOSABS 0.77 2023    EOSABS 0.07 2023    BASOSABS 0.05 2023       Lab Results   Component Value Date    GLUCOSE 99 2023    BUN 2 (L) 2023    CREATININE 0.72 2023     2023    K 3.4 (L) 2023    CL 98 2023    CO2 26.0 2023    CALCIUM 9.1 2023    PROTEINTOT 7.7 2023    ALBUMIN 3.8 2023    BILITOT 1.0 2023    ALKPHOS 266 (H) 2023     (H) 2023    ALT 37 (H) 2023             ASSESSMENT & PLAN:  1.  Elevated ferritin with heterozygous H63D mutation with hepatic steatosis on CT  and ultrasound .  Elevated liver enzymes. HSM on 2023 US.    2.  Hyperlipidemia  3.  Restless legs  4.  Sleep apnea  5.  Hypothyroidism with Hashimoto's  6.  Migraine  7.  Sinusitis  8.  Tubular adenoma without dysplasia ascending colon polypectomy with transverse colon polyp 2021 Dr. Almodovar.  9.  Syncope  10.  Macrocytic anemia with folate " deficiency likely causing ineffective erythropoiesis with mild elevation of reticulocyte count as well    Hematology history timeline:  -2/27/2015 iron 301.  .  -4/7/2016 iron 276  -5/2/16 CT abdomen without contrast shows marked inhomogeneous geographic appearing liver with mosaic pattern typical for marked steatosis with focal area is of fat sparing.  Pattern noted by ultrasound on 11/10/2015.  No focal liver mass.  No ascites.  No adenopathy or stranding in the mesentery.  Left kidney stone nonobstructing noted  -7/14/2022 ferritin 1609 ng/mL, iron 123 with saturation 31%.  Total iron binding capacity 396.  Calculated transferrin saturation 31%  -11/16/2022 white count 2159 ng/mL.  , , alkaline phosphatase 325.    -12/12/2022 [facility] hematology consult: Patient being seen for elevated ferritin as outlined above with elevated liver enzymes and history of hepatic steatosis on prior CT and ultrasound as outlined above.  The liver dysfunction can be due to multiple simultaneous culprits and I will check a liver iron quantitation but there is very little downside to phlebotomy but I would not want to ascribe all of this to hemochromatosis as it is unusual to have heterozygosity to cause significant clinical iron overload and she may have multifactorial reasons for liver dysfunction.  She is due to see Dr. Almodovar who she apparently has seen in the past within the next month.  I will let him decide on liver biopsy to sort this out but in the near term I will check a liver iron quantitation and in the near term we will set her up to start phlebotomies with a goal to get her ferritin down to 100 provided that I do not make her significantly anemic before that happens.  She has had elevated iron dating back at least 7 years with an elevated GGT in 2015 and iron of 301 at that point.  We will start with 1 unit phlebotomy weekly for the next month and I will see her back in about a month to see what we  find from the above tests.  We will hold for hemoglobin less than 11.  No signs or symptoms of cardiomyopathy or pituitary dysfunction.    -12/14/2022 ALT 51  alkaline phosphatase 311.    -1/5/2023 hemoglobin 10.8 .3 with ferritin 928  -1/10/2023 consultation Dr. Almodovar.  With family history of esophageal cancer, he will check EGD.  He did not think she had hemochromatosis but secondary iron overload but the reason for which is not clear to me.  He suggested duplex portal vessels, autoimmune hepatitis and primary biliary cirrhosis work-up and repeat colonoscopy in 2024.  -1/10/2023 multiplicity of labs ordered by Dr. Almodovar including antimitochondrial antibody, hepatitis serologies, anti-smooth muscle antibody, KIT, and alpha 1 antitrypsin but patient has not had drawn.    -1/12/2023 hemoglobin 11.4  ferritin 1030.    -1/17/2023 consultation Dr. Bear regarding chronic cough and possible asthma with vasomotor rhinitis.  Ordered chest x-ray and CT of chest and inhalers.    - 1/19/2023 MRI noncontrast as ordered by Dr. Almodovar was not read for iron quantitation as requested by my prior MRI abdomen noncontrast order for liver iron quantitation in December, but I had Dr. Byers look at this and he did not think that her 27 cm hepatomegaly on MRI was likely related iron overload but much more likely fatty liver infiltration.  Hemoglobin 10.5 .6 with ferritin 1054.    -1/20/2023 duplex hepatic portal ultrasound shows no portal hypertension but evidence of splenomegaly 14.3 cm    - 1/26/2023 [facility] hematology follow-up: Today her hemoglobin is 11.5 which is improving with .8.  She has not been able to get phlebotomized regularly due to her hemoglobin generally being less than 11 and Dr. Almodovar is not convinced of this being hereditary hemochromatosis and I would tend to agree but unfortunately the MRI that ended up being done was not a liver iron quantitation but nonetheless Dr. Byers has  looked at this and said that if there is iron overload it would be borderline based on his gestalt of the images available.  She does have hepatosplenomegaly and that may be a source of her mild macrocytosis and anemia but I will do a formal evaluation for causes of anemia including megaloblastic panel, hemolytic panel, and though inflammatory markers.  Ultimately I will defer to Dr. Almodovar as to doing liver biopsy to look for other causes beyond hepatic steatosis but I think that is the most likely culprit and heterozygous H63D mutation is unlikely to be pathogenic but not impossible but she has no room for phlebotomies with a baseline hemoglobin running right at 11 give or take 0.5.    -1/26/2023 data:  Hemoglobin 11.5 .8 otherwise unremarkable CBC.  ALT 37 and  alkaline phosphatase 266 potassium 3.4 otherwise unremarkable CMP.  Elevations of quantitative IgM and IgA with normal immunoglobulin G likely all reactive.  Ferritin 695.1  Alpha 1 antitrypsin elevated to 13  KIT negative.  Anti-smooth muscle antibody negative  Antimitochondrial antibody +27.6 upper limit 24.9 (ursodiol started by Dr. Almodovar)  Hepatitis ABC negative.  Hemolytic panel: Reticulocyte count elevated 4%.  Haptoglobin normal 185.  G6PD elevated 480.  ARTI negative.  LDH minimally elevated to 20 upper limit 214 and compared to 560 11/16/2022.  Peripheral smear unremarkable with normal white blood cell and platelet morphology and minimal mild macrocytic anemia.  Plasma free hemoglobin elevated 7.6 but urine hemosiderin negative.  Erythropoietin elevated 44.9  Megaloblastic panel: Methylmalonic acidNormal 78 with B12 normal 700.  Folic acid low 2.93 with homocystine elevated 27.5.    -2/8/2023 CT chest ordered by Dr. Bear for chronic cough shows no intrathoracic process but just diffuse hepatic steatosis with mild hepatomegaly and coronary artery calcifications    --2/23/2023 [facility] hematology follow-up: With decreased folic acid and  high homocystine, she has folic acid deficiency which we will start 1 mg daily.  She knows to avoid alcohol in its entirety with the discordant elevation of AST as well as with fatty liver disease now on ursodiol with antimitochondrial antibody positive.  Suspect the macrocytosis is both due to folate deficiency and the dyserythropoiesis causing some elevation of her MCV and causing perhaps some reticulocytosis coming from splenic sequestration though not massive splenomegaly and not pancytopenic.  We will see what her MCV does over time as well as her hemoglobin and she has no room for phlebotomies for the elevated ferritin with the heterozygous H63D mutation.  Perhaps in the future her hemoglobin will improve with folate replacement and that will allow for phlebotomies albeit as I stated on my last note, the gestalt is that the liver is not iron overloaded on MRI.  She will follow-up with Dr. Bear on the cough with no intrathoracic process    Total time of care today inclusive of time spent today prior to patient's arrival reviewing interval CTs and interval labs and during visit covering all this with her and explaining the plan for follow-up as outlined in after visit instituting this plan took 30 minutes of patient care time throughout the day today.  French Shearer MD    02/23/2023

## 2023-02-23 NOTE — TELEPHONE ENCOUNTER
Refilled Rx Omeprazole 40 mg per chart via fax sent to [facility] per pt's request. Pt verbalized appreciation.

## 2023-03-02 DIAGNOSIS — F51.04 PSYCHOPHYSIOLOGICAL INSOMNIA: ICD-10-CM

## 2023-03-02 DIAGNOSIS — J30.1 SEASONAL ALLERGIC RHINITIS DUE TO POLLEN: ICD-10-CM

## 2023-03-02 DIAGNOSIS — E78.5 HYPERLIPIDEMIA, UNSPECIFIED HYPERLIPIDEMIA TYPE: ICD-10-CM

## 2023-03-02 RX ORDER — MONTELUKAST SODIUM 10 MG/1
10 TABLET ORAL
Qty: 90 TABLET | Refills: 2 | OUTPATIENT
Start: 2023-03-02

## 2023-03-02 RX ORDER — ROSUVASTATIN CALCIUM 10 MG/1
10 TABLET, COATED ORAL DAILY
Qty: 30 TABLET | Refills: 0 | Status: SHIPPED | OUTPATIENT
Start: 2023-03-02 | End: 2023-03-07

## 2023-03-02 RX ORDER — TRAZODONE HYDROCHLORIDE 100 MG/1
100 TABLET ORAL NIGHTLY PRN
Qty: 90 TABLET | Refills: 0 | Status: SHIPPED | OUTPATIENT
Start: 2023-03-02 | End: 2023-03-07

## 2023-03-02 NOTE — TELEPHONE ENCOUNTER
Caller: Dawna Lovell    Relationship: Self    Best call back number: [phone]  Requested Prescriptions:   Requested Prescriptions     Pending Prescriptions Disp Refills   • traZODone (DESYREL) 100 MG tablet 90 tablet 0     Sig: Take 1 tablet by mouth At Night As Needed for Sleep.        Pharmacy where request should be sent: [id number] [address] [facility] [phone] [facility] [phone] FX     Additional details provided by patient: PATIENT HAS NO REFILLS    Does the patient have less than a 3 day supply:  [x] Yes  [] No    Would you like a call back once the refill request has been completed: [] Yes [] No    If the office needs to give you a call back, can they leave a voicemail: [] Yes [] No    Tyrone Cruz Rep   03/02/23 10:42 EST

## 2023-03-07 DIAGNOSIS — F51.04 PSYCHOPHYSIOLOGICAL INSOMNIA: ICD-10-CM

## 2023-03-07 DIAGNOSIS — E78.5 HYPERLIPIDEMIA, UNSPECIFIED HYPERLIPIDEMIA TYPE: ICD-10-CM

## 2023-03-07 RX ORDER — ROSUVASTATIN CALCIUM 10 MG/1
10 TABLET, COATED ORAL DAILY
Qty: 90 TABLET | Refills: 0 | Status: SHIPPED | OUTPATIENT
Start: 2023-03-07

## 2023-03-07 RX ORDER — TRAZODONE HYDROCHLORIDE 100 MG/1
100 TABLET ORAL NIGHTLY PRN
Qty: 90 TABLET | Refills: 0 | Status: SHIPPED | OUTPATIENT
Start: 2023-03-07

## 2023-03-16 DIAGNOSIS — F41.9 ANXIETY: ICD-10-CM

## 2023-03-16 RX ORDER — VENLAFAXINE HYDROCHLORIDE 150 MG/1
CAPSULE, EXTENDED RELEASE ORAL
Qty: 90 CAPSULE | Refills: 0 | Status: SHIPPED | OUTPATIENT
Start: 2023-03-16

## 2023-03-23 NOTE — PROGRESS NOTES
[facility] [place] [facility] [place]    Subjective     Patient: Dawna Lovell  : 1970    Primary Care Provider: Jesenia Leahy PA    Requesting Provider: As above    Pain of the Left Ankle      History    Chief Complaint: Left ankle pain    History of Present Illness: Is a very pleasant 49-year-old female presents today to discuss her left ankle pain since an inversion injury 10/16/2020.  She complains of lateral ankle pain with some radiating pain on the dorsum of her foot.  She reports swelling laterally with no radiating pain proximally.  She describes the pain as aching and sharp.  Should she has moderate to severe pain with weightbearing and walking little pain with rest and no night pain.  She was seen at urgent treatment on 10/23/2020 with x-rays showing a tiny avulsion fracture at the tip of the fibula.  She has been weightbearing in a short boot.  She is here for further evaluation and treatment recommendations.    Current Outpatient Medications on File Prior to Visit   Medication Sig Dispense Refill   • ASHLYNA 0.15-0.03 &0.01 MG tablet Take 1 tablet by mouth Daily.  0   • azithromycin (ZITHROMAX) 250 MG tablet Take 1 tablet by mouth Daily. Take 2 tablets the first day, then 1 tablet daily for 4 days. 6 tablet 0   • carvedilol (COREG) 6.25 MG tablet Take 1 tablet by mouth 2 (Two) Times a Day With Meals. 60 tablet 2   • cyclobenzaprine (FLEXERIL) 10 MG tablet Take 1 tablet by mouth 3 (Three) Times a Day As Needed for Muscle Spasms. 30 tablet 0   • fluticasone (FLONASE) 50 MCG/ACT nasal spray 1 spray into the nostril(s) as directed by provider Daily. 16 mL 5   • hydrOXYzine (ATARAX) 25 MG tablet TAKE 1 TABLET Twice daily PRN 40 tablet 1   • Levonorgest-Eth Estrad 91-Day 0.1-0.02 & 0.01 MG tablet TAKE 1 TABLET DAILY UNTIL FINISHED.     • levothyroxine (SYNTHROID, LEVOTHROID) 137 MCG tablet Take 1 tablet by mouth Daily. 90 tablet 0   • loratadine (CLARITIN) 10 MG tablet take 1 tablet by  mouth once daily 30 tablet 1   • methocarbamol (ROBAXIN) 500 MG tablet Take 1 tablet by mouth 3 (Three) Times a Day As Needed for Muscle Spasms. 30 tablet 2   • montelukast (SINGULAIR) 10 MG tablet Take 1 tablet by mouth every night at bedtime. 30 tablet 5   • Multiple Vitamins-Minerals (MULTIVITAMIN ADULT PO) Take  by mouth Daily.     • olopatadine (PATANOL) 0.1 % ophthalmic solution      • rOPINIRole (REQUIP) 1 MG tablet Take 0.5-1 tablets by mouth Every Night. 30 tablet 11   • SUMAtriptan (IMITREX) 25 MG tablet As Needed.  0   • venlafaxine XR (EFFEXOR-XR) 150 MG 24 hr capsule Take 1 capsule by mouth Daily. 30 capsule 5     No current facility-administered medications on file prior to visit.       Allergies   Allergen Reactions   • Morphine And Related Itching      Past Medical History:   Diagnosis Date   • Anxiety    • Asthma    • GERD (gastroesophageal reflux disease)    • Hyperlipidemia    • Hypothyroidism    • Irritable bowel syndrome    • Lyme disease 2016   • Migraine headache    • POTS (postural orthostatic tachycardia syndrome)    • Syncope 2016   • Tachycardia 2016     Past Surgical History:   Procedure Laterality Date   •  SECTION  2003     Family History   Problem Relation Age of Onset   • Esophageal cancer Father    • Cancer Father         Esophogical   • Dementia Other       Social History     Socioeconomic History   • Marital status:      Spouse name: Not on file   • Number of children: Not on file   • Years of education: Not on file   • Highest education level: Not on file   Tobacco Use   • Smoking status: Former Smoker     Packs/day: 0.00     Years: 0.00     Pack years: 0.00     Types: Cigarettes     Quit date:      Years since quittin.8   • Smokeless tobacco: Never Used   Substance and Sexual Activity   • Alcohol use: Yes     Alcohol/week: 1.0 standard drinks     Types: 1 Glasses of wine per week   • Drug use: No   • Sexual activity: Yes     Partners:  "Male     Birth control/protection: Other     Comment: Pill        Review of Systems   Constitutional: Negative.    HENT: Negative.    Eyes: Negative.    Respiratory: Negative.    Cardiovascular: Negative.    Gastrointestinal: Negative.    Endocrine: Negative.    Genitourinary: Negative.    Musculoskeletal: Positive for arthralgias.   Skin: Negative.    Allergic/Immunologic: Negative.    Neurological: Negative.    Hematological: Negative.    Psychiatric/Behavioral: Negative.        The following portions of the patient's history were reviewed and updated as appropriate: allergies, current medications, past family history, past medical history, past social history, past surgical history and problem list.      Objective      Physical Exam  Pulse 103   Ht 172.7 cm (67.99\")   Wt 83.9 kg (184 lb 15.5 oz)   SpO2 98%   BMI 28.13 kg/m²     Body mass index is 28.13 kg/m².    GENERAL: Body habitus: normal weight for height    Lower extremity edema: Left: none; Right: none    Varicose veins:  Left: none; Right: none    Gait: antalgic     Mental Status:  awake and alert; oriented to person, place, and time    Voice:  clear  SKIN:  Normal    Hair Growth:  Right:normal; Left:  normal  HEENT: Head: Normocephalic, atraumatic,  without obvious abnormality.  eye: normal external eye, no icterus   PULM:  Repiratory effort normal    Ortho Exam  V:  Dorsalis Pedis:  Right: 2+; Left:2+    Posterior Tibial: Right:2+; Left:2+    Capillary Refill:  Brisk  MSK:      Tibia:  Right:  non tender; Left:  non tender      Ankle:  Right: non tender; Left:  tender Over the ATFL with no bony tenderness.  No medial tenderness, ROM  normal and motor function  normal      Foot:  Right:  non tender; Left:  non tender      NEURO: Heel Walking:  Right:  unable to test; Left:  unable to test    Toe Walking:  Right:  unable to test; Left:  unable to test     [place] 5.07 monofilament test: not evaluated    Lower extremity sensation: intact "     Calf Atrophy:none    Motor Function: all 5/5          Medical Decision Making    Data Review:   reviewed radiology images    Assessment:  1. Sprain of left ankle, unspecified ligament, initial encounter        Plan:  Left ankle sprain.  I reviewed x-rays from 10/21/2020 and clinical findings with the patient.  She is tender over the ATFL with intact motor function no medial tenderness.  X-rays show tiny avulsion fracture at the tip of the fibula with no other acute bony findings.  Explained to the her that this is an ankle sprain.  Definition of an ankle sprain is trying ligaments and she has just pulled a small piece of bone off of it as well.  This can be treated as an ankle sprain.  We will get her a tall boot for comfort.  I will see her back in 2 weeks or sooner if needed.      Cynthia Guzman PA-C  10/27/20  12:07 EDT   84

## 2023-03-27 ENCOUNTER — OFFICE VISIT (OUTPATIENT)
Dept: ENDOCRINOLOGY | Facility: CLINIC | Age: 53
End: 2023-03-27
Payer: COMMERCIAL

## 2023-03-27 VITALS
OXYGEN SATURATION: 99 % | WEIGHT: 200 LBS | SYSTOLIC BLOOD PRESSURE: 118 MMHG | BODY MASS INDEX: 30.31 KG/M2 | HEIGHT: 68 IN | HEART RATE: 96 BPM | DIASTOLIC BLOOD PRESSURE: 70 MMHG

## 2023-03-27 DIAGNOSIS — E06.3 HYPOTHYROIDISM DUE TO HASHIMOTO'S THYROIDITIS: Primary | ICD-10-CM

## 2023-03-27 DIAGNOSIS — E03.8 HYPOTHYROIDISM DUE TO HASHIMOTO'S THYROIDITIS: Primary | ICD-10-CM

## 2023-03-27 PROCEDURE — 99213 OFFICE O/P EST LOW 20 MIN: CPT | Performed by: INTERNAL MEDICINE

## 2023-04-12 ENCOUNTER — LAB (OUTPATIENT)
Dept: LAB | Facility: HOSPITAL | Age: 53
End: 2023-04-12
Payer: COMMERCIAL

## 2023-04-12 ENCOUNTER — PATIENT MESSAGE (OUTPATIENT)
Dept: INTERNAL MEDICINE | Facility: CLINIC | Age: 53
End: 2023-04-12

## 2023-04-12 ENCOUNTER — OFFICE VISIT (OUTPATIENT)
Dept: INTERNAL MEDICINE | Facility: CLINIC | Age: 53
End: 2023-04-12
Payer: COMMERCIAL

## 2023-04-12 VITALS
HEIGHT: 68 IN | HEART RATE: 78 BPM | BODY MASS INDEX: 31.64 KG/M2 | OXYGEN SATURATION: 94 % | SYSTOLIC BLOOD PRESSURE: 88 MMHG | DIASTOLIC BLOOD PRESSURE: 58 MMHG | WEIGHT: 208.8 LBS | TEMPERATURE: 97.3 F

## 2023-04-12 DIAGNOSIS — I95.9 HYPOTENSION, UNSPECIFIED HYPOTENSION TYPE: ICD-10-CM

## 2023-04-12 DIAGNOSIS — Z00.00 HEALTH CARE MAINTENANCE: ICD-10-CM

## 2023-04-12 DIAGNOSIS — E03.8 HYPOTHYROIDISM DUE TO HASHIMOTO'S THYROIDITIS: ICD-10-CM

## 2023-04-12 DIAGNOSIS — S96.912D MUSCLE STRAIN OF LEFT FOOT, SUBSEQUENT ENCOUNTER: ICD-10-CM

## 2023-04-12 DIAGNOSIS — Z12.31 ENCOUNTER FOR SCREENING MAMMOGRAM FOR MALIGNANT NEOPLASM OF BREAST: ICD-10-CM

## 2023-04-12 DIAGNOSIS — Z00.00 HEALTH CARE MAINTENANCE: Primary | ICD-10-CM

## 2023-04-12 DIAGNOSIS — R74.8 ELEVATED LIVER ENZYMES: ICD-10-CM

## 2023-04-12 DIAGNOSIS — R79.89 ELEVATED FERRITIN: ICD-10-CM

## 2023-04-12 DIAGNOSIS — M79.605 BILATERAL LEG PAIN: Primary | ICD-10-CM

## 2023-04-12 DIAGNOSIS — S99.922A FOOT INJURY, LEFT, INITIAL ENCOUNTER: ICD-10-CM

## 2023-04-12 DIAGNOSIS — E06.3 HYPOTHYROIDISM DUE TO HASHIMOTO'S THYROIDITIS: ICD-10-CM

## 2023-04-12 DIAGNOSIS — E83.110 HEREDITARY HEMOCHROMATOSIS: ICD-10-CM

## 2023-04-12 DIAGNOSIS — D52.0 DIETARY FOLATE DEFICIENCY ANEMIA: ICD-10-CM

## 2023-04-12 DIAGNOSIS — M79.604 BILATERAL LEG PAIN: Primary | ICD-10-CM

## 2023-04-12 DIAGNOSIS — G90.A POTS (POSTURAL ORTHOSTATIC TACHYCARDIA SYNDROME): ICD-10-CM

## 2023-04-12 LAB
ALBUMIN SERPL-MCNC: 3.4 G/DL (ref 3.5–5.2)
ALBUMIN/GLOB SERPL: 1 G/DL
ALP SERPL-CCNC: 128 U/L (ref 39–117)
ALT SERPL W P-5'-P-CCNC: 18 U/L (ref 1–33)
ANION GAP SERPL CALCULATED.3IONS-SCNC: 12 MMOL/L (ref 5–15)
ANISOCYTOSIS BLD QL: NORMAL
AST SERPL-CCNC: 54 U/L (ref 1–32)
BASOPHILS # BLD AUTO: 0.05 10*3/MM3 (ref 0–0.2)
BASOPHILS NFR BLD AUTO: 0.8 % (ref 0–1.5)
BILIRUB CONJ SERPL-MCNC: 0.3 MG/DL (ref 0–0.3)
BILIRUB SERPL-MCNC: 0.8 MG/DL (ref 0–1.2)
BUN SERPL-MCNC: 5 MG/DL (ref 6–20)
BUN/CREAT SERPL: 6.9 (ref 7–25)
CALCIUM SPEC-SCNC: 9.2 MG/DL (ref 8.6–10.5)
CHLORIDE SERPL-SCNC: 102 MMOL/L (ref 98–107)
CHOLEST SERPL-MCNC: 119 MG/DL (ref 0–200)
CK SERPL-CCNC: 28 U/L (ref 20–180)
CO2 SERPL-SCNC: 26 MMOL/L (ref 22–29)
CORTIS AM PEAK SERPL-MCNC: 16.75 MCG/DL
CREAT SERPL-MCNC: 0.72 MG/DL (ref 0.57–1)
DACRYOCYTES BLD QL SMEAR: NORMAL
DEPRECATED RDW RBC AUTO: 68.5 FL (ref 37–54)
EGFRCR SERPLBLD CKD-EPI 2021: 101.4 ML/MIN/1.73
EOSINOPHIL # BLD AUTO: 0.09 10*3/MM3 (ref 0–0.4)
EOSINOPHIL NFR BLD AUTO: 1.4 % (ref 0.3–6.2)
ERYTHROCYTE [DISTWIDTH] IN BLOOD BY AUTOMATED COUNT: 17 % (ref 12.3–15.4)
FERRITIN SERPL-MCNC: 427 NG/ML (ref 13–150)
FOLATE SERPL-MCNC: 9.94 NG/ML (ref 4.78–24.2)
GGT SERPL-CCNC: 213 U/L (ref 5–36)
GLOBULIN UR ELPH-MCNC: 3.5 GM/DL
GLUCOSE SERPL-MCNC: 131 MG/DL (ref 65–99)
HCT VFR BLD AUTO: 28.8 % (ref 34–46.6)
HDLC SERPL-MCNC: 32 MG/DL (ref 40–60)
HGB BLD-MCNC: 9 G/DL (ref 12–15.9)
IMM GRANULOCYTES # BLD AUTO: 0.02 10*3/MM3 (ref 0–0.05)
IMM GRANULOCYTES NFR BLD AUTO: 0.3 % (ref 0–0.5)
IRON 24H UR-MRATE: 55 MCG/DL (ref 37–145)
IRON SATN MFR SERPL: 18 % (ref 20–50)
LDLC SERPL CALC-MCNC: 64 MG/DL (ref 0–100)
LDLC/HDLC SERPL: 1.92 {RATIO}
LYMPHOCYTES # BLD AUTO: 1.76 10*3/MM3 (ref 0.7–3.1)
LYMPHOCYTES NFR BLD AUTO: 28 % (ref 19.6–45.3)
MACROCYTES BLD QL SMEAR: NORMAL
MCH RBC QN AUTO: 34.9 PG (ref 26.6–33)
MCHC RBC AUTO-ENTMCNC: 31.3 G/DL (ref 31.5–35.7)
MCV RBC AUTO: 111.6 FL (ref 79–97)
MONOCYTES # BLD AUTO: 0.42 10*3/MM3 (ref 0.1–0.9)
MONOCYTES NFR BLD AUTO: 6.7 % (ref 5–12)
NEUTROPHILS NFR BLD AUTO: 3.95 10*3/MM3 (ref 1.7–7)
NEUTROPHILS NFR BLD AUTO: 62.8 % (ref 42.7–76)
NRBC BLD AUTO-RTO: 0 /100 WBC (ref 0–0.2)
PLAT MORPH BLD: NORMAL
PLATELET # BLD AUTO: 351 10*3/MM3 (ref 140–450)
PMV BLD AUTO: 9 FL (ref 6–12)
POTASSIUM SERPL-SCNC: 3.4 MMOL/L (ref 3.5–5.2)
PROT SERPL-MCNC: 6.9 G/DL (ref 6–8.5)
RBC # BLD AUTO: 2.58 10*6/MM3 (ref 3.77–5.28)
SODIUM SERPL-SCNC: 140 MMOL/L (ref 136–145)
TIBC SERPL-MCNC: 298 MCG/DL (ref 298–536)
TRANSFERRIN SERPL-MCNC: 200 MG/DL (ref 200–360)
TRIGL SERPL-MCNC: 128 MG/DL (ref 0–150)
VLDLC SERPL-MCNC: 23 MG/DL (ref 5–40)
WBC MORPH BLD: NORMAL
WBC NRBC COR # BLD: 6.29 10*3/MM3 (ref 3.4–10.8)

## 2023-04-12 PROCEDURE — 83540 ASSAY OF IRON: CPT

## 2023-04-12 PROCEDURE — 36415 COLL VENOUS BLD VENIPUNCTURE: CPT

## 2023-04-12 PROCEDURE — 84443 ASSAY THYROID STIM HORMONE: CPT

## 2023-04-12 PROCEDURE — 82550 ASSAY OF CK (CPK): CPT

## 2023-04-12 PROCEDURE — 86038 ANTINUCLEAR ANTIBODIES: CPT

## 2023-04-12 PROCEDURE — 82728 ASSAY OF FERRITIN: CPT

## 2023-04-12 PROCEDURE — 80053 COMPREHEN METABOLIC PANEL: CPT

## 2023-04-12 PROCEDURE — 82103 ALPHA-1-ANTITRYPSIN TOTAL: CPT

## 2023-04-12 PROCEDURE — 85007 BL SMEAR W/DIFF WBC COUNT: CPT

## 2023-04-12 PROCEDURE — 82746 ASSAY OF FOLIC ACID SERUM: CPT

## 2023-04-12 PROCEDURE — 86376 MICROSOMAL ANTIBODY EACH: CPT

## 2023-04-12 PROCEDURE — 82248 BILIRUBIN DIRECT: CPT

## 2023-04-12 PROCEDURE — 85025 COMPLETE CBC W/AUTO DIFF WBC: CPT

## 2023-04-12 PROCEDURE — 82977 ASSAY OF GGT: CPT

## 2023-04-12 PROCEDURE — 82533 TOTAL CORTISOL: CPT

## 2023-04-12 PROCEDURE — 80061 LIPID PANEL: CPT

## 2023-04-12 PROCEDURE — 84466 ASSAY OF TRANSFERRIN: CPT

## 2023-04-12 RX ORDER — NYSTATIN 100000 U/G
OINTMENT TOPICAL
Qty: 15 G | Refills: 1 | Status: SHIPPED | OUTPATIENT
Start: 2023-04-12

## 2023-04-12 NOTE — PROGRESS NOTES
"Chief Complaint   Patient presents with   • Annual Exam   • Anxiety       Subjective     Dawna Lovell is a 52 y.o. female.        History of Present Illness  The patient is being seen for a health maintenance evaluation.  The last health maintenance was 1 year(s) ago.     Social History  Dawna  does not smoke cigarettes.   She drinks no alcohol.  She does not use illicit drugs.     General History  Dawna  does have regular dental visits.  She does complain of vision problems. Wears glasses. Last eye exam was 2022.  Immunizations are not up to date. The patient needs the following immunizations:  shingrix recommended     Lifestyle  Dawna  consumes in general, a \"healthy\" diet  .  She exercises rarely.     Reproductive Health  Dawna  is perimenopausal, almost 1 year since last period.  She reports periods are nonexistent since June 2022.  She is sexually active. Her contraceptive plan is no method.     Screening  Last pap was 6/2022 by  gyn, Oumou Avila. History of abnormal pap smear or family history of gyn cancer: remote history of abnormal pap; normal since  Last mammogram was  8/2021. Personal or family history of abnormal mammograms or breast cancer: none  Last colonoscopy was  by 8/2021, repeat 2031. Family history of colon cancer: none  Last DEXA was never.      The patient is accompanied by her .    Her blood pressure is low today. Denies feeling bad or that it is low. She looks pale today which she attributes to not being \"in the sun at all.\" Her last meal was last night. Had a small amount of water this morning when she took her medication. Hypotension last night. Her  reports hypotension at the urgent treatment center on 03/30/2023. Her blood pressure historically has always been around 120/80 mmHg. Confirms taking 1 Coreg this morning. Feels lightheaded when she stands up. Denies working from home and unable to do so today. Her  does all the driving.    Her  " reports she initially was prescribed Coreg because her pulse rate as in the high 180's. Heart rate is usually 100 bpm. Denies following with cardiology about her tachycardia. Can feel her heart fluttering when she does not take Coreg. Her  notes she drinks a large amount of lime flavored carbonated water that is caffeine and sugar free and water.    Saw Dr. Shearer who ordered a phlebotomy twice and 3 out of 5 times her numbers were okay. Went for a follow-up with him and was told she was anemic and she has been taking folic acid. Her  confirms she had repeat blood work due before her follow-up with Dr. Shearer in 08/2023.    Saw Dr. Almodovar who disagreed with diagnosis of hemochromatosis. She had 2 tests simultaneously, 1 came back positive and the other was negative. Also had some liver abnormalities on the imaging which was diagnosed as fatty liver. Told to stop drinking and lose weight. Follow-up appointment with Dr. Almodovar on 04/18/2023. Dr. Almodovar gave her 3 ursodiol  tablets a day for her gallbladder. Denies doing a biopsy.    Dr. Gray ordered upcoming thyroid test.     Saw Dr. Bear in 02/2023. Her breathing is much improved. Does not have dyspnea or coughing like she was after getting COVID-19. Coughing and dyspnea lasted 9 months. When she quit drinking alcohol, this resolved. Her pulmonologist thought it was a reflux and she is taking omeprazole twice per day. Inquires whether there are sufficient refills.    Confirms she has an appointment with her cardiologist next week. Postponed twice previously. She is on Coreg for POTS.    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 Unsure about hypotension at the time. Denies syncope or feeling oncoming syncope. She hit her head but felt fine. Her intern helped her get back into her chair. She got back up to go back to the printer and had a repeat episode. Her intern heard her go down and came back and helped her up. The intern called  "her  and he took her home. By 03/30/2023, her left ankle had swollen up a large amount. [facility] who did x-rays. No fractures seen but severely sprained. She was told to use wither a boot, walker, or crutches. Discomfort was generalized to her left foot and ecchymosis on her left toes. On 04/11/2023, she came home from work and it looks \"like this\" with and without the boot. Confirms it is a walking boot. Using the same boot that she used from a previous injury a couple years ago. Ecchymosis is mostly resolved, but the swelling will not go away. Currently her left foot is painful on the dorsal aspect. Denies being weightbearing a large amount of the time. Has radiating shooting pains up her legs and hips bilaterally. She thinks her foot got caught under a cabinet when she fell. Denies having left foot pain immediately after the second fall. Her  states she her left foot started being painful when he picked her up from work and later at home. Her  notes that she was \"quite spacey\" at that moment. She confirms hitting her head with both falls. She inquires about tendon damage. Can elevate her left foot at work.    Her  states that she has been sleeping a lot. She comes home at lunch time and sleeps for 1 to 1.5 hours. Returns to work and when she gets home, she intermittently takes a nap for a while. Her  is concerned about this and her hypotension. She sleeps well part of the night but wakes up at 3:00 a.m. Reads a book and tries to get back to sleep. Does not think she snores. Her  reports she snores intermittently. Due to go to a sleep medicine in 01/2023. Her  states she was tested for sleep apnea 5 years ago. She has sleep apnea but was never able to acclimate to the CPAP.     She was not eating at all before, but now she is eating. She denies having an appetite. She is doing Slim Fast for breakfast and Nutrisystem for lunch. She has been " having regular meals at night, small portions. She will start back on the Nutrisystem in the evening once she feels better. Confirms she is eating sufficient protein.    Her  reports that she had been exercising regularly on weekends prior to her falls.    Her  has noticed over the last 1 year that her arms and legs have lost mass, she has always had skinny legs, but they have gotten thinner. Her legs feel weak.    Dentist prescribed nystatin cream for her lips for oral thrush as needed. Had this twice. Tried Neosporin in the past.      Current Outpatient Medications:   •  carvedilol (COREG) 6.25 MG tablet, TAKE 1 TABLET BY MOUTH TWICE DAILY WITH MEALS, Disp: 180 tablet, Rfl: 1  •  folic acid (FOLVITE) 1 MG tablet, 1 tablet daily.  90-day supply., Disp: 30 tablet, Rfl: 90  •  gabapentin (NEURONTIN) 300 MG capsule, Take three tablets in am, 2 tablets in the pm and three tablets in hs, Disp: 720 capsule, Rfl: 1  •  hydrOXYzine (ATARAX) 25 MG tablet, TAKE 1 TABLET Twice daily PRN, Disp: 40 tablet, Rfl: 1  •  levothyroxine (SYNTHROID, LEVOTHROID) 137 MCG tablet, TAKE 1 TABLET BY MOUTH DAILY, Disp: 90 tablet, Rfl: 3  •  meloxicam (MOBIC) 15 MG tablet, TAKE 1 TABLET BY MOUTH DAILY, Disp: 30 tablet, Rfl: 0  •  Multiple Vitamins-Minerals (MULTIVITAMIN ADULT PO), Take  by mouth Daily., Disp: , Rfl:   •  nystatin (MYCOSTATIN) 007608 UNIT/GM ointment, Apply to lips 3 times daily, Disp: 15 g, Rfl: 1  •  omeprazole (priLOSEC) 40 MG capsule, Take 1 capsule by mouth 2 (Two) Times a Day., Disp: 60 capsule, Rfl: 3  •  Proventil  (90 Base) MCG/ACT inhaler, INHALE 2 PUFFS BY MOUTH EVERY 4 HOURS AS NEEDED FOR WHEEZING, Disp: 6.7 g, Rfl: 0  •  Restasis 0.05 % ophthalmic emulsion, Administer 1 drop to both eyes 2 (Two) Times a Day., Disp: , Rfl:   •  rOPINIRole (REQUIP) 1 MG tablet, Take 1 tablet by mouth Every Night., Disp: 90 tablet, Rfl: 3  •  rosuvastatin (CRESTOR) 10 MG tablet, TAKE 1 TABLET BY MOUTH DAILY,  Disp: 90 tablet, Rfl: 0  •  SUMAtriptan (IMITREX) 25 MG tablet, TAKE 1 TABLET BY MOUTH AS NEEDED FOR MIGRAINE, Disp: 9 tablet, Rfl: 1  •  tiZANidine (ZANAFLEX) 4 MG tablet, TAKE 1 TABLET BY MOUTH EVERY 8 HOURS AS NEEDED FOR MUSCLE SPASMS, Disp: 30 tablet, Rfl: 1  •  traZODone (DESYREL) 100 MG tablet, TAKE 1 TABLET BY MOUTH AT NIGHT AS NEEDED FOR SLEEP, Disp: 90 tablet, Rfl: 0  •  ursodiol (ACTIGALL) 300 MG capsule, Take 1 capsule by mouth 3 (Three) Times a Day With Meals., Disp: 270 capsule, Rfl: 3  •  venlafaxine XR (EFFEXOR-XR) 150 MG 24 hr capsule, TAKE 1 CAPSULE BY MOUTH DAILY, Disp: 90 capsule, Rfl: 0  •  vitamin D (ERGOCALCIFEROL) 1.25 MG (42213 UT) capsule capsule, Take 1 capsule by mouth 1 (One) Time Per Week., Disp: 12 capsule, Rfl: 3  •  busPIRone (BUSPAR) 5 MG tablet, TAKE 1 TABLET BY MOUTH THREE TIMES DAILY (Patient not taking: Reported on 4/12/2023), Disp: 90 tablet, Rfl: 1  •  Dymista 137-50 MCG/ACT suspension, , Disp: , Rfl:   •  ipratropium (ATROVENT) 0.06 % nasal spray, 2 sprays into the nostril(s) as directed by provider 4 (Four) Times a Day. (Patient not taking: Reported on 4/12/2023), Disp: 15 mL, Rfl: 12  •  olopatadine (PATANOL) 0.1 % ophthalmic solution, , Disp: , Rfl:   •  Trelegy Ellipta 200-62.5-25 MCG/ACT aerosol powder , , Disp: , Rfl:      PMFSH  The following portions of the patient's history were reviewed and updated as appropriate: allergies, current medications, past family history, past medical history, past social history, past surgical history and problem list.    Review of Systems   Constitutional: Positive for fatigue. Negative for appetite change, fever and unexpected weight change.   HENT: Negative for ear pain, facial swelling and sore throat.    Eyes: Negative for pain and visual disturbance.   Respiratory: Negative for chest tightness, shortness of breath and wheezing.    Cardiovascular: Negative for chest pain and palpitations.   Gastrointestinal: Negative for abdominal  "pain and blood in stool.   Endocrine: Negative.    Genitourinary: Negative for difficulty urinating and hematuria.   Musculoskeletal: Positive for myalgias. Negative for joint swelling.   Neurological: Positive for dizziness. Negative for tremors, seizures and syncope.   Hematological: Negative for adenopathy.   Psychiatric/Behavioral: Positive for sleep disturbance. Negative for dysphoric mood. The patient is not nervous/anxious.        Objective   BP (!) 88/58   Pulse 78   Temp 97.3 °F (36.3 °C)   Ht 172.7 cm (68\")   Wt 94.7 kg (208 lb 12.8 oz)   SpO2 94%   BMI 31.75 kg/m²     Physical Exam  Vitals and nursing note reviewed.   Constitutional:       General: She is not in acute distress.     Appearance: She is well-developed. She is not diaphoretic.   HENT:      Head: Normocephalic and atraumatic. Hair is normal.      Right Ear: Hearing, tympanic membrane, ear canal and external ear normal. No decreased hearing noted. No drainage.      Left Ear: Hearing, tympanic membrane, ear canal and external ear normal. No decreased hearing noted.      Nose: No nasal deformity.   Eyes:      General: Lids are normal. Lids are everted, no foreign bodies appreciated.         Right eye: No discharge.         Left eye: No discharge.      Conjunctiva/sclera: Conjunctivae normal.      Pupils: Pupils are equal, round, and reactive to light.   Neck:      Thyroid: No thyromegaly.      Vascular: No JVD.      Trachea: No tracheal deviation.   Cardiovascular:      Rate and Rhythm: Normal rate and regular rhythm.      Pulses: Normal pulses.      Heart sounds: Normal heart sounds. No murmur heard.    No friction rub. No gallop.   Pulmonary:      Effort: Pulmonary effort is normal. No respiratory distress.      Breath sounds: Normal breath sounds. No wheezing or rales.   Chest:      Chest wall: No tenderness.   Abdominal:      General: Bowel sounds are normal. There is no distension.      Palpations: Abdomen is soft. There is no mass.    "   Tenderness: There is no abdominal tenderness. There is no guarding or rebound.      Hernia: No hernia is present.   Musculoskeletal:         General: No tenderness or deformity. Normal range of motion.      Cervical back: Normal range of motion and neck supple.   Lymphadenopathy:      Cervical: No cervical adenopathy.   Skin:     General: Skin is warm and dry.      Findings: No erythema or rash.   Neurological:      Mental Status: She is alert and oriented to person, place, and time.      Cranial Nerves: No cranial nerve deficit.      Motor: No abnormal muscle tone.      Coordination: Coordination normal.      Deep Tendon Reflexes: Reflexes are normal and symmetric. Reflexes normal.   Psychiatric:         Behavior: Behavior normal.         Thought Content: Thought content normal.         Judgment: Judgment normal.              ASSESSMENT/PLAN    Diagnoses and all orders for this visit:    1. Health care maintenance (Primary)  Comments:  - Continue with future blood work as ordered.  - Included order for lipid panel.  - Follow up with Dr. Almodovar regarding hepatitis B vaccine.  Assessment & Plan:  Immunizations: TDaP done today; shingrix recommended  Eye exam: done 2022  Pap Smear: done 2019 by  gyn; pt will schedule  Mammogram: done 8/2021  Dexa: due post menopausal  Colonoscopy: done 8/2021, repeat 2031  Labs: fasting labs ordered    Counseled patient regarding multimodal approach with healthy nutrition, healthy sleep, regular physical activity, social activities, counseling, safety measures and medications.     Orders:  -     Lipid Panel; Future    2. Foot injury, left, initial encounter  Comments:  - Referral to foot orthopedic surgery.  - Future referral to podiatry if necessary.  - Continue elevating.    Orders:  -     Ambulatory Referral to Orthopedic Surgery    3. Encounter for screening mammogram for malignant neoplasm of breast  Comments:  Ordered  Orders:  -     Mammo Screening Digital Tomosynthesis  Bilateral With CAD; Future    4. POTS (postural orthostatic tachycardia syndrome)  Assessment & Plan:  Pending f/u with Cardiology. D/t hypotension, hold Coreg tonight, increase fluids today. If BP above 110/60 tomorrow, restart 3.125 mg BID and monitor BP closely.      5. Hypotension, unspecified hypotension type  Comments:  - Reduce Coreg to 0.5 tablet  - Systolic blood pressure <100 mmHg, do not take Coreg  - Follow-up cardiology  - Increase water today  - Follow specialists  Orders:  -     Cortisol - AM; Future    Other orders  -     nystatin (MYCOSTATIN) [id number] UNIT/GM ointment; Apply to lips 3 times daily  Dispense: 15 g; Refill: 1         Return in about 6 weeks (around 5/24/2023) for Follow up.     Transcribed from ambient dictation for ROLANDA Renee by Tiffany Franz.  04/12/23   11:49 EDT    Patient or patient representative verbalized consent to the visit recording.  I have personally performed the services described in this document as transcribed by the above individual, and it is both accurate and complete.

## 2023-04-13 ENCOUNTER — TELEPHONE (OUTPATIENT)
Dept: INTERNAL MEDICINE | Facility: CLINIC | Age: 53
End: 2023-04-13
Payer: COMMERCIAL

## 2023-04-13 ENCOUNTER — TELEPHONE (OUTPATIENT)
Dept: ENDOCRINOLOGY | Facility: CLINIC | Age: 53
End: 2023-04-13
Payer: COMMERCIAL

## 2023-04-13 LAB
A1AT SERPL-MCNC: 203 MG/DL (ref 101–187)
ANA SER QL: NEGATIVE
LKM-1 AB SER-ACNC: 1.1 UNITS (ref 0–20)
TSH SERPL DL<=0.005 MIU/L-ACNC: 17.9 UIU/ML (ref 0.45–4.5)

## 2023-04-13 RX ORDER — LEVOTHYROXINE SODIUM 0.15 MG/1
150 TABLET ORAL DAILY
Qty: 90 TABLET | Refills: 1 | Status: SHIPPED | OUTPATIENT
Start: 2023-04-13

## 2023-04-13 NOTE — TELEPHONE ENCOUNTER
Caller: Dawna Lovell    Relationship: Self    Best call back number:    [phone]    What is the medical concern/diagnosis:     FOOT INJURY AND LEG EVALUATION    What specialty or service is being requested:     PODIATRIST AND PHYSICAL THERAPIST    What is the provider, practice or medical service name:     PATIENT DID NOT HAVE A NAME FOR THE REFERRAL PROVIDERS    PATIENT REQUESTED A CALL BACK FROM THE REFERRAL COORDINATOR WITH NAMES OF PROVIDERS FOR REFERRALS, LOCATIONS, TELEPHONE NUMBERS, AND ANY UPDATES FOR SCHEDULING

## 2023-04-13 NOTE — PROGRESS NOTES
Chief Complaint   Patient presents with   • Hypothyroidism     Follow up        HPI:   Dawna Lovell is a 52 y.o.female who returns to endocrine clinic for follow-up evaluation of her hypothyroidism. Last visit (03/23/2022). Her history is as follows:    Interim Events:   - In 04/2022, pt developed anemia. Evaluation showed elevated ferritin level. Was diagnosed with hereditary hemachromatosis. Has had two phlebotomy treatments. Is now on folic acid.   - Is now on omeprazole 40 mg (taking once daily in AM with LT4, not BID)  - Reports increased fatigue despite sleeping more.     1) hypothyroidism due to Hashimoto's thyroiditis:  - Diagnosed approximately 2001  - switched from Brand Levoxyl to levothyroxine due to cost    Current dose: generic levothyroxine 137 µg daily  - Takes tablet in the morning on an empty stomach. Waits at least 30-60 minutes before eating/hot liquids.    - Takes multivitamin in the evening  - not on biotin supplement  - Is taking levothyroxine with the Omeprazole 40 mg    2) h/o abnormal endocrine tests:  - Pt had random serum cortisol levels collected at various time in the day which were elevated from 11/2015 to 4/2016. However, she was on an estrogen containing OCP at the time of these collections which falsely elevated the random serum cortisols. 3PM - ACTH was 11.6. Had elevated hepatic enzymes, normal glucose  - I evaluated pt in 2016 for possible Cushing's. Evaluation off birth control proved to be negative:  ( 06/2016) 24 hour urinary cortisol: was normal at 3 mcg/24 hours  (06/2016)  Midnight salivary cortisols ([facility]): normal  #1 <0.010, #2 0.013 ( normal range <0.010 - 0.090)  (07/2016) 1 mg overnight dexamethasone suppression test: 8AM cortisol was appropriately suppressed to 0.5 (normal < 1.8)    Had pt complete 1 mg overnight dex suppression test again on 4/28/2017:   04/28/2017)  1 mg overnight dexamethasone suppression test: 8AM cortisol was appropriately suppressed to  "0.4    Review of Imaging:   - CT ABD 05/02/2016 - \"showed marked inhomogeneous geographic appearing liver with a mosaic pattern, most typical for marked inhomogeneous geographic steatosis with focal areas of fat sparing. This pattern was noted by ultrasound on 11/10/2015.\" and normal adrenal glands  - CT Head w/o contrast 1/21/2016: \"Old lacunar infarct seen in the left basal ganglia. No acute intracranial abnormalities identified.\"    Other History:  - In 04/2022, pt developed anemia. Evaluation showed elevated ferritin level. Was diagnosed with hereditary hemachromatosis. Has had two phlebotomy treatments. Is now on folic acid.     Review of Systems   Constitutional: Positive for fatigue.        Wt loss, mild   Eyes:        Dry eyes   Respiratory: Negative.    Cardiovascular: Negative for palpitations (on carvediolol).   Gastrointestinal: Negative.  Negative for diarrhea and nausea.   Endocrine: Negative.    Genitourinary: Negative.    Musculoskeletal: Negative for arthralgias, back pain and myalgias.   Skin: Negative.    Allergic/Immunologic: Negative.    Neurological: Positive for numbness (tingling in lower extremities) and headaches (Occasional). Negative for tremors and syncope.   Hematological: Negative.    Psychiatric/Behavioral: Positive for sleep disturbance.        H/o Depression with anxiety, better on effexor     The following portions of the patient's history were reviewed and updated as appropriate: allergies, current medications, past family history, past medical history, past social history, past surgical history and problem list.      /70   Pulse 96   Ht 172.7 cm (68\")   Wt 90.7 kg (200 lb)   SpO2 99%   BMI 30.41 kg/m²   Physical Exam   Constitutional: She is oriented to person, place, and time. She appears well-developed. No distress.   HENT:   Head: Normocephalic.   Eyes: Pupils are equal, round, and reactive to light. Conjunctivae are normal.   Neck: No tracheal deviation present. No " thyromegaly present.   No palpable thyroid nodules     Cardiovascular: Normal rate, regular rhythm and normal heart sounds.   No murmur heard.  Pulmonary/Chest: Effort normal and breath sounds normal. No respiratory distress.   Musculoskeletal: No tenderness.   Lymphadenopathy:     She has no cervical adenopathy.   Neurological: She is alert and oriented to person, place, and time. No cranial nerve deficit.   Skin: Skin is warm and dry. She is not diaphoretic. No erythema.   Psychiatric:   Pt appeared fatigued   Vitals reviewed.    LABS/IMAGING: prior records reviewed and summarized in HPI    TSH, free T4 pending    ASSESSMENT/PLAN:  1) hypothyroidism due to Hashimoto's thyroiditis:   - clinically euthyroid on exam  - TSH, free T4 pending: pt requested to complete on 04/18/2023, the same day as her GI visit  - Will continue levothyroxine 137 mcg pending review of lab results  - Reviewed proper levothyroxine administration, and factors to avoid that decrease medication potency and medication absorption.     ADDENDUM: 04/13/2023  Lab Results   Component Value Date    TSH 17.900 (H) 04/12/2023   Spoke with patient about lab result. Confirmed pt had not missed doses. No interfering medications. However, AM omeprazole 40 mg is new which may affect dosing. Will increase to levothyroxine 150 mcg qAM. Ordered TSH for 06/02/223. Will adjust dose as indicated    RTC 6 months    Signed: Camilla Gray MD

## 2023-04-13 NOTE — TELEPHONE ENCOUNTER
Spoke to patient about TSH lab result. See clinic note addendum from 03/27/2023 for details.   Signed: Camilla Gray MD

## 2023-04-13 NOTE — TELEPHONE ENCOUNTER
----- Message from Dawna Lovell sent at 4/13/2023  8:16 AM EDT -----  Regarding: Question regarding TSH  Contact: [phone]  Hi! It looks like my TSH is very high. What should we do? Thank you!

## 2023-04-13 NOTE — ASSESSMENT & PLAN NOTE
Pending f/u with Cardiology. D/t hypotension, hold Coreg tonight, increase fluids today. If BP above 110/60 tomorrow, restart 3.125 mg BID and monitor BP closely.

## 2023-04-17 ENCOUNTER — OFFICE VISIT (OUTPATIENT)
Dept: ORTHOPEDIC SURGERY | Facility: CLINIC | Age: 53
End: 2023-04-17
Payer: COMMERCIAL

## 2023-04-17 VITALS
HEIGHT: 68 IN | DIASTOLIC BLOOD PRESSURE: 70 MMHG | SYSTOLIC BLOOD PRESSURE: 112 MMHG | BODY MASS INDEX: 31.64 KG/M2 | WEIGHT: 208.78 LBS

## 2023-04-17 DIAGNOSIS — M79.672 LEFT FOOT PAIN: ICD-10-CM

## 2023-04-17 DIAGNOSIS — S93.325A LISFRANC DISLOCATION, LEFT, INITIAL ENCOUNTER: Primary | ICD-10-CM

## 2023-04-17 DIAGNOSIS — G62.9 NEUROPATHY: ICD-10-CM

## 2023-04-17 NOTE — PROGRESS NOTES
[facility] [place] [facility] Office Visit - Indu Dial PA-C    Office Visit       Patient Name: Dawna Lovell    Chief Complaint:   Chief Complaint   Patient presents with   • Left Foot - Pain       Referring Physician: No ref. provider found    History of Present Illness:   Dawna Lovell is a 52 y.o. female who presents with left foot pain for the last 3 weeks.  She reports she fainted and tripped while at work 3 weeks ago and landed on her left foot.  She then fell again onto the left foot that same day.  She went to urgent care on 3/30/2023.  Nonweightbearing x-ray films were obtained.  She was diagnosed with a contusion and sprain of the left foot and told to follow-up with her primary care provider.  She was then referred here for further evaluation because the pain and swelling has persisted.    She rates the pain a 9/10.  She says it is very difficult to bear weight.  She has been wearing walking boot.  She takes Advil as needed for the pain.  She has been trying to elevate the foot while on her lunch breaks and at home.  Here with supportive .    She reports history of neuropathy.  No known history of diabetes.  She said she had sprained her ankle in the past.  No other known injuries to the left foot.      Subjective     Review of Systems   Musculoskeletal: Positive for arthralgias.   All other systems reviewed and are negative.       Past Medical History:   Past Medical History:   Diagnosis Date   • Anxiety    • Asthma    • GERD (gastroesophageal reflux disease)    • Hyperlipidemia    • Hypothyroidism    • Irritable bowel syndrome    • Lyme disease 2016   • Migraine headache    • POTS (postural orthostatic tachycardia syndrome)    • Syncope 2016   • Tachycardia 2016       Past Surgical History:   Past Surgical History:   Procedure Laterality Date   •  SECTION  2003   • COLONOSCOPY         Family  History:   Family History   Problem Relation Age of Onset   • Esophageal cancer Father    • Cancer Father         Esophogical   • Breast cancer Paternal Grandmother    • Dementia Other    • Ovarian cancer Neg Hx        Social History:   Social History     Socioeconomic History   • Marital status:    Tobacco Use   • Smoking status: Former     Packs/day: 0.00     Years: 0.00     Pack years: 0.00     Types: Cigarettes     Quit date:      Years since quittin.3   • Smokeless tobacco: Never   Vaping Use   • Vaping Use: Never used   Substance and Sexual Activity   • Alcohol use: Yes     Alcohol/week: 5.0 standard drinks     Types: 5 Glasses of wine per week   • Drug use: No   • Sexual activity: Yes     Partners: Male     Birth control/protection: Other     Comment: Pill       Medications:   Current Outpatient Medications:   •  busPIRone (BUSPAR) 5 MG tablet, TAKE 1 TABLET BY MOUTH THREE TIMES DAILY, Disp: 90 tablet, Rfl: 1  •  carvedilol (COREG) 6.25 MG tablet, TAKE 1 TABLET BY MOUTH TWICE DAILY WITH MEALS, Disp: 180 tablet, Rfl: 1  •  Dymista 137-50 MCG/ACT suspension, , Disp: , Rfl:   •  folic acid (FOLVITE) 1 MG tablet, 1 tablet daily.  90-day supply., Disp: 30 tablet, Rfl: 90  •  gabapentin (NEURONTIN) 300 MG capsule, Take three tablets in am, 2 tablets in the pm and three tablets in hs, Disp: 720 capsule, Rfl: 1  •  hydrOXYzine (ATARAX) 25 MG tablet, TAKE 1 TABLET Twice daily PRN, Disp: 40 tablet, Rfl: 1  •  ipratropium (ATROVENT) 0.06 % nasal spray, 2 sprays into the nostril(s) as directed by provider 4 (Four) Times a Day., Disp: 15 mL, Rfl: 12  •  levothyroxine (SYNTHROID, LEVOTHROID) 150 MCG tablet, Take 1 tablet by mouth Daily., Disp: 90 tablet, Rfl: 1  •  meloxicam (MOBIC) 15 MG tablet, TAKE 1 TABLET BY MOUTH DAILY, Disp: 30 tablet, Rfl: 0  •  Multiple Vitamins-Minerals (MULTIVITAMIN ADULT PO), Take  by mouth Daily., Disp: , Rfl:   •  nystatin (MYCOSTATIN) 548526 UNIT/GM ointment, Apply to lips 3  "times daily, Disp: 15 g, Rfl: 1  •  olopatadine (PATANOL) 0.1 % ophthalmic solution, , Disp: , Rfl:   •  omeprazole (priLOSEC) 40 MG capsule, Take 1 capsule by mouth 2 (Two) Times a Day., Disp: 60 capsule, Rfl: 3  •  Proventil  (90 Base) MCG/ACT inhaler, INHALE 2 PUFFS BY MOUTH EVERY 4 HOURS AS NEEDED FOR WHEEZING, Disp: 6.7 g, Rfl: 0  •  Restasis 0.05 % ophthalmic emulsion, Administer 1 drop to both eyes 2 (Two) Times a Day., Disp: , Rfl:   •  rOPINIRole (REQUIP) 1 MG tablet, Take 1 tablet by mouth Every Night., Disp: 90 tablet, Rfl: 3  •  rosuvastatin (CRESTOR) 10 MG tablet, TAKE 1 TABLET BY MOUTH DAILY, Disp: 90 tablet, Rfl: 0  •  SUMAtriptan (IMITREX) 25 MG tablet, TAKE 1 TABLET BY MOUTH AS NEEDED FOR MIGRAINE, Disp: 9 tablet, Rfl: 1  •  tiZANidine (ZANAFLEX) 4 MG tablet, TAKE 1 TABLET BY MOUTH EVERY 8 HOURS AS NEEDED FOR MUSCLE SPASMS, Disp: 30 tablet, Rfl: 1  •  traZODone (DESYREL) 100 MG tablet, TAKE 1 TABLET BY MOUTH AT NIGHT AS NEEDED FOR SLEEP, Disp: 90 tablet, Rfl: 0  •  Trelegy Ellipta 200-62.5-25 MCG/ACT aerosol powder , , Disp: , Rfl:   •  ursodiol (ACTIGALL) 300 MG capsule, Take 1 capsule by mouth 3 (Three) Times a Day With Meals., Disp: 270 capsule, Rfl: 3  •  venlafaxine XR (EFFEXOR-XR) 150 MG 24 hr capsule, TAKE 1 CAPSULE BY MOUTH DAILY, Disp: 90 capsule, Rfl: 0  •  vitamin D (ERGOCALCIFEROL) 1.25 MG (60278 UT) capsule capsule, Take 1 capsule by mouth 1 (One) Time Per Week., Disp: 12 capsule, Rfl: 3    Allergies:   Allergies   Allergen Reactions   • Morphine And Related Itching     Nose itching       I have reviewed and updated the following portions of the patient's history and review of systems: allergies, current medications, past family history, past medical history, past social history, past surgical history and problem list.    Objective      Vital Signs:   Vitals:    04/17/23 1433   BP: 112/70   Weight: 94.7 kg (208 lb 12.4 oz)   Height: 172.7 cm (67.99\")       Ortho Exam:  Peripheral " Vascular:  Lower Extremity:  Inspection:  Left--rapid capillary refill  Palpation:  Dorsalis pedis pulse:  Left--normal    Neurologic  Sensory:    Light Touch:     Left foot: Grossly intact sensation to palpation; [place] monofilament testing positive for patchy paresthesias.  Decreased sensation to deep and superficial peroneal nerve.  Dysesthesias in deep and superficial peroneal nerves    Overall Assessment of Muscle Strength and Tone:  Lower Extremities:       Left:  Tibialis anterior--4/5    Gastroc soleus--4/5    EHL--5/5    FHL--5/5    Musculoskeletal  Lower Extremity  Ankle/Foot:  Inspection and Palpation:        Left:  Tenderness: Tender to palpation at base of second and third metatarsals    Swelling: Left foot and ankle diffusely swollen    Effusion:  None    Crepitus:  None     ROM:     Left: Plantarflexion--40    Dorsiflexion--20    Inversion--10    Eversion--10      Results Review:   XR Foot 3+ View Left  Standing AP, lateral, oblique left foot ordered for pain, shows   significant widening between the base of the first and second metatarsal,   significant lateral subluxation of the second metatarsal on the cuneiform,   suggestion of third also, subluxation of the first TMT joint, no obvious   fractures, compared toNonweightbearing films 3/30/2023         Assessment / Plan      Assessment:  Diagnoses and all orders for this visit:    1. Lisfranc dislocation, left, initial encounter (Primary)  -     Case Request; Standing  -     CBC and Differential; Future  -     Basic metabolic panel; Future  -     Hemoglobin A1c; Future  -     ECG 12 Lead; Future  -     ethyl alcohol 62 % 2 each  -     ceFAZolin (ANCEF) 2 g in sodium chloride 0.9 % 100 mL IVPB  -     Case Request    2. Left foot pain  -     XR Foot 3+ View Left    3. Neuropathy  -     Case Request; Standing  -     CBC and Differential; Future  -     Basic metabolic panel; Future  -     Hemoglobin A1c; Future  -     ECG 12 Lead; Future  -      ethyl alcohol 62 % 2 each  -     ceFAZolin (ANCEF) 2 g in sodium chloride 0.9 % 100 mL IVPB  -     Case Request    Other orders  -     Initiate Observation Status; Standing  -     Follow Anesthesia Guidelines / Protocol; Future  -     Obtain informed consent  -     Provide instructions to patient regarding NPO status; Future  -     Chlorhexidine Skin Prep - Educate and Review With Patient; Future  -     Provide Patient With ERAS Hydration Instructions  -     Provide Patient With Enhanced Recovery Booklet(s) or Handout  -     Provide Instructions/Handout For Benzoyl Peroxide 5% Wash If Having Shoulder/Arm Surgery (If Prescribed)  -     Provide Instructions/Handout For Bactroban And Chlorhexidine Shower (If Prescribed)  -     Perform A Memory Screening On All Hip/Knee Replacement Patients >Or Equal To 65 Years Or Older  -     Complete A PROMIS And HOOS Or KOOS Survey If Having Hip Or Knee Replacement  -     Follow Anesthesia Guidelines / Protocol; Standing  -     Nerve Block; Standing  -     Verify The Time Patient Completed ERAS Hydration Drink; Standing  -     Obtain Informed Consent (If Not Done Inpatient or PAT); Standing  -     Provide Patient With Carbo Loading Instructions  -     Provide Patient With ERAS Booklet(s)/Handout      Weightbearing x-ray films ordered today to reassess for possible Lisfranc injury.  Bilateral AP ordered for comparison.    Reviewed x-ray films with Dr. Parker and patient.  Shows significant widening of second metatarsal on the cuneiform indicating  Lisfranc injury.    I counseled the patient on the risks and benefits of surgery versus non-surgical treatment. The patient desired to proceed with surgery.    Surgical risks include but are not limited to pain, bleeding, infection, failure to relieve symptoms, need for further procedures, recurrence of symptoms, damage to healthy adjacent structures, hardware loosening/failure, malunion/nonunion, stiffness, weakness, scar, blood  clots/DVT/PE, loss of limb or life. We also discussed the postoperative protocol and expected outcome although no guarantees are possible with surgery. All questions were answered; the patient would like to proceed with surgical intervention.    To be scheduled for ORIF for Lisfranc injury on left side with Dr. Parker 4/25/23.    Plan for today will be to apply short leg nonweightbearing fiberglass 3-sided splint.  She has a scooter at home in which she can use while non-weightbearing.    Recommend elevating left leg is much as possible up until surgery.  We will also be encouraged to elevate postoperatively.  She will remain nonweightbearing for 3 months after surgery.  We will plan on hardware removal at that time.    Recommend taking 81mg aspirin daily for blood clot prevention.    Short course Lortab sent in for pain control.    To plan on short term time off from work. We discussed may be able to work from home while nonweightbearing.    History, diagnosis and treatment plan discussed with Dr. Parker.    Follow Up:   Plan for ORIF for Lisfranc injury on left side with Dr. Parker 4/25/23        Indu Dial PA-C  [facility] [place] [facility]       Addendum: I examined the patient myself and went over everything with the patient and her .  She does have some underlying neuropathy this does increase her risk for surgery.  She has decreased sensation and dysesthesias in the deep and superficial peroneal nerve on the injured foot.  I explained Lisfranc injuries in great detail. I used a foot model to show the patient and explained these joints.  I explained that they rely on the very strong plantar ligaments for stability.    The main stability is in the plantar ligaments.  However when you see the widening between the bones, or a evulsion off the base of the metatarsal, you know there is a Lisfranc injury.  I explained the history of Lisfranc injuries.  I went over the treatment evolution over time.  I  explained this is primarily a ligamentous injury, the joints are  unstable now.  I explained that these are almost always career ending for athletes, patients will almost always have some pain- permanently.  I explained that they will all develop some arthritis in the Lisfranc joints, the goal of treatment is to slow that down.  Some patients in the long run develop enough arthritis to need a fusion.  I explained that these are treated with open reduction and internal fixation, non-wt bearing for 3 months, then the hardware is removed.  I explained there is some discussion in orthopaedic foot and ankle circles as to whether to remove the hardware and as to whether to do a primary fusion, but in my opinion I think the hardware needs to be removed, and I do not think a primary fusion is indicated.  I explained that in general I have seen better results with ORIF and hardware removal compared with the primary fusions that I have done.  I explained that posttraumatic arthritis in the long-term, about 20% of the time will need delayed fusion.  This can be anywhere from 1-20+ years.  I explained the recovery time.  I explained the prolonged swelling that will occur.  There will probably be some stiffness in the toes.  I explained the risk of decreased dorsal sensation.  We discussed the risks including but not limited to: death, infection, neurovascular damage, strokes, heart attacks, blood clots, chronic pain, deformity, stiffness, malunion, nonunion, hardware failure, need for further surgery,  amputation, etc.  Questions were asked and answered in detail.  I explained that I would not recommend nonoperative treatment, because the posttraumatic arthritis will develop quite rapidly due to the joint instability.  We also discussed other opinions I recommend Dr. Sousa at , or Dr. eVgas in [place].    Dictated using Dragon Speech Recognition.

## 2023-04-17 NOTE — H&P (VIEW-ONLY)
[facility] [place] - Indu Dial PA-C    Office Visit       Patient Name: Dawna Lovell    Chief Complaint:   Chief Complaint   Patient presents with   • Left Foot - Pain       Referring Physician: No ref. provider found    History of Present Illness:   Dawna Lovell is a 52 y.o. female who presents with left foot pain for the last 3 weeks.  She reports she fainted and tripped while at work 3 weeks ago and landed on her left foot.  She then fell again onto the left foot that same day.  She went to urgent care on 3/30/2023.  Nonweightbearing x-ray films were obtained.  She was diagnosed with a contusion and sprain of the left foot and told to follow-up with her primary care provider.  She was then referred here for further evaluation because the pain and swelling has persisted.    She rates the pain a 9/10.  She says it is very difficult to bear weight.  She has been wearing walking boot.  She takes Advil as needed for the pain.  She has been trying to elevate the foot while on her lunch breaks and at home.  Here with supportive .    She reports history of neuropathy.  No known history of diabetes.  She said she had sprained her ankle in the past.  No other known injuries to the left foot.      Subjective     Review of Systems   Musculoskeletal: Positive for arthralgias.   All other systems reviewed and are negative.       Past Medical History:   Past Medical History:   Diagnosis Date   • Anxiety    • Asthma    • GERD (gastroesophageal reflux disease)    • Hyperlipidemia    • Hypothyroidism    • Irritable bowel syndrome    • Lyme disease 2016   • Migraine headache    • POTS (postural orthostatic tachycardia syndrome)    • Syncope 2016   • Tachycardia 2016       Past Surgical History:   Past Surgical History:   Procedure Laterality Date   •  SECTION  2003   • COLONOSCOPY         Family  History:   Family History   Problem Relation Age of Onset   • Esophageal cancer Father    • Cancer Father         Esophogical   • Breast cancer Paternal Grandmother    • Dementia Other    • Ovarian cancer Neg Hx        Social History:   Social History     Socioeconomic History   • Marital status:    Tobacco Use   • Smoking status: Former     Packs/day: 0.00     Years: 0.00     Pack years: 0.00     Types: Cigarettes     Quit date:      Years since quittin.3   • Smokeless tobacco: Never   Vaping Use   • Vaping Use: Never used   Substance and Sexual Activity   • Alcohol use: Yes     Alcohol/week: 5.0 standard drinks     Types: 5 Glasses of wine per week   • Drug use: No   • Sexual activity: Yes     Partners: Male     Birth control/protection: Other     Comment: Pill       Medications:   Current Outpatient Medications:   •  busPIRone (BUSPAR) 5 MG tablet, TAKE 1 TABLET BY MOUTH THREE TIMES DAILY, Disp: 90 tablet, Rfl: 1  •  carvedilol (COREG) 6.25 MG tablet, TAKE 1 TABLET BY MOUTH TWICE DAILY WITH MEALS, Disp: 180 tablet, Rfl: 1  •  Dymista 137-50 MCG/ACT suspension, , Disp: , Rfl:   •  folic acid (FOLVITE) 1 MG tablet, 1 tablet daily.  90-day supply., Disp: 30 tablet, Rfl: 90  •  gabapentin (NEURONTIN) 300 MG capsule, Take three tablets in am, 2 tablets in the pm and three tablets in hs, Disp: 720 capsule, Rfl: 1  •  hydrOXYzine (ATARAX) 25 MG tablet, TAKE 1 TABLET Twice daily PRN, Disp: 40 tablet, Rfl: 1  •  ipratropium (ATROVENT) 0.06 % nasal spray, 2 sprays into the nostril(s) as directed by provider 4 (Four) Times a Day., Disp: 15 mL, Rfl: 12  •  levothyroxine (SYNTHROID, LEVOTHROID) 150 MCG tablet, Take 1 tablet by mouth Daily., Disp: 90 tablet, Rfl: 1  •  meloxicam (MOBIC) 15 MG tablet, TAKE 1 TABLET BY MOUTH DAILY, Disp: 30 tablet, Rfl: 0  •  Multiple Vitamins-Minerals (MULTIVITAMIN ADULT PO), Take  by mouth Daily., Disp: , Rfl:   •  nystatin (MYCOSTATIN) 001525 UNIT/GM ointment, Apply to lips 3  "times daily, Disp: 15 g, Rfl: 1  •  olopatadine (PATANOL) 0.1 % ophthalmic solution, , Disp: , Rfl:   •  omeprazole (priLOSEC) 40 MG capsule, Take 1 capsule by mouth 2 (Two) Times a Day., Disp: 60 capsule, Rfl: 3  •  Proventil  (90 Base) MCG/ACT inhaler, INHALE 2 PUFFS BY MOUTH EVERY 4 HOURS AS NEEDED FOR WHEEZING, Disp: 6.7 g, Rfl: 0  •  Restasis 0.05 % ophthalmic emulsion, Administer 1 drop to both eyes 2 (Two) Times a Day., Disp: , Rfl:   •  rOPINIRole (REQUIP) 1 MG tablet, Take 1 tablet by mouth Every Night., Disp: 90 tablet, Rfl: 3  •  rosuvastatin (CRESTOR) 10 MG tablet, TAKE 1 TABLET BY MOUTH DAILY, Disp: 90 tablet, Rfl: 0  •  SUMAtriptan (IMITREX) 25 MG tablet, TAKE 1 TABLET BY MOUTH AS NEEDED FOR MIGRAINE, Disp: 9 tablet, Rfl: 1  •  tiZANidine (ZANAFLEX) 4 MG tablet, TAKE 1 TABLET BY MOUTH EVERY 8 HOURS AS NEEDED FOR MUSCLE SPASMS, Disp: 30 tablet, Rfl: 1  •  traZODone (DESYREL) 100 MG tablet, TAKE 1 TABLET BY MOUTH AT NIGHT AS NEEDED FOR SLEEP, Disp: 90 tablet, Rfl: 0  •  Trelegy Ellipta 200-62.5-25 MCG/ACT aerosol powder , , Disp: , Rfl:   •  ursodiol (ACTIGALL) 300 MG capsule, Take 1 capsule by mouth 3 (Three) Times a Day With Meals., Disp: 270 capsule, Rfl: 3  •  venlafaxine XR (EFFEXOR-XR) 150 MG 24 hr capsule, TAKE 1 CAPSULE BY MOUTH DAILY, Disp: 90 capsule, Rfl: 0  •  vitamin D (ERGOCALCIFEROL) 1.25 MG (16103 UT) capsule capsule, Take 1 capsule by mouth 1 (One) Time Per Week., Disp: 12 capsule, Rfl: 3    Allergies:   Allergies   Allergen Reactions   • Morphine And Related Itching     Nose itching       I have reviewed and updated the following portions of the patient's history and review of systems: allergies, current medications, past family history, past medical history, past social history, past surgical history and problem list.    Objective      Vital Signs:   Vitals:    04/17/23 1433   BP: 112/70   Weight: 94.7 kg (208 lb 12.4 oz)   Height: 172.7 cm (67.99\")       Ortho Exam:  Peripheral " Vascular:  Lower Extremity:  Inspection:  Left--rapid capillary refill  Palpation:  Dorsalis pedis pulse:  Left--normal    Neurologic  Sensory:    Light Touch:     Left foot: Grossly intact sensation to palpation; Cornell Roby monofilament testing positive for patchy paresthesias.  Decreased sensation to deep and superficial peroneal nerve.  Dysesthesias in deep and superficial peroneal nerves    Overall Assessment of Muscle Strength and Tone:  Lower Extremities:       Left:  Tibialis anterior--4/5    Gastroc soleus--4/5    EHL--5/5    FHL--5/5    Musculoskeletal  Lower Extremity  Ankle/Foot:  Inspection and Palpation:        Left:  Tenderness: Tender to palpation at base of second and third metatarsals    Swelling: Left foot and ankle diffusely swollen    Effusion:  None    Crepitus:  None     ROM:     Left: Plantarflexion--40    Dorsiflexion--20    Inversion--10    Eversion--10      Results Review:   XR Foot 3+ View Left  Standing AP, lateral, oblique left foot ordered for pain, shows   significant widening between the base of the first and second metatarsal,   significant lateral subluxation of the second metatarsal on the cuneiform,   suggestion of third also, subluxation of the first TMT joint, no obvious   fractures, compared toNonweightbearing films 3/30/2023         Assessment / Plan      Assessment:  Diagnoses and all orders for this visit:    1. Lisfranc dislocation, left, initial encounter (Primary)  -     Case Request; Standing  -     CBC and Differential; Future  -     Basic metabolic panel; Future  -     Hemoglobin A1c; Future  -     ECG 12 Lead; Future  -     ethyl alcohol 62 % 2 each  -     ceFAZolin (ANCEF) 2 g in sodium chloride 0.9 % 100 mL IVPB  -     Case Request    2. Left foot pain  -     XR Foot 3+ View Left    3. Neuropathy  -     Case Request; Standing  -     CBC and Differential; Future  -     Basic metabolic panel; Future  -     Hemoglobin A1c; Future  -     ECG 12 Lead; Future  -      ethyl alcohol 62 % 2 each  -     ceFAZolin (ANCEF) 2 g in sodium chloride 0.9 % 100 mL IVPB  -     Case Request    Other orders  -     Initiate Observation Status; Standing  -     Follow Anesthesia Guidelines / Protocol; Future  -     Obtain informed consent  -     Provide instructions to patient regarding NPO status; Future  -     Chlorhexidine Skin Prep - Educate and Review With Patient; Future  -     Provide Patient With ERAS Hydration Instructions  -     Provide Patient With Enhanced Recovery Booklet(s) or Handout  -     Provide Instructions/Handout For Benzoyl Peroxide 5% Wash If Having Shoulder/Arm Surgery (If Prescribed)  -     Provide Instructions/Handout For Bactroban And Chlorhexidine Shower (If Prescribed)  -     Perform A Memory Screening On All Hip/Knee Replacement Patients >Or Equal To 65 Years Or Older  -     Complete A PROMIS And HOOS Or KOOS Survey If Having Hip Or Knee Replacement  -     Follow Anesthesia Guidelines / Protocol; Standing  -     Nerve Block; Standing  -     Verify The Time Patient Completed ERAS Hydration Drink; Standing  -     Obtain Informed Consent (If Not Done Inpatient or PAT); Standing  -     Provide Patient With Carbo Loading Instructions  -     Provide Patient With ERAS Booklet(s)/Handout      Weightbearing x-ray films ordered today to reassess for possible Lisfranc injury.  Bilateral AP ordered for comparison.    Reviewed x-ray films with Dr. Parker and patient.  Shows significant widening of second metatarsal on the cuneiform indicating  Lisfranc injury.    I counseled the patient on the risks and benefits of surgery versus non-surgical treatment. The patient desired to proceed with surgery.    Surgical risks include but are not limited to pain, bleeding, infection, failure to relieve symptoms, need for further procedures, recurrence of symptoms, damage to healthy adjacent structures, hardware loosening/failure, malunion/nonunion, stiffness, weakness, scar, blood  clots/DVT/PE, loss of limb or life. We also discussed the postoperative protocol and expected outcome although no guarantees are possible with surgery. All questions were answered; the patient would like to proceed with surgical intervention.    To be scheduled for ORIF for Lisfranc injury on left side with Dr. Parker 4/25/23.    Plan for today will be to apply short leg nonweightbearing fiberglass 3-sided splint.  She has a scooter at home in which she can use while non-weightbearing.    Recommend elevating left leg is much as possible up until surgery.  We will also be encouraged to elevate postoperatively.  She will remain nonweightbearing for 3 months after surgery.  We will plan on hardware removal at that time.    Recommend taking 81mg aspirin daily for blood clot prevention.    Short course Lortab sent in for pain control.    To plan on short term time off from work. We discussed may be able to work from home while nonweightbearing.    History, diagnosis and treatment plan discussed with Dr. Parker.    Follow Up:   Plan for ORIF for Lisfranc injury on left side with Dr. Parker 4/25/23        Indu Dial PA-C  [facility]       Addendum: I examined the patient myself and went over everything with the patient and her .  She does have some underlying neuropathy this does increase her risk for surgery.  She has decreased sensation and dysesthesias in the deep and superficial peroneal nerve on the injured foot.  I explained Lisfranc injuries in great detail. I used a foot model to show the patient and explained these joints.  I explained that they rely on the very strong plantar ligaments for stability.    The main stability is in the plantar ligaments.  However when you see the widening between the bones, or a evulsion off the base of the metatarsal, you know there is a Lisfranc injury.  I explained the history of Lisfranc injuries.  I went over the treatment evolution over time.  I  explained this is primarily a ligamentous injury, the joints are  unstable now.  I explained that these are almost always career ending for athletes, patients will almost always have some pain- permanently.  I explained that they will all develop some arthritis in the Lisfranc joints, the goal of treatment is to slow that down.  Some patients in the long run develop enough arthritis to need a fusion.  I explained that these are treated with open reduction and internal fixation, non-wt bearing for 3 months, then the hardware is removed.  I explained there is some discussion in orthopaedic foot and ankle circles as to whether to remove the hardware and as to whether to do a primary fusion, but in my opinion I think the hardware needs to be removed, and I do not think a primary fusion is indicated.  I explained that in general I have seen better results with ORIF and hardware removal compared with the primary fusions that I have done.  I explained that posttraumatic arthritis in the long-term, about 20% of the time will need delayed fusion.  This can be anywhere from 1-20+ years.  I explained the recovery time.  I explained the prolonged swelling that will occur.  There will probably be some stiffness in the toes.  I explained the risk of decreased dorsal sensation.  We discussed the risks including but not limited to: death, infection, neurovascular damage, strokes, heart attacks, blood clots, chronic pain, deformity, stiffness, malunion, nonunion, hardware failure, need for further surgery,  amputation, etc.  Questions were asked and answered in detail.  I explained that I would not recommend nonoperative treatment, because the posttraumatic arthritis will develop quite rapidly due to the joint instability.  We also discussed other opinions I recommend Dr. Sousa at , or Dr. Vegas in [place].    Dictated using Dragon Speech Recognition.

## 2023-04-18 ENCOUNTER — TELEPHONE (OUTPATIENT)
Dept: ORTHOPEDIC SURGERY | Facility: CLINIC | Age: 53
End: 2023-04-18
Payer: COMMERCIAL

## 2023-04-18 ENCOUNTER — TELEMEDICINE (OUTPATIENT)
Dept: GASTROENTEROLOGY | Facility: CLINIC | Age: 53
End: 2023-04-18
Payer: COMMERCIAL

## 2023-04-18 DIAGNOSIS — R79.89 ABNORMAL LIVER FUNCTION TESTS: Primary | ICD-10-CM

## 2023-04-18 DIAGNOSIS — S93.325A LISFRANC DISLOCATION, LEFT, INITIAL ENCOUNTER: Primary | ICD-10-CM

## 2023-04-18 PROCEDURE — 99443 PR PHYS/QHP TELEPHONE EVALUATION 21-30 MIN: CPT | Performed by: INTERNAL MEDICINE

## 2023-04-18 RX ORDER — HYDROCODONE BITARTRATE AND ACETAMINOPHEN 7.5; 325 MG/1; MG/1
1 TABLET ORAL EVERY 6 HOURS PRN
Qty: 30 TABLET | Refills: 0 | Status: SHIPPED | OUTPATIENT
Start: 2023-04-18

## 2023-04-18 NOTE — TELEPHONE ENCOUNTER
Dr. Mccormick,    Dr. Bauer didn't get this order in yesterday. Can you please send this into patient's pharmacy. Indu's note from yesterday says a script would be sent it. Please advise thank you.  Rosangela HEALY (R), LUIS    [place] [phone]

## 2023-04-18 NOTE — PROGRESS NOTES
[facility]    Referring Provider: No ref. provider found        Chief complaint f/u  You have chosen to receive care through a telehealth visit.  Do you consent to use a video/audio connection for your medical care today? Yes      History of present illness:  Dawna Lovell is a 52 y.o. female who presents as a f/u to GI clinic via telemedicine.  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  She has ast 342, alt 129. She drinks around 2 glasses of wine nightly. Also h/o obesity.    Denies cough. No gib loss. Feels better avoiding alcohol. Recently found out she needs to have food surgery.     Allergies:  Morphine and related    Scheduled Meds:       Infusions:  No current facility-administered medications for this visit.      PRN Meds:      Home Meds:  (Not in a hospital admission)      ROS: Review of Systems  A complete 12 point ros was asked and is negative except for that mentioned above.  In particular:  No fever  No rash  No increased arthralgias  No worsening edema  No cough  No dyspnea  No chest pain    All other systems reviewed and are negative.    PAST MED HX: Pt  has a past medical history of Anxiety, Asthma, GERD (gastroesophageal reflux disease), Hyperlipidemia, Hypothyroidism, Irritable bowel syndrome, Lyme disease (2016), Migraine headache, POTS (postural orthostatic tachycardia syndrome), Syncope (2016), and Tachycardia (2016).  PAST SURG HX: Pt  has a past surgical history that includes  section (2003) and Colonoscopy.  FAM HX: family history includes Breast cancer in her paternal grandmother; Cancer in her father; Dementia in an other family member; Esophageal cancer in her father.  SOC HX: Pt  reports that she quit smoking about 28 years ago. Her smoking use included cigarettes. She has never used smokeless tobacco. She reports current alcohol use of about 5.0 standard drinks per week. She reports that she does  not use drugs.    There were no vitals taken for this visit.    Physical Exam  Wt Readings from Last 3 Encounters:   04/17/23 94.7 kg (208 lb 12.4 oz)   04/12/23 94.7 kg (208 lb 12.8 oz)   03/30/23 86.2 kg (190 lb)   ,body mass index is unknown because there is no height or weight on file.      Telephone visit            Results Review:   I reviewed the patient's new clinical results.    Lab Results   Component Value Date    WBC 6.29 04/12/2023    HGB 9.0 (L) 04/12/2023    HCT 28.8 (L) 04/12/2023    .6 (H) 04/12/2023     04/12/2023       Lab Results   Component Value Date    GLUCOSE 131 (H) 04/12/2023    BUN 5 (L) 04/12/2023    CREATININE 0.72 04/12/2023    EGFRIFNONA 80 04/19/2021    EGFRIFAFRI 92 04/19/2021    BCR 6.9 (L) 04/12/2023    CO2 26.0 04/12/2023    CALCIUM 9.2 04/12/2023    PROTENTOTREF 6.6 04/29/2022    ALBUMIN 3.4 (L) 04/12/2023    LABIL2 1.4 04/29/2022    AST 54 (H) 04/12/2023    ALT 18 04/12/2023       ASSESSMENTS/PLANS  1.) Chronic cough  2.) family history of esophageal cancer  Stopped when she stopped drinking alcohol; would like to avoid egd.    3.) Fatty liver suspect from nafld + alcohol use  4.) Obesity  5.) Alcohol dependence  6.) PBC, workup: ama + 27.6   and .  This is in range for alcoholic fatty liver.  Her ferritin is noted to be > 1k, ? 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   Workup: mrcp: marked hepatomegaly with severe steatosis, small ascites, , igg: negative roel 1400,  + ama  Plan:  Continue ursodiol  Exercise/diet to goal bmi 25  Avoid alcohol  Avoid nsaids if possible, having ankle surgery soon        6.) H63 heterozygosity  7.) Abnormal ferritin, > 1k  Patient does not have hemochromatosis.  She has completed phlebotomy. Avoid alcohol    8.) Adenomas  Repeat colonoscopy 2024        21 minutes worth of time devoted to telephone visit including review of data  I discussed the patients findings and my recommendations  with the patient    El Almodovar MD  04/18/23  14:18 EDT

## 2023-04-18 NOTE — TELEPHONE ENCOUNTER
Dr. Mccormick sent in the medication and I called patient to let her know.  Rosangela Coreas RT (R), ROT

## 2023-04-18 NOTE — TELEPHONE ENCOUNTER
THIS PATIENT IS CALLING TO SEE IF LORTAB OR ANYTHING FOR PAIN COULD BE SENT IN TO [facility]. SHE SAID THAT DR MEDINA HAD DISCUSSED THIS WITH HER YESTERDAY.

## 2023-04-19 NOTE — PROGRESS NOTES
As you discussed with Dr. Almodovar, your labs show significant improvement since stopping alcohol. Please continue to eat a healthy diet and avoid alcohol.    Your blood counts were lower than before but iron and ferritin are back in normal range. We will need to monitor for worsening anemia. We can recheck this at your follow up in May. Hopefully you are feeling less lightheaded with the lower dose of blood pressure medication. Let me know if you are not!

## 2023-04-21 ENCOUNTER — PRE-ADMISSION TESTING (OUTPATIENT)
Dept: PREADMISSION TESTING | Facility: HOSPITAL | Age: 53
End: 2023-04-21
Payer: COMMERCIAL

## 2023-04-21 ENCOUNTER — TELEPHONE (OUTPATIENT)
Dept: ORTHOPEDIC SURGERY | Facility: CLINIC | Age: 53
End: 2023-04-21
Payer: COMMERCIAL

## 2023-04-21 VITALS — BODY MASS INDEX: 28.79 KG/M2 | WEIGHT: 190 LBS | HEIGHT: 68 IN

## 2023-04-21 DIAGNOSIS — R79.89 ABNORMAL LIVER FUNCTION TESTS: ICD-10-CM

## 2023-04-21 DIAGNOSIS — S93.325A LISFRANC DISLOCATION, LEFT, INITIAL ENCOUNTER: ICD-10-CM

## 2023-04-21 DIAGNOSIS — G62.9 NEUROPATHY: ICD-10-CM

## 2023-04-21 LAB
ALBUMIN SERPL-MCNC: 3.5 G/DL (ref 3.5–5.2)
ALBUMIN/GLOB SERPL: 1 G/DL
ALP SERPL-CCNC: 118 U/L (ref 39–117)
ALT SERPL W P-5'-P-CCNC: 14 U/L (ref 1–33)
ANION GAP SERPL CALCULATED.3IONS-SCNC: 9 MMOL/L (ref 5–15)
AST SERPL-CCNC: 46 U/L (ref 1–32)
BASOPHILS # BLD AUTO: 0.06 10*3/MM3 (ref 0–0.2)
BASOPHILS NFR BLD AUTO: 1 % (ref 0–1.5)
BILIRUB SERPL-MCNC: 0.9 MG/DL (ref 0–1.2)
BUN SERPL-MCNC: 4 MG/DL (ref 6–20)
BUN/CREAT SERPL: 7.7 (ref 7–25)
CALCIUM SPEC-SCNC: 9.2 MG/DL (ref 8.6–10.5)
CHLORIDE SERPL-SCNC: 102 MMOL/L (ref 98–107)
CO2 SERPL-SCNC: 26 MMOL/L (ref 22–29)
CREAT SERPL-MCNC: 0.52 MG/DL (ref 0.57–1)
DEPRECATED RDW RBC AUTO: 63.8 FL (ref 37–54)
EGFRCR SERPLBLD CKD-EPI 2021: 112.6 ML/MIN/1.73
EOSINOPHIL # BLD AUTO: 0.11 10*3/MM3 (ref 0–0.4)
EOSINOPHIL NFR BLD AUTO: 1.8 % (ref 0.3–6.2)
ERYTHROCYTE [DISTWIDTH] IN BLOOD BY AUTOMATED COUNT: 15.6 % (ref 12.3–15.4)
GLOBULIN UR ELPH-MCNC: 3.6 GM/DL
GLUCOSE SERPL-MCNC: 108 MG/DL (ref 65–99)
HBA1C MFR BLD: 5 % (ref 4.8–5.6)
HCT VFR BLD AUTO: 33.2 % (ref 34–46.6)
HGB BLD-MCNC: 10 G/DL (ref 12–15.9)
IMM GRANULOCYTES # BLD AUTO: 0.01 10*3/MM3 (ref 0–0.05)
IMM GRANULOCYTES NFR BLD AUTO: 0.2 % (ref 0–0.5)
INR PPP: 1.08 (ref 0.84–1.13)
LYMPHOCYTES # BLD AUTO: 2.14 10*3/MM3 (ref 0.7–3.1)
LYMPHOCYTES NFR BLD AUTO: 34.3 % (ref 19.6–45.3)
MACROCYTES BLD QL SMEAR: NORMAL
MCH RBC QN AUTO: 33.4 PG (ref 26.6–33)
MCHC RBC AUTO-ENTMCNC: 30.1 G/DL (ref 31.5–35.7)
MCV RBC AUTO: 111 FL (ref 79–97)
MONOCYTES # BLD AUTO: 0.52 10*3/MM3 (ref 0.1–0.9)
MONOCYTES NFR BLD AUTO: 8.3 % (ref 5–12)
NEUTROPHILS NFR BLD AUTO: 3.4 10*3/MM3 (ref 1.7–7)
NEUTROPHILS NFR BLD AUTO: 54.4 % (ref 42.7–76)
NRBC BLD AUTO-RTO: 0 /100 WBC (ref 0–0.2)
PLAT MORPH BLD: NORMAL
PLATELET # BLD AUTO: 301 10*3/MM3 (ref 140–450)
PMV BLD AUTO: 8.7 FL (ref 6–12)
POTASSIUM SERPL-SCNC: 3.6 MMOL/L (ref 3.5–5.2)
PROT SERPL-MCNC: 7.1 G/DL (ref 6–8.5)
PROTHROMBIN TIME: 13.9 SECONDS (ref 11.4–14.4)
RBC # BLD AUTO: 2.99 10*6/MM3 (ref 3.77–5.28)
SODIUM SERPL-SCNC: 137 MMOL/L (ref 136–145)
WBC MORPH BLD: NORMAL
WBC NRBC COR # BLD: 6.24 10*3/MM3 (ref 3.4–10.8)

## 2023-04-21 PROCEDURE — 80053 COMPREHEN METABOLIC PANEL: CPT

## 2023-04-21 PROCEDURE — 85610 PROTHROMBIN TIME: CPT

## 2023-04-21 PROCEDURE — 36415 COLL VENOUS BLD VENIPUNCTURE: CPT

## 2023-04-21 PROCEDURE — 85025 COMPLETE CBC W/AUTO DIFF WBC: CPT

## 2023-04-21 PROCEDURE — 85007 BL SMEAR W/DIFF WBC COUNT: CPT

## 2023-04-21 PROCEDURE — 93005 ELECTROCARDIOGRAM TRACING: CPT

## 2023-04-21 PROCEDURE — 83036 HEMOGLOBIN GLYCOSYLATED A1C: CPT

## 2023-04-21 NOTE — PAT
An arrival time for procedure was not provided during PAT visit. If patient had any questions or concerns about their arrival time, they were instructed to contact their surgeon/physician.  Additionally, if the patient referred to an arrival time that was acquired from their my chart account, patient was encouraged to verify that time with their surgeon/physician. Arrival times are NOT provided in Pre Admission Testing Department.    Patient viewed general PAT education video as instructed in their preoperative information received from their surgeon.  Patient stated the general PAT education video was viewed in its entirety and survey completed.  Copies of PAT general education handouts (Incentive Spirometry, Meds to Beds Program, Patient Belongings, Pre-op skin preparation instructions, Blood Glucose testing, Visitor policy, Surgery FAQ, Code H) distributed to patient if not printed. Education related to the PAT pass and skin preparation for surgery (if applicable) completed in PAT as a reinforcement to PAT education video. Patient instructed to return PAT pass provided today as well as completed skin preparation sheet (if applicable) on the day of procedure.     Additionally if patient had not viewed video yet but intended to view it at home or in our waiting area, then referred them to the handout with QR code/link provided during PAT visit.  Instructed patient to complete survey after viewing the video in its entirety.  Encouraged patient/family to read PAT general education handouts thoroughly and notify PAT staff with any questions or concerns. Patient verbalized understanding of all information and priority content.    Patient instructed to drink 20 ounces of Gatorade and it needs to be completed 1 hour (for Main OR patients) or 2 hours (scheduled  section & [facility] patients) before given arrival time for procedure (NO RED Gatorade)    Patient verbalized understanding.    Patient to apply  Chlorhexadine wipes  to surgical area (as instructed) the night before procedure and the AM of procedure. Wipes provided.    Patient denies any current skin issues.     Post-Surgery Information Instruction Sheet given to patient during Pre-Admission Testing Visit with verbal instructions to patient to return with PAT PASS on the day of surgery. Additionally, encouraged patient to review the information provided.    EKG from PAT today faxed to anesthesiology department for review and cardiac clearance. RN spoke with Dr. Quiroz and reviewed pertinent medical history and EKG results.  Per Dr. Quiroz, patient is cleared to proceed with procedure as planned without additional cardiac testing. Patient denies chest pain or increased shortness of breath.

## 2023-04-21 NOTE — TELEPHONE ENCOUNTER
"Pt called asking what \"see attached note\" on [place] paper work meant . Explained that it means that those questions can be answered with the attached visit note fro 4-17-23. Pt advised that note wasn't with [facility] paper work, but that she had my chart and would print it herself and send to needed [facility]  Representative. Told pt  to feel free to call with any more questions.     "

## 2023-04-24 LAB
QT INTERVAL: 396 MS
QTC INTERVAL: 481 MS

## 2023-04-25 ENCOUNTER — HOSPITAL ENCOUNTER (OUTPATIENT)
Facility: HOSPITAL | Age: 53
Setting detail: OBSERVATION
Discharge: HOME | End: 2023-04-27
Attending: ORTHOPAEDIC SURGERY | Admitting: ORTHOPAEDIC SURGERY
Payer: COMMERCIAL

## 2023-04-25 ENCOUNTER — ANESTHESIA EVENT (OUTPATIENT)
Dept: PERIOP | Facility: HOSPITAL | Age: 53
End: 2023-04-25
Payer: COMMERCIAL

## 2023-04-25 ENCOUNTER — ANESTHESIA EVENT CONVERTED (OUTPATIENT)
Dept: ANESTHESIOLOGY | Facility: HOSPITAL | Age: 53
End: 2023-04-25
Payer: COMMERCIAL

## 2023-04-25 ENCOUNTER — APPOINTMENT (OUTPATIENT)
Dept: GENERAL RADIOLOGY | Facility: HOSPITAL | Age: 53
End: 2023-04-25
Payer: COMMERCIAL

## 2023-04-25 ENCOUNTER — ANESTHESIA (OUTPATIENT)
Dept: PERIOP | Facility: HOSPITAL | Age: 53
End: 2023-04-25
Payer: COMMERCIAL

## 2023-04-25 DIAGNOSIS — S93.325A LISFRANC DISLOCATION, LEFT, INITIAL ENCOUNTER: ICD-10-CM

## 2023-04-25 DIAGNOSIS — G62.9 NEUROPATHY: ICD-10-CM

## 2023-04-25 DIAGNOSIS — G89.18 ACUTE POST-OPERATIVE PAIN: Primary | ICD-10-CM

## 2023-04-25 LAB
B-HCG UR QL: NEGATIVE
EXPIRATION DATE: NORMAL
INTERNAL NEGATIVE CONTROL: NORMAL
INTERNAL POSITIVE CONTROL: NORMAL
Lab: NORMAL

## 2023-04-25 PROCEDURE — G0378 HOSPITAL OBSERVATION PER HR: HCPCS

## 2023-04-25 PROCEDURE — C1713 ANCHOR/SCREW BN/BN,TIS/BN: HCPCS | Performed by: ORTHOPAEDIC SURGERY

## 2023-04-25 PROCEDURE — 76000 FLUOROSCOPY <1 HR PHYS/QHP: CPT

## 2023-04-25 PROCEDURE — 99024 POSTOP FOLLOW-UP VISIT: CPT | Performed by: ORTHOPAEDIC SURGERY

## 2023-04-25 PROCEDURE — 81025 URINE PREGNANCY TEST: CPT | Performed by: ANESTHESIOLOGY

## 2023-04-25 PROCEDURE — 25010000002 DEXAMETHASONE PER 1 MG: Performed by: ANESTHESIOLOGY

## 2023-04-25 PROCEDURE — 25010000002 CEFAZOLIN IN DEXTROSE 2-4 GM/100ML-% SOLUTION: Performed by: ORTHOPAEDIC SURGERY

## 2023-04-25 PROCEDURE — C1769 GUIDE WIRE: HCPCS | Performed by: ORTHOPAEDIC SURGERY

## 2023-04-25 PROCEDURE — 25010000002 DEXAMETHASONE SODIUM PHOSPHATE 10 MG/ML SOLUTION: Performed by: NURSE ANESTHETIST, CERTIFIED REGISTERED

## 2023-04-25 PROCEDURE — 25010000002 FENTANYL CITRATE (PF) 50 MCG/ML SOLUTION: Performed by: NURSE ANESTHETIST, CERTIFIED REGISTERED

## 2023-04-25 PROCEDURE — 25010000002 PROPOFOL 10 MG/ML EMULSION: Performed by: ANESTHESIOLOGY

## 2023-04-25 PROCEDURE — 28615 REPAIR FOOT DISLOCATION: CPT | Performed by: ORTHOPAEDIC SURGERY

## 2023-04-25 PROCEDURE — 97162 PT EVAL MOD COMPLEX 30 MIN: CPT

## 2023-04-25 PROCEDURE — 25010000002 ONDANSETRON PER 1 MG: Performed by: ANESTHESIOLOGY

## 2023-04-25 PROCEDURE — 25010000002 ROPIVACAINE PER 1 MG: Performed by: NURSE ANESTHETIST, CERTIFIED REGISTERED

## 2023-04-25 DEVICE — SCRW CANN SHRT THRD 1/3 4X32MM: Type: IMPLANTABLE DEVICE | Site: ANKLE | Status: FUNCTIONAL

## 2023-04-25 DEVICE — SCRW CANN SHRT THRD 1/3 4X38MM: Type: IMPLANTABLE DEVICE | Site: ANKLE | Status: FUNCTIONAL

## 2023-04-25 DEVICE — SCRW CANN SHRT THRD 1/3 4X44MM: Type: IMPLANTABLE DEVICE | Site: ANKLE | Status: FUNCTIONAL

## 2023-04-25 RX ORDER — ONDANSETRON 2 MG/ML
4 INJECTION INTRAMUSCULAR; INTRAVENOUS EVERY 6 HOURS PRN
Status: DISCONTINUED | OUTPATIENT
Start: 2023-04-25 | End: 2023-04-27 | Stop reason: HOSPADM

## 2023-04-25 RX ORDER — TIZANIDINE 4 MG/1
4 TABLET ORAL EVERY 8 HOURS PRN
Status: DISCONTINUED | OUTPATIENT
Start: 2023-04-25 | End: 2023-04-27 | Stop reason: HOSPADM

## 2023-04-25 RX ORDER — PANTOPRAZOLE SODIUM 40 MG/1
40 TABLET, DELAYED RELEASE ORAL
Status: DISCONTINUED | OUTPATIENT
Start: 2023-04-25 | End: 2023-04-27 | Stop reason: HOSPADM

## 2023-04-25 RX ORDER — FAMOTIDINE 10 MG/ML
20 INJECTION, SOLUTION INTRAVENOUS ONCE
Status: CANCELLED | OUTPATIENT
Start: 2023-04-25 | End: 2023-04-25

## 2023-04-25 RX ORDER — LABETALOL HYDROCHLORIDE 5 MG/ML
5 INJECTION, SOLUTION INTRAVENOUS
Status: DISCONTINUED | OUTPATIENT
Start: 2023-04-25 | End: 2023-04-25 | Stop reason: HOSPADM

## 2023-04-25 RX ORDER — MIDAZOLAM HYDROCHLORIDE 1 MG/ML
1 INJECTION INTRAMUSCULAR; INTRAVENOUS
Status: DISCONTINUED | OUTPATIENT
Start: 2023-04-25 | End: 2023-04-25 | Stop reason: HOSPADM

## 2023-04-25 RX ORDER — BUPIVACAINE HYDROCHLORIDE 2.5 MG/ML
INJECTION, SOLUTION EPIDURAL; INFILTRATION; INTRACAUDAL
Status: COMPLETED | OUTPATIENT
Start: 2023-04-25 | End: 2023-04-25

## 2023-04-25 RX ORDER — LABETALOL HYDROCHLORIDE 5 MG/ML
10 INJECTION, SOLUTION INTRAVENOUS EVERY 4 HOURS PRN
Status: DISCONTINUED | OUTPATIENT
Start: 2023-04-25 | End: 2023-04-27 | Stop reason: HOSPADM

## 2023-04-25 RX ORDER — NALOXONE HCL 0.4 MG/ML
0.4 VIAL (ML) INJECTION AS NEEDED
Status: DISCONTINUED | OUTPATIENT
Start: 2023-04-25 | End: 2023-04-25 | Stop reason: HOSPADM

## 2023-04-25 RX ORDER — DIPHENHYDRAMINE HYDROCHLORIDE 50 MG/ML
25 INJECTION INTRAMUSCULAR; INTRAVENOUS NIGHTLY PRN
Status: DISCONTINUED | OUTPATIENT
Start: 2023-04-25 | End: 2023-04-27 | Stop reason: HOSPADM

## 2023-04-25 RX ORDER — DEXAMETHASONE SODIUM PHOSPHATE 10 MG/ML
INJECTION, SOLUTION INTRAMUSCULAR; INTRAVENOUS
Status: COMPLETED | OUTPATIENT
Start: 2023-04-25 | End: 2023-04-25

## 2023-04-25 RX ORDER — GABAPENTIN 300 MG/1
900 CAPSULE ORAL
Status: DISCONTINUED | OUTPATIENT
Start: 2023-04-26 | End: 2023-04-27 | Stop reason: HOSPADM

## 2023-04-25 RX ORDER — HYDROXYZINE HYDROCHLORIDE 25 MG/1
25 TABLET, FILM COATED ORAL 2 TIMES DAILY PRN
Status: DISCONTINUED | OUTPATIENT
Start: 2023-04-25 | End: 2023-04-27 | Stop reason: HOSPADM

## 2023-04-25 RX ORDER — BUSPIRONE HYDROCHLORIDE 5 MG/1
5 TABLET ORAL 3 TIMES DAILY
Status: DISCONTINUED | OUTPATIENT
Start: 2023-04-25 | End: 2023-04-27 | Stop reason: HOSPADM

## 2023-04-25 RX ORDER — LIDOCAINE HYDROCHLORIDE 10 MG/ML
0.5 INJECTION, SOLUTION EPIDURAL; INFILTRATION; INTRACAUDAL; PERINEURAL ONCE AS NEEDED
Status: COMPLETED | OUTPATIENT
Start: 2023-04-25 | End: 2023-04-25

## 2023-04-25 RX ORDER — MEPERIDINE HYDROCHLORIDE 25 MG/ML
12.5 INJECTION INTRAMUSCULAR; INTRAVENOUS; SUBCUTANEOUS
Status: DISCONTINUED | OUTPATIENT
Start: 2023-04-25 | End: 2023-04-25 | Stop reason: HOSPADM

## 2023-04-25 RX ORDER — ONDANSETRON 2 MG/ML
4 INJECTION INTRAMUSCULAR; INTRAVENOUS ONCE AS NEEDED
Status: DISCONTINUED | OUTPATIENT
Start: 2023-04-25 | End: 2023-04-25 | Stop reason: HOSPADM

## 2023-04-25 RX ORDER — ROPINIROLE 1 MG/1
1 TABLET, FILM COATED ORAL NIGHTLY
Status: DISCONTINUED | OUTPATIENT
Start: 2023-04-25 | End: 2023-04-27 | Stop reason: HOSPADM

## 2023-04-25 RX ORDER — TRAZODONE HYDROCHLORIDE 100 MG/1
100 TABLET ORAL NIGHTLY PRN
Status: DISCONTINUED | OUTPATIENT
Start: 2023-04-25 | End: 2023-04-27 | Stop reason: HOSPADM

## 2023-04-25 RX ORDER — PROMETHAZINE HYDROCHLORIDE 25 MG/1
25 SUPPOSITORY RECTAL ONCE AS NEEDED
Status: DISCONTINUED | OUTPATIENT
Start: 2023-04-25 | End: 2023-04-25 | Stop reason: HOSPADM

## 2023-04-25 RX ORDER — CEFAZOLIN SODIUM 2 G/100ML
2 INJECTION, SOLUTION INTRAVENOUS ONCE
Status: COMPLETED | OUTPATIENT
Start: 2023-04-25 | End: 2023-04-25

## 2023-04-25 RX ORDER — DEXTROSE, SODIUM CHLORIDE, AND POTASSIUM CHLORIDE 5; .45; .15 G/100ML; G/100ML; G/100ML
75 INJECTION INTRAVENOUS CONTINUOUS
Status: DISCONTINUED | OUTPATIENT
Start: 2023-04-25 | End: 2023-04-27 | Stop reason: HOSPADM

## 2023-04-25 RX ORDER — PROPOFOL 10 MG/ML
VIAL (ML) INTRAVENOUS AS NEEDED
Status: DISCONTINUED | OUTPATIENT
Start: 2023-04-25 | End: 2023-05-22 | Stop reason: SURG

## 2023-04-25 RX ORDER — BISACODYL 10 MG
10 SUPPOSITORY, RECTAL RECTAL DAILY PRN
Status: DISCONTINUED | OUTPATIENT
Start: 2023-04-25 | End: 2023-04-27 | Stop reason: HOSPADM

## 2023-04-25 RX ORDER — HYDRALAZINE HYDROCHLORIDE 20 MG/ML
5 INJECTION INTRAMUSCULAR; INTRAVENOUS
Status: DISCONTINUED | OUTPATIENT
Start: 2023-04-25 | End: 2023-04-25 | Stop reason: HOSPADM

## 2023-04-25 RX ORDER — CARVEDILOL 6.25 MG/1
6.25 TABLET ORAL 2 TIMES DAILY WITH MEALS
Status: DISCONTINUED | OUTPATIENT
Start: 2023-04-25 | End: 2023-04-27 | Stop reason: HOSPADM

## 2023-04-25 RX ORDER — GABAPENTIN 300 MG/1
900 CAPSULE ORAL NIGHTLY
Status: DISCONTINUED | OUTPATIENT
Start: 2023-04-25 | End: 2023-04-27 | Stop reason: HOSPADM

## 2023-04-25 RX ORDER — PROMETHAZINE HYDROCHLORIDE 12.5 MG/1
12.5 TABLET ORAL EVERY 4 HOURS PRN
Status: DISCONTINUED | OUTPATIENT
Start: 2023-04-25 | End: 2023-04-25 | Stop reason: SDUPTHER

## 2023-04-25 RX ORDER — ROPIVACAINE HYDROCHLORIDE 2 MG/ML
INJECTION, SOLUTION EPIDURAL; INFILTRATION; PERINEURAL CONTINUOUS
Status: DISCONTINUED | OUTPATIENT
Start: 2023-04-25 | End: 2023-04-27 | Stop reason: HOSPADM

## 2023-04-25 RX ORDER — VENLAFAXINE HYDROCHLORIDE 75 MG/1
150 CAPSULE, EXTENDED RELEASE ORAL DAILY
Status: DISCONTINUED | OUTPATIENT
Start: 2023-04-25 | End: 2023-04-27 | Stop reason: HOSPADM

## 2023-04-25 RX ORDER — URSODIOL 300 MG/1
300 CAPSULE ORAL
Status: DISCONTINUED | OUTPATIENT
Start: 2023-04-25 | End: 2023-04-27 | Stop reason: HOSPADM

## 2023-04-25 RX ORDER — NALOXONE HCL 0.4 MG/ML
0.4 VIAL (ML) INJECTION
Status: DISCONTINUED | OUTPATIENT
Start: 2023-04-25 | End: 2023-04-27 | Stop reason: HOSPADM

## 2023-04-25 RX ORDER — FENTANYL CITRATE 50 UG/ML
50 INJECTION, SOLUTION INTRAMUSCULAR; INTRAVENOUS
Status: DISCONTINUED | OUTPATIENT
Start: 2023-04-25 | End: 2023-04-25 | Stop reason: HOSPADM

## 2023-04-25 RX ORDER — SUMATRIPTAN 50 MG/1
25 TABLET, FILM COATED ORAL AS NEEDED
Status: DISCONTINUED | OUTPATIENT
Start: 2023-04-25 | End: 2023-04-27 | Stop reason: HOSPADM

## 2023-04-25 RX ORDER — SODIUM CHLORIDE 0.9 % (FLUSH) 0.9 %
3-10 SYRINGE (ML) INJECTION AS NEEDED
Status: DISCONTINUED | OUTPATIENT
Start: 2023-04-25 | End: 2023-04-25 | Stop reason: HOSPADM

## 2023-04-25 RX ORDER — HYDROCODONE BITARTRATE AND ACETAMINOPHEN 7.5; 325 MG/1; MG/1
2 TABLET ORAL EVERY 4 HOURS PRN
Status: DISCONTINUED | OUTPATIENT
Start: 2023-04-25 | End: 2023-04-27 | Stop reason: HOSPADM

## 2023-04-25 RX ORDER — CEFAZOLIN SODIUM 2 G/100ML
2 INJECTION, SOLUTION INTRAVENOUS EVERY 8 HOURS
Status: COMPLETED | OUTPATIENT
Start: 2023-04-25 | End: 2023-04-26

## 2023-04-25 RX ORDER — SODIUM CHLORIDE 9 MG/ML
40 INJECTION, SOLUTION INTRAVENOUS AS NEEDED
Status: CANCELLED | OUTPATIENT
Start: 2023-04-25

## 2023-04-25 RX ORDER — SODIUM CHLORIDE 0.9 % (FLUSH) 0.9 %
10 SYRINGE (ML) INJECTION AS NEEDED
Status: CANCELLED | OUTPATIENT
Start: 2023-04-25

## 2023-04-25 RX ORDER — PROMETHAZINE HYDROCHLORIDE 12.5 MG/1
12.5 TABLET ORAL EVERY 4 HOURS PRN
Status: DISCONTINUED | OUTPATIENT
Start: 2023-04-25 | End: 2023-04-27 | Stop reason: HOSPADM

## 2023-04-25 RX ORDER — SODIUM CHLORIDE 0.9 % (FLUSH) 0.9 %
3 SYRINGE (ML) INJECTION EVERY 12 HOURS SCHEDULED
Status: DISCONTINUED | OUTPATIENT
Start: 2023-04-25 | End: 2023-04-25 | Stop reason: HOSPADM

## 2023-04-25 RX ORDER — SODIUM CHLORIDE 9 MG/ML
40 INJECTION, SOLUTION INTRAVENOUS AS NEEDED
Status: DISCONTINUED | OUTPATIENT
Start: 2023-04-25 | End: 2023-04-25 | Stop reason: HOSPADM

## 2023-04-25 RX ORDER — MAGNESIUM HYDROXIDE 1200 MG/15ML
LIQUID ORAL AS NEEDED
Status: DISCONTINUED | OUTPATIENT
Start: 2023-04-25 | End: 2023-04-25 | Stop reason: HOSPADM

## 2023-04-25 RX ORDER — ONDANSETRON 4 MG/1
4 TABLET, FILM COATED ORAL EVERY 6 HOURS PRN
Qty: 30 TABLET | Refills: 0 | Status: SHIPPED | OUTPATIENT
Start: 2023-04-25 | End: 2023-05-08

## 2023-04-25 RX ORDER — HYDROCODONE BITARTRATE AND ACETAMINOPHEN 5; 325 MG/1; MG/1
1 TABLET ORAL ONCE AS NEEDED
Status: DISCONTINUED | OUTPATIENT
Start: 2023-04-25 | End: 2023-04-25 | Stop reason: HOSPADM

## 2023-04-25 RX ORDER — DROPERIDOL 2.5 MG/ML
0.62 INJECTION, SOLUTION INTRAMUSCULAR; INTRAVENOUS ONCE AS NEEDED
Status: DISCONTINUED | OUTPATIENT
Start: 2023-04-25 | End: 2023-04-25 | Stop reason: HOSPADM

## 2023-04-25 RX ORDER — SODIUM CHLORIDE, SODIUM LACTATE, POTASSIUM CHLORIDE, CALCIUM CHLORIDE 600; 310; 30; 20 MG/100ML; MG/100ML; MG/100ML; MG/100ML
9 INJECTION, SOLUTION INTRAVENOUS CONTINUOUS
Status: DISCONTINUED | OUTPATIENT
Start: 2023-04-25 | End: 2023-04-27 | Stop reason: HOSPADM

## 2023-04-25 RX ORDER — FENTANYL CITRATE 50 UG/ML
INJECTION, SOLUTION INTRAMUSCULAR; INTRAVENOUS
Status: COMPLETED | OUTPATIENT
Start: 2023-04-25 | End: 2023-04-25

## 2023-04-25 RX ORDER — LIDOCAINE HYDROCHLORIDE 10 MG/ML
INJECTION, SOLUTION EPIDURAL; INFILTRATION; INTRACAUDAL; PERINEURAL AS NEEDED
Status: DISCONTINUED | OUTPATIENT
Start: 2023-04-25 | End: 2023-05-22 | Stop reason: SURG

## 2023-04-25 RX ORDER — HYDROCODONE BITARTRATE AND ACETAMINOPHEN 7.5; 325 MG/1; MG/1
1 TABLET ORAL EVERY 4 HOURS PRN
Status: DISCONTINUED | OUTPATIENT
Start: 2023-04-25 | End: 2023-04-27 | Stop reason: HOSPADM

## 2023-04-25 RX ORDER — ROSUVASTATIN CALCIUM 10 MG/1
10 TABLET, COATED ORAL NIGHTLY
Status: DISCONTINUED | OUTPATIENT
Start: 2023-04-25 | End: 2023-04-27 | Stop reason: HOSPADM

## 2023-04-25 RX ORDER — FAMOTIDINE 20 MG/1
20 TABLET, FILM COATED ORAL ONCE
Status: COMPLETED | OUTPATIENT
Start: 2023-04-25 | End: 2023-04-25

## 2023-04-25 RX ORDER — HYDROCODONE BITARTRATE AND ACETAMINOPHEN 7.5; 325 MG/1; MG/1
1 TABLET ORAL EVERY 6 HOURS PRN
Qty: 30 TABLET | Refills: 0 | Status: SHIPPED | OUTPATIENT
Start: 2023-04-25

## 2023-04-25 RX ORDER — DIPHENHYDRAMINE HCL 25 MG
25 CAPSULE ORAL NIGHTLY PRN
Status: DISCONTINUED | OUTPATIENT
Start: 2023-04-25 | End: 2023-04-27 | Stop reason: HOSPADM

## 2023-04-25 RX ORDER — IPRATROPIUM BROMIDE AND ALBUTEROL SULFATE 2.5; .5 MG/3ML; MG/3ML
3 SOLUTION RESPIRATORY (INHALATION) ONCE AS NEEDED
Status: DISCONTINUED | OUTPATIENT
Start: 2023-04-25 | End: 2023-04-25 | Stop reason: HOSPADM

## 2023-04-25 RX ORDER — LEVOTHYROXINE SODIUM 0.15 MG/1
150 TABLET ORAL
Status: DISCONTINUED | OUTPATIENT
Start: 2023-04-26 | End: 2023-04-27 | Stop reason: HOSPADM

## 2023-04-25 RX ORDER — ONDANSETRON 2 MG/ML
INJECTION INTRAMUSCULAR; INTRAVENOUS AS NEEDED
Status: DISCONTINUED | OUTPATIENT
Start: 2023-04-25 | End: 2023-05-22 | Stop reason: SURG

## 2023-04-25 RX ORDER — OXYCODONE HYDROCHLORIDE AND ACETAMINOPHEN 5; 325 MG/1; MG/1
1 TABLET ORAL EVERY 6 HOURS PRN
Qty: 30 TABLET | Refills: 0 | Status: SHIPPED | OUTPATIENT
Start: 2023-04-25 | End: 2023-05-08

## 2023-04-25 RX ORDER — PHENYLEPHRINE HCL IN 0.9% NACL 1 MG/10 ML
SYRINGE (ML) INTRAVENOUS AS NEEDED
Status: DISCONTINUED | OUTPATIENT
Start: 2023-04-25 | End: 2023-05-22 | Stop reason: SURG

## 2023-04-25 RX ORDER — DEXAMETHASONE SODIUM PHOSPHATE 4 MG/ML
INJECTION, SOLUTION INTRA-ARTICULAR; INTRALESIONAL; INTRAMUSCULAR; INTRAVENOUS; SOFT TISSUE AS NEEDED
Status: DISCONTINUED | OUTPATIENT
Start: 2023-04-25 | End: 2023-05-22 | Stop reason: SURG

## 2023-04-25 RX ORDER — PROMETHAZINE HYDROCHLORIDE 25 MG/1
25 TABLET ORAL ONCE AS NEEDED
Status: DISCONTINUED | OUTPATIENT
Start: 2023-04-25 | End: 2023-04-25 | Stop reason: HOSPADM

## 2023-04-25 RX ORDER — DROPERIDOL 2.5 MG/ML
0.62 INJECTION, SOLUTION INTRAMUSCULAR; INTRAVENOUS
Status: DISCONTINUED | OUTPATIENT
Start: 2023-04-25 | End: 2023-04-25 | Stop reason: HOSPADM

## 2023-04-25 RX ORDER — PROMETHAZINE HYDROCHLORIDE 12.5 MG/1
12.5 SUPPOSITORY RECTAL EVERY 4 HOURS PRN
Status: DISCONTINUED | OUTPATIENT
Start: 2023-04-25 | End: 2023-04-27 | Stop reason: HOSPADM

## 2023-04-25 RX ORDER — SODIUM CHLORIDE 0.9 % (FLUSH) 0.9 %
10 SYRINGE (ML) INJECTION EVERY 12 HOURS SCHEDULED
Status: CANCELLED | OUTPATIENT
Start: 2023-04-25

## 2023-04-25 RX ORDER — ENOXAPARIN SODIUM 100 MG/ML
40 INJECTION SUBCUTANEOUS DAILY
Status: DISCONTINUED | OUTPATIENT
Start: 2023-04-26 | End: 2023-04-27 | Stop reason: HOSPADM

## 2023-04-25 RX ADMIN — HYDROXYZINE HYDROCHLORIDE 25 MG: 25 TABLET, FILM COATED ORAL at 23:08

## 2023-04-25 RX ADMIN — PROPOFOL 150 MG: 10 INJECTION, EMULSION INTRAVENOUS at 10:41

## 2023-04-25 RX ADMIN — GABAPENTIN 900 MG: 300 CAPSULE ORAL at 19:43

## 2023-04-25 RX ADMIN — DEXAMETHASONE SODIUM PHOSPHATE 2 MG: 10 INJECTION, SOLUTION INTRAMUSCULAR; INTRAVENOUS at 09:24

## 2023-04-25 RX ADMIN — BUPIVACAINE HYDROCHLORIDE 30 ML: 2.5 INJECTION, SOLUTION EPIDURAL; INFILTRATION; INTRACAUDAL at 09:28

## 2023-04-25 RX ADMIN — DEXAMETHASONE SODIUM PHOSPHATE 4 MG: 4 INJECTION, SOLUTION INTRAMUSCULAR; INTRAVENOUS at 10:45

## 2023-04-25 RX ADMIN — VENLAFAXINE HYDROCHLORIDE 150 MG: 75 CAPSULE, EXTENDED RELEASE ORAL at 19:43

## 2023-04-25 RX ADMIN — HYDROCODONE BITARTRATE AND ACETAMINOPHEN 1 TABLET: 7.5; 325 TABLET ORAL at 16:53

## 2023-04-25 RX ADMIN — BUSPIRONE HYDROCHLORIDE 5 MG: 5 TABLET ORAL at 19:44

## 2023-04-25 RX ADMIN — ROPINIROLE HYDROCHLORIDE 1 MG: 1 TABLET, FILM COATED ORAL at 19:44

## 2023-04-25 RX ADMIN — ONDANSETRON 4 MG: 2 INJECTION INTRAMUSCULAR; INTRAVENOUS at 12:02

## 2023-04-25 RX ADMIN — PANTOPRAZOLE SODIUM 40 MG: 40 TABLET, DELAYED RELEASE ORAL at 16:53

## 2023-04-25 RX ADMIN — FAMOTIDINE 20 MG: 20 TABLET, FILM COATED ORAL at 08:41

## 2023-04-25 RX ADMIN — PROPOFOL 20 MG: 10 INJECTION, EMULSION INTRAVENOUS at 10:42

## 2023-04-25 RX ADMIN — ROSUVASTATIN 10 MG: 10 TABLET, FILM COATED ORAL at 19:44

## 2023-04-25 RX ADMIN — URSODIOL 300 MG: 300 CAPSULE ORAL at 17:03

## 2023-04-25 RX ADMIN — LIDOCAINE HYDROCHLORIDE 50 MG: 10 INJECTION, SOLUTION EPIDURAL; INFILTRATION; INTRACAUDAL; PERINEURAL at 10:41

## 2023-04-25 RX ADMIN — CEFAZOLIN SODIUM 2 G: 2 INJECTION, SOLUTION INTRAVENOUS at 10:41

## 2023-04-25 RX ADMIN — BUPIVACAINE HYDROCHLORIDE 30 ML: 2.5 INJECTION, SOLUTION EPIDURAL; INFILTRATION; INTRACAUDAL; PERINEURAL at 09:28

## 2023-04-25 RX ADMIN — CARVEDILOL 6.25 MG: 6.25 TABLET, FILM COATED ORAL at 16:55

## 2023-04-25 RX ADMIN — SODIUM CHLORIDE, POTASSIUM CHLORIDE, SODIUM LACTATE AND CALCIUM CHLORIDE 9 ML/HR: 600; 310; 30; 20 INJECTION, SOLUTION INTRAVENOUS at 08:30

## 2023-04-25 RX ADMIN — Medication 1000 MG: at 12:30

## 2023-04-25 RX ADMIN — POTASSIUM CHLORIDE, DEXTROSE MONOHYDRATE AND SODIUM CHLORIDE 75 ML/HR: 150; 5; 450 INJECTION, SOLUTION INTRAVENOUS at 17:03

## 2023-04-25 RX ADMIN — HYDROCODONE BITARTRATE AND ACETAMINOPHEN 1 TABLET: 7.5; 325 TABLET ORAL at 23:08

## 2023-04-25 RX ADMIN — Medication 100 MCG: at 10:54

## 2023-04-25 RX ADMIN — LIDOCAINE HYDROCHLORIDE 0.5 ML: 10 INJECTION, SOLUTION EPIDURAL; INFILTRATION; INTRACAUDAL; PERINEURAL at 08:30

## 2023-04-25 RX ADMIN — FENTANYL CITRATE 100 MCG: 50 INJECTION, SOLUTION INTRAMUSCULAR; INTRAVENOUS at 09:24

## 2023-04-25 RX ADMIN — Medication 2 G: at 17:03

## 2023-04-25 NOTE — ANESTHESIA PROCEDURE NOTES
Airway  Urgency: elective    Date/Time: 4/25/2023 10:36 AM  Airway not difficult    General Information and Staff    Patient location during procedure: OR  SRNA: Melinda Herring SRNA  Indications and Patient Condition  Indications for airway management: airway protection    Preoxygenated: yes  MILS maintained throughout  Mask difficulty assessment: 1 - vent by mask    Final Airway Details  Final airway type: supraglottic airway      Successful airway: I-gel  Size 4     Number of attempts at approach: 1  Assessment: lips, teeth, and gum same as pre-op    Additional Comments  LMA placed without difficulty, ventilation with assist, equal breath sounds and symmetric chest rise and fall

## 2023-04-25 NOTE — INTERVAL H&P NOTE
"[facility] Pre-op    Full history and physical note from office is attached.    /96 (BP Location: Right arm, Patient Position: Lying)   Pulse 87   Temp 97.3 °F (36.3 °C) (Temporal)   Resp 16   Ht 172.7 cm (68\")   Wt 86.2 kg (190 lb)   SpO2 94%   BMI 28.89 kg/m²     Immunizations:  Influenza:  2022  Pneumococcal:  No  Tetanus:  UTD  Covid : x2      LAB Results:  Lab Results   Component Value Date    WBC 6.24 04/21/2023    HGB 10.0 (L) 04/21/2023    HCT 33.2 (L) 04/21/2023    .0 (H) 04/21/2023     04/21/2023    NEUTROABS 3.40 04/21/2023    GLUCOSE 108 (H) 04/21/2023    BUN 4 (L) 04/21/2023    CREATININE 0.52 (L) 04/21/2023    EGFRIFNONA 80 04/19/2021    EGFRIFAFRI 92 04/19/2021     04/21/2023    K 3.6 04/21/2023     04/21/2023    CO2 26.0 04/21/2023    CALCIUM 9.2 04/21/2023    ALBUMIN 3.5 04/21/2023    AST 46 (H) 04/21/2023    ALT 14 04/21/2023    BILITOT 0.9 04/21/2023    INR 1.08 04/21/2023 4/17/23 foot xray:  Study Result    Narrative & Impression   Standing AP, lateral, oblique left foot ordered for pain, shows significant widening between the base of the first and second metatarsal, significant lateral subluxation of the second metatarsal on the cuneiform, suggestion of third also, subluxation of the first TMT joint, no obvious fractures, compared toNonweightbearing films 3/30/2023       Cancer Staging (if applicable)  Cancer Patient: __ yes __no __unknown__N/A; If yes, clinical stage T:__ N:__M:__, stage group or __N/A      Impression: Lisfranc dislocation, left      Plan: ORIF left lisfranc foot injury      Lauren Rodríguez, SANGEETHA   4/25/2023   09:11 EDT   "

## 2023-04-25 NOTE — BRIEF OP NOTE
Orthopedics ORIF left lisfranc foot injury  Brief Op Note    Dawna Lovell  4/25/2023    Pre-op Diagnosis:   Lisfranc dislocation, left, initial encounter [S93.325A]  Neuropathy [G62.9]       Post-op Diagnosis:  Same       Post-Op Diagnosis Codes:     * Lisfranc dislocation, left, initial encounter [S93.325A]     * Neuropathy [G62.9]    Procedure(s):  ORIF left lisfranc foot injury    Surgeon(s):  Milagro Parker MD    Circulator: Neda Navarrete RN; Mercedes Holley RN  Scrub Person: Anaid Yuan  Vendor Representative: Justyn Arboleda  Assistant: Penny Price PA-C     Assistant: Penny Price PA-C  was responsible for performing the following activities: Retraction, Suction, Irrigation, Suturing, Closing and Placing Dressing and their skilled assistance was necessary for the success of this case.    Anesthesia:  General with Block    Staff:   Circulator: Neda Navarrete RN; Mercedes Holley RN  Scrub Person: Anaid Yuan Representative: Justyn Arboleda  Assistant: Penny Price PA-C    Estimated Blood Loss: 50 cc      Specimens: None      Drains: None    Complications:  None    Tourniquet:: 50 minutes    Dressing: Splint    Disposition: Recovery stable    Milagro Parker MD     Date: 4/25/2023  Time: 11:52 EDT

## 2023-04-25 NOTE — PROGRESS NOTES
Orthopedic Foot/Ankle Progress Note    Subjective     Post-Op:Day of Surgery  Procedure(s):  ORIF left lisfranc foot injury  Laterality:Left      Systemic or Specific Complaints: reasonable ppain control  Objective     Vital signs in last 24 hours:  Temp:  [97.3 °F (36.3 °C)-97.8 °F (36.6 °C)] 97.8 °F (36.6 °C)  Heart Rate:  [82-93] 82  Resp:  [14-16] 16  BP: (139-167)/() 167/109    Neurovascular: left toes pink, decrease motor/sense due to block               Wound: splint dry    Data Review  Lab Results (last 24 hours)     Procedure Component Value Units Date/Time    POC Pregnancy, Urine [[id number]]  (Normal) Collected: 04/25/23 0935    Specimen: Urine Updated: 04/25/23 0935     HCG, Urine, QL Negative     Lot Number [id number]     Internal Positive Control Passed     Internal Negative Control Passed     Expiration Date 4/2,024            Assessment & Plan     Status post- ORIF lisfranc  -elevate, nonwt bear           Milagro Parker MD    Date: 4/25/2023  Time: 13:27 EDT     Patient returned call to Dr. Mervat Ivan. She would like you to call her back so you can explain the other findings of her biopsy rather than wait until appointment.  Phone [phone]

## 2023-04-25 NOTE — H&P
Patient Name: Dawna Lovell  MRN: [id number]  : 1970  DOS: 2023    Attending: Milagro Parker MD    Primary Care Provider: Jesenia Leahy PA      Chief complaint: Left foot pain/Left foot Lisfranc fracture dislocation    Subjective   Patient is a pleasant 52 y.o. female presented for scheduled surgery by Dr. Parker.    Per her note (This extremely pleasant 52-year-old woman with a left foot Lisfranc fracture dislocation.  It involves all of the tarsometatarsal joints.  The intercuneiform joints were found to be stable.  There were small avulsions of the ligaments of the periarticular surface of the joints.  All of the joints were grossly unstable.  The patient has some pre-existing neuropathy and pre-existing dysesthesias in the peroneal nerves.)    Patient underwent open reduction internal fixation of left Lisfranc fracture dislocation, surgery was done under general anesthesia, peripheral nerve block catheter was placed by acute pain service, she tolerated surgery well and is being admitted for further management.    Seen in PACU, doing fairly well, no complains of nausea, vomiting, or shortness of breath.    Reviewed with patient her past medical history and home medications.      Allergies   Allergen Reactions   • Morphine And Related Itching     Nose itching       Meds:  Medications Prior to Admission   Medication Sig Dispense Refill Last Dose   • carvedilol (COREG) 6.25 MG tablet TAKE 1 TABLET BY MOUTH TWICE DAILY WITH MEALS (Patient taking differently: Take 1 tablet by mouth 2 (Two) Times a Day With Meals.) 180 tablet 1 2023 at 0700   • folic acid (FOLVITE) 1 MG tablet 1 tablet daily.  90-day supply. (Patient taking differently: Take 1 tablet by mouth Daily. 1 tablet daily.  90-day supply.) 30 tablet 90 2023   • gabapentin (NEURONTIN) 300 MG capsule Take three tablets in am, 2 tablets in the pm and three tablets in hs (Patient taking differently: Take  by mouth 3 (Three) Times  a Day. Take three tablets in am, 2 tablets in the pm and three tablets in hs) 720 capsule 1 4/24/2023   • HYDROcodone-acetaminophen (Norco) 7.5-325 MG per tablet Take 1 tablet by mouth Every 6 (Six) Hours As Needed for Moderate Pain. 30 tablet 0 4/24/2023   • levothyroxine (SYNTHROID, LEVOTHROID) 150 MCG tablet Take 1 tablet by mouth Daily. 90 tablet 1 4/25/2023 at 0700   • meloxicam (MOBIC) 15 MG tablet TAKE 1 TABLET BY MOUTH DAILY 30 tablet 0 Past Week   • Multiple Vitamins-Minerals (MULTIVITAMIN ADULT PO) Take 1 tablet by mouth Daily.   Past Month   • omeprazole (priLOSEC) 40 MG capsule Take 1 capsule by mouth 2 (Two) Times a Day. 60 capsule 3 4/25/2023 at 0700   • rOPINIRole (REQUIP) 1 MG tablet Take 1 tablet by mouth Every Night. 90 tablet 3 4/25/2023 at 0700   • rosuvastatin (CRESTOR) 10 MG tablet TAKE 1 TABLET BY MOUTH DAILY 90 tablet 0 4/24/2023   • tiZANidine (ZANAFLEX) 4 MG tablet TAKE 1 TABLET BY MOUTH EVERY 8 HOURS AS NEEDED FOR MUSCLE SPASMS (Patient taking differently: Take 1 tablet by mouth Every 8 (Eight) Hours As Needed for Muscle Spasms.) 30 tablet 1 Past Month   • traZODone (DESYREL) 100 MG tablet TAKE 1 TABLET BY MOUTH AT NIGHT AS NEEDED FOR SLEEP (Patient taking differently: Take 1 tablet by mouth At Night As Needed for Sleep. Takes nightly) 90 tablet 0 Past Week   • ursodiol (ACTIGALL) 300 MG capsule Take 1 capsule by mouth 3 (Three) Times a Day With Meals. 270 capsule 3 4/25/2023   • venlafaxine XR (EFFEXOR-XR) 150 MG 24 hr capsule TAKE 1 CAPSULE BY MOUTH DAILY (Patient taking differently: Take 1 capsule by mouth Daily.) 90 capsule 0 4/24/2023   • vitamin D (ERGOCALCIFEROL) 1.25 MG (32056 UT) capsule capsule Take 1 capsule by mouth 1 (One) Time Per Week. 12 capsule 3 Past Week   • busPIRone (BUSPAR) 5 MG tablet TAKE 1 TABLET BY MOUTH THREE TIMES DAILY (Patient taking differently: Take 1 tablet by mouth 3 (Three) Times a Day. Patient states she only takes as needed) 90 tablet 1 More than a  month   • Dymista 137-50 MCG/ACT suspension    More than a month   • hydrOXYzine (ATARAX) 25 MG tablet TAKE 1 TABLET Twice daily PRN (Patient taking differently: Take 1 tablet by mouth 2 (Two) Times a Day As Needed for Anxiety.) 40 tablet 1 More than a month   • ipratropium (ATROVENT) 0.06 % nasal spray 2 sprays into the nostril(s) as directed by provider 4 (Four) Times a Day. 15 mL 12 More than a month   • nystatin (MYCOSTATIN) 231942 UNIT/GM ointment Apply to lips 3 times daily (Patient taking differently: 1 application 3 (Three) Times a Day. Apply to lips 3 times daily) 15 g 1 More than a month   • olopatadine (PATANOL) 0.1 % ophthalmic solution    Unknown   • Proventil  (90 Base) MCG/ACT inhaler INHALE 2 PUFFS BY MOUTH EVERY 4 HOURS AS NEEDED FOR WHEEZING 6.7 g 0 More than a month   • Restasis 0.05 % ophthalmic emulsion Administer 1 drop to both eyes 2 (Two) Times a Day.   More than a month   • SUMAtriptan (IMITREX) 25 MG tablet TAKE 1 TABLET BY MOUTH AS NEEDED FOR MIGRAINE 9 tablet 1 More than a month   • Trelegy Ellipta 200-62.5-25 MCG/ACT aerosol powder     More than a month     Past Medical History:   Diagnosis Date   • Anxiety    • Asthma    • Depression    • GERD (gastroesophageal reflux disease)    • Hyperlipidemia    • Hypertension    • Hypothyroidism    • Irritable bowel syndrome    • Lyme disease 2016   • Migraine headache    • POTS (postural orthostatic tachycardia syndrome)    • Syncope 2016   • Tachycardia 2016     Past Surgical History:   Procedure Laterality Date   •  SECTION  2003   • COLONOSCOPY     • MOUTH SURGERY     • WISDOM TOOTH EXTRACTION       Family History   Problem Relation Age of Onset   • Esophageal cancer Father    • Cancer Father         Esophogical   • Breast cancer Paternal Grandmother    • Dementia Other    • Ovarian cancer Neg Hx      Social History     Tobacco Use   • Smoking status: Former     Packs/day: 0.50     Years: 11.00     Pack  "years: 5.50     Types: Cigarettes     Start date:      Quit date:      Years since quittin.3     Passive exposure: Past   • Smokeless tobacco: Never   Vaping Use   • Vaping Use: Never used   Substance Use Topics   • Alcohol use: Not Currently     Alcohol/week: 5.0 standard drinks     Types: 5 Glasses of wine per week     Comment: off alcohol for surgery   • Drug use: No     Review of Systems  Pertinent items are noted in HPI, all other systems reviewed and negative    Vital Signs  BP (!) 167/109   Pulse 82   Temp 97.8 °F (36.6 °C) (Temporal)   Resp 16   Ht 172.7 cm (68\")   Wt 86.2 kg (190 lb)   SpO2 97%   BMI 28.89 kg/m²     Physical Exam:    General Appearance:    Alert, cooperative, in no acute distress   Head:    Normocephalic, without obvious abnormality, atraumatic   Eyes:            Lids and lashes normal, conjunctivae and sclerae normal, no   icterus, no pallor, corneas clear,    Ears:    Ears appear intact with no abnormalities noted   Throat:   No oral lesions, no thrush, oral mucosa moist   Neck:   No adenopathy, supple, trachea midline, no thyromegaly         Lungs:     Clear to auscultation,respirations regular, even and                   unlabored    Heart:    Regular rhythm and normal rate, normal S1 and S2, no            murmur, no gallop   Abdomen:     Normal bowel sounds, no masses, no organomegaly, soft        non-tender, non-distended, no guarding, no rebound                 tenderness   Genitalia:    Deferred   Extremities:   Splint/ACE on LLE CDI. PNB cath present, popliteal. Cap refill intact. Wiggles toes.    Pulses:   Pulses palpable and equal bilaterally   Skin:   No bleeding, bruising or rash   Neurologic:   Cranial nerves 2 - 12 grossly intact, A/A/O.      I reviewed the patient's new clinical results.       Results from last 7 days   Lab Units 23  1157   WBC 10*3/mm3 6.24   HEMOGLOBIN g/dL 10.0*   HEMATOCRIT % 33.2*   PLATELETS 10*3/mm3 301     Results from last " 7 days   Lab Units 04/21/23  1209   SODIUM mmol/L 137   POTASSIUM mmol/L 3.6   CHLORIDE mmol/L 102   CO2 mmol/L 26.0   BUN mg/dL 4*   CREATININE mg/dL 0.52*   CALCIUM mg/dL 9.2   BILIRUBIN mg/dL 0.9   ALK PHOS U/L 118*   ALT (SGPT) U/L 14   AST (SGOT) U/L 46*   GLUCOSE mg/dL 108*     Lab Results   Component Value Date    HGBA1C 5.00 04/21/2023           Assessment and Plan:       Lisfranc dislocation, left, initial encounter, s/p ORIF    Anxiety    GERD (gastroesophageal reflux disease)    Hyperlipidemia    POTS (postural orthostatic tachycardia syndrome)    Left foot pain    Neuropathy  Hypertension    Plan  1. PT/OT. NWB LLE.  2. Pain control-prns, multimodal approach, PNB cath with ropivacaine infusion.   3. IS-encourage  4. DVT proph- mechanicals and subcutaneous Lovenox   5. Bowel regimen  6. Resume home medications as appropriate  7. Monitor post-op labs  8. DC planning.     -GERD:  Resume PPI.  Formulary substitution when indicated.      -Dyslipidemia:  Resume home regimen statin ( formulary substitution when appropriate).    -Neuropathy: Resume Neurontin    - Hypertension:  Resume home medications as appropriate, formulary substitution when indicated.  Holding parameters.  Prn medications for elevated blood pressure.    Discussed with patient postop management plan, she expressed understanding and agreement.      Dragon disclaimer:  Part of this encounter note is an electronic transcription/translation of spoken language to printed text. The electronic translation of spoken language may permit erroneous, or at times, nonsensical words or phrases to be inadvertently transcribed; Although I have reviewed the note for such errors, some may still exist.    Luisana Epperson MD  04/25/23  13:28 EDT

## 2023-04-25 NOTE — THERAPY EVALUATION
Patient Name: Dawna Lovell  : 1970    MRN: [id number]                              Today's Date: 2023       Admit Date: 2023    Visit Dx:     ICD-10-CM ICD-9-CM   1. Acute post-operative pain  G89.18 338.18   2. Neuropathy  G62.9 355.9   3. Lisfranc dislocation, left, initial encounter  S93.325A 838.03     Patient Active Problem List   Diagnosis   • Psychophysiological insomnia   • Anxiety   • GERD (gastroesophageal reflux disease)   • Restless leg syndrome   • Abnormal liver function test   • Allergic rhinitis   • Elevated AST (SGOT)   • Fatigue   • Hyperlipidemia   • Hypothyroidism due to Hashimoto's thyroiditis   • Syncope   • Tachycardia   • History of migraine headaches   • Personal history of asthma   • POTS (postural orthostatic tachycardia syndrome)   • Health care maintenance   • Hereditary hemochromatosis   • Chronic cough   • Left foot pain   • Lisfranc dislocation, left, initial encounter, s/p ORIF   • Neuropathy     Past Medical History:   Diagnosis Date   • Anxiety    • Asthma    • Depression    • GERD (gastroesophageal reflux disease)    • Hyperlipidemia    • Hypertension    • Hypothyroidism    • Irritable bowel syndrome    • Lyme disease 2016   • Migraine headache    • POTS (postural orthostatic tachycardia syndrome)    • Syncope 2016   • Tachycardia 2016     Past Surgical History:   Procedure Laterality Date   •  SECTION  2003   • COLONOSCOPY     • MOUTH SURGERY     • WISDOM TOOTH EXTRACTION        General Information     Row Name 23 1644          Physical Therapy Time and Intention    Document Type evaluation  -HP     Mode of Treatment physical therapy  -HP     Row Name 23 1644          General Information    Patient Profile Reviewed yes  -HP     Prior Level of Function all household mobility;community mobility;gait;transfer;bed mobility;ADL's;independent:  Prior to injury, pt was Ind. For last week, pt has been NWB with a knee  scooter and crutches prn  -     Existing Precautions/Restrictions fall;non-weight bearing;other (see comments)  popliteal sciatic nerve cath  -     Barriers to Rehab none identified  -     Row Name 04/25/23 1644          Living Environment    People in Home spouse;child(mynor), adult  -     Row Name 04/25/23 1644          Home Main Entrance    Number of Stairs, Main Entrance other (see comments)  TBD  -[facility] Name 04/25/23 1644          Stairs Within Home, Primary    Stairs, Within Home, Primary BR upstairs, has been staying on main level  -     Row Name 04/25/23 1644          Cognition    Orientation Status (Cognition) oriented x 3  -     Row Name 04/25/23 1644          Safety Issues, Functional Mobility    Safety Issues Affecting Function (Mobility) insight into deficits/self-awareness;awareness of need for assistance;safety precaution awareness  -     Impairments Affecting Function (Mobility) balance;endurance/activity tolerance;pain;strength;sensation/sensory awareness;range of motion (ROM)  -           User Key  (r) = Recorded By, (t) = Taken By, (c) = Cosigned By    Initials Name Provider Type     Libby Chaves, PT Physical Therapist               Mobility     Row Name 04/25/23 1649          Bed Mobility    Bed Mobility supine-sit;sit-supine  -     Supine-Sit Humphreys (Bed Mobility) standby assist  -     Sit-Supine Humphreys (Bed Mobility) minimum assist (75% patient effort)  -     Comment, (Bed Mobility) Pt performed bed mobility with Min A for managing LLE into bed.  -     Row Name 04/25/23 1649          Transfers    Comment, (Transfers) VC for hand placement and to keep L knee extended throughout transfer to avoid weight bearing. Pt able to pivot on R foot to scoot up towards HOB. Pt declined sitting in chair despite max encouragement. Pt returned to supine with B legs elevated  -     Row Name 04/25/23 1649          Sit-Stand Transfer    Sit-Stand Humphreys  (Transfers) contact guard  -     Assistive Device (Sit-Stand Transfers) walker, front-wheeled  -     Row Name 04/25/23 1649          Gait/Stairs (Locomotion)    [place] Level (Gait) unable to assess  -[facility] Name 04/25/23 1649          Mobility    Extremity Weight-bearing Status left lower extremity  -     Left Lower Extremity (Weight-bearing Status) non weight-bearing (NWB)  -           User Key  (r) = Recorded By, (t) = Taken By, (c) = Cosigned By    Initials Name Provider Type     Libby Chaves, SYED Physical Therapist               Obj/Interventions     Row Name 04/25/23 1651          Range of Motion Comprehensive    General Range of Motion lower extremity range of motion deficits identified  -     Comment, General Range of Motion L ankle ROM limited by splint  -[facility] Name 04/25/23 1651          Strength Comprehensive (MMT)    Comment, General Manual Muscle Testing (MMT) Assessment Pt able to perform B active SLR  -[facility] Name 04/25/23 1651          Balance    Balance Assessment sitting static balance;sitting dynamic balance;sit to stand dynamic balance;standing static balance;standing dynamic balance  -     Static Sitting Balance standby assist  -     Dynamic Sitting Balance standby assist  -     Position, Sitting Balance supported  -     Static Standing Balance contact guard  -     Dynamic Standing Balance contact guard  -     Position/Device Used, Standing Balance supported;walker, rolling  -     Balance Interventions standing;sitting;sit to stand;occupation based/functional task  -[facility] Name 04/25/23 1651          Sensory Assessment (Somatosensory)    Sensory Assessment (Somatosensory) LE sensation intact  -           User Key  (r) = Recorded By, (t) = Taken By, (c) = Cosigned By    Initials Name Provider Type     Libby Chaves, PT Physical Therapist               Goals/Plan     Row Name 04/25/23 1656          Bed Mobility Goal 1 (PT)    Activity/Assistive  Device (Bed Mobility Goal 1, PT) sit to supine/supine to sit  -HP     [place] Level/Cues Needed (Bed Mobility Goal 1, PT) modified independence  -HP     Time Frame (Bed Mobility Goal 1, PT) long term goal (LTG);10 days  -HP     Progress/Outcomes (Bed Mobility Goal 1, PT) goal ongoing  -     Row Name 04/25/23 1656          Transfer Goal 1 (PT)    Activity/Assistive Device (Transfer Goal 1, PT) sit-to-stand/stand-to-sit;bed-to-chair/chair-to-bed  -HP     [place] Level/Cues Needed (Transfer Goal 1, PT) modified independence  -HP     Time Frame (Transfer Goal 1, PT) long term goal (LTG);10 days  -HP     Progress/Outcome (Transfer Goal 1, PT) goal ongoing  -     Row Name 04/25/23 1656          Gait Training Goal 1 (PT)    Activity/Assistive Device (Gait Training Goal 1, PT) gait (walking locomotion)  -HP     [place] Level (Gait Training Goal 1, PT) modified independence  -HP     Distance (Gait Training Goal 1, PT) 150  -HP     Time Frame (Gait Training Goal 1, PT) long term goal (LTG);5 days  -HP     Progress/Outcome (Gait Training Goal 1, PT) goal ongoing  -     Row Name 04/25/23 1656          Therapy Assessment/Plan (PT)    Planned Therapy Interventions (PT) balance training;bed mobility training;gait training;home exercise program;patient/family education;transfer training;stretching;strengthening;ROM (range of motion);stair training  -HP           User Key  (r) = Recorded By, (t) = Taken By, (c) = Cosigned By    Initials Name Provider Type     Libby Chaves, PT Physical Therapist               Clinical Impression     Row Name 04/25/23 1655          Pain    Pretreatment Pain Rating 7/10  -HP     Posttreatment Pain Rating 7/10  -HP     Pain Location - Side/Orientation Left  -HP     Pain Location generalized  -HP     Pain Location - foot  -HP     Pain Intervention(s) Repositioned;Cold applied;Ambulation/increased activity;Elevated  -HP     Row Name 04/25/23 1654          Plan of Care Review     Plan of Care Reviewed With patient;spouse  -HP     Progress no change  -HP     Outcome Evaluation PT eval complete. Pt performed bed mobility with Min A. Pt performed STS and pivoted towards HOB with CGAx2 and FWW. Good ability at maintaining NWB on LLE. Pt delined sitting up in chair at this time. Pt returned to supine with legs elevated. RN notified. Recommend d/c home with assist and HHPT when medically appropriate.  -HP     Row Name 04/25/23 7300          Therapy Assessment/Plan (PT)    Rehab Potential (PT) good, to achieve stated therapy goals  -HP     Criteria for Skilled Interventions Met (PT) yes;meets criteria  -HP     Therapy Frequency (PT) daily  -HP     Row Name 04/25/23 1656          Vital Signs    Pre Systolic BP Rehab --  VSS  -HP     Pre Patient Position Supine  -HP     Intra Patient Position Standing  -HP     Post Patient Position Supine  -HP     Row Name 04/25/23 8474          Positioning and Restraints    Pre-Treatment Position in bed  -HP     Post Treatment Position bed  -HP     In Bed notified nsg;supine;fowlers;call light within reach;encouraged to call for assist;exit alarm on;with family/caregiver;side rails up x2  -HP           User Key  (r) = Recorded By, (t) = Taken By, (c) = Cosigned By    Initials Name Provider Type    HP Libby Chaves, PT Physical Therapist               Outcome Measures     Row Name 04/25/23 4692          How much help from another person do you currently need...    Turning from your back to your side while in flat bed without using bedrails? 4  -HP     Moving from lying on back to sitting on the side of a flat bed without bedrails? 3  -HP     Moving to and from a bed to a chair (including a wheelchair)? 3  -HP     Standing up from a chair using your arms (e.g., wheelchair, bedside chair)? 3  -HP     Climbing 3-5 steps with a railing? 3  -HP     To walk in hospital room? 3  -HP     AM-PAC 6 Clicks Score (PT) 19  -HP     Highest level of mobility 6 --> Walked 10 steps  or more  -     Row Name 04/25/23 1657          Functional Assessment    Outcome Measure Options AM-PAC 6 Clicks Basic Mobility (PT)  -           User Key  (r) = Recorded By, (t) = Taken By, (c) = Cosigned By    Initials Name Provider Type     Libby Chaves, SYED Physical Therapist                             Physical Therapy Education     Title: PT OT SLP Therapies (In Progress)     Topic: Physical Therapy (In Progress)     Point: Mobility training (Done)     Learning Progress Summary           Patient Acceptance, E,D, VU,DU by  at 4/25/2023 1657                   Point: Home exercise program (Not Started)     Learner Progress:  Not documented in this visit.          Point: Body mechanics (Done)     Learning Progress Summary           Patient Acceptance, E,D, VU,DU by  at 4/25/2023 1657                   Point: Precautions (Done)     Learning Progress Summary           Patient Acceptance, E,D, VU,DU by  at 4/25/2023 1657                               User Key     Initials Effective Dates Name Provider Type Discipline     06/01/21 -  Libby Chaves PT Physical Therapist PT              PT Recommendation and Plan  Planned Therapy Interventions (PT): balance training, bed mobility training, gait training, home exercise program, patient/family education, transfer training, stretching, strengthening, ROM (range of motion), stair training  Plan of Care Reviewed With: patient, spouse  Progress: no change  Outcome Evaluation: PT eval complete. Pt performed bed mobility with Min A. Pt performed STS and pivoted towards HOB with CGAx2 and FWW. Good ability at maintaining NWB on LLE. Pt delined sitting up in chair at this time. Pt returned to supine with legs elevated. RN notified. Recommend d/c home with assist and HHPT when medically appropriate.     Time Calculation:    PT Charges     Row Name 04/25/23 1620             Time Calculation    Start Time 1620  -      PT Received On 04/25/23  -      PT Goal  Re-Cert Due Date 05/05/23  -HP         Untimed Charges    PT Eval/Re-eval Minutes 47  -HP         Total Minutes    Untimed Charges Total Minutes 47  -HP       Total Minutes 47  -HP            User Key  (r) = Recorded By, (t) = Taken By, (c) = Cosigned By    Initials Name Provider Type    HP Libby Chaves, PT Physical Therapist              Therapy Charges for Today     Code Description Service Date Service Provider Modifiers Qty    54427119978 HC PT EVAL MOD COMPLEXITY 4 4/25/2023 Libby Chaves, PT GP 1    36044247053 HC PT THER SUPP EA 15 MIN 4/25/2023 Libby Chaves, PT GP 3          PT G-Codes  Outcome Measure Options: AM-PAC 6 Clicks Basic Mobility (PT)  AM-PAC 6 Clicks Score (PT): 19  PT Discharge Summary  Anticipated Discharge Disposition (PT): home with assist, home with home health    Libby Chaves, PT  4/25/2023

## 2023-04-25 NOTE — PLAN OF CARE
Goal Outcome Evaluation:  Plan of Care Reviewed With: patient, spouse        Progress: no change  Outcome Evaluation: PT eval complete. Pt performed bed mobility with Min A. Pt performed STS and pivoted towards HOB with CGAx2 and FWW. Good ability at maintaining NWB on LLE. Pt delined sitting up in chair at this time. Pt returned to supine with legs elevated. RN notified. Recommend d/c home with assist and HHPT when medically appropriate.

## 2023-04-25 NOTE — ANESTHESIA PROCEDURE NOTES
Adductor canal SS      Patient reassessed immediately prior to procedure    Reason for block: at surgeon's request and post-op pain management  Performed by  CRNA/CAA: Sivakumar Mckeon, CRNA  Assisted by: Gisella Menon RN  Preanesthetic Checklist  Completed: patient identified, IV checked, site marked, risks and benefits discussed, surgical consent, monitors and equipment checked, pre-op evaluation and timeout performed  Prep:  Pt Position: supine  Sterile barriers:cap, gloves, mask, sterile barriers and washed/disinfected hands  Prep: ChloraPrep  Patient monitoring: blood pressure monitoring, continuous pulse oximetry and EKG  Procedure  Performed under: spinal  Guidance:ultrasound guided    ULTRASOUND INTERPRETATION.  Using ultrasound guidance a 20 G gauge needle was placed in close proximity to the nerve, at which point, under ultrasound guidance anesthetic was injected in the area of the nerve and spread of the anesthesia was seen on ultrasound in close proximity thereto.  There were no abnormalities seen on ultrasound; a digital image was taken; and the patient tolerated the procedure with no complications. Images:still images obtained, printed/placed on chart    Laterality:left  Block Type:adductor canal block  Injection Technique:single-shot  Needle Type:Tuohy and echogenic  Needle Gauge:18 G  Resistance on Injection: none  Catheter Size:20 G (20g)    Medications Used: fentaNYL citrate (PF) (SUBLIMAZE) injection - Intravenous   100 mcg - 4/25/2023 9:24:00 AM  dexamethasone sodium phosphate injection - Injection   2 mg - 4/25/2023 9:24:00 AM  bupivacaine PF (MARCAINE) 0.25 % injection - Injection   30 mL - 4/25/2023 9:28:00 AM      Post Assessment  Injection Assessment: negative aspiration for heme, incremental injection and no paresthesia on injection  Patient Tolerance:comfortable throughout block  Complications:no  Additional Notes  SINGLE shot   A high-frequency linear transducer, with sterile cover, was  "placed on the anterior mid-thigh (between the anterior superior iliac spine and patella). The transducer was then moved medially to identify the Sartorius muscle (Lashawn), Vastus Medialis muscle (VMM), Superficial Femoral Artery (SFA) and Vein. The transducer was then moved cephalad or caudad to position the SFA in the middle of the Lashawn. The insertion site was prepped and draped in sterile fashion. Skin and cutaneous tissue was infiltrated with 2-5 ml of 1% Lidocaine. Using ultrasound-guidance, a 20-gauge B-Garcia 4\" Ultraplex 360 non-stimulating echogenic needle was advanced in plane from lateral to medial. Preservative-free normal saline was utilized for hydro-dissection of tissue, advancement of needle, and to confirm needle placement below the fascial plane of the Lashawn where the Nerve to the VMM is located. Local anesthetic (LA) 5 ml deposited here. The needle continues its path lateral to the SFA at the level of the Saphenous Nerve. The remainder of the LA was deposited at the 10-11 o'clock position of the SFA. This injection created a space between the Lashawn and the SFA. Aspiration every 5 ml to prevent intravascular injection. Injection was completed with negative aspiration of blood and negative intravascular injection. Injection pressures were normal with minimal resistance.           "

## 2023-04-25 NOTE — PLAN OF CARE
Goal Outcome Evaluation:  Plan of Care Reviewed With: patient           Outcome Evaluation: VSS. Alert orineted x4. Ambulated asssit x1.  Voiding well per BSC. LLE elevated. PO pain medication given.

## 2023-04-25 NOTE — DISCHARGE INSTRUCTIONS
DR MEDINA DISCHARGE INSTRUCTIONS   (these instructions are the most important, and supercede any other hospital paperwork)      1. Do not put weight on operated foot, use crutches, wheelchair,  walker or knee walker  2. Elevate operated foot over heart.  Keep pressure off the heel, put pillows under the calf and let the heel hang free so there is no pressure from the splint on the heel.    3. Keep the splint dry and intact- the ace bandage may be tightened or loosened, but do not remove the splint underneath the ace bandage.    4. Call the office if any problems: [phone]  5. Take the Zofran with the pain meds if you have nausea/vomiting  6. Take lovenox to help prevent blood clots       InfuBLOCK - Patient Information    What is a pain pump?  InfuBLOCK is a postoperative, non-narcotic pain relief system that delivers local anesthetic to or near the surgical site. This is a pain minimizing therapy that delivers an anesthetic (numbing) medicine to the nerve.    The InfuBLOCK pain pump will continuously deliver a local anesthetic medication to block the pain in the area of your procedure.    Where can I find information about my pain pump?           For more information about your pain pump, scan the QR code.  For additional patient resources, visit [address] [facility] [email or web address].                                                                                             The [facility] Nursing Hotline is Here for You 24/7.     Call 1-715.377.6291 for Assistance.  While your physician is your primary source for information about your treatment., there may be times during your treatment that you need assistance with your infusion pump. Our team of compassionate and knowledgeable Registered Nursed (RN) is here to assist every step of the way.    Answers to questions about your infusion pump                 Tubing disconnect  Assistance with pump alarms                                                       Dislodged catheter  Excessive leakage noted from pump                                         Inadequate pain control         Nerve Catheter Removal Instructions  When your device is empty:    Remove your catheter by pulling the dressing off slowly (like you would remove a regular bandage). The catheter should pull right out of the skin.  Check that the BLUE tip is intact.                                                                                     If the catheter is stuck, reposition your   extremity and pull slowly until removed.  *If catheter is HURTING and WON'T come out, stop and call [phone] for further assistance.    Remove medication bag from the black carrying case.  Cut the tubing on right and left side of pump, and discard the medication bag and tubing into garbage.  Place the pump and black carrying case into the plastic bag and then place this into the return box.  Seal box with blue stickers and return to [employer] postal service.    THIS IS PRE-PAID POSTAGE.

## 2023-04-25 NOTE — ANESTHESIA PROCEDURE NOTES
Popliteal catheter      Patient reassessed immediately prior to procedure    Patient location during procedure: pre-op  Reason for block: at surgeon's request and post-op pain management  Performed by  CRNA/CAA: Sivakumar Mckeon, CRNA  Assisted by: Gisella Menon RN  Preanesthetic Checklist  Completed: patient identified, IV checked, site marked, risks and benefits discussed, surgical consent, monitors and equipment checked, pre-op evaluation and timeout performed  Prep:  Sterile barriers:cap, gloves, mask and washed/disinfected hands  Prep: ChloraPrep  Patient monitoring: blood pressure monitoring, continuous pulse oximetry and EKG  Procedure    Sedation: yes  Performed under: local infiltration  Guidance:ultrasound guided    ULTRASOUND INTERPRETATION.  Using ultrasound guidance a 20 G gauge needle was placed in close proximity to the nerve, at which point, under ultrasound guidance anesthetic was injected in the area of the nerve and spread of the anesthesia was seen on ultrasound in close proximity thereto.  There were no abnormalities seen on ultrasound; a digital image was taken; and the patient tolerated the procedure with no complications. Images:still images obtained, printed/placed on chart    Laterality:left  Block Type:popliteal  Injection Technique:catheter  Needle Type:echogenic and Tuohy  Needle Gauge:18 G  Resistance on Injection: none  Catheter Size:20 G  Cath Depth at skin: 7 cm    Medications Used: bupivacaine PF (MARCAINE) 0.25 % injection - Injection   30 mL - 4/25/2023 9:28:00 AM      Post Assessment  Injection Assessment: negative aspiration for heme, no paresthesia on injection and incremental injection  Patient Tolerance:comfortable throughout block  Complications:no  Additional Notes  CATHETER                               A high-frequency linear transducer, with sterile cover, was placed in the popliteal fossa to identify the popliteal artery and vein, Tibial nerve (TN) and Common Peroneal  "nerve (CP). The transducer was then moved in a cephalad fashion to observe the TN and CP nerve bifurcation to form the Sciatic Nerve. The insertion site was prepped and draped in sterile fashion. Skin and cutaneous tissue was infiltrated with 2-5 ml of 1% Lidocaine. Using ultrasound-guidance, an 18-gauge Macrocosmiplex Ultra 360 Touhy needle was advanced in plane from lateral to medial. Preservative-free normal saline was utilized for hydro-dissection of tissue, advancement of Touhy needle, and to confirm final needle placement posterior to the nerves. Local anesthetic injection spread, in incremental 3-5 ml injections, to surround both nerve structures. Aspiration every 5 ml to prevent intravascular injection. Injection was completed with negative aspiration of blood and negative intravascular injection. Injection pressures were normal with minimal resistance. A 20-gauge Kanocoex Echo catheter was placed through the needle and advance out the tip of the Touhy 1-3 cm. The Touhy needle was then removed, and final catheter position verified (Below/above or Anterior/Posterior) the nerve structures. The catheter was secured in the usual fashion with skin glue, benzoin, steri-strips, CHG tegaderm and Label noting \"Nerve Block Catheter\". Jerk tape applied at yellow connector and catheter connection.            "

## 2023-04-25 NOTE — OP NOTE
Operative Report    04/25/23  11:53 EDT    Preoperative diagnosis: Left foot Lisfranc fracture dislocation    Postoperative diagnosis: Same    Anesthesia: General with blocks for postop pain control    Surgeon: Milagro Bauer M.D.    Assistant: Penny ORANTES, present for the entire procedure including prepping, draping, retraction, closure, dressing.    Operative procedure: Open reduction internal fixation tarsometatarsal joints 1 through 5    Operative indications: This extremely pleasant 52-year-old woman with a left foot Lisfranc fracture dislocation.  It involves all of the tarsometatarsal joints.  The intercuneiform joints were found to be stable.  There were small avulsions of the ligaments of the periarticular surface of the joints.  All of the joints were grossly unstable.  The patient has some pre-existing neuropathy and pre-existing dysesthesias in the peroneal nerves.    Operative procedure: Patient was taken to the operating room where general anesthesia was induced without difficulty.  She was given preoperative antibiotics and preoperative blocks.  The left leg was then prepped and draped in the usual sterile fashion with a well-padded tourniquet on the thigh.  The appropriate timeout was called.  The leg was elevated wrapped with an Esmarch tourniquet inflated to 350 mmHg.  Tourniquet time was 50 minutes.  I made an 8 cm incision dorsally over the second metatarsal and carried this down through soft tissue bluntly.  The superficial neurovascular structures and extensor tendons were protected.  I carefully dissected the dorsalis pedis artery and deep peroneal nerve from the soft tissues.  There was quite a bit of granulation tissue and early scar tissue because of the 4 weeks between surgery and injury.  The nerves and blood vessels were carefully protected and moves side to side in order to reach the joints below.  The first second third tarsometatarsal joints were grossly unstable.  The  intercuneiform joints were stable.  The fourth and fifth were also unstable and I made a separate 4 cm incision over these joints and carried it down through soft tissue bluntly.  The granulation tissue was cleaned from all the joints.  The fibrous tissue was removed.  They were carefully reduced anatomically.  Using C-arm and the guidewires for the 4.0 cannulated screw system the guidewires were placed across the joints and screws were placed as needed.  Care was taken to clamp the joints with a bone-holding clamp to assure anatomic alignment.  C-arm in multiple planes including AP, oblique, lateral showed anatomic alignment.  This also showed appropriate position and length of the hardware.  The tourniquet was released.  There is no evidence of neurovascular damage.  The incisions were irrigated with Irrisept and saline.  The incisions were closed with Monocryl and nylon.  The foot was dressed sterilely.  She was placed in a 3 sided fiberglass short leg splint.  He was awakened extubated and transferred recovery in stable condition.  Postop plan will be 23 admission for observation.    Estimated blood loss: 50 cc    Specimens: None    Drains: None    Complications: None    Milagro Parker MD  04/25/23  11:53 EDT

## 2023-04-25 NOTE — ANESTHESIA POSTPROCEDURE EVALUATION
Patient: Dawna Lovell    Procedure Summary     Date: 04/25/23 Room / [facility]:  [facility]    Anesthesia Start: 1031 Anesthesia Stop:     Procedure: ORIF left lisfranc foot injury (Left: Ankle) Diagnosis:       Lisfranc dislocation, left, initial encounter      Neuropathy      (Lisfranc dislocation, left, initial encounter [S93.325A])      (Neuropathy [G62.9])    Surgeons: Milagro Parker MD Provider: Wan Sheriff MD    Anesthesia Type: general with block ASA Status: 2          Anesthesia Type: general with block    Vitals  Vitals Value Taken Time   /91 04/25/23 1230   Temp     Pulse 88 04/25/23 1233   Resp     SpO2 95 % 04/25/23 1233   Vitals shown include unvalidated device data.        Post Anesthesia Care and Evaluation    Patient location during evaluation: PACU  Patient participation: complete - patient participated  Level of consciousness: awake and alert  Pain management: adequate    Airway patency: patent  Anesthetic complications: No anesthetic complications  PONV Status: none  Cardiovascular status: hemodynamically stable and acceptable  Respiratory status: nonlabored ventilation, acceptable and nasal cannula  Hydration status: acceptable

## 2023-04-25 NOTE — ANESTHESIA PREPROCEDURE EVALUATION
Anesthesia Evaluation     Patient summary reviewed and Nursing notes reviewed   NPO Solid Status: > 8 hours  NPO Liquid Status: > 6 hours           Airway   Mallampati: III  TM distance: >3 FB  Neck ROM: full  Possible difficult intubation  Dental - normal exam     Pulmonary     breath sounds clear to auscultation  Cardiovascular   Exercise tolerance: good (4-7 METS)    Rhythm: regular        Neuro/Psych  GI/Hepatic/Renal/Endo      Musculoskeletal     Abdominal    Substance History      OB/GYN          Other                        Anesthesia Plan    ASA 2     general with block     intravenous induction     Anesthetic plan, risks, benefits, and alternatives have been provided, discussed and informed consent has been obtained with: patient.    popiteal block for post op pain control    CODE STATUS:

## 2023-04-26 ENCOUNTER — HOME HEALTH ADMISSION (OUTPATIENT)
Dept: HOME HEALTH SERVICES | Facility: HOME HEALTHCARE | Age: 53
End: 2023-04-26
Payer: COMMERCIAL

## 2023-04-26 PROBLEM — G89.18 ACUTE POSTOPERATIVE PAIN: Status: ACTIVE | Noted: 2023-04-26

## 2023-04-26 PROBLEM — E87.6 HYPOKALEMIA: Status: ACTIVE | Noted: 2023-04-26

## 2023-04-26 PROBLEM — D64.9 ANEMIA: Status: ACTIVE | Noted: 2023-04-26

## 2023-04-26 LAB
ANION GAP SERPL CALCULATED.3IONS-SCNC: 11 MMOL/L (ref 5–15)
BUN SERPL-MCNC: 6 MG/DL (ref 6–20)
BUN/CREAT SERPL: 9.7 (ref 7–25)
CALCIUM SPEC-SCNC: 8.9 MG/DL (ref 8.6–10.5)
CHLORIDE SERPL-SCNC: 104 MMOL/L (ref 98–107)
CO2 SERPL-SCNC: 24 MMOL/L (ref 22–29)
CREAT SERPL-MCNC: 0.62 MG/DL (ref 0.57–1)
EGFRCR SERPLBLD CKD-EPI 2021: 108 ML/MIN/1.73
GLUCOSE SERPL-MCNC: 135 MG/DL (ref 65–99)
HCT VFR BLD AUTO: 32.5 % (ref 34–46.6)
HGB BLD-MCNC: 9.8 G/DL (ref 12–15.9)
POTASSIUM SERPL-SCNC: 3.3 MMOL/L (ref 3.5–5.2)
SODIUM SERPL-SCNC: 139 MMOL/L (ref 136–145)

## 2023-04-26 PROCEDURE — 97530 THERAPEUTIC ACTIVITIES: CPT

## 2023-04-26 PROCEDURE — G0378 HOSPITAL OBSERVATION PER HR: HCPCS

## 2023-04-26 PROCEDURE — 85018 HEMOGLOBIN: CPT | Performed by: NURSE PRACTITIONER

## 2023-04-26 PROCEDURE — 80048 BASIC METABOLIC PNL TOTAL CA: CPT | Performed by: NURSE PRACTITIONER

## 2023-04-26 PROCEDURE — 25010000002 ENOXAPARIN PER 10 MG: Performed by: ORTHOPAEDIC SURGERY

## 2023-04-26 PROCEDURE — 25010000002 CEFAZOLIN IN DEXTROSE 2-4 GM/100ML-% SOLUTION: Performed by: ORTHOPAEDIC SURGERY

## 2023-04-26 PROCEDURE — 97116 GAIT TRAINING THERAPY: CPT

## 2023-04-26 PROCEDURE — 85014 HEMATOCRIT: CPT | Performed by: NURSE PRACTITIONER

## 2023-04-26 PROCEDURE — 25010000002 HYDROMORPHONE 1 MG/ML SOLUTION: Performed by: ORTHOPAEDIC SURGERY

## 2023-04-26 PROCEDURE — 99024 POSTOP FOLLOW-UP VISIT: CPT | Performed by: ORTHOPAEDIC SURGERY

## 2023-04-26 RX ORDER — ROPIVACAINE HYDROCHLORIDE 2 MG/ML
1 INJECTION, SOLUTION EPIDURAL; INFILTRATION; PERINEURAL CONTINUOUS
Start: 2023-04-26 | End: 2023-05-08

## 2023-04-26 RX ORDER — POTASSIUM CHLORIDE 750 MG/1
40 CAPSULE, EXTENDED RELEASE ORAL ONCE
Status: COMPLETED | OUTPATIENT
Start: 2023-04-26 | End: 2023-04-26

## 2023-04-26 RX ORDER — ENOXAPARIN SODIUM 100 MG/ML
40 INJECTION SUBCUTANEOUS
Qty: 5.6 ML | Refills: 0 | Status: SHIPPED | OUTPATIENT
Start: 2023-04-26 | End: 2023-05-05

## 2023-04-26 RX ORDER — DOCUSATE SODIUM 100 MG/1
100 CAPSULE, LIQUID FILLED ORAL 2 TIMES DAILY
Qty: 60 CAPSULE | Refills: 0 | Status: SHIPPED | OUTPATIENT
Start: 2023-04-26 | End: 2023-05-05

## 2023-04-26 RX ADMIN — CARVEDILOL 6.25 MG: 6.25 TABLET, FILM COATED ORAL at 08:15

## 2023-04-26 RX ADMIN — GABAPENTIN 900 MG: 300 CAPSULE ORAL at 08:15

## 2023-04-26 RX ADMIN — TRAZODONE HYDROCHLORIDE 100 MG: 100 TABLET ORAL at 21:03

## 2023-04-26 RX ADMIN — ROSUVASTATIN 10 MG: 10 TABLET, FILM COATED ORAL at 19:48

## 2023-04-26 RX ADMIN — LEVOTHYROXINE SODIUM 150 MCG: 150 TABLET ORAL at 04:36

## 2023-04-26 RX ADMIN — ROPINIROLE HYDROCHLORIDE 1 MG: 1 TABLET, FILM COATED ORAL at 19:48

## 2023-04-26 RX ADMIN — ENOXAPARIN SODIUM 40 MG: 40 INJECTION SUBCUTANEOUS at 08:16

## 2023-04-26 RX ADMIN — POTASSIUM CHLORIDE 40 MEQ: 750 CAPSULE, EXTENDED RELEASE ORAL at 12:15

## 2023-04-26 RX ADMIN — Medication 2 G: at 09:59

## 2023-04-26 RX ADMIN — HYDROCODONE BITARTRATE AND ACETAMINOPHEN 2 TABLET: 7.5; 325 TABLET ORAL at 09:59

## 2023-04-26 RX ADMIN — URSODIOL 300 MG: 300 CAPSULE ORAL at 08:15

## 2023-04-26 RX ADMIN — URSODIOL 300 MG: 300 CAPSULE ORAL at 17:05

## 2023-04-26 RX ADMIN — GABAPENTIN 900 MG: 300 CAPSULE ORAL at 19:48

## 2023-04-26 RX ADMIN — PANTOPRAZOLE SODIUM 40 MG: 40 TABLET, DELAYED RELEASE ORAL at 17:05

## 2023-04-26 RX ADMIN — CARVEDILOL 6.25 MG: 6.25 TABLET, FILM COATED ORAL at 17:05

## 2023-04-26 RX ADMIN — URSODIOL 300 MG: 300 CAPSULE ORAL at 12:09

## 2023-04-26 RX ADMIN — HYDROMORPHONE HYDROCHLORIDE 1 MG: 1 INJECTION, SOLUTION INTRAMUSCULAR; INTRAVENOUS; SUBCUTANEOUS at 00:52

## 2023-04-26 RX ADMIN — PANTOPRAZOLE SODIUM 40 MG: 40 TABLET, DELAYED RELEASE ORAL at 08:15

## 2023-04-26 RX ADMIN — BUSPIRONE HYDROCHLORIDE 5 MG: 5 TABLET ORAL at 08:15

## 2023-04-26 RX ADMIN — BUSPIRONE HYDROCHLORIDE 5 MG: 5 TABLET ORAL at 17:05

## 2023-04-26 RX ADMIN — BUSPIRONE HYDROCHLORIDE 5 MG: 5 TABLET ORAL at 19:47

## 2023-04-26 RX ADMIN — HYDROCODONE BITARTRATE AND ACETAMINOPHEN 1 TABLET: 7.5; 325 TABLET ORAL at 18:26

## 2023-04-26 RX ADMIN — Medication 2 G: at 00:46

## 2023-04-26 NOTE — THERAPY TREATMENT NOTE
Patient Name: Dawna Lovell  : 1970    MRN: [id number]                              Today's Date: 2023       Admit Date: 2023    Visit Dx:     ICD-10-CM ICD-9-CM   1. Acute post-operative pain  G89.18 338.18   2. Neuropathy  G62.9 355.9   3. Lisfranc dislocation, left, initial encounter  S93.325A 838.03   4. Lisfranc dislocation, left, initial encounter, s/p ORIF  S93.325A 838.03     Patient Active Problem List   Diagnosis   • Psychophysiological insomnia   • Anxiety   • GERD (gastroesophageal reflux disease)   • Restless leg syndrome   • Abnormal liver function test   • Allergic rhinitis   • Elevated AST (SGOT)   • Fatigue   • Hyperlipidemia   • Hypothyroidism due to Hashimoto's thyroiditis   • Syncope   • Tachycardia   • History of migraine headaches   • Personal history of asthma   • POTS (postural orthostatic tachycardia syndrome)   • Health care maintenance   • Hereditary hemochromatosis   • Chronic cough   • Left foot pain   • Lisfranc dislocation, left, initial encounter, s/p ORIF   • Neuropathy   • Acute postoperative pain   • Hypokalemia, replaced   • Anemia, asymptomatic      Past Medical History:   Diagnosis Date   • Anxiety    • Asthma    • Depression    • GERD (gastroesophageal reflux disease)    • Hyperlipidemia    • Hypertension    • Hypothyroidism    • Irritable bowel syndrome    • Lyme disease 2016   • Migraine headache    • POTS (postural orthostatic tachycardia syndrome)    • Syncope 2016   • Tachycardia 2016     Past Surgical History:   Procedure Laterality Date   •  SECTION  2003   • COLONOSCOPY     • MOUTH SURGERY     • ORIF FOOT FRACTURE Left 2023    Procedure: ORIF left lisfranc foot injury;  Surgeon: Milagro Parker MD;  Location: [facility];  Service: Orthopedics;  Laterality: Left;   • WISDOM TOOTH EXTRACTION        General Information     Row Name 23 1158          Physical Therapy Time and Intention    Document Type therapy  note (daily note)  -CM     Mode of Treatment physical therapy;individual therapy  -CM     Row Name 04/26/23 1158          General Information    Patient Profile Reviewed yes  -CM     Existing Precautions/Restrictions fall;non-weight bearing;other (see comments)  popliteal sciatic nerve cath  -CM     Barriers to Rehab none identified  -CM     Row Name 04/26/23 1158          Home Main Entrance    Number of Stairs, Main Entrance four  -CM     Stair Railings, Main Entrance railings on both sides of stairs  -CM     Row Name 04/26/23 1158          Cognition    Orientation Status (Cognition) oriented x 3  -CM     Row Name 04/26/23 1158          Safety Issues, Functional Mobility    Safety Issues Affecting Function (Mobility) awareness of need for assistance;insight into deficits/self-awareness;judgment;safety precaution awareness;safety precautions follow-through/compliance;sequencing abilities  -CM     Impairments Affecting Function (Mobility) balance;endurance/activity tolerance;pain;strength;sensation/sensory awareness;range of motion (ROM)  -CM           User Key  (r) = Recorded By, (t) = Taken By, (c) = Cosigned By    Initials Name Provider Type    CM Angelina Urena, PT Physical Therapist               Mobility     Row Name 04/26/23 1158          Bed Mobility    Bed Mobility supine-sit  -CM     Supine-Sit [place] (Bed Mobility) standby assist  -CM     Assistive Device (Bed Mobility) head of bed elevated;bed rails  -CM     Comment, (Bed Mobility) patient performs without difficulty with HOB elevated and use of bedrails  -CM     Row Name 04/26/23 1158          Sit-Stand Transfer    Sit-Stand [place] (Transfers) contact guard  -CM     Assistive Device (Sit-Stand Transfers) walker, knee scooter  -CM     Comment, (Sit-Stand Transfer) cues for placement of knee scooter, to lock wheels of knee scooter, and to push up from bed. Patient requires close CGA to complete and additional cues for NWB LLE status as  she does rest it on the ground to complete transfer  -CM     Row Name 04/26/23 1158          Gait/Stairs (Locomotion)    [place] Level (Gait) moderate assist (50% patient effort);other (see comments)  1 significant LOB modA to correct, otherwise CGA  -CM     Assistive Device (Gait) walker, knee scooter  -CM     Distance in Feet (Gait) 330  -CM     Deviations/Abnormal Patterns (Gait) gait speed decreased  -CM     Bilateral Gait Deviations forward flexed posture  -CM     [place] Level (Stairs) moderate assist (50% patient effort);2 person assist;verbal cues  -CM     Handrail Location (Stairs) left side (ascending);right side (ascending)  -CM     Ascending Technique (Stairs) step-to-step  -CM     Stairs, Safety Issues balance decreased during turns;sequencing ability decreased  -CM     Stairs, Impairments strength decreased;impaired balance;coordination impaired;unable to maintain weight bearing status  -CM     Comment, (Gait/Stairs) Patient ambulated in zepeda on knee scooter with cues for sequencing and control of brakes. She does have one significant LOB while on knee scooter and looking over her shoulder that requires modA to correct. She attempted stair training with ability to navigate 1 step with modAx2 and bilat handrails, however she completes this step by putting weight through LLE and stepping onto it. Despite continuous cues for sequencing and even trialing bilat/unilateral crutches, patient is unable to complete an additional step while maintaining NWB status of LLE.  -CM     Row Name 04/26/23 1158          Mobility    Extremity Weight-bearing Status left lower extremity  -CM     Left Lower Extremity (Weight-bearing Status) non weight-bearing (NWB)  -CM           User Key  (r) = Recorded By, (t) = Taken By, (c) = Cosigned By    Initials Name Provider Type    Angelina Barnes, PT Physical Therapist               Obj/Interventions     Row Name 04/26/23 1204          Balance    Balance  Assessment sitting static balance;standing static balance;standing dynamic balance  -CM     Static Sitting Balance standby assist  -CM     Position, Sitting Balance unsupported;sitting edge of bed  -CM     Static Standing Balance contact guard  -CM     Dynamic Standing Balance moderate assist;other (see comments)  1 LOB, otherwise CGA  -CM     Position/Device Used, Standing Balance supported;other (see comments)  knee scooter  -CM     Comment, Balance 1 significant LOB on knee scooter, modA to correct  -CM           User Key  (r) = Recorded By, (t) = Taken By, (c) = Cosigned By    Initials Name Provider Type    Angelina Barnes, PT Physical Therapist               Goals/Plan    No documentation.                Clinical Impression     Row Name 04/26/23 1206          Pain    Pretreatment Pain Rating 2/10  -CM     Posttreatment Pain Rating 1/10  -CM     Pain Location - Side/Orientation Left  -CM     Pain Location lower  -CM     Pain Location - extremity  -CM     Pain Intervention(s) Ambulation/increased activity;Repositioned;Elevated  -CM     Row Name 04/26/23 1206          Plan of Care Review    Plan of Care Reviewed With patient  -CM     Progress no change  -CM     Outcome Evaluation Patient continues to be limited by generalized strength, balance, and safety awareness deficits. She completed ambulation with knee scooter today and had 1 significant LOB requiring modA to correct. She also trialed stair training per her home setup and demonstrated difficulty navigating steps while maintaining NWB LLE even with modAx2. Patient may need to consider installing a ramp to enter her home. Given current safety awarness and mobility level, patient is not safe to return home today. She would certainly benefit from an additional PT session with family present tomorrow and even rehab prior to returning home. Will update D/C recommendation to IPR and continue to progress patient's mobility as appropriate.  -CM     Row Name  04/26/23 1206          Vital Signs    Pre Systolic BP Rehab --  VSS  -CM     O2 Delivery Pre Treatment room air  -CM     O2 Delivery Intra Treatment room air  -CM     O2 Delivery Post Treatment room air  -CM     Pre Patient Position Supine  -CM     Intra Patient Position Standing  -CM     Post Patient Position Sitting  -CM     Row Name 04/26/23 1206          Positioning and Restraints    Pre-Treatment Position in bed  -CM     Post Treatment Position chair  -CM     In Chair reclined;call light within reach;encouraged to call for assist;exit alarm on;LLE elevated;notified nsg  -CM           User Key  (r) = Recorded By, (t) = Taken By, (c) = Cosigned By    Initials Name Provider Type    Angelina Barnes PT Physical Therapist               Outcome Measures     Row Name 04/26/23 1211          How much help from another person do you currently need...    Turning from your back to your side while in flat bed without using bedrails? 4  -CM     Moving from lying on back to sitting on the side of a flat bed without bedrails? 3  -CM     Moving to and from a bed to a chair (including a wheelchair)? 3  -CM     Standing up from a chair using your arms (e.g., wheelchair, bedside chair)? 3  -CM     Climbing 3-5 steps with a railing? 1  -CM     To walk in hospital room? 3  -CM     AM-PAC 6 Clicks Score (PT) 17  -CM     Highest level of mobility 5 --> Static standing  -CM     Row Name 04/26/23 1211          Functional Assessment    Outcome Measure Options AM-PAC 6 Clicks Basic Mobility (PT)  -CM           User Key  (r) = Recorded By, (t) = Taken By, (c) = Cosigned By    Initials Name Provider Type    Angelina Barnes PT Physical Therapist                             Physical Therapy Education     Title: PT OT SLP Therapies (In Progress)     Topic: Physical Therapy (In Progress)     Point: Mobility training (Done)     Learning Progress Summary           Patient Acceptance, E, VU by ARLETTE at 4/26/2023 1212    Acceptance,  E,D, VU,DU by  at 4/25/2023 1657                   Point: Home exercise program (Not Started)     Learner Progress:  Not documented in this visit.          Point: Body mechanics (Done)     Learning Progress Summary           Patient Acceptance, E, VU by  at 4/26/2023 1212    Acceptance, E,D, VU,DU by  at 4/25/2023 1657                   Point: Precautions (Done)     Learning Progress Summary           Patient Acceptance, E, VU by  at 4/26/2023 1212    Acceptance, E,D, VU,DU by  at 4/25/2023 1657                               User Key     Initials Effective Dates Name Provider Type Discipline     06/01/21 -  Libby Chaves, PT Physical Therapist PT     09/22/22 -  Angelina Urena PT Physical Therapist PT              PT Recommendation and Plan     Plan of Care Reviewed With: patient  Progress: no change  Outcome Evaluation: Patient continues to be limited by generalized strength, balance, and safety awareness deficits. She completed ambulation with knee scooter today and had 1 significant LOB requiring modA to correct. She also trialed stair training per her home setup and demonstrated difficulty navigating steps while maintaining NWB LLE even with modAx2. Patient may need to consider installing a ramp to enter her home. Given current safety awarness and mobility level, patient is not safe to return home today. She would certainly benefit from an additional PT session with family present tomorrow and even rehab prior to returning home. Will update D/C recommendation to IPR and continue to progress patient's mobility as appropriate.     Time Calculation:    PT Charges     Row Name 04/26/23 1213             Time Calculation    Start Time 1030  -CM      PT Received On 04/26/23  -CM      PT Goal Re-Cert Due Date 05/05/23  -CM         Timed Charges    60560 - Gait Training Minutes  30  -CM      99184 - PT Therapeutic Activity Minutes 10  -CM         Total Minutes    Timed Charges Total Minutes 40  -CM   Yes      Total Minutes 40  -CM            User Key  (r) = Recorded By, (t) = Taken By, (c) = Cosigned By    Initials Name Provider Type    CM Angelina Urena, PT Physical Therapist              Therapy Charges for Today     Code Description Service Date Service Provider Modifiers Qty    [id number]  GAIT TRAINING EA 15 MIN 4/26/2023 Angelina Urena, PT GP 2    [id number]  PT THERAPEUTIC ACT EA 15 MIN 4/26/2023 Angelina Urena, PT GP 1          PT G-Codes  Outcome Measure Options: AM-PAC 6 Clicks Basic Mobility (PT)  AM-PAC 6 Clicks Score (PT): 17  PT Discharge Summary  Anticipated Discharge Disposition (PT): inpatient rehabilitation facility    Angelina Urena PT  4/26/2023

## 2023-04-26 NOTE — PLAN OF CARE
Patient stands pivot to BSC with one to two assist. NWB status maintained. SBP moderately elevated. O2 saturation >92% on 2L via NC. Pain adequately controlled with nerve block agent/PO medications. Affected extremity elevated. Voids spontaneously without difficulty. Will cont to mx. Call light in reach.

## 2023-04-26 NOTE — CASE MANAGEMENT/SOCIAL WORK
Case Management Discharge Note      Final Note: Met with Mrs. Lovell at the bedside. Therapy recommends home health physical therapy at discharge but Mrs. Lovell is not in agreement with this. She will go home with her  to transport her via private vehicle once she is cleared by physical therapy. No discharge needs identenfied.         Selected Continued Care - Admitted Since 4/25/2023     Destination    No services have been selected for the patient.              Durable Medical Equipment    No services have been selected for the patient.              Dialysis/Infusion    No services have been selected for the patient.              Home Medical Care    No services have been selected for the patient.              Therapy    No services have been selected for the patient.              Community Resources    No services have been selected for the patient.              Community & DME    No services have been selected for the patient.                       Final Discharge Disposition Code: 01 - home or self-care

## 2023-04-26 NOTE — PROGRESS NOTES
"IM progress note      Dawna Lovell  [id number]  1970     LOS: 0 days     Attending: Milagro Parker MD    Primary Care Provider: Jesenia Leahy PA      Chief Complaint/Reason for visit:  Left foot pain    Subjective   Doing ok. Adequate pain control. Did not clear PT for safe discharge to home. Denies f/c/n/v/sob/cp.    Objective     Vital Signs  Visit Vitals  /78 (BP Location: Left arm, Patient Position: Sitting)   Pulse 98   Temp 98.5 °F (36.9 °C) (Oral)   Resp 20   Ht 172.7 cm (68\")   Wt 86.2 kg (190 lb)   SpO2 96%   BMI 28.89 kg/m²     Temp (24hrs), Av °F (36.7 °C), Min:97.8 °F (36.6 °C), Max:98.5 °F (36.9 °C)      Nutrition: PO    Respiratory: RA    Physical Therapy: Patient continues to be limited by generalized strength, balance, and safety awareness deficits. She completed ambulation with knee scooter today and had 1 significant LOB requiring modA to correct. She also trialed stair training per her home setup and demonstrated difficulty navigating steps while maintaining NWB LLE even with modAx2. Patient may need to consider installing a ramp to enter her home. Given current safety awarness and mobility level, patient is not safe to return home today. She would certainly benefit from an additional PT session with family present tomorrow and even rehab prior to returning home. Will update D/C recommendation to IPR and continue to progress patient's mobility as appropriate.    Physical Exam:     General Appearance:    Alert, cooperative, in no acute distress   Head:    Normocephalic, without obvious abnormality, atraumatic    Lungs:     Normal effort, symmetric chest rise, no crepitus, clear to      auscultation bilaterally             Heart:    Regular rhythm and normal rate, normal S1 and S2   Abdomen:     Normal bowel sounds, no masses, no organomegaly, soft        non-tender, non-distended, no guarding, no rebound                tenderness   Extremities:   Splint/ACE on LLE CDI. PNB " cath present, popliteal. Cap refill intact. Wiggles toes.    Pulses:   Pulses palpable and equal bilaterally   Skin:   No bleeding, bruising or rash   Neurologic:   Cranial nerves 2 - 12 grossly intact     Results Review:     I reviewed the patient's new clinical results.   Results from last 7 days   Lab Units 04/26/23  1032 04/21/23  1157   WBC 10*3/mm3  --  6.24   HEMOGLOBIN g/dL 9.8* 10.0*   HEMATOCRIT % 32.5* 33.2*   PLATELETS 10*3/mm3  --  301     Results from last 7 days   Lab Units 04/26/23  1032 04/21/23  1209   SODIUM mmol/L 139 137   POTASSIUM mmol/L 3.3* 3.6   CHLORIDE mmol/L 104 102   CO2 mmol/L 24.0 26.0   BUN mg/dL 6 4*   CREATININE mg/dL 0.62 0.52*   CALCIUM mg/dL 8.9 9.2   BILIRUBIN mg/dL  --  0.9   ALK PHOS U/L  --  118*   ALT (SGPT) U/L  --  14   AST (SGOT) U/L  --  46*   GLUCOSE mg/dL 135* 108*     I reviewed the patient's new imaging including images and reports.    All medications reviewed.   busPIRone, 5 mg, Oral, TID  carvedilol, 6.25 mg, Oral, BID With Meals  enoxaparin, 40 mg, Subcutaneous, Daily  gabapentin, 900 mg, Oral, Daily With Breakfast  gabapentin, 900 mg, Oral, Nightly  levothyroxine, 150 mcg, Oral, Q AM  pantoprazole, 40 mg, Oral, BID AC  potassium chloride, 40 mEq, Oral, Once  rOPINIRole, 1 mg, Oral, Nightly  rosuvastatin, 10 mg, Oral, Nightly  ursodiol, 300 mg, Oral, TID With Meals  venlafaxine XR, 150 mg, Oral, Daily        Assessment & Plan     Lisfranc dislocation, left, initial encounter, s/p ORIF    Anxiety    GERD (gastroesophageal reflux disease)    Hyperlipidemia    POTS (postural orthostatic tachycardia syndrome)    Left foot pain    Neuropathy    Acute postoperative pain    Hypokalemia, replaced    Anemia, asymptomatic       Plan  1. PT/OT- NWB LLE  2. Pain control-prns, popliteal nerve block   3. IS-encouraged  4. DVT proph- mechs/Lovenox  5. Bowel regimen  6. Monitor post-op labs, replaced K  7. DC planning for home vs rehab depending on PT progress     -GERD:  Resume  PPI.  Formulary substitution when indicated.    -Dyslipidemia:  Resume home regimen statin ( formulary substitution when appropriate).     -Neuropathy: Resume Neurontin     - Hypertension:  Resume home medications as appropriate, formulary substitution when indicated.  Holding parameters.  Prn medications for elevated blood pressure.      SANGEETHA Silva  04/26/23  12:05 EDT

## 2023-04-26 NOTE — PLAN OF CARE
Goal Outcome Evaluation:  Plan of Care Reviewed With: patient        Progress: no change  Outcome Evaluation: Patient continues to be limited by generalized strength, balance, and safety awareness deficits. She completed ambulation with knee scooter today and had 1 significant LOB requiring modA to correct. She also trialed stair training per her home setup and demonstrated difficulty navigating steps while maintaining NWB LLE even with modAx2. Patient may need to consider installing a ramp to enter her home. Given current safety awarness and mobility level, patient is not safe to return home today. She would certainly benefit from an additional PT session with family present tomorrow and even rehab prior to returning home. Will update D/C recommendation to IPR and continue to progress patient's mobility as appropriate.

## 2023-04-26 NOTE — PROGRESS NOTES
[facility]    Acute pain service Inpatient Progress Note    Patient Name: Dawna Lovell  :  1970  MRN:  [id number]        Acute Pain  Service Inpatient Progress Note:    Analgesia:Excellent  Pain Score:0/10  LOC: alert and awake  Resp Status: room air  Cardiac: VS stable  Side Effects:None  Catheter Site:clean, dressing intact and dry  Cath type: peripheral nerve cath with ON Q  Volume: 1mL,5ml, 5ml InfuSystem Pump.  Dosing/Volume: ropivacaine 0.2%  Catheter Plan:Catheter to remain Insitu and Continue catheter infusion rate unchanged  Comments: The neuro assessment of the operative extremity includes the ability to flex and extend the toes. The neuro exam of the patient also includes sensory function throughout the operative extremity.

## 2023-04-26 NOTE — PLAN OF CARE
Problem: Adult Inpatient Plan of Care  Goal: Plan of Care Review  Outcome: Ongoing, Progressing  Flowsheets (Taken 4/26/2023 1846)  Plan of Care Reviewed With: patient  Goal: Patient-Specific Goal (Individualized)  Outcome: Ongoing, Progressing  Goal: Absence of Hospital-Acquired Illness or Injury  Outcome: Ongoing, Progressing  Intervention: Identify and Manage Fall Risk  Recent Flowsheet Documentation  Taken 4/26/2023 1800 by Noah Burk, RN  Safety Promotion/Fall Prevention:   assistive device/personal items within reach   activity supervised   fall prevention program maintained   nonskid shoes/slippers when out of bed   elopement precautions   clutter free environment maintained   gait belt   lighting adjusted   mobility aid in reach   muscle strengthening facilitated  Taken 4/26/2023 1600 by Noah Burk RN  Safety Promotion/Fall Prevention:   activity supervised   assistive device/personal items within reach   clutter free environment maintained   lighting adjusted   gait belt   fall prevention program maintained   elopement precautions   muscle strengthening facilitated   mobility aid in reach   nonskid shoes/slippers when out of bed   room organization consistent   safety round/check completed  Taken 4/26/2023 1400 by Noah Burk RN  Safety Promotion/Fall Prevention:   activity supervised   assistive device/personal items within reach   clutter free environment maintained   elopement precautions   lighting adjusted   muscle strengthening facilitated   fall prevention program maintained   gait belt   mobility aid in reach   nonskid shoes/slippers when out of bed   safety round/check completed   room organization consistent  Taken 4/26/2023 1200 by Noah Burk RN  Safety Promotion/Fall Prevention:   activity supervised   clutter free environment maintained   elopement precautions   gait belt   lighting adjusted   assistive device/personal items within reach   fall prevention  program maintained   mobility aid in reach   muscle strengthening facilitated   safety round/check completed   room organization consistent  Taken 4/26/2023 1000 by Noah Burk RN  Safety Promotion/Fall Prevention:   activity supervised   elopement precautions   fall prevention program maintained   gait belt   lighting adjusted   mobility aid in reach   assistive device/personal items within reach   clutter free environment maintained   nonskid shoes/slippers when out of bed   muscle strengthening facilitated   safety round/check completed   room organization consistent  Taken 4/26/2023 0800 by Noah Burk RN  Safety Promotion/Fall Prevention:   activity supervised   clutter free environment maintained   assistive device/personal items within reach   elopement precautions   lighting adjusted   mobility aid in reach   fall prevention program maintained   gait belt   muscle strengthening facilitated   nonskid shoes/slippers when out of bed   room organization consistent   safety round/check completed  Intervention: Prevent Skin Injury  Recent Flowsheet Documentation  Taken 4/26/2023 1800 by Noah Burk RN  Body Position: neutral body alignment  Taken 4/26/2023 1600 by Noah Burk RN  Body Position: neutral body alignment  Taken 4/26/2023 1400 by Noah Burk RN  Body Position: position changed independently  Taken 4/26/2023 1200 by Noah Burk RN  Body Position: neutral body alignment  Taken 4/26/2023 1000 by Noah Burk RN  Body Position: neutral body alignment  Taken 4/26/2023 0800 by Noah Burk RN  Body Position: neutral body alignment  Intervention: Prevent and Manage VTE (Venous Thromboembolism) Risk  Recent Flowsheet Documentation  Taken 4/26/2023 1800 by Noah Burk RN  Activity Management: activity encouraged  Taken 4/26/2023 1600 by Noah Burk RN  Activity Management: activity encouraged  Taken 4/26/2023 1400 by Noah Burk  BERNARD RN  Activity Management: activity encouraged  Taken 4/26/2023 1200 by Noah Burk RN  Activity Management: activity encouraged  Taken 4/26/2023 1000 by Noah Burk RN  Activity Management: activity encouraged  Taken 4/26/2023 0800 by Noah Burk RN  Activity Management: activity encouraged  Goal: Optimal Comfort and Wellbeing  Outcome: Ongoing, Progressing  Intervention: Provide Person-Centered Care  Recent Flowsheet Documentation  Taken 4/26/2023 0800 by Noah Burk RN  Trust Relationship/Rapport: care explained  Goal: Readiness for Transition of Care  Outcome: Ongoing, Progressing     Problem: Fall Injury Risk  Goal: Absence of Fall and Fall-Related Injury  Outcome: Ongoing, Progressing  Intervention: Promote Injury-Free Environment  Recent Flowsheet Documentation  Taken 4/26/2023 1800 by Noah Burk RN  Safety Promotion/Fall Prevention:   assistive device/personal items within reach   activity supervised   fall prevention program maintained   nonskid shoes/slippers when out of bed   elopement precautions   clutter free environment maintained   gait belt   lighting adjusted   mobility aid in reach   muscle strengthening facilitated  Taken 4/26/2023 1600 by Noah Burk RN  Safety Promotion/Fall Prevention:   activity supervised   assistive device/personal items within reach   clutter free environment maintained   lighting adjusted   gait belt   fall prevention program maintained   elopement precautions   muscle strengthening facilitated   mobility aid in reach   nonskid shoes/slippers when out of bed   room organization consistent   safety round/check completed  Taken 4/26/2023 1400 by Noah Burk, RN  Safety Promotion/Fall Prevention:   activity supervised   assistive device/personal items within reach   clutter free environment maintained   elopement precautions   lighting adjusted   muscle strengthening facilitated   fall prevention program maintained    gait belt   mobility aid in reach   nonskid shoes/slippers when out of bed   safety round/check completed   room organization consistent  Taken 4/26/2023 1200 by Noah Burk RN  Safety Promotion/Fall Prevention:   activity supervised   clutter free environment maintained   elopement precautions   gait belt   lighting adjusted   assistive device/personal items within reach   fall prevention program maintained   mobility aid in reach   muscle strengthening facilitated   safety round/check completed   room organization consistent  Taken 4/26/2023 1000 by Noah Burk RN  Safety Promotion/Fall Prevention:   activity supervised   elopement precautions   fall prevention program maintained   gait belt   lighting adjusted   mobility aid in reach   assistive device/personal items within reach   clutter free environment maintained   nonskid shoes/slippers when out of bed   muscle strengthening facilitated   safety round/check completed   room organization consistent  Taken 4/26/2023 0800 by Noah Burk, RN  Safety Promotion/Fall Prevention:   activity supervised   clutter free environment maintained   assistive device/personal items within reach   elopement precautions   lighting adjusted   mobility aid in reach   fall prevention program maintained   gait belt   muscle strengthening facilitated   nonskid shoes/slippers when out of bed   room organization consistent   safety round/check completed     Problem: Bleeding (Orthopaedic Fracture)  Goal: Absence of Bleeding  Outcome: Ongoing, Progressing     Problem: Embolism (Orthopaedic Fracture)  Goal: Absence of Embolism Signs and Symptoms  Outcome: Ongoing, Progressing     Problem: Fracture Stabilization and Management (Orthopaedic Fracture)  Goal: Fracture Stability  Outcome: Ongoing, Progressing     Problem: Functional Ability Impaired (Orthopaedic Fracture)  Goal: Optimal Functional Ability  Outcome: Ongoing, Progressing  Intervention: Optimize  Functional Ability  Recent Flowsheet Documentation  Taken 4/26/2023 1800 by Noah Burk, RN  Activity Management: activity encouraged  Positioning/Transfer Devices:   pillows   in use  Taken 4/26/2023 1600 by Noah Burk RN  Activity Management: activity encouraged  Positioning/Transfer Devices:   pillows   in use  Taken 4/26/2023 1400 by Noah Burk, RN  Activity Management: activity encouraged  Positioning/Transfer Devices:   pillows   in use  Taken 4/26/2023 1200 by Noah Burk RN  Activity Management: activity encouraged  Positioning/Transfer Devices:   pillows   in use  Taken 4/26/2023 1000 by Noah Burk, RN  Activity Management: activity encouraged  Activity Assistance Provided: assistance, 2 people  Positioning/Transfer Devices:   pillows   in use  Taken 4/26/2023 0800 by Noah Burk RN  Activity Management: activity encouraged  Activity Assistance Provided: assistance, 2 people  Positioning/Transfer Devices:   pillows   in use     Problem: Infection (Orthopaedic Fracture)  Goal: Absence of Infection Signs and Symptoms  Outcome: Ongoing, Progressing     Problem: Neurovascular Compromise (Orthopaedic Fracture)  Goal: Effective Tissue Perfusion  Outcome: Ongoing, Progressing     Problem: Pain (Orthopaedic Fracture)  Goal: Acceptable Pain Control  Outcome: Ongoing, Progressing     Problem: Respiratory Compromise (Orthopaedic Fracture)  Goal: Effective Oxygenation and Ventilation  Outcome: Ongoing, Progressing   Goal Outcome Evaluation:  Plan of Care Reviewed With: patient      VSS. Pt answers orientation questions correctly but is visibly confused. Stand and pivot to BSC and voiding well.

## 2023-04-26 NOTE — PROGRESS NOTES
Orthopedic  Progress Note    Subjective     Post-Operative Day: 1 Day Post-Op  Procedure(s):  ORIF left lisfranc foot injury  Laterality:Left      Systemic or Specific Complaints: good pain control on left. Reports right leg pain diffusely, no focal swelling, motor5/5, no deformity  Objective     Vital signs in last 24 hours:  Temp:  [97.3 °F (36.3 °C)-98.3 °F (36.8 °C)] 97.9 °F (36.6 °C)  Heart Rate:  [] 98  Resp:  [14-20] 20  BP: (139-174)/() 174/95    Neurovascular: left toes pink, wiggle a little, right leg has full ROM, no swelling, no deformity, no focal symptoms, motor 5/5               Wound: left splint dry    Data Review  Lab Results (last 24 hours)     Procedure Component Value Units Date/Time    POC Pregnancy, Urine [[id number]]  (Normal) Collected: 04/25/23 0935    Specimen: Urine Updated: 04/25/23 0935     HCG, Urine, QL Negative     Lot Number [id number]     Internal Positive Control Passed     Internal Negative Control Passed     Expiration Date 4/2,024        Microbiology Results (last 10 days)     ** No results found for the last 240 hours. **              Assessment & Plan     Status post- ORIF left lisfranc  - I think right leg pain is fatigue from overload, no focal findings.  She notes that it was sore even before surgery, will follow  -home after PT today  -2 weeks Lovenox  -see me in 2 weks or sooner if any problems         Milagro Bauer MD  Date: 4/26/2023  Time: 08:18 EDT

## 2023-04-27 ENCOUNTER — READMISSION MANAGEMENT (OUTPATIENT)
Dept: CALL CENTER | Facility: HOSPITAL | Age: 53
End: 2023-04-27
Payer: COMMERCIAL

## 2023-04-27 VITALS
HEIGHT: 68 IN | RESPIRATION RATE: 16 BRPM | HEART RATE: 100 BPM | BODY MASS INDEX: 28.79 KG/M2 | TEMPERATURE: 98.1 F | DIASTOLIC BLOOD PRESSURE: 85 MMHG | WEIGHT: 190 LBS | SYSTOLIC BLOOD PRESSURE: 153 MMHG | OXYGEN SATURATION: 97 %

## 2023-04-27 PROCEDURE — G0378 HOSPITAL OBSERVATION PER HR: HCPCS

## 2023-04-27 PROCEDURE — 25010000002 ENOXAPARIN PER 10 MG: Performed by: ORTHOPAEDIC SURGERY

## 2023-04-27 PROCEDURE — 99024 POSTOP FOLLOW-UP VISIT: CPT | Performed by: ORTHOPAEDIC SURGERY

## 2023-04-27 PROCEDURE — 97116 GAIT TRAINING THERAPY: CPT

## 2023-04-27 RX ADMIN — URSODIOL 300 MG: 300 CAPSULE ORAL at 09:18

## 2023-04-27 RX ADMIN — URSODIOL 300 MG: 300 CAPSULE ORAL at 12:14

## 2023-04-27 RX ADMIN — HYDROCODONE BITARTRATE AND ACETAMINOPHEN 1 TABLET: 7.5; 325 TABLET ORAL at 09:23

## 2023-04-27 RX ADMIN — ENOXAPARIN SODIUM 40 MG: 40 INJECTION SUBCUTANEOUS at 09:19

## 2023-04-27 RX ADMIN — BUSPIRONE HYDROCHLORIDE 5 MG: 5 TABLET ORAL at 09:19

## 2023-04-27 RX ADMIN — VENLAFAXINE HYDROCHLORIDE 150 MG: 75 CAPSULE, EXTENDED RELEASE ORAL at 09:19

## 2023-04-27 RX ADMIN — CARVEDILOL 6.25 MG: 6.25 TABLET, FILM COATED ORAL at 09:18

## 2023-04-27 RX ADMIN — PANTOPRAZOLE SODIUM 40 MG: 40 TABLET, DELAYED RELEASE ORAL at 09:18

## 2023-04-27 RX ADMIN — LEVOTHYROXINE SODIUM 150 MCG: 150 TABLET ORAL at 05:31

## 2023-04-27 RX ADMIN — GABAPENTIN 900 MG: 300 CAPSULE ORAL at 09:19

## 2023-04-27 NOTE — PLAN OF CARE
Goal Outcome Evaluation:  Plan of Care Reviewed With: patient, spouse        Progress: no change  Outcome Evaluation: Spouse present and supportive for PT session. Pt demonstrated improved safety awareness and balance this session with no LOB noted. Pt able to transfer on/off knee scooter with VC for set-up. Stair training deferred this session due to pt/spouse confidence in their ability to navigate stairs safely against PT recommendation. Recommend d/c home with assist and HHPT to assist with transition home and ensure safety with functional mobility within home when medically appropriate. Pt may d/c today from PT standpoint.

## 2023-04-27 NOTE — PLAN OF CARE
Problem: Adult Inpatient Plan of Care  Goal: Plan of Care Review  Outcome: Met  Goal: Patient-Specific Goal (Individualized)  Outcome: Met  Goal: Absence of Hospital-Acquired Illness or Injury  Outcome: Met  Intervention: Identify and Manage Fall Risk  Recent Flowsheet Documentation  Taken 4/27/2023 1200 by Gisella Okeefe RN  Safety Promotion/Fall Prevention:   safety round/check completed   activity supervised   assistive device/personal items within reach   clutter free environment maintained   fall prevention program maintained  Taken 4/27/2023 1000 by Gisella Okeefe RN  Safety Promotion/Fall Prevention:   safety round/check completed   activity supervised   assistive device/personal items within reach   clutter free environment maintained   fall prevention program maintained  Taken 4/27/2023 0800 by Gisella Okeefe RN  Safety Promotion/Fall Prevention:   safety round/check completed   activity supervised   assistive device/personal items within reach   clutter free environment maintained   fall prevention program maintained  Intervention: Prevent Skin Injury  Recent Flowsheet Documentation  Taken 4/27/2023 1200 by Gisella Okeefe RN  Body Position: position changed independently  Taken 4/27/2023 1000 by Gisella Okeefe RN  Body Position: position changed independently  Taken 4/27/2023 0800 by Gisella Okeefe RN  Body Position: position changed independently  Skin Protection:   adhesive use limited   incontinence pads utilized  Intervention: Prevent and Manage VTE (Venous Thromboembolism) Risk  Recent Flowsheet Documentation  Taken 4/27/2023 1200 by Gisella Okeefe RN  Activity Management: activity encouraged  Taken 4/27/2023 1000 by Gisella Okeefe RN  Activity Management: activity encouraged  Taken 4/27/2023 0800 by Gisella Okeefe RN  Activity Management: activity encouraged  VTE Prevention/Management: (lovenox) other (see comments)  Intervention: Prevent Infection  Recent Flowsheet Documentation  Taken 4/27/2023  1200 by Gisella Okeefe RN  Infection Prevention:   rest/sleep promoted   environmental surveillance performed  Taken 4/27/2023 1000 by Gisella Okeefe RN  Infection Prevention:   rest/sleep promoted   single patient room provided  Taken 4/27/2023 0800 by Gisella Okeefe RN  Infection Prevention:   rest/sleep promoted   single patient room provided  Goal: Optimal Comfort and Wellbeing  Outcome: Met  Intervention: Monitor Pain and Promote Comfort  Recent Flowsheet Documentation  Taken 4/27/2023 0923 by Gisella Okeefe RN  Pain Management Interventions: see MAR  Intervention: Provide Person-Centered Care  Recent Flowsheet Documentation  Taken 4/27/2023 0800 by Gisella Okeefe RN  Trust Relationship/Rapport: care explained  Goal: Readiness for Transition of Care  Outcome: Met     Problem: Fall Injury Risk  Goal: Absence of Fall and Fall-Related Injury  Outcome: Met  Intervention: Identify and Manage Contributors  Recent Flowsheet Documentation  Taken 4/27/2023 1200 by Gisella Okeefe RN  Medication Review/Management: medications reviewed  Taken 4/27/2023 1000 by Gisella Okeefe RN  Medication Review/Management: medications reviewed  Taken 4/27/2023 0800 by Gisella Okeefe RN  Medication Review/Management: medications reviewed  Intervention: Promote Injury-Free Environment  Recent Flowsheet Documentation  Taken 4/27/2023 1200 by Gisella Okeefe RN  Safety Promotion/Fall Prevention:   safety round/check completed   activity supervised   assistive device/personal items within reach   clutter free environment maintained   fall prevention program maintained  Taken 4/27/2023 1000 by Gisella Okeefe RN  Safety Promotion/Fall Prevention:   safety round/check completed   activity supervised   assistive device/personal items within reach   clutter free environment maintained   fall prevention program maintained  Taken 4/27/2023 0800 by Gisella Okeefe RN  Safety Promotion/Fall Prevention:   safety round/check completed   activity  supervised   assistive device/personal items within reach   clutter free environment maintained   fall prevention program maintained     Problem: Bleeding (Orthopaedic Fracture)  Goal: Absence of Bleeding  Outcome: Met  Intervention: Monitor and Manage Fracture Bleeding  Recent Flowsheet Documentation  Taken 4/27/2023 0800 by Gisella Okeefe RN  Bleeding Management: dressing monitored     Problem: Embolism (Orthopaedic Fracture)  Goal: Absence of Embolism Signs and Symptoms  Outcome: Met  Intervention: Prevent or Manage Embolism Risk  Recent Flowsheet Documentation  Taken 4/27/2023 0800 by Gisella Okeefe RN  VTE Prevention/Management: (lovenox) other (see comments)     Problem: Fracture Stabilization and Management (Orthopaedic Fracture)  Goal: Fracture Stability  Outcome: Met     Problem: Functional Ability Impaired (Orthopaedic Fracture)  Goal: Optimal Functional Ability  Outcome: Met  Intervention: Optimize Functional Ability  Recent Flowsheet Documentation  Taken 4/27/2023 1200 by Gisella Okeefe RN  Activity Management: activity encouraged  Positioning/Transfer Devices:   pillows   in use  Taken 4/27/2023 1000 by Gisella Okeefe RN  Activity Management: activity encouraged  Positioning/Transfer Devices:   pillows   in use  Taken 4/27/2023 0800 by Gisella Okeefe RN  Activity Management: activity encouraged  Positioning/Transfer Devices:   pillows   in use     Problem: Infection (Orthopaedic Fracture)  Goal: Absence of Infection Signs and Symptoms  Outcome: Met  Intervention: Prevent or Manage Infection  Recent Flowsheet Documentation  Taken 4/27/2023 1200 by Gisella Okeefe RN  Infection Prevention:   rest/sleep promoted   environmental surveillance performed  Taken 4/27/2023 1000 by Gisella Okeefe RN  Infection Prevention:   rest/sleep promoted   single patient room provided  Taken 4/27/2023 0800 by Gisella Okeefe RN  Infection Prevention:   rest/sleep promoted   single patient room provided     Problem:  Neurovascular Compromise (Orthopaedic Fracture)  Goal: Effective Tissue Perfusion  Outcome: Met     Problem: Pain (Orthopaedic Fracture)  Goal: Acceptable Pain Control  Outcome: Met  Intervention: Manage Acute Orthopaedic-Related Pain  Recent Flowsheet Documentation  Taken 4/27/2023 0923 by Gisella Okeefe RN  Pain Management Interventions: see MAR  Taken 4/27/2023 0800 by Gisella Okeefe RN  Diversional Activities:   smartphone   television     Problem: Respiratory Compromise (Orthopaedic Fracture)  Goal: Effective Oxygenation and Ventilation  Outcome: Met  Intervention: Promote Airway Secretion Clearance  Recent Flowsheet Documentation  Taken 4/27/2023 0800 by Gisella Okeefe, RN  Cough And Deep Breathing: done independently per patient   Goal Outcome Evaluation:

## 2023-04-27 NOTE — OUTREACH NOTE
Prep Survey    Flowsheet Row Responses   [facility] patient discharged from? [facility]   Is LACE score < 7 ? Yes   Eligibility [facility]   Date of Admission 04/25/23   Date of Discharge 04/27/23   Discharge Disposition Home or Self Care   Discharge diagnosis Lisfranc dislocation, left, initial encounter, s/p ORIF   Does the patient have one of the following disease processes/diagnoses(primary or secondary)? General Surgery   Does the patient have Home health ordered? No   Is there a DME ordered? No   Prep survey completed? Yes          Vika BRINK - Registered Nurse

## 2023-04-27 NOTE — PLAN OF CARE
Oriented x 4. Patient is restless/highly impulsive. Pad alarm placed in addition to bed alarm. Left toes wiggle with rapid cap refill. NWB status maintained. VSS on 2L via NC. Requent rounding in place.

## 2023-04-27 NOTE — THERAPY TREATMENT NOTE
Patient Name: Dawna Lovell  : 1970    MRN: [id number]                              Today's Date: 2023       Admit Date: 2023    Visit Dx:     ICD-10-CM ICD-9-CM   1. Acute post-operative pain  G89.18 338.18   2. Neuropathy  G62.9 355.9   3. Lisfranc dislocation, left, initial encounter  S93.325A 838.03   4. Lisfranc dislocation, left, initial encounter, s/p ORIF  S93.325A 838.03     Patient Active Problem List   Diagnosis   • Psychophysiological insomnia   • Anxiety   • GERD (gastroesophageal reflux disease)   • Restless leg syndrome   • Abnormal liver function test   • Allergic rhinitis   • Elevated AST (SGOT)   • Fatigue   • Hyperlipidemia   • Hypothyroidism due to Hashimoto's thyroiditis   • Syncope   • Tachycardia   • History of migraine headaches   • Personal history of asthma   • POTS (postural orthostatic tachycardia syndrome)   • Health care maintenance   • Hereditary hemochromatosis   • Chronic cough   • Left foot pain   • Lisfranc dislocation, left, initial encounter, s/p ORIF   • Neuropathy   • Acute postoperative pain   • Hypokalemia, replaced   • Anemia, asymptomatic      Past Medical History:   Diagnosis Date   • Anxiety    • Asthma    • Depression    • GERD (gastroesophageal reflux disease)    • Hyperlipidemia    • Hypertension    • Hypothyroidism    • Irritable bowel syndrome    • Lyme disease 2016   • Migraine headache    • POTS (postural orthostatic tachycardia syndrome)    • Syncope 2016   • Tachycardia 2016     Past Surgical History:   Procedure Laterality Date   •  SECTION  2003   • COLONOSCOPY     • MOUTH SURGERY     • ORIF FOOT FRACTURE Left 2023    Procedure: ORIF left lisfranc foot injury;  Surgeon: Milagro Parker MD;  Location: [facility];  Service: Orthopedics;  Laterality: Left;   • WISDOM TOOTH EXTRACTION        General Information     Row Name 23 1154          Physical Therapy Time and Intention    Document Type therapy  note (daily note)  -     Mode of Treatment physical therapy;individual therapy  -     Row Name 04/27/23 1154          General Information    Patient Profile Reviewed yes  -     Existing Precautions/Restrictions fall;non-weight bearing;other (see comments)  popliteal sciatic nerve cath  -     Row Name 04/27/23 1154          Cognition    Orientation Status (Cognition) oriented x 3  -     Row Name 04/27/23 1154          Safety Issues, Functional Mobility    Impairments Affecting Function (Mobility) balance;endurance/activity tolerance;pain;strength;sensation/sensory awareness;range of motion (ROM)  -           User Key  (r) = Recorded By, (t) = Taken By, (c) = Cosigned By    Initials Name Provider Type     Libby Chaves, SYED Physical Therapist               Mobility     Row Name 04/27/23 1155          Bed Mobility    Bed Mobility supine-sit  -     Supine-Sit [place] (Bed Mobility) standby assist  -     Assistive Device (Bed Mobility) head of bed elevated;bed rails  -     Comment, (Bed Mobility) VC for line management  -     Row Name 04/27/23 1150          Transfers    Comment, (Transfers) VC for set-up with knee scooter and encouraged to lock brakes prior to transfer  -     Row Name 04/27/23 1155          Sit-Stand Transfer    Sit-Stand [place] (Transfers) contact guard  -     Assistive Device (Sit-Stand Transfers) walker, knee scooter  -     Row Name 04/27/23 1153          Gait/Stairs (Locomotion)    [place] Level (Gait) standby assist  -     Assistive Device (Gait) walker, knee scooter  -     Distance in Feet (Gait) 300  -     Deviations/Abnormal Patterns (Gait) gait speed decreased  -     Bilateral Gait Deviations forward flexed posture  -     Comment, (Gait/Stairs) Pt amb in halls with knee scooter and SBA. Pt demonstrated improved balance with no LOB noted. Pt able to navigate turns appropriately with VC and support from spouse. Stair training deferred this  session due to recommendation for ramp declined and pt/spouse verbalizing confidence in scooting up on her bottom against recommendation. Activity limited by fatigue.  -     Row Name 04/27/23 1155          Mobility    Extremity Weight-bearing Status left lower extremity  -     Left Lower Extremity (Weight-bearing Status) non weight-bearing (NWB)  -           User Key  (r) = Recorded By, (t) = Taken By, (c) = Cosigned By    Initials Name Provider Type     Libby Chaves PT Physical Therapist               Obj/Interventions     Row Name 04/27/23 1159          Balance    Balance Assessment sitting static balance;sitting dynamic balance;sit to stand dynamic balance;standing static balance;standing dynamic balance  -     Static Sitting Balance modified independence  -     Dynamic Sitting Balance modified independence  -     Position, Sitting Balance sitting edge of bed  -     Static Standing Balance standby assist  -     Dynamic Standing Balance standby assist  -     Position/Device Used, Standing Balance supported  -     Balance Interventions sitting;standing;sit to stand;occupation based/functional task  -           User Key  (r) = Recorded By, (t) = Taken By, (c) = Cosigned By    Initials Name Provider Type     Libby Chaves, SYED Physical Therapist               Goals/Plan    No documentation.                Clinical Impression     Row Name 04/27/23 1152          Pain    Pretreatment Pain Rating 2/10  -HP     Posttreatment Pain Rating 2/10  -     Pain Location - Side/Orientation Left  -     Pain Location generalized  -     Pain Location - foot  -     Pain Intervention(s) Repositioned;Ambulation/increased activity;Elevated  -     Row Name 04/27/23 1151          Plan of Care Review    Plan of Care Reviewed With patient;spouse  -     Progress no change  -     Outcome Evaluation Spouse present and supportive for PT session. Pt demonstrated improved safety awareness and balance this  session with no LOB noted. Pt able to transfer on/off knee scooter with VC for set-up. Stair training deferred this session due to pt/spouse confidence in their ability to navigate stairs safely against PT recommendation. Recommend d/c home with assist and HHPT to assist with transition home and ensure safety with functional mobility within home when medically appropriate. Pt may d/c today from PT standpoint.  -HP     Row Name 04/27/23 1159          Vital Signs    Pre Systolic BP Rehab --  VSS  -HP     Pre Patient Position Supine  -HP     Intra Patient Position Standing  -HP     Post Patient Position Sitting  -HP     Row Name 04/27/23 1159          Positioning and Restraints    Pre-Treatment Position in bed  -HP     Post Treatment Position chair  -HP     In Chair notified nsg;reclined;sitting;call light within reach;encouraged to call for assist;exit alarm on;with family/caregiver;legs elevated;LLE elevated  -HP           User Key  (r) = Recorded By, (t) = Taken By, (c) = Cosigned By    Initials Name Provider Type    HP Libby Chaves, PT Physical Therapist               Outcome Measures     Row Name 04/27/23 1206          How much help from another person do you currently need...    Turning from your back to your side while in flat bed without using bedrails? 4  -HP     Moving from lying on back to sitting on the side of a flat bed without bedrails? 4  -HP     Moving to and from a bed to a chair (including a wheelchair)? 3  -HP     Standing up from a chair using your arms (e.g., wheelchair, bedside chair)? 3  -HP     Climbing 3-5 steps with a railing? 3  -HP     To walk in hospital room? 3  -HP     AM-PAC 6 Clicks Score (PT) 20  -HP     Highest level of mobility 6 --> Walked 10 steps or more  -HP     Row Name 04/27/23 1206          Functional Assessment    Outcome Measure Options AM-PAC 6 Clicks Basic Mobility (PT)  -HP           User Key  (r) = Recorded By, (t) = Taken By, (c) = Cosigned By    Initials Name  Provider Type     Libby Chaves, PT Physical Therapist                             Physical Therapy Education     Title: PT OT SLP Therapies (Resolved)     Topic: Physical Therapy (Resolved)     Point: Mobility training (Resolved)     Learning Progress Summary           Patient Acceptance, E, VU by CM at 4/26/2023 1212    Acceptance, E,D, VU,DU by  at 4/25/2023 1657                   Point: Home exercise program (Resolved)     Learner Progress:  Not documented in this visit.          Point: Body mechanics (Resolved)     Learning Progress Summary           Patient Acceptance, E, VU by CM at 4/26/2023 1212    Acceptance, E,D, VU,DU by  at 4/25/2023 1657                   Point: Precautions (Resolved)     Learning Progress Summary           Patient Acceptance, E, VU by CM at 4/26/2023 1212    Acceptance, E,D, VU,DU by  at 4/25/2023 1657                               User Key     Initials Effective Dates Name Provider Type Discipline     06/01/21 -  Libby Chaves, PT Physical Therapist PT     09/22/22 -  Angelina Urena PT Physical Therapist PT              PT Recommendation and Plan  Planned Therapy Interventions (PT): balance training, bed mobility training, gait training, home exercise program, patient/family education, transfer training, stretching, strengthening, ROM (range of motion), stair training  Plan of Care Reviewed With: patient, spouse  Progress: no change  Outcome Evaluation: Spouse present and supportive for PT session. Pt demonstrated improved safety awareness and balance this session with no LOB noted. Pt able to transfer on/off knee scooter with VC for set-up. Stair training deferred this session due to pt/spouse confidence in their ability to navigate stairs safely against PT recommendation. Recommend d/c home with assist and HHPT to assist with transition home and ensure safety with functional mobility within home when medically appropriate. Pt may d/c today from PT  standpoint.     Time Calculation:    PT Charges     Row Name 04/27/23 1110             Time Calculation    Start Time 1110  -HP      PT Received On 04/27/23  -HP         Timed Charges    99361 - Gait Training Minutes  15  -HP         Total Minutes    Timed Charges Total Minutes 15  -HP       Total Minutes 15  -HP            User Key  (r) = Recorded By, (t) = Taken By, (c) = Cosigned By    Initials Name Provider Type    HP Libby Chaves, PT Physical Therapist              Therapy Charges for Today     Code Description Service Date Service Provider Modifiers Qty    69450811407 HC GAIT TRAINING EA 15 MIN 4/27/2023 Libby Chaves, PT GP 1    69022495448 HC PT THER SUPP EA 15 MIN 4/27/2023 Libby Chaves, PT GP 1          PT G-Codes  Outcome Measure Options: AM-PAC 6 Clicks Basic Mobility (PT)  AM-PAC 6 Clicks Score (PT): 20  PT Discharge Summary  Anticipated Discharge Disposition (PT): home with assist, home with home health    Libby Chaves, SYED  4/27/2023

## 2023-04-27 NOTE — PROGRESS NOTES
[facility]    Acute pain service Inpatient Progress Note    Patient Name: Dawna Lovell  :  1970  MRN:  [id number]        Acute Pain  Service Inpatient Progress Note:    Analgesia:Excellent  Pain Score:0/10  LOC: alert and awake  Side Effects:None  Catheter Site:clean, dry and dressing intact  Cath type: peripheral nerve cath with ON Q  Volume: .  Catheter Plan:Catheter to remain Insitu, Continue catheter infusion rate unchanged and Patient to be discharged home

## 2023-04-27 NOTE — PROGRESS NOTES
"Orthopedic  Progress Note    Subjective     Post-Operative Day: 2 Days Post-Op  Procedure(s):  ORIF left lisfranc foot injury  Laterality:Left      Systemic or Specific Complaints: feeling less \"loopy\" today, ready to go home  Objective     Vital signs in last 24 hours:  Temp:  [98.1 °F (36.7 °C)-98.5 °F (36.9 °C)] 98.1 °F (36.7 °C)  Heart Rate:  [] 100  Resp:  [16-20] 16  BP: (113-160)/(78-90) 153/85    Neurovascular: left toes pink, wiggle               Wound: left splint dry    Data Review  Lab Results (last 24 hours)     Procedure Component Value Units Date/Time    Basic Metabolic Panel [[id number]]  (Abnormal) Collected: 04/26/23 1032    Specimen: Blood Updated: 04/26/23 1140     Glucose 135 mg/dL      BUN 6 mg/dL      Creatinine 0.62 mg/dL      Sodium 139 mmol/L      Potassium 3.3 mmol/L      Chloride 104 mmol/L      CO2 24.0 mmol/L      Calcium 8.9 mg/dL      BUN/Creatinine Ratio 9.7     Anion Gap 11.0 mmol/L      eGFR 108.0 mL/min/1.73     Narrative:      GFR Normal >60  Chronic Kidney Disease <60  Kidney Failure <15          Microbiology Results (last 10 days)     ** No results found for the last 240 hours. **              Assessment & Plan     Status post- ORIF left lisfranc  -ok to go home  -see me 2 weeks         Milagro Bauer MD  Date: 4/27/2023  Time: 10:56 EDT    "

## 2023-04-27 NOTE — DISCHARGE SUMMARY
Patient Name: Dawna Lovell  MRN: [id number]  : 1970  DOS: 2023    Attending: Milagro Parker MD    Primary Care Provider: Jesenia Leahy PA    Date of Admission:.2023  7:50 AM    Date of Discharge:  2023    Discharge Diagnosis:   Lisfranc dislocation, left, initial encounter, s/p ORIF    Anxiety    GERD (gastroesophageal reflux disease)    Hyperlipidemia    POTS (postural orthostatic tachycardia syndrome)    Left foot pain    Neuropathy    Acute postoperative pain    Hypokalemia, replaced    Anemia, asymptomatic        Hospital Course    At admit:  Patient is a pleasant 52 y.o. female presented for scheduled surgery by Dr. Parker.     Per her note (This extremely pleasant 52-year-old woman with a left foot Lisfranc fracture dislocation.  It involves all of the tarsometatarsal joints.  The intercuneiform joints were found to be stable.  There were small avulsions of the ligaments of the periarticular surface of the joints.  All of the joints were grossly unstable.  The patient has some pre-existing neuropathy and pre-existing dysesthesias in the peroneal nerves.)     Patient underwent open reduction internal fixation of left Lisfranc fracture dislocation, surgery was done under general anesthesia, peripheral nerve block catheter was placed by acute pain service, she tolerated surgery well and is being admitted for further management.     Seen in PACU, doing fairly well, no complains of nausea, vomiting, or shortness of breath.     Reviewed with patient her past medical history and home medications.        After admit:    Patient was provided pain medications as needed for pain control, along with popliteal nerve block infusion of Ropivacaine.      Adjustments were made to pain medications to optimize postop pain management.   Risks and benefits of opiate medications discussed with patient.  Daryn report in chart was reviewed prior to discharge.    She was seen by PT has progressed  well over her stay.    She used an IS for atelectasis prophylaxis and Lovenox along with mechanicals for DVT prophylaxis.    Home medications were resumed as appropriate, and labs were monitored and remained fairly stable.     With the progress she has made,  is ready for DC home today.      She is to keep splint clean and dry until follow up appointment with Dr. Parker.  She will have a popliteal nerve block (instructed on it during this admit).    Discussed with patient regarding plan and she shows understanding and agreement.     Procedures Performed  Surgeon: Milagro Parker M.D.     Assistant: Penny ORANTES, present for the entire procedure including prepping, draping, retraction, closure, dressing.     Operative procedure: Open reduction internal fixation tarsometatarsal joints 1 through 5         Pertinent Test Results:    I reviewed the patient's new clinical results.   Results from last 7 days   Lab Units 04/26/23  1032 04/21/23  1157   WBC 10*3/mm3  --  6.24   HEMOGLOBIN g/dL 9.8* 10.0*   HEMATOCRIT % 32.5* 33.2*   PLATELETS 10*3/mm3  --  301     Results from last 7 days   Lab Units 04/26/23  1032 04/21/23  1209   SODIUM mmol/L 139 137   POTASSIUM mmol/L 3.3* 3.6   CHLORIDE mmol/L 104 102   CO2 mmol/L 24.0 26.0   BUN mg/dL 6 4*   CREATININE mg/dL 0.62 0.52*   CALCIUM mg/dL 8.9 9.2   BILIRUBIN mg/dL  --  0.9   ALK PHOS U/L  --  118*   ALT (SGPT) U/L  --  14   AST (SGOT) U/L  --  46*   GLUCOSE mg/dL 135* 108*       I reviewed the patient's new imaging including images and reports.      Physical therapy:   Plan of Care Reviewed With: patient, spouse  Progress: no change  Outcome Evaluation: Spouse present and supportive for PT session. Pt demonstrated improved safety awareness and balance this session with no LOB noted. Pt able to transfer on/off knee scooter with VC for set-up. Stair training deferred this session due to pt/spouse confidence in their ability to navigate stairs safely against PT  "recommendation. Recommend d/c home with assist and HHPT to assist with transition home and ensure safety with functional mobility within home when medically appropriate. Pt may d/c today from PT standpoint.                Discharge Assessment:       Visit Vitals  /85 (BP Location: Right arm, Patient Position: Lying)   Pulse 100   Temp 98.1 °F (36.7 °C) (Oral)   Resp 16   Ht 172.7 cm (68\")   Wt 86.2 kg (190 lb)   SpO2 97%   BMI 28.89 kg/m²     Temp (24hrs), Av.2 °F (36.8 °C), Min:98.1 °F (36.7 °C), Max:98.3 °F (36.8 °C)      General Appearance:    Alert, cooperative, in no acute distress   Lungs:     Clear to auscultation,respirations regular, even and unlabored    Heart:    Regular rhythm and normal rate, normal S1 and S2   Abdomen:     Normal bowel sounds, no masses, no organomegaly, soft non-tender, non-distended, no guarding, no rebound tendernes   Extremities:   RLE splint CDI. Nerve block present. Good cap refill and movement of toes.    Pulses:   Pulses palpable and equal bilaterally   Skin:   No bleeding, bruising or rash   Neurologic:   Cranial nerves 2 - 12 grossly intact, sensation intact       Discharge Disposition: Home.          Discharge Medications      New Medications      Instructions Start Date   docusate sodium 100 MG capsule  Commonly known as: Colace   100 mg, Oral, 2 Times Daily      Enoxaparin Sodium 40 MG/0.4ML solution prefilled syringe syringe  Commonly known as: LOVENOX   Inject 0.4 mL under the skin into the appropriate area as directed once daily for 2 weeks      ondansetron 4 MG tablet  Commonly known as: Zofran   4 mg, Oral, Every 6 Hours PRN      oxyCODONE-acetaminophen 5-325 MG per tablet  Commonly known as: PERCOCET   1 tablet, Oral, Every 6 Hours PRN      ropivacaine 0.2 % infusion (INFUSYSTEM)  Commonly known as: NAROPIN  Notes to patient: Continuous medication   1 mL/hr (2 mg/hr), Peripheral Nerve, Continuous         Changes to Medications      Instructions Start Date "   busPIRone 5 MG tablet  Commonly known as: BUSPAR  What changed: additional instructions   TAKE 1 TABLET BY MOUTH THREE TIMES DAILY      folic acid 1 MG tablet  Commonly known as: FOLVITE  What changed:   · how much to take  · how to take this  · when to take this   1 tablet daily.  90-day supply.      gabapentin 300 MG capsule  Commonly known as: NEURONTIN  What changed:   · how to take this  · when to take this   Take three tablets in am, 2 tablets in the pm and three tablets in hs      hydrOXYzine 25 MG tablet  Commonly known as: ATARAX  What changed: See the new instructions.   TAKE 1 TABLET Twice daily PRN      nystatin 837147 UNIT/GM ointment  Commonly known as: MYCOSTATIN  What changed:   · how much to take  · when to take this   Apply to lips 3 times daily      traZODone 100 MG tablet  Commonly known as: DESYREL  What changed: additional instructions   100 mg, Oral, Nightly PRN         Continue These Medications      Instructions Start Date   carvedilol 6.25 MG tablet  Commonly known as: COREG   TAKE 1 TABLET BY MOUTH TWICE DAILY WITH MEALS      Dymista 137-50 MCG/ACT suspension  Generic drug: Azelastine-Fluticasone   No dose, route, or frequency recorded.      HYDROcodone-acetaminophen 7.5-325 MG per tablet  Commonly known as: NORCO   1 tablet, Oral, Every 6 Hours PRN      ipratropium 0.06 % nasal spray  Commonly known as: ATROVENT   2 sprays, Nasal, 4 Times Daily      levothyroxine 150 MCG tablet  Commonly known as: SYNTHROID, LEVOTHROID   150 mcg, Oral, Daily      meloxicam 15 MG tablet  Commonly known as: MOBIC   15 mg, Oral, Daily      multivitamin with minerals tablet tablet   1 tablet, Oral, Daily      olopatadine 0.1 % ophthalmic solution  Commonly known as: PATANOL   No dose, route, or frequency recorded.      omeprazole 40 MG capsule  Commonly known as: priLOSEC   40 mg, Oral, 2 Times Daily      Proventil  (90 Base) MCG/ACT inhaler  Generic drug: albuterol sulfate HFA   INHALE 2 PUFFS BY  MOUTH EVERY 4 HOURS AS NEEDED FOR WHEEZING      Restasis 0.05 % ophthalmic emulsion  Generic drug: cycloSPORINE   1 drop, Both Eyes, 2 Times Daily      rOPINIRole 1 MG tablet  Commonly known as: REQUIP   1 mg, Oral, Nightly      rosuvastatin 10 MG tablet  Commonly known as: CRESTOR   10 mg, Oral, Daily      SUMAtriptan 25 MG tablet  Commonly known as: IMITREX   25 mg, Oral, As Needed      tiZANidine 4 MG tablet  Commonly known as: ZANAFLEX   TAKE 1 TABLET BY MOUTH EVERY 8 HOURS AS NEEDED FOR MUSCLE SPASMS      Trelegy Ellipta 200-62.5-25 MCG/ACT aerosol powder   Generic drug: Fluticasone-Umeclidin-Vilant   No dose, route, or frequency recorded.      ursodiol 300 MG capsule  Commonly known as: ACTIGALL   300 mg, Oral, 3 Times Daily With Meals      venlafaxine  MG 24 hr capsule  Commonly known as: EFFEXOR-XR   TAKE 1 CAPSULE BY MOUTH DAILY      vitamin D 1.25 MG (12894 UT) capsule capsule  Commonly known as: ERGOCALCIFEROL   50,000 Units, Oral, Weekly             Discharge Diet: Resume prior.    Activity at Discharge: NWB LLE.    Follow-up Appointments  Dr. Parker per her orders      Luisana Epperson MD  04/27/23  12:16 EDT

## 2023-04-28 ENCOUNTER — TRANSITIONAL CARE MANAGEMENT TELEPHONE ENCOUNTER (OUTPATIENT)
Dept: CALL CENTER | Facility: HOSPITAL | Age: 53
End: 2023-04-28
Payer: COMMERCIAL

## 2023-04-28 NOTE — OUTREACH NOTE
Call Center TCM Note    Flowsheet Row Responses   [facility] patient discharged from? [facility]   Does the patient have one of the following disease processes/diagnoses(primary or secondary)? General Surgery   TCM attempt successful? Yes  []   Call start time 0833   Call end time 0836   Discharge diagnosis Lisfranc dislocation, left, initial encounter, s/p ORIF   Meds reviewed with patient/caregiver? Yes   Is the patient having any side effects they believe may be caused by any medication additions or changes? No   Does the patient have all medications related to this admission filled (includes all antibiotics, pain medications, etc.) Yes   Is the patient taking all medications as directed (includes completed medication regime)? Yes   Comments HOSP DC FU appt 5/2/23 1pm.    Does the patient have an appointment with their PCP within 7 days of discharge? Yes   Has home health visited the patient within 72 hours of discharge? N/A   Psychosocial issues? No   Did the patient receive a copy of their discharge instructions? Yes   Nursing interventions Reviewed instructions with patient   What is the patient's perception of their health status since discharge? Improving   Nursing interventions Nurse provided patient education   Is the patient /caregiver able to teach back basic post-op care? Take showers only when approved by MD-sponge bathe until then, No tub bath, swimming, or hot tub until instructed by MD, Lifting as instructed by MD in discharge instructions, Drive as instructed by MD in discharge instructions   Is the patient/caregiver able to teach back signs and symptoms of incisional infection? Increased redness, swelling or pain at the incisonal site, Increased drainage or bleeding, Fever   Is the patient/caregiver able to teach back steps to recovery at home? Eat a well-balance diet, Set small, achievable goals for return to baseline health   Is the patient/caregiver able to teach back the hierarchy  of who to call/visit for symptoms/problems? PCP, Specialist, Home health nurse, Urgent Care, ED, 911 Yes   TCM call completed? Yes   Wrap up additional comments Pt reports sh eunderstands instructions and is taking meds as ordered.    Call end time 0838          Alecia Cross RN    4/28/2023, 08:36 EDT

## 2023-05-02 ENCOUNTER — TELEMEDICINE (OUTPATIENT)
Dept: INTERNAL MEDICINE | Facility: CLINIC | Age: 53
End: 2023-05-02
Payer: COMMERCIAL

## 2023-05-02 DIAGNOSIS — I10 HYPERTENSION, UNSPECIFIED TYPE: ICD-10-CM

## 2023-05-02 DIAGNOSIS — S93.325A LISFRANC DISLOCATION, LEFT, INITIAL ENCOUNTER: Primary | ICD-10-CM

## 2023-05-02 NOTE — PROGRESS NOTES
Transitional Care Follow Up Visit  Subjective     Dawna Lovell is a 52 y.o. female who presents for a transitional care management visit.    Within 48 business hours after discharge our office contacted her via telephone to coordinate her care and needs.     I reviewed and discussed the details of that call along with the discharge summary, hospital problems, inpatient lab results, inpatient diagnostic studies, and consultation reports with Dawna.     Current outpatient and discharge medications have been reconciled for the patient.  Reviewed by: ROLANDA Ang          4/27/2023     2:16 PM   Date of TCM Phone Call   [facility]   Date of Admission 4/25/2023   Date of Discharge 4/27/2023   Discharge Disposition Home or Self Care     Risk for Readmission (LACE) Score: 2 (4/27/2023  6:00 AM)      History of Present Illness   Course During Hospital Stay: Mrs. Dawna Lovell is a 52-year-old female who presents today via video for a transitional care management visit.    She recently had surgery on her foot. It went well, but it is very painful. She is on 8 weeks of leave from work. She can not do things around the house because she is unable to bear weight on her foot. She has an appointment with Dr. Parker on 05/08/2023. She has a hard time getting around. The crutches are not working. [place], and getting in and out of the house is a pain. She has not been out of the house since 04/25/2023. She came home on 04/27/2023. The top of her foot is crushed and screwed back together. She has not been off the first floor in her house. The first floor is all she an access, so she bends in her chair, on the couch, goes to the bathroom, and sometimes gets a sponge bath. She is on oxycodone and has a few tabs of these medications left. She was on a pain blocker that went down into her hip. It was a nonnarcotic. It went on for a couple of days, and then the day before yesterday it  "finally came out, which it was supposed to. It has been worse since having this removed. She was going to come in today, but she could not get out of her house.    She was taking Coreg down to half for about a week, and then her blood pressure \"tahir rocketed.\" She went back to 1 pill twice a day. It was 118/100 mmHg today. She denies dizziness.    Dr. Shearer did her labs. He told her everything was looking great. She has an appointment with him in 08/2023. She was anemic. He did a phlebotomy. Her ferritin was better. He put her on folic acid.     The following portions of the patient's history were reviewed and updated as appropriate: allergies, current medications, past family history, past medical history, past social history, past surgical history and problem list.    Review of Systems   Constitutional: Negative for activity change, appetite change and fatigue.   HENT: Negative for congestion and rhinorrhea.    Respiratory: Negative for chest tightness and shortness of breath.    Cardiovascular: Negative for chest pain and palpitations.   Gastrointestinal: Negative for abdominal pain.   Genitourinary: Negative for dysuria.   Musculoskeletal: Positive for arthralgias and gait problem. Negative for myalgias.   Neurological: Negative for dizziness, weakness, light-headedness and headaches.   Psychiatric/Behavioral: Negative for dysphoric mood. The patient is not nervous/anxious.        Objective    There were no vitals taken for this visit.    Physical Exam  Constitutional:       Appearance: She is well-developed.   HENT:      Head: Normocephalic and atraumatic.      Right Ear: External ear normal.      Left Ear: External ear normal.      Nose: Nose normal.   Eyes:      Conjunctiva/sclera: Conjunctivae normal.   Cardiovascular:      Rate and Rhythm: Normal rate.   Pulmonary:      Effort: Pulmonary effort is normal.   Musculoskeletal:         General: Normal range of motion.      Cervical back: Normal range of motion. "   Neurological:      Mental Status: She is alert and oriented to person, place, and time.   Psychiatric:         Behavior: Behavior normal.         Thought Content: Thought content normal.         Judgment: Judgment normal.         Assessment & Plan   Diagnoses and all orders for this visit:    1. Lisfranc dislocation, left, initial encounter, s/p ORIF (Primary)  Comments:  Significant pain but recovering as expected. She will follow surgeon's instructions and keep pending follow up.    2. Hypertension, unspecified type  Comments:  Continue adjusted dose of Coreg 6.25 mg BID.                  Transcribed from ambient dictation for ROLANDA Renee by Tashia Ponce.  05/02/23   14:20 EDT    Patient or patient representative verbalized consent to the visit recording.  I have personally performed the services described in this document as transcribed by the above individual, and it is both accurate and complete.

## 2023-05-08 ENCOUNTER — OFFICE VISIT (OUTPATIENT)
Dept: ORTHOPEDIC SURGERY | Facility: CLINIC | Age: 53
End: 2023-05-08
Payer: COMMERCIAL

## 2023-05-08 VITALS — TEMPERATURE: 97.1 F

## 2023-05-08 DIAGNOSIS — G62.9 NEUROPATHY: ICD-10-CM

## 2023-05-08 DIAGNOSIS — Z09 SURGERY FOLLOW-UP: Primary | ICD-10-CM

## 2023-05-08 RX ORDER — ACETAMINOPHEN 500 MG
500 TABLET ORAL EVERY 6 HOURS PRN
COMMUNITY

## 2023-05-08 NOTE — PROGRESS NOTES
Post-op (2 weeks status post ORIF left lisfranc foot injury (DOS 23) )      Dawna Lovell is 2 weeks status post ORIF left Lisfranc injury, 2023. She reports no fever, chills.  She reports pain is well controlled.  They have been taking lovenox for DVT prophylaxis.  They have been NWB in splint.      Past Surgical History:   Procedure Laterality Date   •  SECTION  2003   • COLONOSCOPY     • MOUTH SURGERY     • ORIF FOOT FRACTURE Left 2023    Procedure: ORIF left lisfranc foot injury;  Surgeon: Milagro Parker MD;  Location: [facility];  Service: Orthopedics;  Laterality: Left;   • WISDOM TOOTH EXTRACTION         Temp 97.1 °F (36.2 °C)         Good alignment, no erythema, no drainage, no sign of infection, normal post op swelling left foot no change in the mild decrease in sensation    ordered and reviewed x-rays today    Assessment and Plan:   1. Surgery follow-up  We removed every other suture, its not quite ready to remove all of them.  She was likely placed back into a fiberglass short leg nonweightbearing splint.  Continue strict elevation.  Take an aspirin a day for DVT prophylaxis.  I will see her again in 2 weeks to probably remove the remaining sutures, no x-ray needed at that time.  -  2.  Neuropathy-unchanged          Milagro Parker MD

## 2023-05-09 ENCOUNTER — TELEPHONE (OUTPATIENT)
Dept: ORTHOPEDIC SURGERY | Facility: CLINIC | Age: 53
End: 2023-05-09
Payer: COMMERCIAL

## 2023-05-09 DIAGNOSIS — G89.18 ACUTE POST-OPERATIVE PAIN: ICD-10-CM

## 2023-05-09 RX ORDER — HYDROCODONE BITARTRATE AND ACETAMINOPHEN 7.5; 325 MG/1; MG/1
1 TABLET ORAL EVERY 6 HOURS PRN
Qty: 30 TABLET | Refills: 0 | Status: SHIPPED | OUTPATIENT
Start: 2023-05-09

## 2023-05-09 NOTE — TELEPHONE ENCOUNTER
Notified patient prescription sent in with confirmation from the pharmacy.  I did apologize for the error. She verbalized understanding.    Avis PHILLIPS)

## 2023-05-09 NOTE — TELEPHONE ENCOUNTER
DURING HER TONNY ON 5/8/23 SHE WAS TOLD SHE WOULD RECEIVE A RX FOR HYDROCODINE AND WAS UNABLE TO PICK IT UP AT HER PHARMACY.    I DID NOT SEE RX ON HER CHART FROM 5/8/23

## 2023-05-22 ENCOUNTER — OFFICE VISIT (OUTPATIENT)
Dept: ORTHOPEDIC SURGERY | Facility: CLINIC | Age: 53
End: 2023-05-22
Payer: COMMERCIAL

## 2023-05-22 DIAGNOSIS — G62.9 NEUROPATHY: ICD-10-CM

## 2023-05-22 DIAGNOSIS — Z09 SURGERY FOLLOW-UP: Primary | ICD-10-CM

## 2023-05-22 PROCEDURE — 99024 POSTOP FOLLOW-UP VISIT: CPT | Performed by: ORTHOPAEDIC SURGERY

## 2023-05-22 NOTE — PROGRESS NOTES
Post-op (2 week follow up; 4 weeks status post ORIF left lisfranc foot injury (DOS 23))      Dawna Lovell is 4 weeks status post ORIF left Lisfranc injury, 2023. She reports no fever, chills.  She reports pain is well controlled.  They have been taking aspirin for DVT prophylaxis.  They have been NWB in splint.      Past Surgical History:   Procedure Laterality Date   •  SECTION  2003   • COLONOSCOPY     • MOUTH SURGERY     • ORIF FOOT FRACTURE Left 2023    Procedure: ORIF left lisfranc foot injury;  Surgeon: Milagro Parker MD;  Location: [facility];  Service: Orthopedics;  Laterality: Left;   • WISDOM TOOTH EXTRACTION         There were no vitals taken for this visit.        Good alignment, no erythema, no drainage, no sign of infection, normal post op swelling left foot no change in the mildly decreased dysesthetic sensation    none    Assessment and Plan:   1. Surgery follow-up  We will remove the remaining sutures.  She may now go into a tall nonweightbearing boot which she actually already has.  I want her to go to therapy for a few visits to work on range of motion.  She will need more therapy later after I remove the screws and allow her to weight-bear.  She may shower but no soaking until all the scabs are gone.  I will see her in 4 weeks for 3 views of the foot to probably schedule her for hardware removal.  She must remain nonweightbearing.    2. Neuropathy  No change          Milagro Parker MD

## 2023-05-22 NOTE — ANESTHESIA POSTPROCEDURE EVALUATION
Patient: Dawna Lovell    Procedure Summary     Date: 04/25/23 Room / [facility]:  [facility]    Anesthesia Start: 1031 Anesthesia Stop:     Procedure: ORIF left lisfranc foot injury (Left: Ankle) Diagnosis:       Lisfranc dislocation, left, initial encounter      Neuropathy      (Lisfranc dislocation, left, initial encounter [S93.325A])      (Neuropathy [G62.9])    Surgeons: Milagro Parker MD Provider: Wan Sheriff MD    Anesthesia Type: general with block ASA Status: 2          Anesthesia Type: general with block    Vitals  Vitals Value Taken Time   /100 04/25/23 1515   Temp 97.8 °F (36.6 °C) 04/25/23 1515   Pulse 93 04/25/23 1515   Resp 14 04/25/23 1515   SpO2 93 % 04/25/23 1515           Post Anesthesia Care and Evaluation    Patient location during evaluation: PACU  Patient participation: complete - patient participated  Level of consciousness: awake and alert  Pain score: 2  Pain management: adequate    Airway patency: patent  Anesthetic complications: No anesthetic complications  PONV Status: none  Cardiovascular status: acceptable  Respiratory status: acceptable, spontaneous ventilation and nasal cannula  Hydration status: acceptable

## 2023-05-22 NOTE — ANESTHESIA POSTPROCEDURE EVALUATION
Patient: Dawna Lovell    Procedure Summary     Date: 04/25/23 Room / Location:  [facility]    Anesthesia Start: 1031 Anesthesia Stop: 04/26/23 1240    Procedure: ORIF left lisfranc foot injury (Left: Ankle) Diagnosis:       Lisfranc dislocation, left, initial encounter      Neuropathy      (Lisfranc dislocation, left, initial encounter [S93.325A])      (Neuropathy [G62.9])    Surgeons: Milagro Parker MD Provider: Wan Sheriff MD    Anesthesia Type: general with block ASA Status: 2          Anesthesia Type: general with block    Vitals  Vitals Value Taken Time   /100 04/25/23 1515   Temp 97.8 °F (36.6 °C) 04/25/23 1515   Pulse 93 04/25/23 1515   Resp 14 04/25/23 1515   SpO2 93 % 04/25/23 1515           Post Anesthesia Care and Evaluation    Patient location during evaluation: PACU  Patient participation: complete - patient participated  Level of consciousness: awake and alert  Pain score: 2  Pain management: adequate    Airway patency: patent  Anesthetic complications: No anesthetic complications  PONV Status: none  Cardiovascular status: acceptable  Respiratory status: acceptable, spontaneous ventilation and nasal cannula  Hydration status: acceptable

## 2023-05-31 DIAGNOSIS — M54.50 ACUTE BILATERAL LOW BACK PAIN WITHOUT SCIATICA: ICD-10-CM

## 2023-05-31 RX ORDER — TIZANIDINE 4 MG/1
4 TABLET ORAL EVERY 8 HOURS PRN
Qty: 30 TABLET | Refills: 1 | OUTPATIENT
Start: 2023-05-31

## 2023-06-05 ENCOUNTER — TELEMEDICINE (OUTPATIENT)
Dept: INTERNAL MEDICINE | Facility: CLINIC | Age: 53
End: 2023-06-05
Payer: COMMERCIAL

## 2023-06-05 DIAGNOSIS — M79.671 RIGHT FOOT PAIN: Primary | ICD-10-CM

## 2023-06-05 DIAGNOSIS — I10 ESSENTIAL HYPERTENSION: ICD-10-CM

## 2023-06-05 RX ORDER — TIZANIDINE 4 MG/1
4 TABLET ORAL EVERY 8 HOURS PRN
Qty: 90 TABLET | Refills: 0 | Status: SHIPPED | OUTPATIENT
Start: 2023-06-05

## 2023-06-05 NOTE — PROGRESS NOTES
Chief Complaint   Patient presents with    Hypertension    Foot Pain       Subjective     Dawna Lovell is a 52 y.o. female.        History of Present Illness     The patient presents today via video visit for medication refills.    She had an appointment scheduled for 06/02/2023, but she canceled it because she did not know what it was for. She is still recovering from her foot surgery with Dr. Parker on 04/25/2023. She has a follow-up appointment with Dr. Parker in 2 weeks. She is in a boot and on a scooter. She is not non-weightbearing for some time. They will do x-rays on 06/19/2023 to determine whether or not the screws are ready to come out. She will then do surgery again  to take the screws out. When she is at the house, she elevates her foot up and uses ice. She just started back to work last week. She is working half days from 8:00 AM to 12:00 PM. It hurts her to have it down because she has had it up for weeks. She is getting used to that and went back this morning. She is going to continue to do half days for the next week and next week and then go back to full time except for that surgery to take the screws out. She was prescribed a muscle relaxer from a back an injury about a year ago and it has worked to help some of her pain. It is the only thing that has worked. Advil and Tylenol do not touch the nerve pain at all. The muscle relaxers relieves the pain. Dr. Pisano prescribed gabapentin. She has tried 2 gabapentin, but she does not like the way she feels. She is not on a regimen of them. She has an appointment with Dr. Pisano on 07/13/2023.    Her Coreg was increased back up to 6.25 mg twice a day. Her blood pressure was out of control during the injury. She is back to normal now. It was a little bit low in the hospital too. She is back to her regular dose on that morning and evening. She checks her blood pressure at home. Her average is 125/80 mmHg.    She still has some swelling near her  incision, but not nearly what it was. There are still scabs on the incisions. One is in the center of her foot that is 4 inches long. The side of her foot, on her pinky toe, there is one about 3 inches long. There are always little incisions where she is going to open those back up to get the screws out. It still stings.     No more falls or near syncopal episodes.      Current Outpatient Medications:     tiZANidine (ZANAFLEX) 4 MG tablet, Take 1 tablet by mouth Every 8 (Eight) Hours As Needed for Muscle Spasms., Disp: 90 tablet, Rfl: 0    acetaminophen (TYLENOL) 500 MG tablet, Take 1 tablet by mouth Every 6 (Six) Hours As Needed for Mild Pain., Disp: , Rfl:     busPIRone (BUSPAR) 5 MG tablet, TAKE 1 TABLET BY MOUTH THREE TIMES DAILY (Patient taking differently: Take 1 tablet by mouth 3 (Three) Times a Day. Patient states she only takes as needed), Disp: 90 tablet, Rfl: 1    carvedilol (COREG) 6.25 MG tablet, TAKE 1 TABLET BY MOUTH TWICE DAILY WITH MEALS (Patient taking differently: Take 1 tablet by mouth 2 (Two) Times a Day With Meals.), Disp: 180 tablet, Rfl: 1    Dymista 137-50 MCG/ACT suspension, , Disp: , Rfl:     folic acid (FOLVITE) 1 MG tablet, 1 tablet daily.  90-day supply. (Patient taking differently: Take 1 tablet by mouth Daily. 1 tablet daily.  90-day supply.), Disp: 30 tablet, Rfl: 90    gabapentin (NEURONTIN) 300 MG capsule, Take three tablets in am, 2 tablets in the pm and three tablets in hs (Patient taking differently: Take  by mouth 3 (Three) Times a Day. Take three tablets in am, 2 tablets in the pm and three tablets in hs), Disp: 720 capsule, Rfl: 1    hydrOXYzine (ATARAX) 25 MG tablet, TAKE 1 TABLET Twice daily PRN (Patient taking differently: Take 1 tablet by mouth 2 (Two) Times a Day As Needed for Anxiety.), Disp: 40 tablet, Rfl: 1    ipratropium (ATROVENT) 0.06 % nasal spray, 2 sprays into the nostril(s) as directed by provider 4 (Four) Times a Day., Disp: 15 mL, Rfl: 12    levothyroxine  (SYNTHROID, LEVOTHROID) 150 MCG tablet, Take 1 tablet by mouth Daily., Disp: 90 tablet, Rfl: 1    Multiple Vitamins-Minerals (MULTIVITAMIN ADULT PO), Take 1 tablet by mouth Daily., Disp: , Rfl:     nystatin (MYCOSTATIN) 894031 UNIT/GM ointment, Apply to lips 3 times daily (Patient taking differently: 1 application 3 (Three) Times a Day. Apply to lips 3 times daily), Disp: 15 g, Rfl: 1    olopatadine (PATANOL) 0.1 % ophthalmic solution, , Disp: , Rfl:     omeprazole (priLOSEC) 40 MG capsule, Take 1 capsule by mouth 2 (Two) Times a Day., Disp: 60 capsule, Rfl: 3    Proventil  (90 Base) MCG/ACT inhaler, INHALE 2 PUFFS BY MOUTH EVERY 4 HOURS AS NEEDED FOR WHEEZING, Disp: 6.7 g, Rfl: 0    Restasis 0.05 % ophthalmic emulsion, Administer 1 drop to both eyes 2 (Two) Times a Day., Disp: , Rfl:     rOPINIRole (REQUIP) 1 MG tablet, Take 1 tablet by mouth Every Night., Disp: 90 tablet, Rfl: 3    rosuvastatin (CRESTOR) 10 MG tablet, TAKE 1 TABLET BY MOUTH DAILY, Disp: 90 tablet, Rfl: 0    SUMAtriptan (IMITREX) 25 MG tablet, TAKE 1 TABLET BY MOUTH AS NEEDED FOR MIGRAINE, Disp: 9 tablet, Rfl: 1    traZODone (DESYREL) 100 MG tablet, TAKE 1 TABLET BY MOUTH AT NIGHT AS NEEDED FOR SLEEP, Disp: 90 tablet, Rfl: 0    Trelegy Ellipta 200-62.5-25 MCG/ACT aerosol powder , , Disp: , Rfl:     ursodiol (ACTIGALL) 300 MG capsule, Take 1 capsule by mouth 3 (Three) Times a Day With Meals., Disp: 270 capsule, Rfl: 3    venlafaxine XR (EFFEXOR-XR) 150 MG 24 hr capsule, TAKE 1 CAPSULE BY MOUTH DAILY (Patient taking differently: Take 1 capsule by mouth Daily.), Disp: 90 capsule, Rfl: 0    vitamin D (ERGOCALCIFEROL) 1.25 MG (70302 UT) capsule capsule, Take 1 capsule by mouth 1 (One) Time Per Week., Disp: 12 capsule, Rfl: 3     PMFSH  The following portions of the patient's history were reviewed and updated as appropriate: allergies, current medications, past family history, past medical history, past social history, past surgical history, and  problem list.    Review of Systems   Constitutional:  Negative for activity change, appetite change and fatigue.   HENT:  Negative for congestion and rhinorrhea.    Respiratory:  Negative for chest tightness and shortness of breath.    Cardiovascular:  Negative for chest pain and palpitations.   Gastrointestinal:  Negative for abdominal pain.   Genitourinary:  Negative for dysuria.   Musculoskeletal:  Positive for arthralgias and gait problem. Negative for myalgias.   Skin:  Positive for wound.   Neurological:  Negative for dizziness, weakness, light-headedness and headaches.   Psychiatric/Behavioral:  Negative for dysphoric mood. The patient is not nervous/anxious.      Objective   There were no vitals taken for this visit.    Physical Exam  Constitutional:       Appearance: She is well-developed.   HENT:      Head: Normocephalic and atraumatic.      Right Ear: External ear normal.      Left Ear: External ear normal.      Nose: Nose normal.   Eyes:      Conjunctiva/sclera: Conjunctivae normal.   Cardiovascular:      Rate and Rhythm: Normal rate.   Pulmonary:      Effort: Pulmonary effort is normal.   Musculoskeletal:         General: Normal range of motion.      Cervical back: Normal range of motion.   Neurological:      Mental Status: She is alert and oriented to person, place, and time.   Psychiatric:         Behavior: Behavior normal.         Thought Content: Thought content normal.         Judgment: Judgment normal.       Results for orders placed or performed during the hospital encounter of 04/25/23   Basic Metabolic Panel    Specimen: Blood   Result Value Ref Range    Glucose 135 (H) 65 - 99 mg/dL    BUN 6 6 - 20 mg/dL    Creatinine 0.62 0.57 - 1.00 mg/dL    Sodium 139 136 - 145 mmol/L    Potassium 3.3 (L) 3.5 - 5.2 mmol/L    Chloride 104 98 - 107 mmol/L    CO2 24.0 22.0 - 29.0 mmol/L    Calcium 8.9 8.6 - 10.5 mg/dL    BUN/Creatinine Ratio 9.7 7.0 - 25.0    Anion Gap 11.0 5.0 - 15.0 mmol/L    eGFR 108.0  >60.0 mL/min/1.73   Hemoglobin & Hematocrit, Blood    Specimen: Blood   Result Value Ref Range    Hemoglobin 9.8 (L) 12.0 - 15.9 g/dL    Hematocrit 32.5 (L) 34.0 - 46.6 %   POC Pregnancy, Urine    Specimen: Urine   Result Value Ref Range    HCG, Urine, QL Negative Negative    Lot Number [id number]     Internal Positive Control Passed Positive, Passed    Internal Negative Control Passed Negative, Passed    Expiration Date 4/2,024         ASSESSMENT/PLAN    Diagnoses and all orders for this visit:    1. Right foot pain (Primary)  Comments:  Refilled zanaflex that helps some of her pain.  Orders:  -     tiZANidine (ZANAFLEX) 4 MG tablet; Take 1 tablet by mouth Every 8 (Eight) Hours As Needed for Muscle Spasms.  Dispense: 90 tablet; Refill: 0    2. Essential hypertension  Assessment & Plan:  Hypertension is improving with treatment.  Continue current treatment regimen.  Continue current medications.  Ambulatory blood pressure monitoring.  Blood pressure will be reassessed at the next regular appointment.                   Return in about 6 months (around 12/5/2023) for Follow up.    Transcribed from ambient dictation for ROLANDA Renee by Tashia Ponce.  06/05/23   13:31 EDT    Patient or patient representative verbalized consent to the visit recording.  I have personally performed the services described in this document as transcribed by the above individual, and it is both accurate and complete.

## 2023-06-08 NOTE — ASSESSMENT & PLAN NOTE
Hypertension is improving with treatment.  Continue current treatment regimen.  Continue current medications.  Ambulatory blood pressure monitoring.  Blood pressure will be reassessed at the next regular appointment.

## 2023-06-12 DIAGNOSIS — F41.9 ANXIETY: ICD-10-CM

## 2023-06-12 RX ORDER — VENLAFAXINE HYDROCHLORIDE 150 MG/1
CAPSULE, EXTENDED RELEASE ORAL
Qty: 90 CAPSULE | Refills: 1 | Status: SHIPPED | OUTPATIENT
Start: 2023-06-12

## 2023-06-19 ENCOUNTER — OFFICE VISIT (OUTPATIENT)
Dept: ORTHOPEDIC SURGERY | Facility: CLINIC | Age: 53
End: 2023-06-19
Payer: COMMERCIAL

## 2023-06-19 DIAGNOSIS — Z09 SURGERY FOLLOW-UP: Primary | ICD-10-CM

## 2023-06-19 DIAGNOSIS — G62.9 NEUROPATHY: ICD-10-CM

## 2023-06-19 PROCEDURE — 99024 POSTOP FOLLOW-UP VISIT: CPT | Performed by: ORTHOPAEDIC SURGERY

## 2023-06-19 NOTE — PROGRESS NOTES
Post-op (4 week post op; 8 weeks status post ORIF left lisfranc foot injury DOS 23)      Dawna Lovell is 8 weeks status post ORIF left Lisfranc injury, 2023. She reports no fever, chills.  She reports pain is well controlled.  They have been taking aspirin for DVT prophylaxis.  They have been NWB in boot.      Past Surgical History:   Procedure Laterality Date     SECTION  2003    COLONOSCOPY      MOUTH SURGERY      ORIF FOOT FRACTURE Left 2023    Procedure: ORIF left lisfranc foot injury;  Surgeon: Milagro Parker MD;  Location: [facility];  Service: Orthopedics;  Laterality: Left;    WISDOM TOOTH EXTRACTION         There were no vitals taken for this visit.        Good alignment, no erythema, no drainage, no sign of infection, normal post op swelling left foot    ordered and reviewed x-rays today    Assessment and Plan:   1. Surgery follow-up  Doing well, we need to plan for hardware removal.  This can be done anytime after .  She will be weightbearing after that.We discussed the risks including but not limited to: death, infection, neurovascular damage, strokes, heart attacks, blood clots, chronic pain, deformity, stiffness, need for  further surgery,  amputation, etc.  Questions asked and answered in detail.      - XR Foot 3+ View Left    2. Neuropathy  No change          Milagro Parker MD

## 2023-07-25 ENCOUNTER — PRE-ADMISSION TESTING (OUTPATIENT)
Dept: PREADMISSION TESTING | Facility: HOSPITAL | Age: 53
End: 2023-07-25
Payer: COMMERCIAL

## 2023-07-25 LAB
ANION GAP SERPL CALCULATED.3IONS-SCNC: 15 MMOL/L (ref 5–15)
BUN SERPL-MCNC: 8 MG/DL (ref 6–20)
BUN/CREAT SERPL: 17 (ref 7–25)
CALCIUM SPEC-SCNC: 10 MG/DL (ref 8.6–10.5)
CHLORIDE SERPL-SCNC: 101 MMOL/L (ref 98–107)
CO2 SERPL-SCNC: 25 MMOL/L (ref 22–29)
CREAT SERPL-MCNC: 0.47 MG/DL (ref 0.57–1)
DEPRECATED RDW RBC AUTO: 47.3 FL (ref 37–54)
EGFRCR SERPLBLD CKD-EPI 2021: 115.4 ML/MIN/1.73
ERYTHROCYTE [DISTWIDTH] IN BLOOD BY AUTOMATED COUNT: 13.4 % (ref 12.3–15.4)
GLUCOSE SERPL-MCNC: 112 MG/DL (ref 65–99)
HBA1C MFR BLD: 5.4 % (ref 4.8–5.6)
HCT VFR BLD AUTO: 39.4 % (ref 34–46.6)
HGB BLD-MCNC: 13 G/DL (ref 12–15.9)
MCH RBC QN AUTO: 31.4 PG (ref 26.6–33)
MCHC RBC AUTO-ENTMCNC: 33 G/DL (ref 31.5–35.7)
MCV RBC AUTO: 95.2 FL (ref 79–97)
PLATELET # BLD AUTO: 155 10*3/MM3 (ref 140–450)
PMV BLD AUTO: 9.5 FL (ref 6–12)
POTASSIUM SERPL-SCNC: 4.3 MMOL/L (ref 3.5–5.2)
RBC # BLD AUTO: 4.14 10*6/MM3 (ref 3.77–5.28)
SODIUM SERPL-SCNC: 141 MMOL/L (ref 136–145)
WBC NRBC COR # BLD: 6.05 10*3/MM3 (ref 3.4–10.8)

## 2023-07-25 PROCEDURE — 85027 COMPLETE CBC AUTOMATED: CPT

## 2023-07-25 PROCEDURE — 80048 BASIC METABOLIC PNL TOTAL CA: CPT

## 2023-07-25 PROCEDURE — 36415 COLL VENOUS BLD VENIPUNCTURE: CPT

## 2023-07-25 PROCEDURE — 83036 HEMOGLOBIN GLYCOSYLATED A1C: CPT

## 2023-07-25 NOTE — PAT
Patient instructed to drink 20 ounces of Gatorade and it needs to be completed 1 hour (for Main OR patients) or 2 hours (scheduled  section & [facility] patients) before given arrival time for procedure (NO RED Gatorade)    Patient verbalized understanding.    An arrival time for procedure was not provided during PAT visit. If patient had any questions or concerns about their arrival time, they were instructed to contact their surgeon/physician.  Additionally, if the patient referred to an arrival time that was acquired from their my chart account, patient was encouraged to verify that time with their surgeon/physician. Arrival times are NOT provided in Pre Admission Testing Department.    EKG on chart from 23, patient denies any shortness of breath or chest pain.   
done

## 2023-08-03 ENCOUNTER — OUTSIDE FACILITY SERVICE (OUTPATIENT)
Dept: ORTHOPEDIC SURGERY | Facility: CLINIC | Age: 53
End: 2023-08-03
Payer: COMMERCIAL

## 2023-08-03 DIAGNOSIS — G89.18 ACUTE POST-OPERATIVE PAIN: Primary | ICD-10-CM

## 2023-08-03 RX ORDER — HYDROCODONE BITARTRATE AND ACETAMINOPHEN 7.5; 325 MG/1; MG/1
1 TABLET ORAL EVERY 6 HOURS PRN
Qty: 20 TABLET | Refills: 0 | Status: SHIPPED | OUTPATIENT
Start: 2023-08-03

## 2023-08-03 RX ORDER — ONDANSETRON 4 MG/1
4 TABLET, FILM COATED ORAL EVERY 6 HOURS PRN
Qty: 30 TABLET | Refills: 0 | Status: SHIPPED | OUTPATIENT
Start: 2023-08-03

## 2023-08-16 ENCOUNTER — OFFICE VISIT (OUTPATIENT)
Dept: ORTHOPEDIC SURGERY | Facility: CLINIC | Age: 53
End: 2023-08-16
Payer: COMMERCIAL

## 2023-08-16 DIAGNOSIS — Z09 SURGERY FOLLOW-UP: Primary | ICD-10-CM

## 2023-08-16 DIAGNOSIS — G62.9 NEUROPATHY: ICD-10-CM

## 2023-08-16 PROCEDURE — 99024 POSTOP FOLLOW-UP VISIT: CPT | Performed by: ORTHOPAEDIC SURGERY

## 2023-08-16 NOTE — PROGRESS NOTES
Post-op (2 weeks s/p Hardware removal 8/3/23-prior Lisfranc ORIF 23)      Dawna Lovell is 2 weeks status post Lisfranc hardware removal, 8/3/2023, ORIF 2023. She reports no fever, chills.  She reports pain is well controlled.  They have been taking aspirin for DVT prophylaxis.  They have been weight bearing as tolerated.      Past Surgical History:   Procedure Laterality Date     SECTION  2003    COLONOSCOPY      FOOT SURGERY Left 2023    Hardware Removal Lisfranc ORIF 23    MOUTH SURGERY      ORIF FOOT FRACTURE Left 2023    Procedure: ORIF left lisfranc foot injury;  Surgeon: Milagro Parker MD;  Location: [facility];  Service: Orthopedics;  Laterality: Left;    WISDOM TOOTH EXTRACTION         There were no vitals taken for this visit.        Good alignment, no erythema, no drainage, no sign of infection, normal post op swelling left foot    ordered and reviewed x-rays today    Assessment and Plan:   1. Surgery follow-up  We removed her sutures.  She may shower, no soaking until all the scabs are gone.  Continue PT, wear compression sock, I gave her prescription for orthotics.  We talked about shoewear and how to change her laces.  She may wean out of the boot as tolerated.  I will see her again in 6 weeks with standing 2 views of the foot  - XR Foot 3+ View Left          Milagro Parker MD

## 2023-09-02 DIAGNOSIS — F51.04 PSYCHOPHYSIOLOGICAL INSOMNIA: ICD-10-CM

## 2023-09-03 RX ORDER — TRAZODONE HYDROCHLORIDE 100 MG/1
100 TABLET ORAL NIGHTLY PRN
Qty: 90 TABLET | Refills: 0 | Status: SHIPPED | OUTPATIENT
Start: 2023-09-03

## 2023-09-25 ENCOUNTER — OFFICE VISIT (OUTPATIENT)
Dept: ENDOCRINOLOGY | Facility: CLINIC | Age: 53
End: 2023-09-25
Payer: COMMERCIAL

## 2023-09-25 VITALS
SYSTOLIC BLOOD PRESSURE: 132 MMHG | WEIGHT: 184 LBS | HEART RATE: 86 BPM | HEIGHT: 68 IN | BODY MASS INDEX: 27.89 KG/M2 | DIASTOLIC BLOOD PRESSURE: 60 MMHG

## 2023-09-25 DIAGNOSIS — E06.3 HYPOTHYROIDISM DUE TO HASHIMOTO'S THYROIDITIS: Primary | ICD-10-CM

## 2023-09-25 DIAGNOSIS — E03.8 HYPOTHYROIDISM DUE TO HASHIMOTO'S THYROIDITIS: Primary | ICD-10-CM

## 2023-09-25 PROCEDURE — 84443 ASSAY THYROID STIM HORMONE: CPT | Performed by: INTERNAL MEDICINE

## 2023-09-25 NOTE — PROGRESS NOTES
Chief Complaint   Patient presents with    Hypothyroidism     Patient is here for 6 month follow up Hypothyroidism        HPI:   Dawna Lovell is a 52 y.o.female who returns to endocrine clinic for follow-up evaluation of her hypothyroidism. Last visit (03/27/2023). Her history is as follows:    Interim Events:   - Pt fell in 04/2023, sustained a Lisfranc fracture dislocation of the left foot. S/P ORIF on 04/25/2023. (08/03/2023) s/p Lisfranc hardware removal. Is currently weight bearing, wearing boot and using a cane.     1) hypothyroidism due to Hashimoto's thyroiditis:  - Diagnosed approximately 2001  - switched from Brand Levoxyl to levothyroxine due to cost    Current dose: generic levothyroxine 150 µg daily  - Takes tablet in the morning on an empty stomach. Waits at least 30-60 minutes before eating/hot liquids.    - Takes multivitamin in the evening  - not on biotin supplement  - Is taking levothyroxine with the Omeprazole 40 mg    2) h/o abnormal endocrine tests:  - Pt had random serum cortisol levels collected at various time in the day which were elevated from 11/2015 to 4/2016. However, she was on an estrogen containing OCP at the time of these collections which falsely elevated the random serum cortisols. 3PM - ACTH was 11.6. Had elevated hepatic enzymes, normal glucose  - I evaluated pt in 2016 for possible Cushing's. Evaluation off birth control proved to be negative:  ( 06/2016) 24 hour urinary cortisol: was normal at 3 mcg/24 hours  (06/2016)  Midnight salivary cortisols (lab [facility]): normal  #1 <0.010, #2 0.013 ( normal range <0.010 - 0.090)  (07/2016) 1 mg overnight dexamethasone suppression test: 8AM cortisol was appropriately suppressed to 0.5 (normal < 1.8)    Had pt complete 1 mg overnight dex suppression test again on 4/28/2017:   04/28/2017)  1 mg overnight dexamethasone suppression test: 8AM cortisol was appropriately suppressed to 0.4    Review of Imaging:   - CT ABD 05/02/2016 -  "\"showed marked inhomogeneous geographic appearing liver with a mosaic pattern, most typical for marked inhomogeneous geographic steatosis with focal areas of fat sparing. This pattern was noted by ultrasound on 11/10/2015.\" and normal adrenal glands  - CT Head w/o contrast 1/21/2016: \"Old lacunar infarct seen in the left basal ganglia. No acute intracranial abnormalities identified.\"    Other History:  - In 04/2022, pt developed anemia. Evaluation showed elevated ferritin level. Was diagnosed with hereditary hemachromatosis. Has had two phlebotomy treatments. Is now on folic acid only.   - Pt fell in 04/2023, sustained a Lisfranc fracture dislocation of the left foot. S/P ORIF on 04/25/2023. (08/03/2023) s/p Lisfranc hardware removal.     Review of Systems   Constitutional:  Positive for fatigue.        Wt loss, mild   Eyes:         Dry eyes   Respiratory: Negative.     Cardiovascular:  Negative for palpitations (on carvediolol).   Gastrointestinal: Negative.  Negative for diarrhea and nausea.   Endocrine: Negative.    Genitourinary: Negative.    Musculoskeletal:  Negative for arthralgias, back pain and myalgias.   Skin: Negative.    Allergic/Immunologic: Negative.    Neurological:  Positive for numbness (tingling in lower extremities) and headaches (Occasional). Negative for tremors and syncope.   Hematological: Negative.    Psychiatric/Behavioral:  Positive for sleep disturbance.         H/o Depression with anxiety, better on effexor       The following portions of the patient's history were reviewed and updated as appropriate: allergies, current medications, past family history, past medical history, past social history, past surgical history and problem list.      /60   Pulse 86   Ht 172.7 cm (67.99\")   Wt 83.5 kg (184 lb)   BMI 27.98 kg/m²   Physical Exam   Constitutional: She is oriented to person, place, and time. She appears well-developed. No distress.   HENT:   Head: Normocephalic.   Eyes: Pupils " are equal, round, and reactive to light. Conjunctivae are normal.   Neck: No tracheal deviation present. No thyromegaly present.   No palpable thyroid nodules     Cardiovascular: Normal rate, regular rhythm and normal heart sounds.   No murmur heard.  Pulmonary/Chest: Effort normal and breath sounds normal. No respiratory distress.   Musculoskeletal: No tenderness.      Comments: Weight boot on left foot, ambulating with cane   Lymphadenopathy:     She has no cervical adenopathy.   Neurological: She is alert and oriented to person, place, and time. No cranial nerve deficit.   Skin: Skin is warm and dry. She is not diaphoretic. No erythema.   Psychiatric: Her behavior is normal. Mood normal.   Vitals reviewed.      LABS/IMAGING: prior records reviewed and summarized in HPI  Office Visit on 09/25/2023   Component Date Value Ref Range Status    TSH 09/25/2023 0.799  0.270 - 4.200 uIU/mL Final       ASSESSMENT/PLAN:  1) hypothyroidism due to Hashimoto's thyroiditis:   - clinically euthyroid on exam  - TSH today normal at 0.799  - Will continue levothyroxine 150 mcg q AM  - Reviewed proper levothyroxine administration, and factors to avoid that decrease medication potency and medication absorption.     RTC 6 months    Signed: Camilla Gray MD

## 2023-09-26 LAB — TSH SERPL DL<=0.05 MIU/L-ACNC: 0.8 UIU/ML (ref 0.27–4.2)

## 2023-09-26 RX ORDER — LEVOTHYROXINE SODIUM 0.15 MG/1
150 TABLET ORAL DAILY
Qty: 90 TABLET | Refills: 2 | Status: SHIPPED | OUTPATIENT
Start: 2023-09-26

## 2023-09-27 ENCOUNTER — OFFICE VISIT (OUTPATIENT)
Dept: ORTHOPEDIC SURGERY | Facility: CLINIC | Age: 53
End: 2023-09-27
Payer: COMMERCIAL

## 2023-09-27 DIAGNOSIS — G62.9 NEUROPATHY: ICD-10-CM

## 2023-09-27 DIAGNOSIS — Z09 SURGERY FOLLOW-UP: Primary | ICD-10-CM

## 2023-09-27 PROCEDURE — 99024 POSTOP FOLLOW-UP VISIT: CPT | Performed by: ORTHOPAEDIC SURGERY

## 2023-09-27 NOTE — PROGRESS NOTES
Post-op (6 week recheck- 8 weeks s/p left Hardware removal 8/3/23-prior left Lisfranc ORIF 23)      Dawna Lovell is 4 weeks status post removal of Lisfranc hardware, 8/3/2023. She reports no fever, chills.  She has been weightbearing in the tall boot.  She has obtained her orthotic.    Past Surgical History:   Procedure Laterality Date     SECTION  2003    COLONOSCOPY      FOOT SURGERY Left 2023    Hardware Removal Lisfranc ORIF 23    MOUTH SURGERY      ORIF FOOT FRACTURE Left 2023    Procedure: ORIF left lisfranc foot injury;  Surgeon: Milagro Parker MD;  Location: [facility];  Service: Orthopedics;  Laterality: Left;    WISDOM TOOTH EXTRACTION         There were no vitals taken for this visit.        Good alignment, no erythema, no drainage, no sign of infection, normal post op swelling left foot, mildly tender across the TMT joints and the first MTPJ    ordered and reviewed x-rays today    Assessment and Plan:   1. Surgery follow-up  No change in the mild recurrence of the diastases between the base of the first and second metatarsals.  She notes that it still swells and I explained that is normal.  She does have pain across the TMT's.  I explained able to take 6 to 9 months to reach a plateau to see where we are.  Some patients do go on to need a midfoot fusion.  Especially a patient such as this who had a delay in treatment.  She may wean out of the boot.  Definitely wear the orthotics.  I explained how to change the laces on her shoes to avoid pressure over the joints.  I will see her in 3 months with standing 2 views of the foot  - XR Foot 2 View Left    2. Neuropathy  She notes a feeling as if there is a string around her great toe, I think that is probably neuropathy.  Hopefully that will improve as the normal swelling improves.            Milagro Parker MD

## 2023-10-06 RX ORDER — CARVEDILOL 6.25 MG/1
TABLET ORAL
Qty: 180 TABLET | Refills: 0 | Status: SHIPPED | OUTPATIENT
Start: 2023-10-06

## 2023-10-09 RX ORDER — OMEPRAZOLE 40 MG/1
40 CAPSULE, DELAYED RELEASE ORAL 2 TIMES DAILY
Qty: 60 CAPSULE | Refills: 3 | Status: SHIPPED | OUTPATIENT
Start: 2023-10-09

## 2023-10-16 DIAGNOSIS — M79.671 RIGHT FOOT PAIN: ICD-10-CM

## 2023-10-17 RX ORDER — TIZANIDINE 4 MG/1
4 TABLET ORAL EVERY 8 HOURS PRN
Qty: 90 TABLET | Refills: 0 | Status: SHIPPED | OUTPATIENT
Start: 2023-10-17

## 2023-11-01 RX ORDER — ROPINIROLE 1 MG/1
1 TABLET, FILM COATED ORAL 3 TIMES DAILY
Qty: 90 TABLET | Refills: 3 | Status: SHIPPED | OUTPATIENT
Start: 2023-11-01

## 2023-11-01 NOTE — TELEPHONE ENCOUNTER
Rx Refill Note  Requested Prescriptions     Pending Prescriptions Disp Refills    rOPINIRole (REQUIP) 1 MG tablet [Pharmacy Med Name: ROPINIROLE 1MG TABLETS] 90 tablet 3     Sig: TAKE 1 TABLET BY MOUTH THREE TIMES DAILY      Last filled:  07/13/2023 w/3  Last office visit with prescribing clinician: 7/13/2023      Next office visit with prescribing clinician: 7/16/2024     Madelin Guzman MA  11/01/23, 12:02 EDT

## 2023-11-19 DIAGNOSIS — M79.671 RIGHT FOOT PAIN: ICD-10-CM

## 2023-11-20 RX ORDER — TIZANIDINE 4 MG/1
4 TABLET ORAL EVERY 8 HOURS PRN
Qty: 30 TABLET | Refills: 0 | Status: SHIPPED | OUTPATIENT
Start: 2023-11-20

## 2023-11-20 NOTE — TELEPHONE ENCOUNTER
Please let patient know limited supply Zanaflex was sent to the pharmacy.  It appears that she is requesting this medication about monthly.  This medication ideally should be used on an as needed basis.  We will plan to discuss further at that December appointment.

## 2023-12-08 ENCOUNTER — OFFICE VISIT (OUTPATIENT)
Dept: INTERNAL MEDICINE | Facility: CLINIC | Age: 53
End: 2023-12-08
Payer: COMMERCIAL

## 2023-12-08 VITALS
HEIGHT: 68 IN | BODY MASS INDEX: 29.86 KG/M2 | DIASTOLIC BLOOD PRESSURE: 74 MMHG | SYSTOLIC BLOOD PRESSURE: 120 MMHG | TEMPERATURE: 97.9 F | WEIGHT: 197 LBS | OXYGEN SATURATION: 97 % | HEART RATE: 102 BPM

## 2023-12-08 DIAGNOSIS — Z79.899 ON STATIN THERAPY: ICD-10-CM

## 2023-12-08 DIAGNOSIS — Z23 NEED FOR INFLUENZA VACCINATION: ICD-10-CM

## 2023-12-08 DIAGNOSIS — F51.04 PSYCHOPHYSIOLOGICAL INSOMNIA: ICD-10-CM

## 2023-12-08 DIAGNOSIS — Z76.89 ESTABLISHING CARE WITH NEW DOCTOR, ENCOUNTER FOR: Primary | ICD-10-CM

## 2023-12-08 DIAGNOSIS — Z86.69 HISTORY OF MIGRAINE HEADACHES: ICD-10-CM

## 2023-12-08 DIAGNOSIS — K21.9 GASTROESOPHAGEAL REFLUX DISEASE, UNSPECIFIED WHETHER ESOPHAGITIS PRESENT: ICD-10-CM

## 2023-12-08 DIAGNOSIS — F41.9 ANXIETY: ICD-10-CM

## 2023-12-08 DIAGNOSIS — G90.A POTS (POSTURAL ORTHOSTATIC TACHYCARDIA SYNDROME): ICD-10-CM

## 2023-12-08 DIAGNOSIS — E66.3 OVERWEIGHT (BMI 25.0-29.9): ICD-10-CM

## 2023-12-08 DIAGNOSIS — K74.3 PRIMARY BILIARY CHOLANGITIS: ICD-10-CM

## 2023-12-08 DIAGNOSIS — G25.81 RESTLESS LEG SYNDROME: Chronic | ICD-10-CM

## 2023-12-08 DIAGNOSIS — E06.3 HYPOTHYROIDISM DUE TO HASHIMOTO'S THYROIDITIS: ICD-10-CM

## 2023-12-08 DIAGNOSIS — Z78.9 NON-SMOKER: ICD-10-CM

## 2023-12-08 DIAGNOSIS — Z12.31 ENCOUNTER FOR SCREENING MAMMOGRAM FOR BREAST CANCER: ICD-10-CM

## 2023-12-08 DIAGNOSIS — S93.325D DISLOCATION OF TARSOMETATARSAL JOINT OF LEFT FOOT, SUBSEQUENT ENCOUNTER: ICD-10-CM

## 2023-12-08 DIAGNOSIS — K63.5 POLYP OF ASCENDING COLON, UNSPECIFIED TYPE: ICD-10-CM

## 2023-12-08 DIAGNOSIS — Z78.9 OCCASIONAL ALCOHOL CONSUMPTION: ICD-10-CM

## 2023-12-08 DIAGNOSIS — M79.672 LEFT FOOT PAIN: ICD-10-CM

## 2023-12-08 DIAGNOSIS — Z71.3 ENCOUNTER FOR DIETARY COUNSELING AND SURVEILLANCE: ICD-10-CM

## 2023-12-08 DIAGNOSIS — L29.9 ITCHING: ICD-10-CM

## 2023-12-08 DIAGNOSIS — E03.8 HYPOTHYROIDISM DUE TO HASHIMOTO'S THYROIDITIS: ICD-10-CM

## 2023-12-08 DIAGNOSIS — Z79.899 LONG-TERM USE OF HIGH-RISK MEDICATION: ICD-10-CM

## 2023-12-08 DIAGNOSIS — E78.2 MIXED HYPERLIPIDEMIA: ICD-10-CM

## 2023-12-08 PROBLEM — G89.18 ACUTE POSTOPERATIVE PAIN: Status: RESOLVED | Noted: 2023-04-26 | Resolved: 2023-12-08

## 2023-12-08 RX ORDER — TIZANIDINE 4 MG/1
4 TABLET ORAL EVERY 8 HOURS PRN
Qty: 90 TABLET | Refills: 1 | Status: SHIPPED | OUTPATIENT
Start: 2023-12-08

## 2023-12-08 NOTE — PROGRESS NOTES
Office Note     Name: Dawna Lovell    : 1970     MRN: [id number]     Chief Complaint  Establish Care (Pt only wants flu vaccine today)    Subjective     History of Present Illness:  Dawna Lovell is a 52 y.o. female who presents today for establish care with new provider    Patient is a non-smoker  Occasional alcohol use  No drug use  She describes her diet as healthy  She is exercising twice weekly with physical therapy  Vaccines: She would like her flu shot today.  Declined any further COVID vaccines.  Tetanus shot .  She would like to consider shingles vaccine in the future    Last Pap smear was in .  Her GYN is through   She would like an updated order for mammogram    Last colonoscopy was  with an ascending polyp with recommended for 3-year repeat  Patient has seen GI in the past    Patient does currently see orthopedic surgery due to a Lisfranc hardware removal.  She is now currently weightbearing.  This is of her left foot.  Her surgery was earlier this year.  She does have a scheduled follow-up at the end of the month.  Patient does note some neuropathy as well  This provider does occasionally prescribe her Norco for the pain  Patient does state that the Zanaflex works the best for her.  She takes it mainly at night.  She does note discomfort from the top of her toe up her foot into her ankle.  She states that she is fine in the mornings when she is sitting for the first part of work at her desk but as time is on she does get more sore.  She states that this medication works better than ibuprofen or Tylenol    Patient does see endocrinology related to Hashimoto's thyroiditis    Patient sees neurology related to restless leg.  She is currently on Requip and gabapentin.  This is currently prescribed by neurology.  Medication does provide her relief of symptoms.  Did review Daryn and it is appropriate.  No evidence of misuse or diversion    Patient does have a history of  POTS for which she is taking Coreg.  She denies any recent changes to her symptoms    Patient is currently on Crestor related to hyperlipidemia    GERD currently well-controlled on Prilosec    Anxiety: Patient does take daily Effexor.  She uses trazodone at night to assist with sleep but really this is as needed.  She uses BuSpar as needed as well for anxiety.  She mainly uses the hydroxyzine for itching    Patient does have a history of migraines.  She states she is passed her menopausal phase and states that her migraines were worse with the perimenopausal phase.  She does have Imitrex if needed    Patient has been diagnosed with primary biliary cholangitis with use of years at all.  I did review the note from GI.  It states that patient was ruled out for the hemochromatosis.  She states she no longer has to see heme-onc.  She has continued the folic acid and states she has done well.    Patient does report family history of a paternal grandmother with breast cancer    She denied any other particular updates or concerns today.    Last physical was in 2023        Past Medical History:   Diagnosis Date    Acute postoperative pain     Anxiety     Asthma     Depression     GERD (gastroesophageal reflux disease)     Hyperlipidemia     Hypertension     Hypothyroidism     Irritable bowel syndrome     Lyme disease 2016    Migraine headache     POTS (postural orthostatic tachycardia syndrome)     Syncope 2016    Tachycardia 2016       Past Surgical History:   Procedure Laterality Date     SECTION  2003    COLONOSCOPY      FOOT SURGERY Left 2023    Hardware Removal Lisfranc ORIF 23    MOUTH SURGERY      ORIF FOOT FRACTURE Left 2023    Procedure: ORIF left lisfranc foot injury;  Surgeon: Milagro Parker MD;  Location: [facility];  Service: Orthopedics;  Laterality: Left;    WISDOM TOOTH EXTRACTION         Social History     Socioeconomic History    Marital status:     Tobacco Use    Smoking status: Former     Packs/day: 0.50     Years: 11.00     Additional pack years: 0.00     Total pack years: 5.50     Types: Cigarettes     Start date:      Quit date:      Years since quittin.9     Passive exposure: Past    Smokeless tobacco: Never   Vaping Use    Vaping Use: Never used   Substance and Sexual Activity    Alcohol use: Not Currently     Alcohol/week: 5.0 standard drinks of alcohol     Types: 5 Glasses of wine per week     Comment: off alcohol for surgery    Drug use: No    Sexual activity: Defer         Current Outpatient Medications:     acetaminophen (TYLENOL) 500 MG tablet, Take 1 tablet by mouth Every 6 (Six) Hours As Needed for Mild Pain., Disp: , Rfl:     busPIRone (BUSPAR) 5 MG tablet, TAKE 1 TABLET BY MOUTH THREE TIMES DAILY (Patient taking differently: Take 1 tablet by mouth 3 (Three) Times a Day. Patient states she only takes as needed), Disp: 90 tablet, Rfl: 1    carvedilol (COREG) 6.25 MG tablet, TAKE 1 TABLET BY MOUTH TWICE DAILY WITH MEALS, Disp: 180 tablet, Rfl: 0    folic acid (FOLVITE) 1 MG tablet, 1 tablet daily.  90-day supply. (Patient taking differently: Take 1 tablet by mouth Daily. 1 tablet daily.  90-day supply.), Disp: 30 tablet, Rfl: 90    gabapentin (NEURONTIN) 300 MG capsule, TAKE 3 CAPSULES BY MOUTH EVERY MORNING, 2 CAPSULES EVERY EVENING AND 3 CAPSULES AT BEDTIME, Disp: 720 capsule, Rfl: 1    HYDROcodone-acetaminophen (NORCO) 7.5-325 MG per tablet, Take 1 tablet by mouth Every 6 (Six) Hours As Needed for Moderate Pain., Disp: 20 tablet, Rfl: 0    hydrOXYzine (ATARAX) 25 MG tablet, TAKE 1 TABLET Twice daily PRN (Patient taking differently: Take 1 tablet by mouth 2 (Two) Times a Day As Needed for Anxiety.), Disp: 40 tablet, Rfl: 1    levothyroxine (SYNTHROID, LEVOTHROID) 150 MCG tablet, Take 1 tablet by mouth Daily., Disp: 90 tablet, Rfl: 2    Multiple Vitamins-Minerals (MULTIVITAMIN ADULT PO), Take 1 tablet by mouth Daily., Disp: , Rfl:  "    nystatin (MYCOSTATIN) 874827 UNIT/GM ointment, Apply to lips 3 times daily (Patient taking differently: 1 application  3 (Three) Times a Day. Apply to lips 3 times daily), Disp: 15 g, Rfl: 1    olopatadine (PATANOL) 0.1 % ophthalmic solution, , Disp: , Rfl:     omeprazole (priLOSEC) 40 MG capsule, TAKE 1 CAPSULE BY MOUTH TWICE DAILY, Disp: 60 capsule, Rfl: 3    Restasis 0.05 % ophthalmic emulsion, Administer 1 drop to both eyes 2 (Two) Times a Day., Disp: , Rfl:     rOPINIRole (REQUIP) 1 MG tablet, TAKE 1 TABLET BY MOUTH THREE TIMES DAILY, Disp: 90 tablet, Rfl: 3    rosuvastatin (CRESTOR) 10 MG tablet, TAKE 1 TABLET BY MOUTH DAILY, Disp: 90 tablet, Rfl: 1    SUMAtriptan (IMITREX) 25 MG tablet, TAKE 1 TABLET BY MOUTH AS NEEDED FOR MIGRAINE, Disp: 9 tablet, Rfl: 1    tiZANidine (ZANAFLEX) 4 MG tablet, Take 1 tablet by mouth Every 8 (Eight) Hours As Needed for Muscle Spasms., Disp: 90 tablet, Rfl: 1    traZODone (DESYREL) 100 MG tablet, TAKE 1 TABLET BY MOUTH AT NIGHT AS NEEDED FOR SLEEP, Disp: 90 tablet, Rfl: 0    ursodiol (ACTIGALL) 300 MG capsule, Take 1 capsule by mouth 3 (Three) Times a Day With Meals., Disp: 270 capsule, Rfl: 3    venlafaxine XR (EFFEXOR-XR) 150 MG 24 hr capsule, TAKE 1 CAPSULE BY MOUTH DAILY, Disp: 90 capsule, Rfl: 1    Objective     Vital Signs  /74   Pulse 102   Temp 97.9 °F (36.6 °C)   Ht 172.7 cm (67.99\")   Wt 89.4 kg (197 lb)   SpO2 97%   BMI 29.96 kg/m²   Estimated body mass index is 29.96 kg/m² as calculated from the following:    Height as of this encounter: 172.7 cm (67.99\").    Weight as of this encounter: 89.4 kg (197 lb).            PHQ-9 Depression Screening  Little interest or pleasure in doing things? 0-->not at all   Feeling down, depressed, or hopeless? 0-->not at all   Trouble falling or staying asleep, or sleeping too much?     Feeling tired or having little energy?     Poor appetite or overeating?     Feeling bad about yourself - or that you are a failure or " have let yourself or your family down?     Trouble concentrating on things, such as reading the newspaper or watching television?     Moving or speaking so slowly that other people could have noticed? Or the opposite - being so fidgety or restless that you have been moving around a lot more than usual?     Thoughts that you would be better off dead, or of hurting yourself in some way?     PHQ-9 Total Score 0   If you checked off any problems, how difficult have these problems made it for you to do your work, take care of things at home, or get along with other people?       PHQ-9 Total Score: 0           Physical Exam  Vitals and nursing note reviewed.   Constitutional:       General: She is awake.      Appearance: Normal appearance. She is well-groomed.   HENT:      Head: Normocephalic and atraumatic.      Right Ear: Hearing and external ear normal.      Left Ear: Hearing and external ear normal.      Nose: Nose normal.   Eyes:      Extraocular Movements: Extraocular movements intact.      Pupils: Pupils are equal, round, and reactive to light.   Cardiovascular:      Rate and Rhythm: Normal rate and regular rhythm.      Heart sounds: Normal heart sounds.   Pulmonary:      Effort: Pulmonary effort is normal.      Breath sounds: Normal breath sounds.   Abdominal:      General: Abdomen is flat. Bowel sounds are normal.      Palpations: Abdomen is soft.      Tenderness: There is no abdominal tenderness. There is no guarding or rebound.   Musculoskeletal:         General: Normal range of motion.      Right lower leg: No edema.      Left lower leg: No edema.   Skin:     General: Skin is warm and dry.      Comments: Dorsal aspect of the left foot with a well-healed scar.  Mild swelling noted.  No erythema nonverbal indicators of pain   Neurological:      Mental Status: She is alert and oriented to person, place, and time.   Psychiatric:         Mood and Affect: Mood normal.         Behavior: Behavior normal. Behavior is  cooperative.                 Assessment and Plan     Diagnoses and all orders for this visit:    1. Establishing care with new doctor, encounter for (Primary)    2. Non-smoker    3. Occasional alcohol consumption    4. BMI 29.0-29.9,adult    5. Overweight (BMI 25.0-29.9)    6. Encounter for dietary counseling and surveillance    7. Encounter for screening mammogram for breast cancer  -     Mammo Screening Bilateral With CAD; Future    8. Polyp of ascending colon, unspecified type    9. Left foot pain  -     tiZANidine (ZANAFLEX) 4 MG tablet; Take 1 tablet by mouth Every 8 (Eight) Hours As Needed for Muscle Spasms.  Dispense: 90 tablet; Refill: 1    10. Dislocation of tarsometatarsal joint of left foot, subsequent encounter    11. Hypothyroidism due to Hashimoto's thyroiditis    12. Restless leg syndrome    13. Long-term use of high-risk medication    14. POTS (postural orthostatic tachycardia syndrome)  -     CBC (No Diff); Future  -     Comprehensive Metabolic Panel; Future  -     MicroAlbumin, Urine, Random - Urine, Clean Catch; Future  -     Urinalysis With Culture If Indicated -; Future    15. Mixed hyperlipidemia  -     Lipid Panel; Future    16. On statin therapy    17. Need for influenza vaccination  -     Fluzone (or Fluarix & Flulaval for VFC) >6mos    18. Gastroesophageal reflux disease, unspecified whether esophagitis present    19. Anxiety    20. Itching    21. History of migraine headaches    22. Primary biliary cholangitis    23. Psychophysiological insomnia    Plan  Establish care visit completed with patient today    Labs were ordered and patient will have these obtained at her convenience.  She will be notified of results    Flu shot provided in office today    Declined any further COVID vaccines    Updated order for mammogram was placed    She would like to discuss the shingles vaccine series at next visit    Continue stay up-to-date with women's health, GI, endocrinology, Ortho    Patient did need  a refill of the Zanaflex today    Denied any other particular refills but she will notify me if she does    Go to ER if any condition worsens or severe    Plan for next follow-up with annual physical exam and updated lab work if needed    Follow Up  Return for schedule for annual physical from 4/12/2023.    SANGEETHA Woodson    Part of this note may be an electronic transcription/translation of spoken language to printed text using the Dragon Dictation System.

## 2023-12-11 DIAGNOSIS — Z12.31 ENCOUNTER FOR SCREENING MAMMOGRAM FOR BREAST CANCER: Primary | ICD-10-CM

## 2023-12-13 DIAGNOSIS — F41.9 ANXIETY: ICD-10-CM

## 2023-12-14 RX ORDER — VENLAFAXINE HYDROCHLORIDE 150 MG/1
CAPSULE, EXTENDED RELEASE ORAL
Qty: 90 CAPSULE | Refills: 1 | Status: SHIPPED | OUTPATIENT
Start: 2023-12-14

## 2024-01-03 ENCOUNTER — OFFICE VISIT (OUTPATIENT)
Dept: ORTHOPEDIC SURGERY | Facility: CLINIC | Age: 54
End: 2024-01-03
Payer: COMMERCIAL

## 2024-01-03 VITALS — WEIGHT: 197.09 LBS | HEIGHT: 68 IN | BODY MASS INDEX: 29.87 KG/M2

## 2024-01-03 DIAGNOSIS — Z09 SURGERY FOLLOW-UP: Primary | ICD-10-CM

## 2024-01-07 DIAGNOSIS — F51.04 PSYCHOPHYSIOLOGICAL INSOMNIA: ICD-10-CM

## 2024-01-08 RX ORDER — TRAZODONE HYDROCHLORIDE 100 MG/1
100 TABLET ORAL NIGHTLY PRN
Qty: 90 TABLET | Refills: 0 | Status: SHIPPED | OUTPATIENT
Start: 2024-01-08

## 2024-01-08 NOTE — TELEPHONE ENCOUNTER
Last appointment: 12/8/2023  Next appointment: 4/16/2024       Last Refill: 9/3/3023 last filled by Jesenia Leahy

## 2024-01-08 NOTE — TELEPHONE ENCOUNTER
90 tablets of trazodone sent to pharmacy on file.  Just remind me at next visit in April and I can refill further at that time

## 2024-01-10 DIAGNOSIS — E78.5 HYPERLIPIDEMIA, UNSPECIFIED HYPERLIPIDEMIA TYPE: ICD-10-CM

## 2024-01-10 RX ORDER — CARVEDILOL 6.25 MG/1
TABLET ORAL
Qty: 180 TABLET | Refills: 0 | Status: SHIPPED | OUTPATIENT
Start: 2024-01-10

## 2024-01-10 RX ORDER — ROSUVASTATIN CALCIUM 10 MG/1
10 TABLET, COATED ORAL DAILY
Qty: 90 TABLET | Refills: 0 | Status: SHIPPED | OUTPATIENT
Start: 2024-01-10

## 2024-02-01 DIAGNOSIS — K74.3 PRIMARY BILIARY CHOLANGITIS: ICD-10-CM

## 2024-02-01 RX ORDER — URSODIOL 300 MG/1
300 CAPSULE ORAL
Qty: 270 CAPSULE | Refills: 3 | Status: SHIPPED | OUTPATIENT
Start: 2024-02-01

## 2024-02-01 NOTE — TELEPHONE ENCOUNTER
Rx Refill Note  Pending Prescriptions:                       Disp   Refills    ursodiol (ACTIGALL) 300 MG capsule [Pharma*270 ca*3        Sig: TAKE 1 CAPSULE BY MOUTH THREE TIMES DAILY WITH MEALS    Last office visit with prescribing clinician: 1/10/2023   Last telemedicine visit with prescribing clinician: Visit date not found   Next office visit with prescribing clinician: Visit date not found         Shanelle Navarrete MA  02/01/24, 08:03 EST

## 2024-02-06 DIAGNOSIS — M79.672 LEFT FOOT PAIN: ICD-10-CM

## 2024-02-06 RX ORDER — TIZANIDINE 4 MG/1
4 TABLET ORAL EVERY 8 HOURS PRN
Qty: 90 TABLET | Refills: 1 | Status: SHIPPED | OUTPATIENT
Start: 2024-02-06

## 2024-02-19 DIAGNOSIS — E78.5 HYPERLIPIDEMIA, UNSPECIFIED HYPERLIPIDEMIA TYPE: ICD-10-CM

## 2024-02-19 RX ORDER — ROSUVASTATIN CALCIUM 10 MG/1
10 TABLET, COATED ORAL DAILY
Qty: 90 TABLET | Refills: 0 | OUTPATIENT
Start: 2024-02-19

## 2024-03-13 DIAGNOSIS — E83.110 HEREDITARY HEMOCHROMATOSIS: Primary | ICD-10-CM

## 2024-03-13 DIAGNOSIS — D64.9 ANEMIA, UNSPECIFIED TYPE: ICD-10-CM

## 2024-03-13 RX ORDER — ROPINIROLE 1 MG/1
1 TABLET, FILM COATED ORAL 3 TIMES DAILY
Qty: 270 TABLET | Refills: 3 | Status: SHIPPED | OUTPATIENT
Start: 2024-03-13 | End: 2025-03-13

## 2024-03-13 RX ORDER — ROPINIROLE 1 MG/1
1 TABLET, FILM COATED ORAL 3 TIMES DAILY
Qty: 90 TABLET | Refills: 3 | OUTPATIENT
Start: 2024-03-13

## 2024-03-13 NOTE — TELEPHONE ENCOUNTER
Rx Refill Note  Requested Prescriptions     Pending Prescriptions Disp Refills    rOPINIRole (REQUIP) 1 MG tablet [Pharmacy Med Name: ROPINIROLE 1MG TABLETS] 90 tablet 3     Sig: TAKE 1 TABLET BY MOUTH THREE TIMES DAILY      Last filled:  11/01/2023 w/3  Last office visit with prescribing clinician: 7/13/2023      Next office visit with prescribing clinician: 7/16/2024     Madelin Guzman MA  03/13/24, 09:22 EDT

## 2024-03-15 NOTE — TELEPHONE ENCOUNTER
LMOM for PT to return my call. PT needs a follow up appt for future refills.   [id number]  ~Shell

## 2024-03-15 NOTE — TELEPHONE ENCOUNTER
LMOM for PT to return my call. PT needs a follow up appt for future refills.   [id number]  ~Shell

## 2024-03-18 RX ORDER — FOLIC ACID 1 MG/1
TABLET ORAL
Qty: 30 TABLET | Refills: 0 | OUTPATIENT
Start: 2024-03-18

## 2024-03-18 NOTE — TELEPHONE ENCOUNTER
LMOM PT needs a follow up appt for medication refill.   Last OV [id number].   Next available for Dr. Shearer is Friday [id number] @ 1500 or Monday [id number] 0815  1115  1400  1500   or with Claudia tomorrow [id number] @ 0800  1100  1130  1330.   If any of those doesn't work, we can schedule out further.   [id number]  ~Shell

## 2024-03-21 RX ORDER — CARVEDILOL 6.25 MG/1
TABLET ORAL
Qty: 60 TABLET | Refills: 0 | Status: SHIPPED | OUTPATIENT
Start: 2024-03-21

## 2024-03-21 NOTE — TELEPHONE ENCOUNTER
Please let patient know 60 tablets of Coreg sent to pharmacy.  We will consider refilling further at next visit in April

## 2024-03-26 DIAGNOSIS — E83.110 HEREDITARY HEMOCHROMATOSIS: Primary | ICD-10-CM

## 2024-03-26 DIAGNOSIS — D52.0 DIETARY FOLATE DEFICIENCY ANEMIA: ICD-10-CM

## 2024-03-26 DIAGNOSIS — D64.9 ANEMIA, UNSPECIFIED TYPE: ICD-10-CM

## 2024-03-28 ENCOUNTER — TELEPHONE (OUTPATIENT)
Dept: ONCOLOGY | Facility: CLINIC | Age: 54
End: 2024-03-28

## 2024-03-28 NOTE — TELEPHONE ENCOUNTER
Caller: Ivy Lovell    Relationship: Emergency Contact    Best call back number: [phone]    What was the call regarding: IVY CALLED TO CANCEL ZAKIYA'S APPOINTMENT FOR THIS MORNING. THEY WILL CALL BACK LATER ON TO RESCHEDULE.    HUB UNABLE TO REACH WARM TRANSFER.

## 2024-03-30 DIAGNOSIS — M79.672 LEFT FOOT PAIN: ICD-10-CM

## 2024-03-30 DIAGNOSIS — F51.04 PSYCHOPHYSIOLOGICAL INSOMNIA: ICD-10-CM

## 2024-04-01 RX ORDER — TIZANIDINE 4 MG/1
4 TABLET ORAL EVERY 8 HOURS PRN
Qty: 30 TABLET | Refills: 0 | Status: SHIPPED | OUTPATIENT
Start: 2024-04-01

## 2024-04-01 RX ORDER — TRAZODONE HYDROCHLORIDE 100 MG/1
100 TABLET ORAL NIGHTLY PRN
Qty: 30 TABLET | Refills: 0 | Status: SHIPPED | OUTPATIENT
Start: 2024-04-01

## 2024-04-01 NOTE — TELEPHONE ENCOUNTER
Short supply of medication sent to pharmacy as we have an upcoming follow-up.  Will plan to refill further at that next visit

## 2024-04-10 ENCOUNTER — TELEPHONE (OUTPATIENT)
Dept: PULMONOLOGY | Facility: CLINIC | Age: 54
End: 2024-04-10
Payer: MEDICAID

## 2024-04-17 ENCOUNTER — TELEPHONE (OUTPATIENT)
Dept: PULMONOLOGY | Facility: CLINIC | Age: 54
End: 2024-04-17
Payer: MEDICAID

## 2024-04-17 DIAGNOSIS — S82.892A CLOSED FRACTURE OF LEFT ANKLE, INITIAL ENCOUNTER: Primary | ICD-10-CM

## 2024-04-17 NOTE — TELEPHONE ENCOUNTER
Per fax refill request for Rx Atrovent Nasal Spray denied. Pt will need to schedule an apt before any further refills.

## 2024-04-18 ENCOUNTER — HOSPITAL ENCOUNTER (OUTPATIENT)
Dept: CT IMAGING | Facility: HOSPITAL | Age: 54
Discharge: HOME | End: 2024-04-18
Admitting: ORTHOPAEDIC SURGERY
Payer: MEDICAID

## 2024-04-18 DIAGNOSIS — S82.892A CLOSED FRACTURE OF LEFT ANKLE, INITIAL ENCOUNTER: ICD-10-CM

## 2024-04-18 PROCEDURE — 73700 CT LOWER EXTREMITY W/O DYE: CPT

## 2024-04-19 ENCOUNTER — OFFICE VISIT (OUTPATIENT)
Dept: ORTHOPEDIC SURGERY | Facility: CLINIC | Age: 54
End: 2024-04-19
Payer: MEDICAID

## 2024-04-19 VITALS — WEIGHT: 179.9 LBS | HEIGHT: 68 IN | BODY MASS INDEX: 27.26 KG/M2

## 2024-04-19 DIAGNOSIS — S82.892A CLOSED FRACTURE OF LEFT ANKLE, INITIAL ENCOUNTER: Primary | ICD-10-CM

## 2024-04-19 RX ORDER — HYDROCODONE BITARTRATE AND ACETAMINOPHEN 7.5; 325 MG/1; MG/1
1-2 TABLET ORAL EVERY 6 HOURS PRN
Qty: 45 TABLET | Refills: 0 | Status: SHIPPED | OUTPATIENT
Start: 2024-04-19

## 2024-04-19 NOTE — PROGRESS NOTES
ESTABLISHED PATIENT    Patient: Dawna Lovell  : 1970    Primary Care Provider: Karissa Mar APRN    Requesting Provider: As above    Follow-up (After CT scan 2024)      History    Chief Complaint: Left ankle injury    History of Present Illness: She returns unexpectedly with her .  9 days ago she had another fall, she felt her hip give out and sat down, and had left ankle mild swelling with mild pain.  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  Her physical therapist saw her and recommended that she have an x-ray.  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  2 weeks earlier she had a fall where she hit her head and her shoulder and her hip.    Current Outpatient Medications on File Prior to Visit   Medication Sig Dispense Refill    acetaminophen (TYLENOL) 500 MG tablet Take 1 tablet by mouth Every 6 (Six) Hours As Needed for Mild Pain.      busPIRone (BUSPAR) 5 MG tablet TAKE 1 TABLET BY MOUTH THREE TIMES DAILY (Patient taking differently: Take 1 tablet by mouth 3 (Three) Times a Day. Patient states she only takes as needed) 90 tablet 1    carvedilol (COREG) 6.25 MG tablet TAKE 1 TABLET BY MOUTH TWICE DAILY WITH MEALS 60 tablet 0    folic acid (FOLVITE) 1 MG tablet 1 tablet daily.  90-day supply. (Patient taking differently: Take 1 tablet by mouth Daily. 1 tablet daily.  90-day supply.) 30 tablet 90    gabapentin (NEURONTIN) 300 MG capsule TAKE 3 CAPSULES BY MOUTH EVERY MORNING, 2 CAPSULES EVERY EVENING AND 3 CAPSULES AT BEDTIME 720 capsule 1    HYDROcodone-acetaminophen (NORCO) 7.5-325 MG per tablet Take 1 tablet by mouth Every 6 (Six) Hours As Needed for Moderate Pain. 20 tablet 0    hydrOXYzine (ATARAX) 25 MG tablet TAKE 1 TABLET Twice daily PRN (Patient taking differently: Take 1 tablet by mouth 2 (Two) Times a Day As Needed for Anxiety.) 40 tablet 1    levothyroxine (SYNTHROID, LEVOTHROID) 150 MCG tablet Take 1  tablet by mouth Daily. 90 tablet 2    Multiple Vitamins-Minerals (MULTIVITAMIN ADULT PO) Take 1 tablet by mouth Daily.      nystatin (MYCOSTATIN) 320665 UNIT/GM ointment Apply to lips 3 times daily (Patient taking differently: 1 application  3 (Three) Times a Day. Apply to lips 3 times daily) 15 g 1    olopatadine (PATANOL) 0.1 % ophthalmic solution       omeprazole (priLOSEC) 40 MG capsule TAKE 1 CAPSULE BY MOUTH TWICE DAILY 60 capsule 3    Restasis 0.05 % ophthalmic emulsion Administer 1 drop to both eyes 2 (Two) Times a Day.      rOPINIRole (REQUIP) 1 MG tablet Take 1 tablet by mouth 3 (Three) Times a Day. Take 1 hour before bedtime. 270 tablet 3    rosuvastatin (CRESTOR) 10 MG tablet TAKE 1 TABLET BY MOUTH DAILY 90 tablet 0    SUMAtriptan (IMITREX) 25 MG tablet TAKE 1 TABLET BY MOUTH AS NEEDED FOR MIGRAINE 9 tablet 1    tiZANidine (ZANAFLEX) 4 MG tablet TAKE 1 TABLET BY MOUTH EVERY 8 HOURS AS NEEDED FOR MUSCLE SPASMS 30 tablet 0    traZODone (DESYREL) 100 MG tablet TAKE 1 TABLET BY MOUTH AT NIGHT AS NEEDED FOR SLEEP 30 tablet 0    ursodiol (ACTIGALL) 300 MG capsule TAKE 1 CAPSULE BY MOUTH THREE TIMES DAILY WITH MEALS 270 capsule 3    venlafaxine XR (EFFEXOR-XR) 150 MG 24 hr capsule TAKE 1 CAPSULE BY MOUTH DAILY 90 capsule 1     No current facility-administered medications on file prior to visit.      Allergies   Allergen Reactions    Morphine And Related Itching     Nose itching      Past Medical History:   Diagnosis Date    Acute postoperative pain     Anxiety     Asthma     Depression     GERD (gastroesophageal reflux disease)     Hyperlipidemia     Hypertension     Hypothyroidism     Irritable bowel syndrome     Lyme disease 2016    Migraine headache     POTS (postural orthostatic tachycardia syndrome)     Syncope 2016    Tachycardia 2016     Past Surgical History:   Procedure Laterality Date     SECTION  2003    COLONOSCOPY      FOOT SURGERY Left 2023    Hardware Removal  "Lisfranc ORIF 23    MOUTH SURGERY      ORIF FOOT FRACTURE Left 2023    Procedure: ORIF left lisfranc foot injury;  Surgeon: Milagro Parker MD;  Location: [facility];  Service: Orthopedics;  Laterality: Left;    WISDOM TOOTH EXTRACTION       Family History   Problem Relation Age of Onset    Esophageal cancer Father     Cancer Father         Esophogical    Breast cancer Paternal Grandmother     Dementia Other     Ovarian cancer Neg Hx       Social History     Socioeconomic History    Marital status:    Tobacco Use    Smoking status: Former     Current packs/day: 0.00     Average packs/day: 0.5 packs/day for 11.0 years (5.5 ttl pk-yrs)     Types: Cigarettes     Start date:      Quit date:      Years since quittin.3     Passive exposure: Past    Smokeless tobacco: Never   Vaping Use    Vaping status: Never Used   Substance and Sexual Activity    Alcohol use: Not Currently     Alcohol/week: 5.0 standard drinks of alcohol     Types: 5 Glasses of wine per week     Comment: off alcohol for surgery    Drug use: No    Sexual activity: Defer        Review of Systems    The following portions of the patient's history were reviewed and updated as appropriate: allergies, current medications, past family history, past medical history, past social history, past surgical history, and problem list.    Physical Exam:   Ht 172.7 cm (67.99\")   Wt 81.6 kg (179 lb 14.3 oz)   LMP 06/15/2022   BMI 27.36 kg/m²   GENERAL: Gait: using knee walker       MSK:        Left ankle has circumferential swelling, she is very tender over the fibula and the syndesmosis, mildly tender medially, no change in dense neuropathy, no new swelling or pain in the TMT joints    Medical Decision Making    Data Review:   reviewed radiology images and reviewed radiology results    Assessment/Plan/Diagnosis/Treatment Options:   1. Closed fracture of left ankle, initial encounter  After looking at the initial x-rays when she called to " get the appointment I ordered a CT scan.  CT was done yesterday 418.  I reviewed the films and the report.  She has a nondisplaced distal fibula fracture and an anterolateral tibia fracture consistent with a syndesmotic injury, but it is nondisplaced.  I think essentially the syndesmotic ligament pulled off a large but comminuted piece of bone.  Also on the CT I think I can see a nondisplaced posterior malleolar fracture, that was not part of the reading.  On the CT you can see the Lisfranc arthritic changes.  No signs of new injury to the Lisfranc joint.  I think we can treat this nonoperatively as long as it stays in neutral alignment.  The syndesmosis itself is nondisplaced.  We placed her in a short leg nonweightbearing fiberglass splint today, I think there is too much swelling to put her in a soft cast.  She needs to be nonweightbearing.  She had been walking on it until she got the x-ray on 4/16/2024.  I also reviewed those x-rays and the reports.  That is why I ordered the CT.  I think the fact she was able to walk on this is a testament to her significant neuropathy.  I will see her again in a week for 3 views of the ankle nonweightbearing out of the splint, if it remains nondisplaced she can go into a cast.  If it displaces we are going to need to operate on it.  Strict elevation and nonweightbearing.                Part of this encounter note is an electronic transcription/translation of spoken language to printed text. The electronic translation of spoken language may permit erroneous, or at times, nonsensical words or phrases to be inadvertently transcribed; Although I have reviewed the note for such errors, some may still exist.

## 2024-04-26 ENCOUNTER — OFFICE VISIT (OUTPATIENT)
Dept: ORTHOPEDIC SURGERY | Facility: CLINIC | Age: 54
End: 2024-04-26
Payer: MEDICAID

## 2024-04-26 VITALS — WEIGHT: 179.9 LBS | HEIGHT: 68 IN | BODY MASS INDEX: 27.26 KG/M2

## 2024-04-26 DIAGNOSIS — Z09 FRACTURE FOLLOW-UP: Primary | ICD-10-CM

## 2024-04-26 NOTE — PROGRESS NOTES
ESTABLISHED PATIENT    Patient: Dawna Lovell  : 1970    Primary Care Provider: Karissa Mar APRN    Requesting Provider: As above    Follow-up (1 week recheck- Closed fracture of left ankle)      History    Chief Complaint: Follow-up left ankle fracture    History of Present Illness: She returns for follow-up of her nondisplaced left ankle fracture, she has been nonweightbearing in a splint.  She has been very good about elevating.    Current Outpatient Medications on File Prior to Visit   Medication Sig Dispense Refill    carvedilol (COREG) 6.25 MG tablet TAKE 1 TABLET BY MOUTH TWICE DAILY WITH MEALS 60 tablet 0    folic acid (FOLVITE) 1 MG tablet 1 tablet daily.  90-day supply. (Patient taking differently: Take 1 tablet by mouth Daily. 1 tablet daily.  90-day supply.) 30 tablet 90    gabapentin (NEURONTIN) 300 MG capsule TAKE 3 CAPSULES BY MOUTH EVERY MORNING, 2 CAPSULES EVERY EVENING AND 3 CAPSULES AT BEDTIME 720 capsule 1    HYDROcodone-acetaminophen (NORCO) 7.5-325 MG per tablet Take 1-2 tablets by mouth Every 6 (Six) Hours As Needed for Moderate Pain. 45 tablet 0    hydrOXYzine (ATARAX) 25 MG tablet TAKE 1 TABLET Twice daily PRN (Patient taking differently: Take 1 tablet by mouth 2 (Two) Times a Day As Needed for Anxiety.) 40 tablet 1    levothyroxine (SYNTHROID, LEVOTHROID) 150 MCG tablet Take 1 tablet by mouth Daily. 90 tablet 2    Multiple Vitamins-Minerals (MULTIVITAMIN ADULT PO) Take 1 tablet by mouth Daily.      omeprazole (priLOSEC) 40 MG capsule TAKE 1 CAPSULE BY MOUTH TWICE DAILY 60 capsule 3    Restasis 0.05 % ophthalmic emulsion Administer 1 drop to both eyes 2 (Two) Times a Day.      rOPINIRole (REQUIP) 1 MG tablet Take 1 tablet by mouth 3 (Three) Times a Day. Take 1 hour before bedtime. 270 tablet 3    rosuvastatin (CRESTOR) 10 MG tablet TAKE 1 TABLET BY MOUTH DAILY 90 tablet 0    SUMAtriptan (IMITREX) 25 MG tablet TAKE 1 TABLET BY MOUTH AS NEEDED FOR MIGRAINE 9 tablet 1     tiZANidine (ZANAFLEX) 4 MG tablet TAKE 1 TABLET BY MOUTH EVERY 8 HOURS AS NEEDED FOR MUSCLE SPASMS 30 tablet 0    traZODone (DESYREL) 100 MG tablet TAKE 1 TABLET BY MOUTH AT NIGHT AS NEEDED FOR SLEEP 30 tablet 0    ursodiol (ACTIGALL) 300 MG capsule TAKE 1 CAPSULE BY MOUTH THREE TIMES DAILY WITH MEALS 270 capsule 3    venlafaxine XR (EFFEXOR-XR) 150 MG 24 hr capsule TAKE 1 CAPSULE BY MOUTH DAILY 90 capsule 1    acetaminophen (TYLENOL) 500 MG tablet Take 1 tablet by mouth Every 6 (Six) Hours As Needed for Mild Pain.      busPIRone (BUSPAR) 5 MG tablet TAKE 1 TABLET BY MOUTH THREE TIMES DAILY (Patient taking differently: Take 1 tablet by mouth 3 (Three) Times a Day. Patient states she only takes as needed) 90 tablet 1    HYDROcodone-acetaminophen (NORCO) 7.5-325 MG per tablet Take 1 tablet by mouth Every 6 (Six) Hours As Needed for Moderate Pain. 20 tablet 0    nystatin (MYCOSTATIN) 514914 UNIT/GM ointment Apply to lips 3 times daily (Patient taking differently: 1 Application 3 (Three) Times a Day. Apply to lips 3 times daily) 15 g 1    olopatadine (PATANOL) 0.1 % ophthalmic solution        No current facility-administered medications on file prior to visit.      Allergies   Allergen Reactions    Morphine And Related Itching     Nose itching      Past Medical History:   Diagnosis Date    Acute postoperative pain     Anxiety     Asthma     Depression     GERD (gastroesophageal reflux disease)     Hyperlipidemia     Hypertension     Hypothyroidism     Irritable bowel syndrome     Lyme disease 2016    Migraine headache     POTS (postural orthostatic tachycardia syndrome)     Syncope 2016    Tachycardia 2016     Past Surgical History:   Procedure Laterality Date     SECTION  2003    COLONOSCOPY      FOOT SURGERY Left 2023    Hardware Removal Lisfranc ORIF 23    MOUTH SURGERY      ORIF FOOT FRACTURE Left 2023    Procedure: ORIF left lisfranc foot injury;  Surgeon: Milagro Parker  "MD KEENA;  Location: [facility];  Service: Orthopedics;  Laterality: Left;    WISDOM TOOTH EXTRACTION       Family History   Problem Relation Age of Onset    Esophageal cancer Father     Cancer Father         Esophogical    Breast cancer Paternal Grandmother     Dementia Other     Ovarian cancer Neg Hx       Social History     Socioeconomic History    Marital status:    Tobacco Use    Smoking status: Former     Current packs/day: 0.00     Average packs/day: 0.5 packs/day for 11.0 years (5.5 ttl pk-yrs)     Types: Cigarettes     Start date:      Quit date:      Years since quittin.3     Passive exposure: Past    Smokeless tobacco: Never   Vaping Use    Vaping status: Never Used   Substance and Sexual Activity    Alcohol use: Not Currently     Alcohol/week: 5.0 standard drinks of alcohol     Types: 5 Glasses of wine per week     Comment: off alcohol for surgery    Drug use: No    Sexual activity: Defer        Review of Systems   Constitutional: Negative.    HENT: Negative.     Eyes: Negative.    Respiratory: Negative.     Cardiovascular: Negative.    Gastrointestinal: Negative.    Endocrine: Negative.    Genitourinary: Negative.    Musculoskeletal:  Positive for arthralgias.   Skin: Negative.    Allergic/Immunologic: Negative.    Neurological: Negative.    Hematological: Negative.    Psychiatric/Behavioral: Negative.         The following portions of the patient's history were reviewed and updated as appropriate: allergies, current medications, past family history, past medical history, past social history, past surgical history, and problem list.    Physical Exam:   Ht 172.7 cm (67.99\")   Wt 81.6 kg (179 lb 14.3 oz)   LMP 06/15/2022   BMI 27.36 kg/m²     Left ankle is much less swollen, no change in neuropathy    Medical Decision Making    Data Review:   ordered and reviewed x-rays today    Assessment/Plan/Diagnosis/Treatment Options:   1. Fracture follow-up  Radiographs today show the fracture " remains entirely nondisplaced.  She has a distal fibular fracture as well as a distal lateral tibia fracture, this indicates a syndesmotic injury but the fracture is nondisplaced I suspect the ligament is attached to that piece of bone, it should heal as long as it remains in position.  I also thought she had a nondisplaced posterior malleolar fracture on the CT scan.  As long as this remains nondisplaced we can treat this nonoperatively.  She is placed in a short leg fiberglass nonweightbearing cast today.  I will see her again in 2 weeks for an x-ray in the cast.  - XR Ankle 3+ View Left          Milagro Bauer MD

## 2024-04-29 ENCOUNTER — TELEPHONE (OUTPATIENT)
Dept: INTERNAL MEDICINE | Facility: CLINIC | Age: 54
End: 2024-04-29
Payer: MEDICAID

## 2024-04-29 NOTE — TELEPHONE ENCOUNTER
Caller: Dawna Lovell    Relationship: Self    Best call back number: [phone]    What form or medical record are you requesting: [place] - [facility] - [employer]  FOR 4/12/23 APPOINTMENT    Who is requesting this form or medical record from you: DE[employer] OF [employer]    How would you like to receive the form or medical records (pick-up, mail, fax):     ONCE COMPLETED PUT IN [employer]    Additional notes:     PATIENT WILL UPLOAD THIS FORM IN "[employer]

## 2024-04-30 NOTE — TELEPHONE ENCOUNTER
**HUB TO RELAY**  LM that AD will have to review document that Armond previously signed before signing.  Will call with update once reviewed.

## 2024-05-02 NOTE — TELEPHONE ENCOUNTER
Called and spoke to pt. Informed pt that provider had reviewed document and having never seen pt, provider was unable to complete paperwork. Pt has upcoming appt with provider and will be able to address paperwork w/ pt at that time. Pt voiced understanding and appreciation.

## 2024-05-06 DIAGNOSIS — S82.892A CLOSED FRACTURE OF LEFT ANKLE, INITIAL ENCOUNTER: ICD-10-CM

## 2024-05-06 DIAGNOSIS — F51.04 PSYCHOPHYSIOLOGICAL INSOMNIA: ICD-10-CM

## 2024-05-06 RX ORDER — CARVEDILOL 6.25 MG/1
TABLET ORAL
Qty: 60 TABLET | Refills: 0 | Status: SHIPPED | OUTPATIENT
Start: 2024-05-06

## 2024-05-06 RX ORDER — TRAZODONE HYDROCHLORIDE 100 MG/1
100 TABLET ORAL NIGHTLY PRN
Qty: 30 TABLET | Refills: 0 | Status: SHIPPED | OUTPATIENT
Start: 2024-05-06

## 2024-05-06 RX ORDER — HYDROCODONE BITARTRATE AND ACETAMINOPHEN 7.5; 325 MG/1; MG/1
1-2 TABLET ORAL EVERY 6 HOURS PRN
Qty: 45 TABLET | Refills: 0 | Status: SHIPPED | OUTPATIENT
Start: 2024-05-06

## 2024-05-10 ENCOUNTER — OFFICE VISIT (OUTPATIENT)
Dept: ORTHOPEDIC SURGERY | Facility: CLINIC | Age: 54
End: 2024-05-10
Payer: MEDICAID

## 2024-05-10 VITALS
WEIGHT: 179 LBS | DIASTOLIC BLOOD PRESSURE: 80 MMHG | BODY MASS INDEX: 27.13 KG/M2 | HEIGHT: 68 IN | SYSTOLIC BLOOD PRESSURE: 134 MMHG

## 2024-05-10 DIAGNOSIS — S82.892A CLOSED FRACTURE OF LEFT ANKLE, INITIAL ENCOUNTER: Primary | ICD-10-CM

## 2024-05-15 ENCOUNTER — OFFICE VISIT (OUTPATIENT)
Dept: INTERNAL MEDICINE | Facility: CLINIC | Age: 54
End: 2024-05-15
Payer: MEDICAID

## 2024-05-15 ENCOUNTER — LAB (OUTPATIENT)
Dept: LAB | Facility: HOSPITAL | Age: 54
End: 2024-05-15
Payer: MEDICAID

## 2024-05-15 VITALS
HEIGHT: 68 IN | DIASTOLIC BLOOD PRESSURE: 82 MMHG | BODY MASS INDEX: 29.12 KG/M2 | RESPIRATION RATE: 18 BRPM | TEMPERATURE: 97.1 F | OXYGEN SATURATION: 98 % | SYSTOLIC BLOOD PRESSURE: 120 MMHG | WEIGHT: 192.1 LBS | HEART RATE: 80 BPM

## 2024-05-15 DIAGNOSIS — F41.9 ANXIETY: ICD-10-CM

## 2024-05-15 DIAGNOSIS — Z71.3 ENCOUNTER FOR DIETARY COUNSELING AND SURVEILLANCE: ICD-10-CM

## 2024-05-15 DIAGNOSIS — Z78.9 NON-SMOKER: ICD-10-CM

## 2024-05-15 DIAGNOSIS — M79.672 LEFT FOOT PAIN: ICD-10-CM

## 2024-05-15 DIAGNOSIS — K64.8 INTERNAL HEMORRHOIDS: ICD-10-CM

## 2024-05-15 DIAGNOSIS — G90.A POTS (POSTURAL ORTHOSTATIC TACHYCARDIA SYNDROME): ICD-10-CM

## 2024-05-15 DIAGNOSIS — K74.3 PRIMARY BILIARY CHOLANGITIS: ICD-10-CM

## 2024-05-15 DIAGNOSIS — K21.9 GASTROESOPHAGEAL REFLUX DISEASE, UNSPECIFIED WHETHER ESOPHAGITIS PRESENT: ICD-10-CM

## 2024-05-15 DIAGNOSIS — Z80.3 FAMILY HISTORY OF BREAST CANCER: ICD-10-CM

## 2024-05-15 DIAGNOSIS — G25.81 RESTLESS LEG SYNDROME: ICD-10-CM

## 2024-05-15 DIAGNOSIS — S93.325A LISFRANC DISLOCATION, LEFT, INITIAL ENCOUNTER: ICD-10-CM

## 2024-05-15 DIAGNOSIS — Z00.00 ANNUAL PHYSICAL EXAM: ICD-10-CM

## 2024-05-15 DIAGNOSIS — E78.2 MIXED HYPERLIPIDEMIA: ICD-10-CM

## 2024-05-15 DIAGNOSIS — Z79.899 LONG-TERM USE OF HIGH-RISK MEDICATION: ICD-10-CM

## 2024-05-15 DIAGNOSIS — Z79.899 ON STATIN THERAPY: ICD-10-CM

## 2024-05-15 DIAGNOSIS — K63.5 POLYP OF COLON, UNSPECIFIED PART OF COLON, UNSPECIFIED TYPE: ICD-10-CM

## 2024-05-15 DIAGNOSIS — Z28.21 IMMUNIZATION DECLINED: ICD-10-CM

## 2024-05-15 DIAGNOSIS — E66.3 OVERWEIGHT (BMI 25.0-29.9): ICD-10-CM

## 2024-05-15 DIAGNOSIS — Z00.00 ANNUAL PHYSICAL EXAM: Primary | ICD-10-CM

## 2024-05-15 DIAGNOSIS — Z86.69 HISTORY OF MIGRAINE HEADACHES: ICD-10-CM

## 2024-05-15 DIAGNOSIS — F51.04 PSYCHOPHYSIOLOGICAL INSOMNIA: ICD-10-CM

## 2024-05-15 DIAGNOSIS — Z00.00 ENCOUNTER FOR WELL ADULT EXAM WITHOUT ABNORMAL FINDINGS: ICD-10-CM

## 2024-05-15 DIAGNOSIS — E06.3 HYPOTHYROIDISM DUE TO HASHIMOTO'S THYROIDITIS: ICD-10-CM

## 2024-05-15 DIAGNOSIS — E03.8 HYPOTHYROIDISM DUE TO HASHIMOTO'S THYROIDITIS: ICD-10-CM

## 2024-05-15 DIAGNOSIS — G62.9 NEUROPATHY: ICD-10-CM

## 2024-05-15 DIAGNOSIS — E78.5 HYPERLIPIDEMIA, UNSPECIFIED HYPERLIPIDEMIA TYPE: ICD-10-CM

## 2024-05-15 LAB
ALBUMIN SERPL-MCNC: 4.4 G/DL (ref 3.5–5.2)
ALBUMIN/GLOB SERPL: 1.3 G/DL
ALP SERPL-CCNC: 107 U/L (ref 39–117)
ALT SERPL W P-5'-P-CCNC: 56 U/L (ref 1–33)
ANION GAP SERPL CALCULATED.3IONS-SCNC: 13 MMOL/L (ref 5–15)
AST SERPL-CCNC: 75 U/L (ref 1–32)
BILIRUB SERPL-MCNC: 0.8 MG/DL (ref 0–1.2)
BUN SERPL-MCNC: 8 MG/DL (ref 6–20)
BUN/CREAT SERPL: 17.4 (ref 7–25)
CALCIUM SPEC-SCNC: 9.2 MG/DL (ref 8.6–10.5)
CHLORIDE SERPL-SCNC: 99 MMOL/L (ref 98–107)
CHOLEST SERPL-MCNC: 184 MG/DL (ref 0–200)
CO2 SERPL-SCNC: 25 MMOL/L (ref 22–29)
CREAT SERPL-MCNC: 0.46 MG/DL (ref 0.57–1)
DEPRECATED RDW RBC AUTO: 46.7 FL (ref 37–54)
EGFRCR SERPLBLD CKD-EPI 2021: 115.3 ML/MIN/1.73
ERYTHROCYTE [DISTWIDTH] IN BLOOD BY AUTOMATED COUNT: 12.7 % (ref 12.3–15.4)
GLOBULIN UR ELPH-MCNC: 3.3 GM/DL
GLUCOSE SERPL-MCNC: 112 MG/DL (ref 65–99)
HCT VFR BLD AUTO: 37.1 % (ref 34–46.6)
HDLC SERPL-MCNC: 93 MG/DL (ref 40–60)
HGB BLD-MCNC: 12.5 G/DL (ref 12–15.9)
LDLC SERPL CALC-MCNC: 74 MG/DL (ref 0–100)
LDLC/HDLC SERPL: 0.77 {RATIO}
MCH RBC QN AUTO: 34 PG (ref 26.6–33)
MCHC RBC AUTO-ENTMCNC: 33.7 G/DL (ref 31.5–35.7)
MCV RBC AUTO: 100.8 FL (ref 79–97)
PLATELET # BLD AUTO: 152 10*3/MM3 (ref 140–450)
PMV BLD AUTO: 10.6 FL (ref 6–12)
POTASSIUM SERPL-SCNC: 4.6 MMOL/L (ref 3.5–5.2)
PROT SERPL-MCNC: 7.7 G/DL (ref 6–8.5)
RBC # BLD AUTO: 3.68 10*6/MM3 (ref 3.77–5.28)
SODIUM SERPL-SCNC: 137 MMOL/L (ref 136–145)
TRIGL SERPL-MCNC: 95 MG/DL (ref 0–150)
VLDLC SERPL-MCNC: 17 MG/DL (ref 5–40)
WBC NRBC COR # BLD AUTO: 5.64 10*3/MM3 (ref 3.4–10.8)

## 2024-05-15 PROCEDURE — 80061 LIPID PANEL: CPT

## 2024-05-15 PROCEDURE — 99396 PREV VISIT EST AGE 40-64: CPT | Performed by: NURSE PRACTITIONER

## 2024-05-15 PROCEDURE — 3074F SYST BP LT 130 MM HG: CPT | Performed by: NURSE PRACTITIONER

## 2024-05-15 PROCEDURE — 1125F AMNT PAIN NOTED PAIN PRSNT: CPT | Performed by: NURSE PRACTITIONER

## 2024-05-15 PROCEDURE — 3044F HG A1C LEVEL LT 7.0%: CPT | Performed by: NURSE PRACTITIONER

## 2024-05-15 PROCEDURE — 99497 ADVNCD CARE PLAN 30 MIN: CPT | Performed by: NURSE PRACTITIONER

## 2024-05-15 PROCEDURE — 1160F RVW MEDS BY RX/DR IN RCRD: CPT | Performed by: NURSE PRACTITIONER

## 2024-05-15 PROCEDURE — 3079F DIAST BP 80-89 MM HG: CPT | Performed by: NURSE PRACTITIONER

## 2024-05-15 PROCEDURE — 3048F LDL-C <100 MG/DL: CPT | Performed by: NURSE PRACTITIONER

## 2024-05-15 PROCEDURE — 85027 COMPLETE CBC AUTOMATED: CPT

## 2024-05-15 PROCEDURE — 80053 COMPREHEN METABOLIC PANEL: CPT

## 2024-05-15 PROCEDURE — 1159F MED LIST DOCD IN RCRD: CPT | Performed by: NURSE PRACTITIONER

## 2024-05-15 RX ORDER — VENLAFAXINE HYDROCHLORIDE 150 MG/1
150 CAPSULE, EXTENDED RELEASE ORAL DAILY
Qty: 90 CAPSULE | Refills: 3 | Status: SHIPPED | OUTPATIENT
Start: 2024-05-15

## 2024-05-15 RX ORDER — TRAZODONE HYDROCHLORIDE 100 MG/1
100 TABLET ORAL NIGHTLY PRN
Qty: 90 TABLET | Refills: 3 | Status: SHIPPED | OUTPATIENT
Start: 2024-05-15

## 2024-05-15 RX ORDER — ROSUVASTATIN CALCIUM 10 MG/1
10 TABLET, COATED ORAL DAILY
Qty: 90 TABLET | Refills: 3 | Status: SHIPPED | OUTPATIENT
Start: 2024-05-15

## 2024-05-15 RX ORDER — CARVEDILOL 6.25 MG/1
6.25 TABLET ORAL 2 TIMES DAILY WITH MEALS
Qty: 180 TABLET | Refills: 3 | Status: SHIPPED | OUTPATIENT
Start: 2024-05-15

## 2024-05-15 RX ORDER — TIZANIDINE 4 MG/1
4 TABLET ORAL EVERY 8 HOURS PRN
Qty: 90 TABLET | Refills: 5 | Status: SHIPPED | OUTPATIENT
Start: 2024-05-15

## 2024-05-15 NOTE — PROGRESS NOTES
Annual Physical     Name: Dawna Lovell    : 1970     MRN: [id number]     Chief Complaint  Annual Exam (Declined vaccine at this time/)    Subjective     History of Present Illness:  Dawna Lovell is a 53 y.o. female who presents today for annual physical exam    Medical assistant offered vaccines to patient today.  She did decline.    Patient's last visit with our office was in 2023 for establish care.    Patient continues to be a non-smoker  Occasional alcohol use  No drug use    Last colonoscopy was  with an ascending polyp with recommended 3-year repeat.  Patient has seen GI in the past    Patient is currently seeing orthopedics due to previous issues with her left foot.  Patient is currently in a nonweightbearing cast as well as use of a scooter.  She is a previous Lisfranc injury to that side as well.  Last fracture was 2024.  Plan for patient to follow-up in 2 weeks with additional x-rays.  Patient is aware of scheduled upcoming appointment  -Patient is currently prescribed hydrocodone from this orthopedics provider.  Medication is still current on her medication list.  She is also using tizanidine as well.    Patient does follow closely with endocrinology for Hashimoto's thyroiditis.  It appears patient has not seen endocrinology since 2023.  -Current medication includes levothyroxine at 150 mcg.    Patient is also seeing neurology for some restless leg syndrome.  Patient is currently prescribed gabapentin from this provider as well as Requip.  She also has some noted underlying neuropathy.  -Last fill date was  for 30 days with 240 capsules    Patient does have a history of POTS syndrome for which she uses Coreg.  -Patient has not seen cardiology in quite some time.  She states that so far her symptoms are under control.  She will notify me if she does feel she needs an updated cardiology referral    Patient is taking Crestor related to  hyperlipidemia.  Patient is not currently on a once daily aspirin    GERD with use of Prilosec    Anxiety: Patient currently uses hydroxyzine as needed.  She also uses trazodone at night to assist with sleep as well as Effexor.  No suicidal homicidal thoughts.    Patient does have a history of migraines.  Her migraines were worse during her perimenopausal period.  She does use Imitrex as needed    Patient does have a diagnosis of primary biliary cholangitis.  They have ruled out hemochromatosis from a GI standpoint.  She is no longer seeing hematology oncology.  She has continued to folic acid and done well.    There is a family history of a paternal grandmother with breast cancer.  Patient also states that her father had esophageal cancer.    The patient is being seen for a health maintenance evaluation.    Past Medical History:   Diagnosis Date    Acute postoperative pain     Anxiety     Asthma     Depression     Fracture, fibula 2024    Fracture, foot 2023    Fracture, tibia and fibula 2024    GERD (gastroesophageal reflux disease)     Hyperlipidemia     Hypertension     Hypothyroidism     Irritable bowel syndrome     Lyme disease 2016    Migraine headache     Neuromuscular disorder     POTS (postural orthostatic tachycardia syndrome)     Syncope 2016    Tachycardia 2016       Past Surgical History:   Procedure Laterality Date     SECTION  2003    COLONOSCOPY      FOOT SURGERY Left 2023    Hardware Removal Lisfranc ORIF 23    FRACTURE SURGERY      MOUTH SURGERY      ORIF FOOT FRACTURE Left 2023    Procedure: ORIF left lisfranc foot injury;  Surgeon: Milagro Parker MD;  Location: [facility];  Service: Orthopedics;  Laterality: Left;    WISDOM TOOTH EXTRACTION         Social History     Socioeconomic History    Marital status:    Tobacco Use    Smoking status: Former     Current packs/day: 0.00     Average packs/day: 0.5 packs/day for 11.0 years  (5.5 ttl pk-yrs)     Types: Cigarettes     Start date: 1990     Quit date:      Years since quittin.3     Passive exposure: Past    Smokeless tobacco: Never   Vaping Use    Vaping status: Never Used   Substance and Sexual Activity    Alcohol use: Yes     Alcohol/week: 5.0 standard drinks of alcohol     Types: 5 Glasses of wine per week     Comment: off alcohol for surgery    Drug use: No    Sexual activity: Yes     Partners: Male     Birth control/protection: Post-menopausal, None         Current Outpatient Medications:     carvedilol (COREG) 6.25 MG tablet, Take 1 tablet by mouth 2 (Two) Times a Day With Meals., Disp: 180 tablet, Rfl: 3    folic acid (FOLVITE) 1 MG tablet, 1 tablet daily.  90-day supply. (Patient taking differently: Take 1 tablet by mouth Daily. 1 tablet daily.  90-day supply.), Disp: 30 tablet, Rfl: 90    gabapentin (NEURONTIN) 300 MG capsule, TAKE 3 CAPSULES BY MOUTH EVERY MORNING, 2 CAPSULES EVERY EVENING AND 3 CAPSULES AT BEDTIME, Disp: 720 capsule, Rfl: 1    HYDROcodone-acetaminophen (NORCO) 7.5-325 MG per tablet, Take 1-2 tablets by mouth Every 6 (Six) Hours As Needed for Moderate Pain., Disp: 45 tablet, Rfl: 0    hydrOXYzine (ATARAX) 25 MG tablet, TAKE 1 TABLET Twice daily PRN (Patient taking differently: Take 1 tablet by mouth 2 (Two) Times a Day As Needed for Anxiety.), Disp: 40 tablet, Rfl: 1    levothyroxine (SYNTHROID, LEVOTHROID) 150 MCG tablet, Take 1 tablet by mouth Daily., Disp: 90 tablet, Rfl: 2    Multiple Vitamins-Minerals (MULTIVITAMIN ADULT PO), Take 1 tablet by mouth Daily., Disp: , Rfl:     omeprazole (priLOSEC) 40 MG capsule, TAKE 1 CAPSULE BY MOUTH TWICE DAILY, Disp: 60 capsule, Rfl: 3    Restasis 0.05 % ophthalmic emulsion, Administer 1 drop to both eyes 2 (Two) Times a Day., Disp: , Rfl:     rOPINIRole (REQUIP) 1 MG tablet, Take 1 tablet by mouth 3 (Three) Times a Day. Take 1 hour before bedtime., Disp: 270 tablet, Rfl: 3    rosuvastatin (CRESTOR) 10 MG  "tablet, Take 1 tablet by mouth Daily., Disp: 90 tablet, Rfl: 3    SUMAtriptan (IMITREX) 25 MG tablet, TAKE 1 TABLET BY MOUTH AS NEEDED FOR MIGRAINE, Disp: 9 tablet, Rfl: 1    tiZANidine (ZANAFLEX) 4 MG tablet, Take 1 tablet by mouth Every 8 (Eight) Hours As Needed for Muscle Spasms., Disp: 90 tablet, Rfl: 5    traZODone (DESYREL) 100 MG tablet, Take 1 tablet by mouth At Night As Needed for Sleep., Disp: 90 tablet, Rfl: 3    ursodiol (ACTIGALL) 300 MG capsule, TAKE 1 CAPSULE BY MOUTH THREE TIMES DAILY WITH MEALS, Disp: 270 capsule, Rfl: 3    venlafaxine XR (EFFEXOR-XR) 150 MG 24 hr capsule, Take 1 capsule by mouth Daily., Disp: 90 capsule, Rfl: 3    General History  Dawna  does have regular dental visits.  She does complain of vision problems. Last eye exam was none recent.  This is on her to do list to be scheduled.  Immunizations are not up to date. The patient needs the following immunizations: Patient declined today.  Flu shot is up-to-date as of 2023.  Tdap completed 2022.    Lifestyle  Dawna  consumes in general, a \"healthy\" diet  .  She exercises  limited due to her foot fracture .    Reproductive Health  Dawna  is postmenopausal.  Last noted cycle was June 2022  She reports periods are rare.  She is sexually active. Her contraceptive plan is no method.    Screening  Last pap was 2022.  Patient sees GYN through [facility]  Last Completed Pap Smear            PAP SMEAR (Every 3 Years) Next due on 6/6/2025 06/06/2022  Done                . History of abnormal pap smear or family history of gyn cancer: None stated    Last mammogram was 2021 with negative results.  Last Completed Mammogram       This patient has no relevant Health Maintenance data.        . Personal or family history of abnormal mammograms or breast cancer: Paternal grandmother with breast cancer    Last colonoscopy was 2021 with internal hemorrhoids and polyp of the ascending colon.  Repeat in 3 years.  Last Completed Colonoscopy       This " patient has no relevant Health Maintenance data.        . Family history of colon cancer: None stated    Last DEXA was never per chart review.    Advance Care Planning   ACP discussion was held with the patient during this visit. Patient has an advance directive (not in EMR), copy requested.  16 minutes spent with patient today.  She states we should have a copy on file.  She does understand the importance of this documentation.  No further questions at this time.       Health Maintenance Summary            Ordered - MAMMOGRAM (Every 2 Years) Ordered on 12/11/2023 08/30/2021  Mammo Screening Digital Tomosynthesis Bilateral With CAD    11/30/2016  MAMMO SCREENING DIGITAL TOMOSYNTHESIS BILATERAL W CAD              Ordered - LIPID PANEL (Yearly) Ordered on 5/15/2024      04/12/2023  Lipid Panel    04/29/2022  Lipid Panel    04/19/2021  Lipid Panel    10/02/2017  Lipid Panel    12/17/2015  Lipid panel    Only the first 5 history entries have been loaded, but more history exists.              COLORECTAL CANCER SCREENING (COLONOSCOPY - Every 3 Years) Due soon on 8/19/2024 08/19/2021  SCANNED - COLONOSCOPY    08/19/2021  SCANNED - COLONOSCOPY    08/19/2021  SCANNED - COLONOSCOPY              INFLUENZA VACCINE (Yearly - August to March) Next due on 8/1/2024 12/08/2023  Imm Admin: Fluzone (or Fluarix & Flulaval for VFC) >6mos    11/16/2022  Imm Admin: FluLaval/Fluzone >6mos    11/16/2022  Imm Admin: Influenza, Unspecified    12/13/2019  Imm Admin: flucelvax quad pfs =>4 YRS    12/13/2019  Imm Admin: Influenza, Unspecified    Only the first 5 history entries have been loaded, but more history exists.              ANNUAL PHYSICAL (Yearly) Next due on 5/15/2025      05/15/2024  Done    04/12/2023  Done    04/07/2022  Done    04/05/2021  Done              BMI FOLLOWUP (Yearly) Next due on 5/15/2025      05/15/2024  Registry Metric: BMI Follow-up    05/26/2017  [employer]: WORKFLOW - QUALITY MEASUREMENT -  "DOCUMENTED WEIGHT FOLLOW-UP PLAN              PAP SMEAR (Every 3 Years) Next due on 6/6/2025 06/06/2022  Done              TDAP/TD VACCINES (2 - Td or Tdap) Next due on 4/7/2032 04/07/2022  Imm Admin: Tdap    04/05/2021  Postponed until 4/5/2022 by Jesenia Leahy PA (Pending event)              HEPATITIS C SCREENING  Completed      01/26/2023  Hep C Virus Ab component of Hepatitis C Antibody    10/28/2015  Hepatitis C Ab component of Hepatitis C antibody              Pneumococcal Vaccine 0-64 (Series Information) Aged Out      No completion, postpone, or frequency change history exists for this topic.              Discontinued - Hepatitis B  Discontinued      05/15/2024  Frequency changed to Never by Karissa Mar APRN (Patient Preference - Patient declined)              Discontinued - COVID-19 Vaccine  Discontinued      12/08/2023  Frequency changed to Never by Karissa Mar APRN (declined)    04/12/2023  Postponed until 4/14/2024 by Jesenia Leahy PA (Patient Refused)    04/28/2021  Imm Admin: COVID-19 (PFIZER) PURPLE CAP    03/31/2021  Imm Admin: COVID-19 (PFIZER) PURPLE CAP              Discontinued - ZOSTER VACCINE  Discontinued      05/15/2024  Frequency changed to Never by Karissa Mar APRN (Patient Preference - Patient declined)    04/07/2022  Postponed until 4/7/2023 by Jesenia Leahy PA (Pending event)                  Immunization History   Administered Date(s) Administered    COVID-19 (PFIZER) Purple Cap Monovalent 03/31/2021, 04/28/2021    Fluzone (or Fluarix & Flulaval for VFC) >6mos 11/16/2022, 12/08/2023    Influenza, Unspecified 12/13/2019, 11/16/2022    Tdap 04/07/2022    flucelvax quad pfs =>4 YRS 12/13/2019           Objective     Vital Signs  /82   Pulse 80   Temp 97.1 °F (36.2 °C) (Temporal)   Resp 18   Ht 172.7 cm (67.99\")   Wt 87.1 kg (192 lb 1.6 oz)   SpO2 98%   BMI 29.22 kg/m²   Estimated body mass index is 29.22 kg/m² as calculated from the " "following:    Height as of this encounter: 172.7 cm (67.99\").    Weight as of this encounter: 87.1 kg (192 lb 1.6 oz).               Physical Exam  Vitals and nursing note reviewed.   Constitutional:       General: She is awake.      Appearance: Normal appearance. She is well-groomed and overweight.   HENT:      Head: Normocephalic and atraumatic.      Right Ear: Hearing, tympanic membrane, ear canal and external ear normal.      Left Ear: Hearing, tympanic membrane, ear canal and external ear normal.      Nose: Nose normal.      Mouth/Throat:      Lips: Pink.      Mouth: Mucous membranes are moist.   Eyes:      Extraocular Movements: Extraocular movements intact.      Pupils: Pupils are equal, round, and reactive to light.   Neck:      Thyroid: No thyroid mass, thyromegaly or thyroid tenderness.   Cardiovascular:      Rate and Rhythm: Normal rate and regular rhythm.      Pulses: Normal pulses.           Radial pulses are 2+ on the right side and 2+ on the left side.      Heart sounds: Normal heart sounds, S1 normal and S2 normal.   Pulmonary:      Effort: Pulmonary effort is normal.      Breath sounds: Normal breath sounds.   Abdominal:      General: Bowel sounds are normal.      Palpations: Abdomen is soft.   Musculoskeletal:         General: Normal range of motion.      Comments: Use of scooter today for ambulation purposes   Feet:      Comments: Nonweightbearing cast noted to the left lower extremity  Skin:     General: Skin is warm and dry.   Neurological:      Mental Status: She is alert and oriented to person, place, and time.      Comments: Mental status fully intact as patient was able to provide a detailed description of the events   Psychiatric:         Mood and Affect: Mood normal.         Behavior: Behavior normal. Behavior is cooperative.         Thought Content: Thought content normal.         Judgment: Judgment normal.               Assessment and Plan     Diagnoses and all orders for this " visit:    1. Annual physical exam (Primary)  -     CBC (No Diff); Future  -     Comprehensive Metabolic Panel; Future  -     Lipid Panel; Future  -     Urinalysis With Culture If Indicated -; Future    2. Encounter for well adult exam without abnormal findings    3. Immunization declined    4. Non-smoker    5. Polyp of colon, unspecified part of colon, unspecified type    6. Internal hemorrhoids    7. Left foot pain  -     tiZANidine (ZANAFLEX) 4 MG tablet; Take 1 tablet by mouth Every 8 (Eight) Hours As Needed for Muscle Spasms.  Dispense: 90 tablet; Refill: 5    8. Lisfranc dislocation, left, initial encounter, s/p ORIF    9. Hypothyroidism due to Hashimoto's thyroiditis    10. Restless leg syndrome    11. Long-term use of high-risk medication    12. Neuropathy    13. POTS (postural orthostatic tachycardia syndrome)  -     carvedilol (COREG) 6.25 MG tablet; Take 1 tablet by mouth 2 (Two) Times a Day With Meals.  Dispense: 180 tablet; Refill: 3  -     CBC (No Diff); Future  -     Comprehensive Metabolic Panel; Future  -     Urinalysis With Culture If Indicated -; Future    14. Mixed hyperlipidemia  -     rosuvastatin (CRESTOR) 10 MG tablet; Take 1 tablet by mouth Daily.  Dispense: 90 tablet; Refill: 3  -     Lipid Panel; Future    15. On statin therapy    16. Gastroesophageal reflux disease, unspecified whether esophagitis present    17. Psychophysiological insomnia  -     traZODone (DESYREL) 100 MG tablet; Take 1 tablet by mouth At Night As Needed for Sleep.  Dispense: 90 tablet; Refill: 3  -     CBC (No Diff); Future  -     Comprehensive Metabolic Panel; Future  -     Urinalysis With Culture If Indicated -; Future    18. Anxiety  -     venlafaxine XR (EFFEXOR-XR) 150 MG 24 hr capsule; Take 1 capsule by mouth Daily.  Dispense: 90 capsule; Refill: 3  -     CBC (No Diff); Future  -     Comprehensive Metabolic Panel; Future  -     Urinalysis With Culture If Indicated -; Future    19. History of migraine  headaches    20. Primary biliary cholangitis    21. Family history of breast cancer    22. Overweight (BMI 25.0-29.9)    23. Encounter for dietary counseling and surveillance    24. BMI 29.0-29.9,adult    Plan  Annual physical exam completed with patient today    Patient is aware of her mammogram order.  She will work to have this scheduled    Consider making a follow-up with OB/GYN for regular Pap smears  Patient has not yet had a DEXA scan  Colonoscopy will be ordered when patient is ready    Please schedule updated follow-up for vision exam    Vaccines were declined today    Continue to work towards a healthy lifestyle and non-smoking status    Please try to provide us a copy of your advance directive    Refills of Coreg, rosuvastatin, tizanidine, trazodone, venlafaxine for 90 days with 3 refills sent to pharmacy.  Patient will continue to follow with all specialist including endocrinology, orthopedics, neurology, GI, women's health.    Basic labs are ordered today.  Patient will be notified of results within 3-5 business days.    Go to ER if any condition worsens or severe    Plan to follow-up in 1 year for annual physical exam    Follow Up  Return for Annual.    SANGEETHA Woodson    Part of this note may be an electronic transcription/translation of spoken language to printed text using the Dragon Dictation System.

## 2024-05-16 ENCOUNTER — TELEPHONE (OUTPATIENT)
Dept: INTERNAL MEDICINE | Facility: CLINIC | Age: 54
End: 2024-05-16
Payer: MEDICAID

## 2024-05-16 NOTE — TELEPHONE ENCOUNTER
MA called and spoke to pt. Gave message from provider. Pt voiced understanding and appreciation.  Pt stated she had seen hematology in the passed. Pt stated she would do whatever provider suggested.           Karissa Mar, SANGEETHA ARCEO [facility]  Please let patient know labs resulted  Your liver numbers were slightly increased on your comprehensive metabolic panel.  I know this has been consistent with previous labs in the past  Lipid panel showed that your good cholesterol is very elevated.  This is great to see  Your CBC also noted a few abnormalities as well.  I think if I remember correctly you previously saw hematology?  If these numbers continue to be altered we may need to consider getting you back to that specialist but as of right now we will continue to monitor.

## 2024-05-24 ENCOUNTER — OFFICE VISIT (OUTPATIENT)
Dept: ORTHOPEDIC SURGERY | Facility: CLINIC | Age: 54
End: 2024-05-24
Payer: MEDICAID

## 2024-05-24 VITALS — HEIGHT: 68 IN | WEIGHT: 192.02 LBS | BODY MASS INDEX: 29.1 KG/M2

## 2024-05-24 DIAGNOSIS — Z09 FRACTURE FOLLOW-UP: Primary | ICD-10-CM

## 2024-05-24 NOTE — PROGRESS NOTES
ESTABLISHED PATIENT    Patient: Dawna Lovell  : 1970    Primary Care Provider: Karissa Mar APRN    Requesting Provider: As above    Follow-up (2 week recheck- Closed fracture of left ankle)      History    Chief Complaint: Ankle fracture follow-up    History of Present Illness: date of injury was 2024, I have her in a nonweightbearing cast. Her  is with her. She has underlying neuropathy. She had a previous Lisfranc injury on this side.  She is very compliant, she has been nonweightbearing.  She reports less pain.    Current Outpatient Medications on File Prior to Visit   Medication Sig Dispense Refill    carvedilol (COREG) 6.25 MG tablet Take 1 tablet by mouth 2 (Two) Times a Day With Meals. 180 tablet 3    folic acid (FOLVITE) 1 MG tablet 1 tablet daily.  90-day supply. (Patient taking differently: Take 1 tablet by mouth Daily. 1 tablet daily.  90-day supply.) 30 tablet 90    gabapentin (NEURONTIN) 300 MG capsule TAKE 3 CAPSULES BY MOUTH EVERY MORNING, 2 CAPSULES EVERY EVENING AND 3 CAPSULES AT BEDTIME 720 capsule 1    HYDROcodone-acetaminophen (NORCO) 7.5-325 MG per tablet Take 1-2 tablets by mouth Every 6 (Six) Hours As Needed for Moderate Pain. 45 tablet 0    hydrOXYzine (ATARAX) 25 MG tablet TAKE 1 TABLET Twice daily PRN (Patient taking differently: Take 1 tablet by mouth 2 (Two) Times a Day As Needed for Anxiety.) 40 tablet 1    levothyroxine (SYNTHROID, LEVOTHROID) 150 MCG tablet Take 1 tablet by mouth Daily. 90 tablet 2    Multiple Vitamins-Minerals (MULTIVITAMIN ADULT PO) Take 1 tablet by mouth Daily.      omeprazole (priLOSEC) 40 MG capsule TAKE 1 CAPSULE BY MOUTH TWICE DAILY 60 capsule 3    Restasis 0.05 % ophthalmic emulsion Administer 1 drop to both eyes 2 (Two) Times a Day.      rOPINIRole (REQUIP) 1 MG tablet Take 1 tablet by mouth 3 (Three) Times a Day. Take 1 hour before bedtime. 270 tablet 3    rosuvastatin (CRESTOR) 10 MG tablet Take 1 tablet by mouth Daily. 90  tablet 3    SUMAtriptan (IMITREX) 25 MG tablet TAKE 1 TABLET BY MOUTH AS NEEDED FOR MIGRAINE 9 tablet 1    tiZANidine (ZANAFLEX) 4 MG tablet Take 1 tablet by mouth Every 8 (Eight) Hours As Needed for Muscle Spasms. 90 tablet 5    traZODone (DESYREL) 100 MG tablet Take 1 tablet by mouth At Night As Needed for Sleep. 90 tablet 3    ursodiol (ACTIGALL) 300 MG capsule TAKE 1 CAPSULE BY MOUTH THREE TIMES DAILY WITH MEALS 270 capsule 3    venlafaxine XR (EFFEXOR-XR) 150 MG 24 hr capsule Take 1 capsule by mouth Daily. 90 capsule 3     No current facility-administered medications on file prior to visit.      Allergies   Allergen Reactions    Morphine And Related Itching     Nose itching      Past Medical History:   Diagnosis Date    Acute postoperative pain     Anxiety     Asthma     Depression     Fracture, fibula 2024    Fracture, foot 2023    Fracture, tibia and fibula 2024    GERD (gastroesophageal reflux disease)     Hyperlipidemia     Hypertension     Hypothyroidism     Irritable bowel syndrome     Lyme disease 2016    Migraine headache     Neuromuscular disorder     POTS (postural orthostatic tachycardia syndrome)     Syncope 2016    Tachycardia 2016     Past Surgical History:   Procedure Laterality Date     SECTION  2003    COLONOSCOPY      FOOT SURGERY Left 2023    Hardware Removal Lisfranc ORIF 23    FRACTURE SURGERY      MOUTH SURGERY      ORIF FOOT FRACTURE Left 2023    Procedure: ORIF left lisfranc foot injury;  Surgeon: Milagro Parker MD;  Location: [facility];  Service: Orthopedics;  Laterality: Left;    WISDOM TOOTH EXTRACTION       Family History   Problem Relation Age of Onset    Esophageal cancer Father     Cancer Father         Esophogical    Breast cancer Paternal Grandmother     Dementia Other     Ovarian cancer Neg Hx       Social History     Socioeconomic History    Marital status:    Tobacco Use    Smoking status: Former      "Current packs/day: 0.00     Average packs/day: 0.5 packs/day for 11.0 years (5.5 ttl pk-yrs)     Types: Cigarettes     Start date: 1990     Quit date:      Years since quittin.4     Passive exposure: Past    Smokeless tobacco: Never   Vaping Use    Vaping status: Never Used   Substance and Sexual Activity    Alcohol use: Yes     Alcohol/week: 5.0 standard drinks of alcohol     Types: 5 Glasses of wine per week     Comment: off alcohol for surgery    Drug use: No    Sexual activity: Yes     Partners: Male     Birth control/protection: Post-menopausal, None        Review of Systems   Constitutional: Negative.    HENT: Negative.     Eyes: Negative.    Respiratory: Negative.     Cardiovascular: Negative.    Gastrointestinal: Negative.    Endocrine: Negative.    Genitourinary: Negative.    Musculoskeletal:  Positive for arthralgias.   Skin: Negative.    Allergic/Immunologic: Negative.    Neurological: Negative.    Hematological: Negative.    Psychiatric/Behavioral: Negative.         The following portions of the patient's history were reviewed and updated as appropriate: allergies, current medications, past family history, past medical history, past social history, past surgical history, and problem list.    Physical Exam:   Ht 172.7 cm (67.99\")   Wt 87.1 kg (192 lb 0.3 oz)   LMP 06/15/2022   BMI 29.20 kg/m²   no change in neuropathy here  left ankle swelling is much improved, she is  over the anterolateral aspect, only slightly tender medially, no change in neuropathy      Medical Decision Making    Data Review:   ordered and reviewed x-rays today    Assessment/Plan/Diagnosis/Treatment Options:   1. Fracture follow-up  The fracture has remained nondisplaced and is showing some early healing.  It is acceptable for it 6 weeks now to go into a tall boot nonweightbearing.  I will see her again in 2 to 3 weeks with standing or simulated weightbearing 3 views of the left ankle, she asked how long " she would have to be nonweightbearing and I explained that it depends on healing, we know that neuropathy slows down healing in the foot and ankle.      Milagro Bauer MD

## 2024-05-28 DIAGNOSIS — S82.892A CLOSED FRACTURE OF LEFT ANKLE, INITIAL ENCOUNTER: ICD-10-CM

## 2024-05-28 NOTE — TELEPHONE ENCOUNTER
PATIENT IS CALLING IN REQUESTING A REFILL ON HYDROCODONE 7.5 MG. SHE HAS BEEN SEEING DR. MEDINA FOR LEFT ANKLE INJURY.     PHARMACY: [facility]    PHONE #: [phone]

## 2024-05-29 RX ORDER — HYDROCODONE BITARTRATE AND ACETAMINOPHEN 7.5; 325 MG/1; MG/1
1 TABLET ORAL EVERY 6 HOURS PRN
Qty: 25 TABLET | Refills: 0 | Status: SHIPPED | OUTPATIENT
Start: 2024-05-29

## 2024-06-03 DIAGNOSIS — Z13.820 SCREENING FOR OSTEOPOROSIS: Primary | ICD-10-CM

## 2024-06-03 DIAGNOSIS — Z78.0 POSTMENOPAUSAL: ICD-10-CM

## 2024-06-10 ENCOUNTER — OFFICE VISIT (OUTPATIENT)
Dept: ORTHOPEDIC SURGERY | Facility: CLINIC | Age: 54
End: 2024-06-10
Payer: MEDICAID

## 2024-06-10 VITALS
SYSTOLIC BLOOD PRESSURE: 112 MMHG | BODY MASS INDEX: 29.1 KG/M2 | HEIGHT: 68 IN | DIASTOLIC BLOOD PRESSURE: 60 MMHG | WEIGHT: 192 LBS

## 2024-06-10 DIAGNOSIS — S82.892A CLOSED FRACTURE OF LEFT ANKLE, INITIAL ENCOUNTER: Primary | ICD-10-CM

## 2024-06-10 PROCEDURE — 99213 OFFICE O/P EST LOW 20 MIN: CPT | Performed by: ORTHOPAEDIC SURGERY

## 2024-06-10 PROCEDURE — 3074F SYST BP LT 130 MM HG: CPT | Performed by: ORTHOPAEDIC SURGERY

## 2024-06-10 PROCEDURE — 1160F RVW MEDS BY RX/DR IN RCRD: CPT | Performed by: ORTHOPAEDIC SURGERY

## 2024-06-10 PROCEDURE — 3078F DIAST BP <80 MM HG: CPT | Performed by: ORTHOPAEDIC SURGERY

## 2024-06-10 PROCEDURE — 1159F MED LIST DOCD IN RCRD: CPT | Performed by: ORTHOPAEDIC SURGERY

## 2024-06-10 NOTE — PROGRESS NOTES
ESTABLISHED PATIENT    Patient: Dawna Lovell  : 1970    Primary Care Provider: Karissa Mar APRN    Requesting Provider: As above    Follow-up (3 week follow up -- Closed fracture of left ankle)      History    Chief Complaint: Left foot and ankle pain    History of Present Illness: She is here for follow-up of her left ankle fracture.  She has been in a boot nonweightbearing.  She notes that it aches a great deal.  She has pain in the great toe.  I think some of it is due to her neuropathy.  She is also status post a severe Lisfranc injury that occurred at work.  She was at work and got her feet caught under a cabinet, and fell backwards.  That led to the Lisfranc injury.    Current Outpatient Medications on File Prior to Visit   Medication Sig Dispense Refill    carvedilol (COREG) 6.25 MG tablet Take 1 tablet by mouth 2 (Two) Times a Day With Meals. 180 tablet 3    folic acid (FOLVITE) 1 MG tablet 1 tablet daily.  90-day supply. (Patient taking differently: Take 1 tablet by mouth Daily. 1 tablet daily.  90-day supply.) 30 tablet 90    gabapentin (NEURONTIN) 300 MG capsule TAKE 3 CAPSULES BY MOUTH EVERY MORNING, 2 CAPSULES EVERY EVENING AND 3 CAPSULES AT BEDTIME 720 capsule 1    HYDROcodone-acetaminophen (NORCO) 7.5-325 MG per tablet Take 1 tablet by mouth Every 6 (Six) Hours As Needed for Moderate Pain. 25 tablet 0    hydrOXYzine (ATARAX) 25 MG tablet TAKE 1 TABLET Twice daily PRN (Patient taking differently: Take 1 tablet by mouth 2 (Two) Times a Day As Needed for Anxiety.) 40 tablet 1    levothyroxine (SYNTHROID, LEVOTHROID) 150 MCG tablet Take 1 tablet by mouth Daily. 90 tablet 2    Multiple Vitamins-Minerals (MULTIVITAMIN ADULT PO) Take 1 tablet by mouth Daily.      omeprazole (priLOSEC) 40 MG capsule TAKE 1 CAPSULE BY MOUTH TWICE DAILY 60 capsule 3    Restasis 0.05 % ophthalmic emulsion Administer 1 drop to both eyes 2 (Two) Times a Day.      rOPINIRole (REQUIP) 1 MG tablet Take 1  tablet by mouth 3 (Three) Times a Day. Take 1 hour before bedtime. 270 tablet 3    rosuvastatin (CRESTOR) 10 MG tablet Take 1 tablet by mouth Daily. 90 tablet 3    SUMAtriptan (IMITREX) 25 MG tablet TAKE 1 TABLET BY MOUTH AS NEEDED FOR MIGRAINE 9 tablet 1    tiZANidine (ZANAFLEX) 4 MG tablet Take 1 tablet by mouth Every 8 (Eight) Hours As Needed for Muscle Spasms. 90 tablet 5    traZODone (DESYREL) 100 MG tablet Take 1 tablet by mouth At Night As Needed for Sleep. 90 tablet 3    ursodiol (ACTIGALL) 300 MG capsule TAKE 1 CAPSULE BY MOUTH THREE TIMES DAILY WITH MEALS 270 capsule 3    venlafaxine XR (EFFEXOR-XR) 150 MG 24 hr capsule Take 1 capsule by mouth Daily. 90 capsule 3     No current facility-administered medications on file prior to visit.      Allergies   Allergen Reactions    Morphine And Codeine Itching     Nose itching      Past Medical History:   Diagnosis Date    Acute postoperative pain     Anxiety     Asthma     Depression     Fracture, fibula 2024    Fracture, foot 2023    Fracture, tibia and fibula 2024    GERD (gastroesophageal reflux disease)     Hyperlipidemia     Hypertension     Hypothyroidism     Irritable bowel syndrome     Lyme disease 2016    Migraine headache     Neuromuscular disorder     POTS (postural orthostatic tachycardia syndrome)     Syncope 2016    Tachycardia 2016     Past Surgical History:   Procedure Laterality Date     SECTION  2003    COLONOSCOPY      FOOT SURGERY Left 2023    Hardware Removal Lisfranc ORIF 23    FRACTURE SURGERY      MOUTH SURGERY      ORIF FOOT FRACTURE Left 2023    Procedure: ORIF left lisfranc foot injury;  Surgeon: Milagro Parker MD;  Location: [facility];  Service: Orthopedics;  Laterality: Left;    WISDOM TOOTH EXTRACTION       Family History   Problem Relation Age of Onset    Esophageal cancer Father     Cancer Father         Esophogical    Breast cancer Paternal Grandmother     Dementia  "Other     Ovarian cancer Neg Hx       Social History     Socioeconomic History    Marital status:    Tobacco Use    Smoking status: Former     Current packs/day: 0.00     Average packs/day: 0.5 packs/day for 11.0 years (5.5 ttl pk-yrs)     Types: Cigarettes     Start date: 1990     Quit date:      Years since quittin.4     Passive exposure: Past    Smokeless tobacco: Never   Vaping Use    Vaping status: Never Used   Substance and Sexual Activity    Alcohol use: Yes     Alcohol/week: 5.0 standard drinks of alcohol     Types: 5 Glasses of wine per week     Comment: off alcohol for surgery    Drug use: No    Sexual activity: Yes     Partners: Male     Birth control/protection: Post-menopausal, None        Review of Systems    The following portions of the patient's history were reviewed and updated as appropriate: allergies, current medications, past family history, past medical history, past social history, past surgical history, and problem list.    Physical Exam:   /60   Ht 172.7 cm (68\")   Wt 87.1 kg (192 lb)   LMP 06/15/2022   BMI 29.19 kg/m²     Nontender over the distal fibula fracture and lateral distal corner of the tibia, moderately tender in the great toe with some stiffness, moderately tender across the TMT joints, left foot and ankle    Medical Decision Making    Data Review:   ordered and reviewed x-rays today    Assessment/Plan/Diagnosis/Treatment Options:   1. Closed fracture of left ankle, initial encounter  The fracture is healing well.  She may begin weightbearing.  I will see her back in 4 weeks for an x-ray of the ankle to probably start PT  - XR Ankle 3+ View Left    2.  Follow-up Lisfranc injury-again this injury occurred at work    Milagro Bauer MD                      "

## 2024-06-12 ENCOUNTER — HOSPITAL ENCOUNTER (OUTPATIENT)
Dept: BONE DENSITY | Facility: HOSPITAL | Age: 54
Discharge: HOME | End: 2024-06-12
Admitting: NURSE PRACTITIONER
Payer: MEDICAID

## 2024-06-12 DIAGNOSIS — Z13.820 SCREENING FOR OSTEOPOROSIS: ICD-10-CM

## 2024-06-12 DIAGNOSIS — Z78.0 POSTMENOPAUSAL: ICD-10-CM

## 2024-06-12 PROCEDURE — 77080 DXA BONE DENSITY AXIAL: CPT

## 2024-06-17 ENCOUNTER — TELEPHONE (OUTPATIENT)
Dept: INTERNAL MEDICINE | Facility: CLINIC | Age: 54
End: 2024-06-17
Payer: MEDICAID

## 2024-06-17 NOTE — TELEPHONE ENCOUNTER
Called and spoke to pt. Gave message from provider. Pt voiced understanding and appreciation.     ----- Message from Karissa Mar sent at 6/17/2024  9:42 AM EDT -----  DEXA scan/bone mineral density testing resulted within normal.  We will plan to repeat in2 to 3 years    Will send as MyChart message as well

## 2024-06-17 NOTE — TELEPHONE ENCOUNTER
----- Message from Karissa Mar sent at 6/17/2024  9:42 AM EDT -----  DEXA scan/bone mineral density testing resulted within normal.  We will plan to repeat in2 to 3 years    Will send as [employer] message as well

## 2024-06-18 ENCOUNTER — TELEPHONE (OUTPATIENT)
Dept: ORTHOPEDIC SURGERY | Facility: CLINIC | Age: 54
End: 2024-06-18
Payer: MEDICAID

## 2024-06-18 NOTE — TELEPHONE ENCOUNTER
Patient calling in to get a refill of HYDROcodone-acetaminophen (NORCO) 7.5-325 MG per tablet patient states that she has one pill left. Also states that she went back to partial weight bearing and it is very painful. Would like another refill sent to [facility].

## 2024-06-19 DIAGNOSIS — S82.892A CLOSED FRACTURE OF LEFT ANKLE, INITIAL ENCOUNTER: Primary | ICD-10-CM

## 2024-06-19 RX ORDER — HYDROCODONE BITARTRATE AND ACETAMINOPHEN 7.5; 325 MG/1; MG/1
1 TABLET ORAL EVERY 6 HOURS PRN
Qty: 25 TABLET | Refills: 0 | Status: SHIPPED | OUTPATIENT
Start: 2024-06-19

## 2024-06-19 NOTE — TELEPHONE ENCOUNTER
Milagro Parker MD  You1 hour ago (9:03 AM)     I sent it in, but she needs to start decreasing this, use OTC advil/aleve also       I spoke with the patient to let her know we did send in that medication and to start decreasing and use OTC meds to help with her pain. The patient verbalized understanding.     Gisella Christianson

## 2024-06-24 ENCOUNTER — OFFICE VISIT (OUTPATIENT)
Dept: ENDOCRINOLOGY | Facility: CLINIC | Age: 54
End: 2024-06-24
Payer: MEDICAID

## 2024-06-24 VITALS
HEIGHT: 68 IN | BODY MASS INDEX: 29.1 KG/M2 | SYSTOLIC BLOOD PRESSURE: 116 MMHG | HEART RATE: 87 BPM | WEIGHT: 192 LBS | DIASTOLIC BLOOD PRESSURE: 58 MMHG | OXYGEN SATURATION: 90 %

## 2024-06-24 DIAGNOSIS — E03.8 HYPOTHYROIDISM DUE TO HASHIMOTO'S THYROIDITIS: Primary | ICD-10-CM

## 2024-06-24 DIAGNOSIS — E06.3 HYPOTHYROIDISM DUE TO HASHIMOTO'S THYROIDITIS: Primary | ICD-10-CM

## 2024-06-24 LAB — TSH SERPL DL<=0.05 MIU/L-ACNC: 0.06 UIU/ML (ref 0.27–4.2)

## 2024-06-24 PROCEDURE — 84443 ASSAY THYROID STIM HORMONE: CPT | Performed by: INTERNAL MEDICINE

## 2024-06-24 NOTE — PROGRESS NOTES
Chief Complaint   Patient presents with    Hypothyroidism       HPI:   Dawna Lovell is a 53 y.o.female who returns to endocrine clinic for follow-up evaluation of her hypothyroidism. Last visit (03/27/2023). Her history is as follows:    Interim Events:   - Pt fell in 04/2023, sustained a Lisfranc fracture dislocation of the left foot. S/P ORIF on 04/25/2023. (08/03/2023) s/p Lisfranc hardware removal.   - On 04/16/2024, pt again fractured her left ankle (closed fracture of distal end of left fibula, Closed fracture of distal end of left tibia) Is currently weight bearing, wearing boot and using a scooter. Followed by orthopedics     1) hypothyroidism due to Hashimoto's thyroiditis:  - Diagnosed approximately 2001  - switched from Brand Levoxyl to levothyroxine due to cost    Current dose: generic levothyroxine 150 µg daily  - Takes tablet in the morning on an empty stomach. Waits at least 30-60 minutes before eating/hot liquids.    - Takes multivitamin in the evening  - not on biotin supplement  - Is taking levothyroxine with the Omeprazole 40 mg    2) h/o abnormal endocrine tests:  - Pt had random serum cortisol levels collected at various time in the day which were elevated from 11/2015 to 4/2016. However, she was on an estrogen containing OCP at the time of these collections which falsely elevated the random serum cortisols. 3PM - ACTH was 11.6. Had elevated hepatic enzymes, normal glucose  - I evaluated pt in 2016 for possible Cushing's. Evaluation off birth control proved to be negative:  ( 06/2016) 24 hour urinary cortisol: was normal at 3 mcg/24 hours  (06/2016)  Midnight salivary cortisols ([facility]): normal  #1 <0.010, #2 0.013 ( normal range <0.010 - 0.090)  (07/2016) 1 mg overnight dexamethasone suppression test: 8AM cortisol was appropriately suppressed to 0.5 (normal < 1.8)    Had pt complete 1 mg overnight dex suppression test again on 4/28/2017:   04/28/2017)  1 mg overnight dexamethasone  "suppression test: 8AM cortisol was appropriately suppressed to 0.4    Review of Imaging:   - CT ABD 05/02/2016 - \"showed marked inhomogeneous geographic appearing liver with a mosaic pattern, most typical for marked inhomogeneous geographic steatosis with focal areas of fat sparing. This pattern was noted by ultrasound on 11/10/2015.\" and normal adrenal glands  - CT Head w/o contrast 1/21/2016: \"Old lacunar infarct seen in the left basal ganglia. No acute intracranial abnormalities identified.\"    Other History:  - In 04/2022, pt developed anemia. Evaluation showed elevated ferritin level. Was diagnosed with hereditary hemachromatosis. Has had two phlebotomy treatments. Is now on folic acid only.   - Pt fell in 04/2023, sustained a Lisfranc fracture dislocation of the left foot. S/P ORIF on 04/25/2023. (08/03/2023) s/p Lisfranc hardware removal.   - On 04/16/2024, pt again fractured her left ankle (closed fracture of distal end of left fibula, Closed fracture of distal end of left tibia)    Review of Systems   Constitutional:  Positive for fatigue.        Wt stable   Eyes:         Dry eyes   Respiratory: Negative.     Cardiovascular:  Negative for palpitations (on carvediolol).   Gastrointestinal: Negative.  Negative for diarrhea and nausea.   Endocrine: Negative.    Genitourinary: Negative.    Musculoskeletal:  Negative for arthralgias, back pain and myalgias.   Skin: Negative.    Allergic/Immunologic: Negative.    Neurological:  Positive for numbness (tingling in lower extremities) and headaches (Occasional). Negative for tremors and syncope.   Hematological: Negative.    Psychiatric/Behavioral:  Negative for sleep disturbance (on trazadone PRN).         H/o Depression with anxiety, better on effexor     The following portions of the patient's history were reviewed and updated as appropriate: allergies, current medications, past family history, past medical history, past social history, past surgical history and " "problem list.    /58   Pulse 87   Ht 172.7 cm (67.99\")   Wt 87.1 kg (192 lb)   LMP 06/15/2022   SpO2 90%   BMI 29.20 kg/m²   Physical Exam   Constitutional: She is oriented to person, place, and time. She appears well-developed. No distress.   HENT:   Head: Normocephalic.   Eyes: Pupils are equal, round, and reactive to light. Conjunctivae are normal.   Neck: No tracheal deviation present. No thyromegaly present.   No palpable thyroid nodules     Cardiovascular: Normal rate, regular rhythm and normal heart sounds.   No murmur heard.  Pulmonary/Chest: Effort normal and breath sounds normal. No respiratory distress.   Musculoskeletal: No tenderness.      Comments: Left foot in boot , ambulating with scooter   Lymphadenopathy:     She has no cervical adenopathy.   Neurological: She is alert and oriented to person, place, and time. No cranial nerve deficit.   Skin: Skin is warm and dry. She is not diaphoretic. No erythema.   Psychiatric: Her behavior is normal. Mood normal.   Vitals reviewed.      LABS/IMAGING: prior records reviewed and summarized in HPI  Office Visit on 06/24/2024   Component Date Value Ref Range Status    TSH 06/24/2024 0.063 (L)  0.270 - 4.200 uIU/mL Final       ASSESSMENT/PLAN:  1) hypothyroidism due to Hashimoto's thyroiditis:   - clinically euthyroid on exam  - TSH today suppressed at 0.063 on levothyroxine 150 mcg  - Pt with fluctuating TSH levels on generic levothyroxine making treatment difficult. Pt had more stable TSH levels on a brand name of thyroid hormone.   - Will discontinue levothyroxine 150 mcg q AM. Will start Brand Unithroid 137 mcg qAM. Will check TSH in 6 weeks   - Reviewed proper levothyroxine administration, and factors to avoid that decrease medication potency and medication absorption.   - pt to then have TSH checked in 6 months.     RTC 12 months    Electronically Signed: Camilla Gray MD                      "

## 2024-06-28 ENCOUNTER — OFFICE VISIT (OUTPATIENT)
Dept: ORTHOPEDIC SURGERY | Facility: CLINIC | Age: 54
End: 2024-06-28
Payer: MEDICAID

## 2024-06-28 VITALS — WEIGHT: 192 LBS | HEIGHT: 68 IN | BODY MASS INDEX: 29.1 KG/M2

## 2024-06-28 DIAGNOSIS — M84.362A STRESS FRACTURE OF LEFT TIBIA, INITIAL ENCOUNTER: ICD-10-CM

## 2024-06-28 DIAGNOSIS — Z09 FRACTURE FOLLOW-UP: Primary | ICD-10-CM

## 2024-06-28 PROCEDURE — 1159F MED LIST DOCD IN RCRD: CPT | Performed by: ORTHOPAEDIC SURGERY

## 2024-06-28 PROCEDURE — 1160F RVW MEDS BY RX/DR IN RCRD: CPT | Performed by: ORTHOPAEDIC SURGERY

## 2024-06-28 PROCEDURE — 99213 OFFICE O/P EST LOW 20 MIN: CPT | Performed by: ORTHOPAEDIC SURGERY

## 2024-06-28 RX ORDER — LEVOTHYROXINE SODIUM 137 UG/1
137 TABLET ORAL EVERY MORNING
Qty: 90 TABLET | Refills: 3 | Status: SHIPPED | OUTPATIENT
Start: 2024-06-28

## 2024-06-28 NOTE — PROGRESS NOTES
ESTABLISHED PATIENT    Patient: Dawna Lovell  : 1970    Primary Care Provider: Karissa Mar APRN    Requesting Provider: As above    Follow-up (2 week f/u; Closed fracture of left ankle)      History    Chief Complaint: Increased left ankle pain        History of Present Illness: Ms. Lovell returns unexpectedly noting increased left ankle pain and swelling.  At last visit I had allowed her to begin weightbearing.  She has the very small medial malleolar avulsion fracture in the lateral tibial avulsion fracture.  She has been traveling, and perhaps was on her feet more, when she traveled she wore a short boot.  She has had increased pain and swelling.  She has gone back to using a knee scooter.    Current Outpatient Medications on File Prior to Visit   Medication Sig Dispense Refill    carvedilol (COREG) 6.25 MG tablet Take 1 tablet by mouth 2 (Two) Times a Day With Meals. 180 tablet 3    folic acid (FOLVITE) 1 MG tablet 1 tablet daily.  90-day supply. (Patient taking differently: Take 1 tablet by mouth Daily. 1 tablet daily.  90-day supply.) 30 tablet 90    gabapentin (NEURONTIN) 300 MG capsule TAKE 3 CAPSULES BY MOUTH EVERY MORNING, 2 CAPSULES EVERY EVENING AND 3 CAPSULES AT BEDTIME 720 capsule 1    HYDROcodone-acetaminophen (NORCO) 7.5-325 MG per tablet Take 1 tablet by mouth Every 6 (Six) Hours As Needed for Moderate Pain. 25 tablet 0    HYDROcodone-acetaminophen (NORCO) 7.5-325 MG per tablet Take 1 tablet by mouth Every 6 (Six) Hours As Needed for Moderate Pain. 25 tablet 0    hydrOXYzine (ATARAX) 25 MG tablet TAKE 1 TABLET Twice daily PRN (Patient taking differently: Take 1 tablet by mouth 2 (Two) Times a Day As Needed for Anxiety.) 40 tablet 1    levothyroxine (SYNTHROID, LEVOTHROID) 150 MCG tablet Take 1 tablet by mouth Daily. 90 tablet 2    Multiple Vitamins-Minerals (MULTIVITAMIN ADULT PO) Take 1 tablet by mouth Daily.      omeprazole (priLOSEC) 40 MG capsule TAKE 1 CAPSULE BY MOUTH  TWICE DAILY 60 capsule 3    Restasis 0.05 % ophthalmic emulsion Administer 1 drop to both eyes 2 (Two) Times a Day.      rOPINIRole (REQUIP) 1 MG tablet Take 1 tablet by mouth 3 (Three) Times a Day. Take 1 hour before bedtime. 270 tablet 3    rosuvastatin (CRESTOR) 10 MG tablet Take 1 tablet by mouth Daily. 90 tablet 3    SUMAtriptan (IMITREX) 25 MG tablet TAKE 1 TABLET BY MOUTH AS NEEDED FOR MIGRAINE 9 tablet 1    tiZANidine (ZANAFLEX) 4 MG tablet Take 1 tablet by mouth Every 8 (Eight) Hours As Needed for Muscle Spasms. 90 tablet 5    traZODone (DESYREL) 100 MG tablet Take 1 tablet by mouth At Night As Needed for Sleep. 90 tablet 3    ursodiol (ACTIGALL) 300 MG capsule TAKE 1 CAPSULE BY MOUTH THREE TIMES DAILY WITH MEALS 270 capsule 3    venlafaxine XR (EFFEXOR-XR) 150 MG 24 hr capsule Take 1 capsule by mouth Daily. 90 capsule 3     No current facility-administered medications on file prior to visit.      Allergies   Allergen Reactions    Morphine And Codeine Itching     Nose itching      Past Medical History:   Diagnosis Date    Acute postoperative pain     Anxiety     Asthma     Depression     Fracture, fibula 2024    Fracture, foot 2023    Fracture, tibia and fibula 2024    GERD (gastroesophageal reflux disease)     Hyperlipidemia     Hypertension     Hypothyroidism     Irritable bowel syndrome     Lyme disease 2016    Migraine headache     Neuromuscular disorder     POTS (postural orthostatic tachycardia syndrome)     Syncope 2016    Tachycardia 2016     Past Surgical History:   Procedure Laterality Date     SECTION  2003    COLONOSCOPY      FOOT SURGERY Left 2023    Hardware Removal Lisfranc ORIF 23    FRACTURE SURGERY      MOUTH SURGERY      ORIF FOOT FRACTURE Left 2023    Procedure: ORIF left lisfranc foot injury;  Surgeon: Milagro Parker MD;  Location: [facility];  Service: Orthopedics;  Laterality: Left;    WISDOM TOOTH EXTRACTION       Family  "History   Problem Relation Age of Onset    Esophageal cancer Father     Cancer Father         Esophogical    Breast cancer Paternal Grandmother     Dementia Other     Ovarian cancer Neg Hx       Social History     Socioeconomic History    Marital status:    Tobacco Use    Smoking status: Former     Current packs/day: 0.00     Average packs/day: 0.5 packs/day for 11.0 years (5.5 ttl pk-yrs)     Types: Cigarettes     Start date: 1990     Quit date:      Years since quittin.5     Passive exposure: Past    Smokeless tobacco: Never   Vaping Use    Vaping status: Never Used   Substance and Sexual Activity    Alcohol use: Yes     Alcohol/week: 5.0 standard drinks of alcohol     Types: 5 Glasses of wine per week     Comment: off alcohol for surgery    Drug use: No    Sexual activity: Yes     Partners: Male     Birth control/protection: Post-menopausal, None        Review of Systems   Constitutional: Negative.    HENT: Negative.     Eyes: Negative.    Respiratory: Negative.     Cardiovascular: Negative.    Gastrointestinal: Negative.    Endocrine: Negative.    Genitourinary: Negative.    Musculoskeletal:  Positive for arthralgias.   Skin: Negative.    Allergic/Immunologic: Negative.    Neurological: Negative.    Hematological: Negative.    Psychiatric/Behavioral: Negative.         The following portions of the patient's history were reviewed and updated as appropriate: allergies, current medications, past family history, past medical history, past social history, past surgical history, and problem list.    Physical Exam:   Ht 172.7 cm (67.99\")   Wt 87.1 kg (192 lb)   LMP 06/15/2022   BMI 29.20 kg/m²   Much more swelling on the left ankle compared with last visit, she is very tender over the medial malleolus and medial distal tibia, less tender over the syndesmotic region, no warmth nothing that looks like Charcot    Medical Decision Making    Data Review:   ordered and reviewed x-rays " today    Assessment/Plan/Diagnosis/Treatment Options:   1. Fracture follow-up  I strongly suspect a stress fracture here.  I explained Ed's law to the patient and her .  Her bone is weaker due to being non-wt bearing from the traumatic fracture.  I think neuropathy plays a role also and making her slow healing and weaker bone.  I do not see anything that looks like a Charcot.  We need to do an MRI to assess this.  I think the MRI will be more useful than a CT scan at this point.  I will see her back following that.  She should stay in the tall boot and decrease activity, use the knee scooter for all but short distances.  - XR Ankle 3+ View Left    2. Stress fracture of left tibia, initial encounter  As above  - MRI Ankle Left Without Contrast; Future        Milagro Bauer MD

## 2024-06-29 ENCOUNTER — HOSPITAL ENCOUNTER (OUTPATIENT)
Facility: HOSPITAL | Age: 54
Discharge: HOME | End: 2024-06-29
Payer: MEDICAID

## 2024-06-29 DIAGNOSIS — M84.362A STRESS FRACTURE OF LEFT TIBIA, INITIAL ENCOUNTER: ICD-10-CM

## 2024-06-29 PROCEDURE — 73721 MRI JNT OF LWR EXTRE W/O DYE: CPT

## 2024-07-02 ENCOUNTER — PRIOR AUTHORIZATION (OUTPATIENT)
Dept: ENDOCRINOLOGY | Facility: CLINIC | Age: 54
End: 2024-07-02
Payer: MEDICAID

## 2024-07-02 DIAGNOSIS — E03.8 HYPOTHYROIDISM DUE TO HASHIMOTO'S THYROIDITIS: ICD-10-CM

## 2024-07-02 DIAGNOSIS — E06.3 HYPOTHYROIDISM DUE TO HASHIMOTO'S THYROIDITIS: ICD-10-CM

## 2024-07-02 RX ORDER — LEVOTHYROXINE SODIUM 137 UG/1
137 TABLET ORAL EVERY MORNING
Qty: 90 TABLET | Refills: 3 | OUTPATIENT
Start: 2024-07-02

## 2024-07-02 NOTE — TELEPHONE ENCOUNTER
Approvedtoday  The request has been approved. The authorization is effective from 07/02/2024 to 07/02/2025, as long as the member is enrolled in their current health plan. A written notification letter will follow with additional details.  Drug  Unithroid 137MCG tablets  Form  [employer] [place] Medicaid ePA Form 2017 NCPDP

## 2024-07-10 RX ORDER — OMEPRAZOLE 40 MG/1
CAPSULE, DELAYED RELEASE ORAL
Qty: 180 CAPSULE | OUTPATIENT
Start: 2024-07-10

## 2024-07-11 DIAGNOSIS — E06.3 HYPOTHYROIDISM DUE TO HASHIMOTO'S THYROIDITIS: ICD-10-CM

## 2024-07-11 DIAGNOSIS — E03.8 HYPOTHYROIDISM DUE TO HASHIMOTO'S THYROIDITIS: ICD-10-CM

## 2024-07-11 RX ORDER — LEVOTHYROXINE SODIUM 0.15 MG/1
150 TABLET ORAL DAILY
Qty: 90 TABLET | Refills: 2 | OUTPATIENT
Start: 2024-07-11

## 2024-07-11 NOTE — TELEPHONE ENCOUNTER
The original prescription was discontinued on 6/28/2024 by Camilla Gray MD for the following reason: Dose adjustment.     Last office visit with prescribing clinician: 6/24/2024       Next office visit with prescribing clinician: Visit date not found     {

## 2024-07-15 ENCOUNTER — OFFICE VISIT (OUTPATIENT)
Dept: ORTHOPEDIC SURGERY | Facility: CLINIC | Age: 54
End: 2024-07-15
Payer: MEDICAID

## 2024-07-15 VITALS
HEIGHT: 68 IN | BODY MASS INDEX: 29.1 KG/M2 | WEIGHT: 192 LBS | SYSTOLIC BLOOD PRESSURE: 130 MMHG | DIASTOLIC BLOOD PRESSURE: 80 MMHG

## 2024-07-15 DIAGNOSIS — M84.362A STRESS FRACTURE OF LEFT TIBIA, INITIAL ENCOUNTER: Primary | ICD-10-CM

## 2024-07-15 PROCEDURE — 3075F SYST BP GE 130 - 139MM HG: CPT | Performed by: ORTHOPAEDIC SURGERY

## 2024-07-15 PROCEDURE — 1159F MED LIST DOCD IN RCRD: CPT | Performed by: ORTHOPAEDIC SURGERY

## 2024-07-15 PROCEDURE — 3079F DIAST BP 80-89 MM HG: CPT | Performed by: ORTHOPAEDIC SURGERY

## 2024-07-15 PROCEDURE — 1160F RVW MEDS BY RX/DR IN RCRD: CPT | Performed by: ORTHOPAEDIC SURGERY

## 2024-07-15 PROCEDURE — 99213 OFFICE O/P EST LOW 20 MIN: CPT | Performed by: ORTHOPAEDIC SURGERY

## 2024-07-15 NOTE — PROGRESS NOTES
ESTABLISHED PATIENT    Patient: Dawna Lovell  : 1970    Primary Care Provider: Karissa Mar APRN    Requesting Provider: As above    Follow-up (2 week f/u; MRI f/u)      History    Chief Complaint: Left ankle pain    History of Present Illness: This she returns for follow-up of the MRI of her left ankle, I reviewed the films and the report, it was done 2024.  It shows a classic transverse tibial stress fracture just above the level of the joint.  You can still see the lateral tibial avulsion type fracture but it appears to be healing appropriately, it is nondisplaced.  The transverse fracture is the new problem.  She has edema in the foot musculature consistent with her neuropathy.  He can see the Lisfranc arthritis.    Current Outpatient Medications on File Prior to Visit   Medication Sig Dispense Refill    carvedilol (COREG) 6.25 MG tablet Take 1 tablet by mouth 2 (Two) Times a Day With Meals. 180 tablet 3    folic acid (FOLVITE) 1 MG tablet 1 tablet daily.  90-day supply. (Patient taking differently: Take 1 tablet by mouth Daily. 1 tablet daily.  90-day supply.) 30 tablet 90    gabapentin (NEURONTIN) 300 MG capsule TAKE 3 CAPSULES BY MOUTH EVERY MORNING, 2 CAPSULES EVERY EVENING AND 3 CAPSULES AT BEDTIME 720 capsule 1    HYDROcodone-acetaminophen (NORCO) 7.5-325 MG per tablet Take 1 tablet by mouth Every 6 (Six) Hours As Needed for Moderate Pain. 25 tablet 0    HYDROcodone-acetaminophen (NORCO) 7.5-325 MG per tablet Take 1 tablet by mouth Every 6 (Six) Hours As Needed for Moderate Pain. 25 tablet 0    hydrOXYzine (ATARAX) 25 MG tablet TAKE 1 TABLET Twice daily PRN (Patient taking differently: Take 1 tablet by mouth 2 (Two) Times a Day As Needed for Anxiety.) 40 tablet 1    Multiple Vitamins-Minerals (MULTIVITAMIN ADULT PO) Take 1 tablet by mouth Daily.      omeprazole (priLOSEC) 40 MG capsule TAKE 1 CAPSULE BY MOUTH TWICE DAILY 60 capsule 3    Restasis 0.05 % ophthalmic emulsion  Administer 1 drop to both eyes 2 (Two) Times a Day.      rOPINIRole (REQUIP) 1 MG tablet Take 1 tablet by mouth 3 (Three) Times a Day. Take 1 hour before bedtime. 270 tablet 3    rosuvastatin (CRESTOR) 10 MG tablet Take 1 tablet by mouth Daily. 90 tablet 3    SUMAtriptan (IMITREX) 25 MG tablet TAKE 1 TABLET BY MOUTH AS NEEDED FOR MIGRAINE 9 tablet 1    tiZANidine (ZANAFLEX) 4 MG tablet Take 1 tablet by mouth Every 8 (Eight) Hours As Needed for Muscle Spasms. 90 tablet 5    traZODone (DESYREL) 100 MG tablet Take 1 tablet by mouth At Night As Needed for Sleep. 90 tablet 3    Unithroid 137 MCG tablet Take 1 tablet by mouth Every Morning. 90 tablet 3    ursodiol (ACTIGALL) 300 MG capsule TAKE 1 CAPSULE BY MOUTH THREE TIMES DAILY WITH MEALS 270 capsule 3    venlafaxine XR (EFFEXOR-XR) 150 MG 24 hr capsule Take 1 capsule by mouth Daily. 90 capsule 3     No current facility-administered medications on file prior to visit.      Allergies   Allergen Reactions    Morphine And Codeine Itching     Nose itching      Past Medical History:   Diagnosis Date    Acute postoperative pain     Anxiety     Asthma     Depression     Fracture, fibula 2024    Fracture, foot 2023    Fracture, tibia and fibula 2024    GERD (gastroesophageal reflux disease)     Hyperlipidemia     Hypertension     Hypothyroidism     Irritable bowel syndrome     Lyme disease 2016    Migraine headache     Neuromuscular disorder     POTS (postural orthostatic tachycardia syndrome)     Syncope 2016    Tachycardia 2016     Past Surgical History:   Procedure Laterality Date     SECTION  2003    COLONOSCOPY      FOOT SURGERY Left 2023    Hardware Removal Lisfranc ORIF 23    FRACTURE SURGERY      MOUTH SURGERY      ORIF FOOT FRACTURE Left 2023    Procedure: ORIF left lisfranc foot injury;  Surgeon: Milagro Parker MD;  Location: [facility];  Service: Orthopedics;  Laterality: Left;    WISDOM TOOTH EXTRACTION        Family History   Problem Relation Age of Onset    Esophageal cancer Father     Cancer Father         Esophogical    Breast cancer Paternal Grandmother     Dementia Other     Ovarian cancer Neg Hx       Social History     Socioeconomic History    Marital status:    Tobacco Use    Smoking status: Former     Current packs/day: 0.00     Average packs/day: 0.5 packs/day for 11.0 years (5.5 ttl pk-yrs)     Types: Cigarettes     Start date: 1990     Quit date:      Years since quittin.5     Passive exposure: Past    Smokeless tobacco: Never   Vaping Use    Vaping status: Never Used   Substance and Sexual Activity    Alcohol use: Yes     Alcohol/week: 5.0 standard drinks of alcohol     Types: 5 Glasses of wine per week     Comment: off alcohol for surgery    Drug use: No    Sexual activity: Yes     Partners: Male     Birth control/protection: Post-menopausal, None        Review of Systems   Constitutional:  Negative for activity change, appetite change, chills, diaphoresis, fatigue, fever and unexpected weight change.   HENT:  Negative for congestion, dental problem, drooling, ear discharge, ear pain, facial swelling, hearing loss, mouth sores, nosebleeds, postnasal drip, rhinorrhea, sinus pressure, sneezing, sore throat, tinnitus, trouble swallowing and voice change.    Eyes:  Negative for photophobia, pain, discharge, redness, itching and visual disturbance.   Respiratory:  Negative for apnea, cough, choking, chest tightness, shortness of breath, wheezing and stridor.    Cardiovascular:  Negative for chest pain, palpitations and leg swelling.   Gastrointestinal:  Negative for abdominal distention, abdominal pain, anal bleeding, blood in stool, constipation, diarrhea, nausea, rectal pain and vomiting.   Endocrine: Negative for cold intolerance, heat intolerance, polydipsia, polyphagia and polyuria.   Genitourinary:  Negative for decreased urine volume, difficulty urinating, dysuria, enuresis,  "flank pain, frequency, genital sores, hematuria and urgency.   Musculoskeletal:  Positive for arthralgias. Negative for back pain, gait problem, joint swelling, myalgias, neck pain and neck stiffness.   Skin:  Negative for color change, pallor, rash and wound.   Allergic/Immunologic: Negative for environmental allergies, food allergies and immunocompromised state.   Neurological:  Negative for dizziness, tremors, seizures, syncope, facial asymmetry, speech difficulty, weakness, light-headedness, numbness and headaches.   Hematological:  Negative for adenopathy. Does not bruise/bleed easily.   Psychiatric/Behavioral:  Negative for agitation, behavioral problems, confusion, decreased concentration, dysphoric mood, hallucinations, self-injury, sleep disturbance and suicidal ideas. The patient is not nervous/anxious and is not hyperactive.        The following portions of the patient's history were reviewed and updated as appropriate: allergies, current medications, past family history, past medical history, past social history, past surgical history, and problem list.    Physical Exam:   /80   Ht 172.7 cm (67.99\")   Wt 87.1 kg (192 lb)   LMP 06/15/2022   BMI 29.20 kg/m²     Left ankle is less swollen today, she remains tender across the anterior aspect of the ankle, but not as significant as last visit    Medical Decision Making    Data Review:   reviewed radiology images and reviewed radiology results    Assessment/Plan/Diagnosis/Treatment Options:   1. Stress fracture of left tibia, initial encounter  She is improving.  The MRI shows a classic transverse tibial stress fracture.  She may walk short distances in the boot but use the scooter for any long distances.  She is very sensible and she will monitor her activity.  I will see her again in 6 weeks with standing 3 views of the ankle or sooner if she has any problems.    2.  Neuropathy-no change, puts her at increased risk for stress fractures and " complications    Milagro Bauer MD

## 2024-07-16 ENCOUNTER — OFFICE VISIT (OUTPATIENT)
Dept: NEUROLOGY | Facility: CLINIC | Age: 54
End: 2024-07-16
Payer: MEDICAID

## 2024-07-16 VITALS
OXYGEN SATURATION: 93 % | HEIGHT: 68 IN | DIASTOLIC BLOOD PRESSURE: 68 MMHG | SYSTOLIC BLOOD PRESSURE: 138 MMHG | BODY MASS INDEX: 29.1 KG/M2 | WEIGHT: 192.02 LBS | HEART RATE: 95 BPM

## 2024-07-16 DIAGNOSIS — G25.81 RESTLESS LEG SYNDROME: Primary | Chronic | ICD-10-CM

## 2024-07-16 PROCEDURE — 1159F MED LIST DOCD IN RCRD: CPT | Performed by: PSYCHIATRY & NEUROLOGY

## 2024-07-16 PROCEDURE — 3075F SYST BP GE 130 - 139MM HG: CPT | Performed by: PSYCHIATRY & NEUROLOGY

## 2024-07-16 PROCEDURE — 3078F DIAST BP <80 MM HG: CPT | Performed by: PSYCHIATRY & NEUROLOGY

## 2024-07-16 PROCEDURE — 1160F RVW MEDS BY RX/DR IN RCRD: CPT | Performed by: PSYCHIATRY & NEUROLOGY

## 2024-07-16 PROCEDURE — 99214 OFFICE O/P EST MOD 30 MIN: CPT | Performed by: PSYCHIATRY & NEUROLOGY

## 2024-07-16 RX ORDER — ROPINIROLE 1 MG/1
1 TABLET, FILM COATED ORAL 3 TIMES DAILY
Qty: 270 TABLET | Refills: 3 | Status: SHIPPED | OUTPATIENT
Start: 2024-07-16 | End: 2025-07-16

## 2024-07-16 RX ORDER — GABAPENTIN 300 MG/1
900 CAPSULE ORAL 3 TIMES DAILY
Qty: 810 CAPSULE | Refills: 1 | Status: SHIPPED | OUTPATIENT
Start: 2024-07-16 | End: 2025-07-16

## 2024-07-16 NOTE — PROGRESS NOTES
"Chief Complaint    Restless Legs Syndrome    Subjective        Dawna Lovell presents to [facility] NEUROLOGY  History of Present Illness    53 y.o. female returns in follow up.  Last visit on 7/13/23 continued GBP,  Requip.     S/P ORIF left lisfranc injury     RLS worsened with boot and fractures.       Electrical buzzing in feet.       Requip 1 mg qhs        Problem history:     Sx started two years ago.  Sx started suddenly.  Legs feel a fluttering in legs mainly lying down at night.  Sx improved during the day and walking.  Improved of late.  Mild intensity.  Gralise 1200 mg qhs.    Legs ache during the day when sitting still.       EMG/NCS - normal      Objective   Vital Signs:  /68   Pulse 95   Ht 172.7 cm (67.99\")   Wt 87.1 kg (192 lb 0.3 oz)   SpO2 93%   BMI 29.20 kg/m²   Estimated body mass index is 29.2 kg/m² as calculated from the following:    Height as of this encounter: 172.7 cm (67.99\").    Weight as of this encounter: 87.1 kg (192 lb 0.3 oz).       Neurologic Exam     Mental Status   Oriented to person, place, and time.   Speech: speech is normal   Level of consciousness: alert  Knowledge: good and consistent with education.   Normal comprehension.     Cranial Nerves   Cranial nerves II through XII intact.     CN II   Visual fields full to confrontation.   Visual acuity: normal  Right visual field deficit: none  Left visual field deficit: none     CN III, IV, VI   Pupils are equal, round, and reactive to light.  Extraocular motions are normal.   Nystagmus: none   Diplopia: none  Ophthalmoparesis: none  Upgaze: normal  Downgaze: normal  Conjugate gaze: present    CN V   Facial sensation intact.   Right corneal reflex: normal  Left corneal reflex: normal    CN VII   Right facial weakness: none  Left facial weakness: none    CN VIII   Hearing: intact    CN IX, X   Palate: symmetric  Right gag reflex: normal  Left gag reflex: normal    CN XI   Right sternocleidomastoid " strength: normal  Left sternocleidomastoid strength: normal    CN XII   Tongue: not atrophic  Fasciculations: absent  Tongue deviation: none    Motor Exam   Muscle bulk: normal  Overall muscle tone: normal    Strength   Strength 5/5 throughout.     Sensory Exam   Light touch normal.     Gait, Coordination, and Reflexes     Gait  Gait: normal    Tremor   Resting tremor: absent  Intention tremor: absent  Action tremor: absent    Reflexes   Reflexes 2+ except as noted.            Physical Exam  Eyes:      Extraocular Movements: EOM normal.      Pupils: Pupils are equal, round, and reactive to light.   Neurological:      Mental Status: She is oriented to person, place, and time.      Cranial Nerves: Cranial nerves 2-12 are intact.      Motor: Motor strength is normal.     Gait: Gait is intact.   Psychiatric:         Speech: Speech normal.        Result Review :    The following data was reviewed by: Jasson Pisano MD on 07/16/2024:  Common labs          7/25/2023    08:39 5/15/2024    09:46   Common Labs   Glucose 112  112    BUN 8  8    Creatinine 0.47  0.46    Sodium 141  137    Potassium 4.3  4.6    Chloride 101  99    Calcium 10.0  9.2    Albumin  4.4    Total Bilirubin  0.8    Alkaline Phosphatase  107    AST (SGOT)  75    ALT (SGPT)  56    WBC 6.05  5.64    Hemoglobin 13.0  12.5    Hematocrit 39.4  37.1    Platelets 155  152    Total Cholesterol  184    Triglycerides  95    HDL Cholesterol  93    LDL Cholesterol   74    Hemoglobin A1C 5.40                    Assessment and Plan     Diagnoses and all orders for this visit:    1. Restless leg syndrome (Primary)  Assessment & Plan:  Increase  mg TID    Continue Requip 1 mg BID/TID     Orders:  -     gabapentin (NEURONTIN) 300 MG capsule; Take 3 capsules by mouth 3 (Three) Times a Day.  Dispense: 810 capsule; Refill: 1    Other orders  -     rOPINIRole (REQUIP) 1 MG tablet; Take 1 tablet by mouth 3 (Three) Times a Day. Take 1 hour before bedtime.  Dispense:  270 tablet; Refill: 3             Follow Up     No follow-ups on file.  Patient was given instructions and counseling regarding her condition or for health maintenance advice. Please see specific information pulled into the AVS if appropriate.

## 2024-08-08 RX ORDER — OMEPRAZOLE 40 MG/1
CAPSULE, DELAYED RELEASE ORAL
Qty: 60 CAPSULE | Refills: 3 | OUTPATIENT
Start: 2024-08-08

## 2024-08-26 ENCOUNTER — OFFICE VISIT (OUTPATIENT)
Dept: ORTHOPEDIC SURGERY | Facility: CLINIC | Age: 54
End: 2024-08-26
Payer: MEDICAID

## 2024-08-26 VITALS
BODY MASS INDEX: 29.77 KG/M2 | DIASTOLIC BLOOD PRESSURE: 72 MMHG | HEIGHT: 68 IN | SYSTOLIC BLOOD PRESSURE: 124 MMHG | WEIGHT: 196.4 LBS

## 2024-08-26 DIAGNOSIS — M84.362A STRESS FRACTURE OF LEFT TIBIA, INITIAL ENCOUNTER: Primary | ICD-10-CM

## 2024-08-26 PROCEDURE — 3074F SYST BP LT 130 MM HG: CPT | Performed by: ORTHOPAEDIC SURGERY

## 2024-08-26 PROCEDURE — 1160F RVW MEDS BY RX/DR IN RCRD: CPT | Performed by: ORTHOPAEDIC SURGERY

## 2024-08-26 PROCEDURE — 3078F DIAST BP <80 MM HG: CPT | Performed by: ORTHOPAEDIC SURGERY

## 2024-08-26 PROCEDURE — 99212 OFFICE O/P EST SF 10 MIN: CPT | Performed by: ORTHOPAEDIC SURGERY

## 2024-08-26 PROCEDURE — 1159F MED LIST DOCD IN RCRD: CPT | Performed by: ORTHOPAEDIC SURGERY

## 2024-08-26 NOTE — PROGRESS NOTES
ESTABLISHED PATIENT    Patient: Dawna Lovell  : 1970    Primary Care Provider: Karissa Mar APRN    Requesting Provider: As above    Follow-up (6 week follow-up: Stress fracture of left tibia, subsequent encounter)      History    Chief Complaint: Follow-up left tibial stress fracture    History of Present Illness: She returns for follow-up of her left tibial stress fracture that occurred after we made her nonweightbearing for the lateral tibial avulsion fracture.  She reports it is feeling much better, she does have some soreness intermittently.    Current Outpatient Medications on File Prior to Visit   Medication Sig Dispense Refill    carvedilol (COREG) 6.25 MG tablet Take 1 tablet by mouth 2 (Two) Times a Day With Meals. 180 tablet 3    folic acid (FOLVITE) 1 MG tablet 1 tablet daily.  90-day supply. (Patient taking differently: Take 1 tablet by mouth Daily. 1 tablet daily.  90-day supply.) 30 tablet 90    gabapentin (NEURONTIN) 300 MG capsule Take 3 capsules by mouth 3 (Three) Times a Day. 810 capsule 1    HYDROcodone-acetaminophen (NORCO) 7.5-325 MG per tablet Take 1 tablet by mouth Every 6 (Six) Hours As Needed for Moderate Pain. 25 tablet 0    HYDROcodone-acetaminophen (NORCO) 7.5-325 MG per tablet Take 1 tablet by mouth Every 6 (Six) Hours As Needed for Moderate Pain. 25 tablet 0    hydrOXYzine (ATARAX) 25 MG tablet TAKE 1 TABLET Twice daily PRN (Patient taking differently: Take 1 tablet by mouth 2 (Two) Times a Day As Needed for Anxiety.) 40 tablet 1    Multiple Vitamins-Minerals (MULTIVITAMIN ADULT PO) Take 1 tablet by mouth Daily.      omeprazole (priLOSEC) 40 MG capsule TAKE 1 CAPSULE BY MOUTH TWICE DAILY 60 capsule 3    Restasis 0.05 % ophthalmic emulsion Administer 1 drop to both eyes 2 (Two) Times a Day.      rOPINIRole (REQUIP) 1 MG tablet Take 1 tablet by mouth 3 (Three) Times a Day. Take 1 hour before bedtime. 270 tablet 3    rosuvastatin (CRESTOR) 10 MG tablet Take 1 tablet  by mouth Daily. 90 tablet 3    SUMAtriptan (IMITREX) 25 MG tablet TAKE 1 TABLET BY MOUTH AS NEEDED FOR MIGRAINE 9 tablet 1    tiZANidine (ZANAFLEX) 4 MG tablet Take 1 tablet by mouth Every 8 (Eight) Hours As Needed for Muscle Spasms. 90 tablet 5    traZODone (DESYREL) 100 MG tablet Take 1 tablet by mouth At Night As Needed for Sleep. 90 tablet 3    Unithroid 137 MCG tablet Take 1 tablet by mouth Every Morning. 90 tablet 3    ursodiol (ACTIGALL) 300 MG capsule TAKE 1 CAPSULE BY MOUTH THREE TIMES DAILY WITH MEALS 270 capsule 3    venlafaxine XR (EFFEXOR-XR) 150 MG 24 hr capsule Take 1 capsule by mouth Daily. 90 capsule 3     No current facility-administered medications on file prior to visit.      Allergies   Allergen Reactions    Morphine And Codeine Itching     Nose itching      Past Medical History:   Diagnosis Date    Acute postoperative pain     Anxiety     Asthma     Depression     Fracture, fibula 2024    Fracture, foot 2023    Fracture, tibia and fibula 2024    GERD (gastroesophageal reflux disease)     Headache, tension-type     Hyperlipidemia     Hypertension     Hypothyroidism     Irritable bowel syndrome     Lyme disease 2016    Migraine headache     Neuromuscular disorder     POTS (postural orthostatic tachycardia syndrome)     Syncope 2016    Tachycardia 2016     Past Surgical History:   Procedure Laterality Date     SECTION  2003    COLONOSCOPY      FOOT SURGERY Left 2023    Hardware Removal Lisfranc ORIF 23    FRACTURE SURGERY      MOUTH SURGERY      ORIF FOOT FRACTURE Left 2023    Procedure: ORIF left lisfranc foot injury;  Surgeon: Milagro Parker MD;  Location: [facility];  Service: Orthopedics;  Laterality: Left;    WISDOM TOOTH EXTRACTION       Family History   Problem Relation Age of Onset    Esophageal cancer Father     Cancer Father         Esophogical    Breast cancer Paternal Grandmother     Dementia Other     Ovarian cancer Neg Hx  "      Social History     Socioeconomic History    Marital status:    Tobacco Use    Smoking status: Former     Current packs/day: 0.00     Average packs/day: 0.5 packs/day for 11.0 years (5.5 ttl pk-yrs)     Types: Cigarettes     Start date: 1990     Quit date:      Years since quittin.6     Passive exposure: Past    Smokeless tobacco: Never   Vaping Use    Vaping status: Never Used   Substance and Sexual Activity    Alcohol use: Yes     Alcohol/week: 5.0 standard drinks of alcohol     Types: 5 Glasses of wine per week     Comment: off alcohol for surgery    Drug use: No    Sexual activity: Yes     Partners: Male     Birth control/protection: Post-menopausal, None        Review of Systems   Constitutional: Negative.    HENT: Negative.     Eyes: Negative.    Respiratory: Negative.     Cardiovascular: Negative.    Gastrointestinal: Negative.    Endocrine: Negative.    Genitourinary: Negative.    Musculoskeletal:  Positive for arthralgias.   Skin: Negative.    Allergic/Immunologic: Negative.    Neurological: Negative.    Hematological: Negative.    Psychiatric/Behavioral: Negative.         The following portions of the patient's history were reviewed and updated as appropriate: allergies, current medications, past family history, past medical history, past social history, past surgical history, and problem list.    Physical Exam:   /72   Ht 172.5 cm (67.91\")   Wt 89.1 kg (196 lb 6.4 oz)   LMP 06/15/2022   BMI 29.94 kg/m²     Much less swelling around the left ankle, no tenderness over the tibia and the fibula and syndesmosis today, nontender in the midfoot    Medical Decision Making    Data Review:   ordered and reviewed x-rays today    Assessment/Plan/Diagnosis/Treatment Options:   1. Stress fracture of left tibia, subsequent encounter  I think the stress fracture is healing appropriately.  The avulsion may heal with a fibrous union.  She may now wean off her scooter and start therapy.  " She may slowly wean out of the boot.  I will see her again in 8 to 10 weeks with standing 3 views of the ankle.  - XR Ankle 3+ View Left          Milagro Bauer MD

## 2024-09-09 ENCOUNTER — TELEMEDICINE (OUTPATIENT)
Dept: INTERNAL MEDICINE | Facility: CLINIC | Age: 54
End: 2024-09-09
Payer: MEDICAID

## 2024-09-09 DIAGNOSIS — J98.8 VIRAL RESPIRATORY ILLNESS: Primary | ICD-10-CM

## 2024-09-09 DIAGNOSIS — G25.81 RESTLESS LEG SYNDROME: Chronic | ICD-10-CM

## 2024-09-09 DIAGNOSIS — R63.5 WEIGHT GAIN: ICD-10-CM

## 2024-09-09 DIAGNOSIS — B97.89 VIRAL RESPIRATORY ILLNESS: Primary | ICD-10-CM

## 2024-09-09 DIAGNOSIS — N95.1 MENOPAUSAL SYMPTOM: ICD-10-CM

## 2024-09-09 PROCEDURE — 1160F RVW MEDS BY RX/DR IN RCRD: CPT | Performed by: NURSE PRACTITIONER

## 2024-09-09 PROCEDURE — 1159F MED LIST DOCD IN RCRD: CPT | Performed by: NURSE PRACTITIONER

## 2024-09-09 PROCEDURE — 99214 OFFICE O/P EST MOD 30 MIN: CPT | Performed by: NURSE PRACTITIONER

## 2024-09-09 PROCEDURE — 1126F AMNT PAIN NOTED NONE PRSNT: CPT | Performed by: NURSE PRACTITIONER

## 2024-09-09 RX ORDER — AZITHROMYCIN 250 MG/1
TABLET, FILM COATED ORAL
Qty: 6 TABLET | Refills: 0 | Status: SHIPPED | OUTPATIENT
Start: 2024-09-09

## 2024-09-09 RX ORDER — GABAPENTIN 300 MG/1
900 CAPSULE ORAL 3 TIMES DAILY
Qty: 810 CAPSULE | Refills: 1 | Status: SHIPPED | OUTPATIENT
Start: 2024-09-09 | End: 2025-09-09

## 2024-09-09 RX ORDER — METHYLPREDNISOLONE 4 MG
TABLET, DOSE PACK ORAL
Qty: 21 TABLET | Refills: 0 | Status: SHIPPED | OUTPATIENT
Start: 2024-09-09

## 2024-09-09 RX ORDER — BENZONATATE 100 MG/1
100 CAPSULE ORAL 3 TIMES DAILY PRN
Qty: 30 CAPSULE | Refills: 0 | Status: SHIPPED | OUTPATIENT
Start: 2024-09-09 | End: 2024-09-19

## 2024-09-09 RX ORDER — DEXTROMETHORPHAN HYDROBROMIDE AND PROMETHAZINE HYDROCHLORIDE 15; 6.25 MG/5ML; MG/5ML
5 SYRUP ORAL 4 TIMES DAILY PRN
Qty: 473 ML | Refills: 0 | Status: SHIPPED | OUTPATIENT
Start: 2024-09-09

## 2024-09-09 NOTE — TELEPHONE ENCOUNTER
Caller: Dawna Lovell    Relationship: Self    Best call back number: [phone]    Requested Prescriptions:   Requested Prescriptions     Pending Prescriptions Disp Refills    gabapentin (NEURONTIN) 300 MG capsule 810 capsule 1     Sig: Take 3 capsules by mouth 3 (Three) Times a Day.        [id number] [address]  AT [facility]  [address] [facility] [phone] [facility] [phone] FX     Last office visit with prescribing clinician: 7/16/2024   Last telemedicine visit with prescribing clinician: Visit date not found   Next office visit with prescribing clinician: 7/16/2025     Additional details provided by patient: PATIENT CURRENTLY TAKES 6-8 PER DAY. PATIENT STATES SHE HAS ABOUT 10 DAYS WORTH REMAINING.    Does the patient have less than a 3 day supply:  [] Yes  [x] No    Would you like a call back once the refill request has been completed: [] Yes [x] No    If the office needs to give you a call back, can they leave a voicemail: [] Yes [x] No    Tyrone Reilly Rep   09/09/24 10:23 EDT

## 2024-09-09 NOTE — Clinical Note
How would you feel if I started her on topamax for weight reduction?I know she has history of migraines as well

## 2024-09-09 NOTE — TELEPHONE ENCOUNTER
Rx Refill Note  Requested Prescriptions     Pending Prescriptions Disp Refills    gabapentin (NEURONTIN) 300 MG capsule 810 capsule 1     Sig: Take 3 capsules by mouth 3 (Three) Times a Day.      Last filled:  07/16/24 w/1  Last office visit with prescribing clinician: 7/16/2024      Next office visit with prescribing clinician: 7/16/2025     Madelin Guzmna MA  09/09/24, 10:39 EDT

## 2024-09-09 NOTE — PROGRESS NOTES
Telehealth Visit     Date: 2024   Patient Name: Dawna Lovell  : 1970   MRN: [id number]     Chief Complaint:    Chief Complaint   Patient presents with    Cough       This provider is located at the [facility] in [place], [place]. The patient is being seen remotely via telehealth at their home address in [place], and stated they are in a secure environment for this session. The patient's condition being diagnosed/treated is appropriate for telemedicine. The provider identified herself as well as her credentials. The patient, and/or patients guardian, consent to be seen remotely, and when consent is given they understand that the consent allows for patient identifiable information to be sent to a third party as needed. They may refuse to be seen remotely at any time. The electronic data is encrypted and password protected, and the patient and/or guardian has been advised of the potential risks to privacy not withstanding such measures.    You have chosen to receive care through a telehealth visit. Do you consent to use a video/audio connection for your medical care today? Yes    History of Present Illness: Dawna Lovell is a 53 y.o. female who is here today to follow up with cough and congestion.  She states her symptoms started over the weekend.  She does feel like she has a chest cold.  There is quite a bit of productive cough and sputum production. No fevers    Patient also had some questions about the weight loss medication options.  She states she has had a foot injury for quite some time so she is very limited on exercise.  She feels like she is seeing the weight gain through her face.  She does have history of migraines but does not believe she is ever been on Topamax.  She did just recently start back on the injectables    She also had some questions about possible hormone replacement related to her menopausal symptoms        Subjective          I have reviewed and  the following portions of the patient's history were updated as appropriate: past family history, past medical history, past social history, past surgical history and problem list.    Medications:     Current Outpatient Medications:     carvedilol (COREG) 6.25 MG tablet, Take 1 tablet by mouth 2 (Two) Times a Day With Meals., Disp: 180 tablet, Rfl: 3    folic acid (FOLVITE) 1 MG tablet, 1 tablet daily.  90-day supply. (Patient taking differently: Take 1 tablet by mouth Daily. 1 tablet daily.  90-day supply.), Disp: 30 tablet, Rfl: 90    HYDROcodone-acetaminophen (NORCO) 7.5-325 MG per tablet, Take 1 tablet by mouth Every 6 (Six) Hours As Needed for Moderate Pain., Disp: 25 tablet, Rfl: 0    HYDROcodone-acetaminophen (NORCO) 7.5-325 MG per tablet, Take 1 tablet by mouth Every 6 (Six) Hours As Needed for Moderate Pain., Disp: 25 tablet, Rfl: 0    hydrOXYzine (ATARAX) 25 MG tablet, TAKE 1 TABLET Twice daily PRN (Patient taking differently: Take 1 tablet by mouth 2 (Two) Times a Day As Needed for Anxiety.), Disp: 40 tablet, Rfl: 1    Multiple Vitamins-Minerals (MULTIVITAMIN ADULT PO), Take 1 tablet by mouth Daily., Disp: , Rfl:     omeprazole (priLOSEC) 40 MG capsule, TAKE 1 CAPSULE BY MOUTH TWICE DAILY, Disp: 60 capsule, Rfl: 3    Restasis 0.05 % ophthalmic emulsion, Administer 1 drop to both eyes 2 (Two) Times a Day., Disp: , Rfl:     rOPINIRole (REQUIP) 1 MG tablet, Take 1 tablet by mouth 3 (Three) Times a Day. Take 1 hour before bedtime., Disp: 270 tablet, Rfl: 3    rosuvastatin (CRESTOR) 10 MG tablet, Take 1 tablet by mouth Daily., Disp: 90 tablet, Rfl: 3    SUMAtriptan (IMITREX) 25 MG tablet, TAKE 1 TABLET BY MOUTH AS NEEDED FOR MIGRAINE, Disp: 9 tablet, Rfl: 1    tiZANidine (ZANAFLEX) 4 MG tablet, Take 1 tablet by mouth Every 8 (Eight) Hours As Needed for Muscle Spasms., Disp: 90 tablet, Rfl: 5    traZODone (DESYREL) 100 MG tablet, Take 1 tablet by mouth At Night As Needed for Sleep., Disp: 90 tablet, Rfl: 3     Unithroid 137 MCG tablet, Take 1 tablet by mouth Every Morning., Disp: 90 tablet, Rfl: 3    ursodiol (ACTIGALL) 300 MG capsule, TAKE 1 CAPSULE BY MOUTH THREE TIMES DAILY WITH MEALS, Disp: 270 capsule, Rfl: 3    venlafaxine XR (EFFEXOR-XR) 150 MG 24 hr capsule, Take 1 capsule by mouth Daily., Disp: 90 capsule, Rfl: 3    azithromycin (Zithromax Z-Constantino) 250 MG tablet, Take 2 tablets by mouth on day 1, then 1 tablet daily on days 2-5, Disp: 6 tablet, Rfl: 0    benzonatate (Tessalon Perles) 100 MG capsule, Take 1 capsule by mouth 3 (Three) Times a Day As Needed for Cough for up to 10 days., Disp: 30 capsule, Rfl: 0    gabapentin (NEURONTIN) 300 MG capsule, Take 3 capsules by mouth 3 (Three) Times a Day., Disp: 810 capsule, Rfl: 1    methylPREDNISolone (MEDROL) 4 MG dose pack, Take as directed on package instructions., Disp: 21 tablet, Rfl: 0    promethazine-dextromethorphan (PROMETHAZINE-DM) 6.25-15 MG/5ML syrup, Take 5 mL by mouth 4 (Four) Times a Day As Needed for Cough., Disp: 473 mL, Rfl: 0    Allergies:   Allergies   Allergen Reactions    Morphine And Codeine Itching     Nose itching       Objective     Physical Exam:  Vital Signs: There were no vitals filed for this visit.  There is no height or weight on file to calculate BMI.       Physical Exam  Constitutional:       Appearance: Normal appearance.   HENT:      Head: Normocephalic and atraumatic.   Eyes:      Extraocular Movements: Extraocular movements intact.   Pulmonary:      Effort: No respiratory distress.   Skin:     General: Skin is dry.   Neurological:      Mental Status: She is alert.   Psychiatric:         Mood and Affect: Mood normal.         Behavior: Behavior normal.         Assessment / Plan      Assessment/Plan:   Diagnoses and all orders for this visit:    1. Viral respiratory illness (Primary)  -     methylPREDNISolone (MEDROL) 4 MG dose pack; Take as directed on package instructions.  Dispense: 21 tablet; Refill: 0  -     azithromycin (Zithromax  Z-Constantino) 250 MG tablet; Take 2 tablets by mouth on day 1, then 1 tablet daily on days 2-5  Dispense: 6 tablet; Refill: 0  -     benzonatate (Tessalon Perles) 100 MG capsule; Take 1 capsule by mouth 3 (Three) Times a Day As Needed for Cough for up to 10 days.  Dispense: 30 capsule; Refill: 0  -     promethazine-dextromethorphan (PROMETHAZINE-DM) 6.25-15 MG/5ML syrup; Take 5 mL by mouth 4 (Four) Times a Day As Needed for Cough.  Dispense: 473 mL; Refill: 0    2. Weight gain    3. Menopausal symptom    Plan  Patient will be treated with medication therapy based on her viral respiratory symptoms.  Continue to stay well-hydrated and perform coughing and deep breathing exercises    Regarding her weight gain, she could consider an outpatient weight loss clinic.  I will speak with neurology about consideration of Topamax.    I did encourage patient to follow-up with her OB/GYN about her menopausal symptoms and questions about hormones    Go to ER if any condition worsens or severe    Plan to follow-up as scheduled for May 2025    Follow Up:   Return for Next scheduled follow up.    Any medications prescribed have been sent electronically to   [id number] - [place], [place] - [address] [facility] - [phone]  - [phone]   [address]  [facility] [address]  Phone: [phone] Fax: [phone]    [address]  [address] [place] [address]  Phone: [phone] Fax: [phone]      20 minutes were spent reviewing the patient's questionnaire, formulating a treatment plan, and relaying information to the patient via [employer].    SANGEETHA Woodson    Part of this note may be an electronic transcription/translation of spoken language to printed text using the Dragon Dictation System.

## 2024-09-11 DIAGNOSIS — F41.9 ANXIETY: ICD-10-CM

## 2024-09-11 DIAGNOSIS — E78.2 MIXED HYPERLIPIDEMIA: ICD-10-CM

## 2024-09-11 RX ORDER — VENLAFAXINE HYDROCHLORIDE 150 MG/1
150 CAPSULE, EXTENDED RELEASE ORAL DAILY
Qty: 90 CAPSULE | Refills: 3 | OUTPATIENT
Start: 2024-09-11

## 2024-09-11 RX ORDER — ROSUVASTATIN CALCIUM 10 MG/1
10 TABLET, COATED ORAL DAILY
Qty: 60 TABLET | OUTPATIENT
Start: 2024-09-11

## 2024-09-11 RX ORDER — ROSUVASTATIN CALCIUM 10 MG/1
10 TABLET, COATED ORAL DAILY
Qty: 90 TABLET | Refills: 3 | OUTPATIENT
Start: 2024-09-11

## 2024-09-11 NOTE — TELEPHONE ENCOUNTER
HUB TO RELAY    LVM for patient to return call regarding med refill. Rosuvastatin was sent for a year supply on 05/15/24. Patient needs to call pharmacy to request a fill.

## 2024-09-12 ENCOUNTER — PRIOR AUTHORIZATION (OUTPATIENT)
Dept: NEUROLOGY | Facility: CLINIC | Age: 54
End: 2024-09-12
Payer: MEDICAID

## 2024-09-14 DIAGNOSIS — E03.8 HYPOTHYROIDISM DUE TO HASHIMOTO'S THYROIDITIS: ICD-10-CM

## 2024-09-14 DIAGNOSIS — E06.3 HYPOTHYROIDISM DUE TO HASHIMOTO'S THYROIDITIS: ICD-10-CM

## 2024-09-16 DIAGNOSIS — B97.89 VIRAL RESPIRATORY ILLNESS: ICD-10-CM

## 2024-09-16 DIAGNOSIS — J98.8 VIRAL RESPIRATORY ILLNESS: ICD-10-CM

## 2024-09-16 RX ORDER — LEVOTHYROXINE SODIUM 150 UG/1
150 TABLET ORAL DAILY
Qty: 90 TABLET | Refills: 1 | OUTPATIENT
Start: 2024-09-16

## 2024-09-16 RX ORDER — BENZONATATE 100 MG/1
CAPSULE ORAL
Qty: 30 CAPSULE | Refills: 0 | OUTPATIENT
Start: 2024-09-16

## 2024-09-17 ENCOUNTER — TELEMEDICINE (OUTPATIENT)
Dept: INTERNAL MEDICINE | Facility: CLINIC | Age: 54
End: 2024-09-17
Payer: MEDICAID

## 2024-09-17 DIAGNOSIS — J98.8 VIRAL RESPIRATORY ILLNESS: ICD-10-CM

## 2024-09-17 DIAGNOSIS — R09.89 CHEST CONGESTION: Primary | ICD-10-CM

## 2024-09-17 DIAGNOSIS — B97.89 VIRAL RESPIRATORY ILLNESS: ICD-10-CM

## 2024-09-17 PROCEDURE — 99213 OFFICE O/P EST LOW 20 MIN: CPT | Performed by: NURSE PRACTITIONER

## 2024-09-17 PROCEDURE — 1126F AMNT PAIN NOTED NONE PRSNT: CPT | Performed by: NURSE PRACTITIONER

## 2024-09-17 PROCEDURE — 1159F MED LIST DOCD IN RCRD: CPT | Performed by: NURSE PRACTITIONER

## 2024-09-17 PROCEDURE — 1160F RVW MEDS BY RX/DR IN RCRD: CPT | Performed by: NURSE PRACTITIONER

## 2024-09-17 RX ORDER — DOXYCYCLINE 100 MG/1
100 CAPSULE ORAL 2 TIMES DAILY
Qty: 14 CAPSULE | Refills: 0 | Status: SHIPPED | OUTPATIENT
Start: 2024-09-17

## 2024-09-17 RX ORDER — DEXTROMETHORPHAN HYDROBROMIDE AND PROMETHAZINE HYDROCHLORIDE 15; 6.25 MG/5ML; MG/5ML
5 SYRUP ORAL 4 TIMES DAILY PRN
Qty: 473 ML | Refills: 0 | Status: SHIPPED | OUTPATIENT
Start: 2024-09-17

## 2024-09-17 RX ORDER — BENZONATATE 100 MG/1
100 CAPSULE ORAL 3 TIMES DAILY PRN
Qty: 30 CAPSULE | Refills: 0 | Status: SHIPPED | OUTPATIENT
Start: 2024-09-17 | End: 2024-09-27

## 2024-10-02 DIAGNOSIS — B97.89 VIRAL RESPIRATORY ILLNESS: ICD-10-CM

## 2024-10-02 DIAGNOSIS — J98.8 VIRAL RESPIRATORY ILLNESS: ICD-10-CM

## 2024-10-02 RX ORDER — DEXTROMETHORPHAN HYDROBROMIDE AND PROMETHAZINE HYDROCHLORIDE 15; 6.25 MG/5ML; MG/5ML
SYRUP ORAL
Qty: 473 ML | Refills: 0 | OUTPATIENT
Start: 2024-10-02

## 2024-10-04 ENCOUNTER — TELEPHONE (OUTPATIENT)
Dept: NEUROLOGY | Facility: CLINIC | Age: 54
End: 2024-10-04
Payer: MEDICAID

## 2024-10-04 RX ORDER — TOPIRAMATE 25 MG/1
TABLET, FILM COATED ORAL
Qty: 120 TABLET | Refills: 3 | Status: SHIPPED | OUTPATIENT
Start: 2024-10-04

## 2024-10-04 NOTE — TELEPHONE ENCOUNTER
----- Message from Karissa Mar sent at 10/4/2024  9:59 AM EDT -----  Regarding: FW: Dawna Lovell Question  Contact: [phone]  Healexander Pisano,  Hope you are doing well. I cannot remember if you messaged me back, but I wanted to gain your insights about possible use of topamax for this patient for weight related concerns. Just wanted to gain your suggestions since you are also seeing her.   Thank you  ----- Message -----  From: Lindsey Bishop MA  Sent: 10/4/2024   9:42 AM EDT  To: SANGEETHA Woodson  Subject: FW: Dawna Lovell Question                         ----- Message -----  From: Dawna Lovell  Sent: 10/4/2024   9:19 AM EDT  To: [facility]  Subject: Dawna Lovell Question                           Good morning and Happy Friday!    Just following up on our telehealth visit on 9/9/2024. We discussed a weight loss option and I have a note that you were going to consult with Dr. Pisano (my neurologist) about the possibility of Topamax?    Thanks for checking for me-  Dawna

## 2024-10-08 ENCOUNTER — TELEPHONE (OUTPATIENT)
Dept: NEUROLOGY | Facility: CLINIC | Age: 54
End: 2024-10-08
Payer: MEDICAID

## 2024-10-08 RX ORDER — TOPIRAMATE 50 MG/1
50 TABLET, FILM COATED ORAL 2 TIMES DAILY
Qty: 180 TABLET | Refills: 1 | Status: SHIPPED | OUTPATIENT
Start: 2024-10-08

## 2024-10-08 RX ORDER — TOPIRAMATE 25 MG/1
TABLET, FILM COATED ORAL
Qty: 14 TABLET | Refills: 0 | Status: SHIPPED | OUTPATIENT
Start: 2024-10-08

## 2024-10-08 NOTE — TELEPHONE ENCOUNTER
PATIENT CALLING TO ADVISE, A PRIOR AUTHORIZATION IS NEEDED FOR MEDICATION  TOPIRAMATE 24 MG TABLET    RX:     [id number] - [place], [place] - [address] [facility]  & [address] [facility] - [phone] [facility] [phone] FX

## 2024-10-18 ENCOUNTER — TELEMEDICINE (OUTPATIENT)
Dept: INTERNAL MEDICINE | Facility: CLINIC | Age: 54
End: 2024-10-18
Payer: MEDICAID

## 2024-10-18 DIAGNOSIS — J01.40 ACUTE NON-RECURRENT PANSINUSITIS: ICD-10-CM

## 2024-10-18 DIAGNOSIS — J06.9 VIRAL UPPER RESPIRATORY ILLNESS: Primary | ICD-10-CM

## 2024-10-18 DIAGNOSIS — J30.89 SEASONAL ALLERGIC RHINITIS DUE TO OTHER ALLERGIC TRIGGER: ICD-10-CM

## 2024-10-18 PROCEDURE — 1126F AMNT PAIN NOTED NONE PRSNT: CPT | Performed by: NURSE PRACTITIONER

## 2024-10-18 PROCEDURE — 1160F RVW MEDS BY RX/DR IN RCRD: CPT | Performed by: NURSE PRACTITIONER

## 2024-10-18 PROCEDURE — 99213 OFFICE O/P EST LOW 20 MIN: CPT | Performed by: NURSE PRACTITIONER

## 2024-10-18 PROCEDURE — 1159F MED LIST DOCD IN RCRD: CPT | Performed by: NURSE PRACTITIONER

## 2024-10-18 RX ORDER — DEXTROMETHORPHAN HYDROBROMIDE AND PROMETHAZINE HYDROCHLORIDE 15; 6.25 MG/5ML; MG/5ML
5 SYRUP ORAL 4 TIMES DAILY PRN
Qty: 473 ML | Refills: 0 | Status: SHIPPED | OUTPATIENT
Start: 2024-10-18

## 2024-10-18 RX ORDER — DIPHENHYDRAMINE HCL 25 MG
25 TABLET ORAL NIGHTLY PRN
Qty: 90 TABLET | Refills: 0 | Status: SHIPPED | OUTPATIENT
Start: 2024-10-18

## 2024-10-18 RX ORDER — BENZONATATE 100 MG/1
100 CAPSULE ORAL 3 TIMES DAILY PRN
Qty: 30 CAPSULE | Refills: 0 | Status: SHIPPED | OUTPATIENT
Start: 2024-10-18 | End: 2024-10-28

## 2024-10-18 RX ORDER — CETIRIZINE HYDROCHLORIDE 10 MG/1
10 TABLET ORAL DAILY
Qty: 90 TABLET | Refills: 0 | Status: SHIPPED | OUTPATIENT
Start: 2024-10-18

## 2024-10-18 RX ORDER — FLUTICASONE PROPIONATE 50 UG/1
1 SPRAY, METERED NASAL DAILY
Qty: 18.2 ML | Refills: 0 | Status: SHIPPED | OUTPATIENT
Start: 2024-10-18

## 2024-10-18 RX ORDER — METHYLPREDNISOLONE 4 MG
TABLET, DOSE PACK ORAL
Qty: 21 TABLET | Refills: 0 | Status: SHIPPED | OUTPATIENT
Start: 2024-10-18

## 2024-10-18 NOTE — PROGRESS NOTES
Telehealth Visit     Date: 10/18/2024   Patient Name: Dawna Lovell  : 1970   MRN: [id number]     Chief Complaint:    Chief Complaint   Patient presents with    URI       This provider is located at the [facility] [place] in [place], [place]. The patient is being seen remotely via telehealth at their home address in [place], and stated they are in a secure environment for this session. The patient's condition being diagnosed/treated is appropriate for telemedicine. The provider identified herself as well as her credentials. The patient, and/or patients guardian, consent to be seen remotely, and when consent is given they understand that the consent allows for patient identifiable information to be sent to a third party as needed. They may refuse to be seen remotely at any time. The electronic data is encrypted and password protected, and the patient and/or guardian has been advised of the potential risks to privacy not withstanding such measures.    You have chosen to receive care through a telehealth visit. Do you consent to use a video/audio connection for your medical care today? Yes    History of Present Illness: Dawna Lovell is a 53 y.o. female who is here today to follow up with respiratory symptoms    Patient was last seen the beginning of September for respiratory concerns.  At that time she was provided steroids, azithromycin, Tessalon Perles, Promethazine DM.  Patient completed full course of this medication.  She was then seen in mid September for continued symptoms.  Updated refills as well as doxycycline was sent to the pharmacy.    Patient stated she did complete the full course of medication for each of these visits.  She overall started to feel well again and now she has not felt as well.  She reports clear drainage from the nose.  There is some nasal congestion.  Her eyes are burning.  There is a dry cough.  Patient is getting ready leave to go out of town so she did  want to be seen just in case because she will be visiting family          Subjective          I have reviewed and the following portions of the patient's history were updated as appropriate: past family history, past medical history, past social history, past surgical history and problem list.    Medications:     Current Outpatient Medications:     carvedilol (COREG) 6.25 MG tablet, Take 1 tablet by mouth 2 (Two) Times a Day With Meals., Disp: 180 tablet, Rfl: 3    doxycycline (VIBRAMYCIN) 100 MG capsule, Take 1 capsule by mouth 2 (Two) Times a Day., Disp: 14 capsule, Rfl: 0    folic acid (FOLVITE) 1 MG tablet, 1 tablet daily.  90-day supply. (Patient taking differently: Take 1 tablet by mouth Daily. 1 tablet daily.  90-day supply.), Disp: 30 tablet, Rfl: 90    gabapentin (NEURONTIN) 300 MG capsule, Take 3 capsules by mouth 3 (Three) Times a Day., Disp: 810 capsule, Rfl: 1    HYDROcodone-acetaminophen (NORCO) 7.5-325 MG per tablet, Take 1 tablet by mouth Every 6 (Six) Hours As Needed for Moderate Pain., Disp: 25 tablet, Rfl: 0    HYDROcodone-acetaminophen (NORCO) 7.5-325 MG per tablet, Take 1 tablet by mouth Every 6 (Six) Hours As Needed for Moderate Pain., Disp: 25 tablet, Rfl: 0    hydrOXYzine (ATARAX) 25 MG tablet, TAKE 1 TABLET Twice daily PRN (Patient taking differently: Take 1 tablet by mouth 2 (Two) Times a Day As Needed for Anxiety.), Disp: 40 tablet, Rfl: 1    Multiple Vitamins-Minerals (MULTIVITAMIN ADULT PO), Take 1 tablet by mouth Daily., Disp: , Rfl:     omeprazole (priLOSEC) 40 MG capsule, TAKE 1 CAPSULE BY MOUTH TWICE DAILY, Disp: 60 capsule, Rfl: 3    promethazine-dextromethorphan (PROMETHAZINE-DM) 6.25-15 MG/5ML syrup, Take 5 mL by mouth 4 (Four) Times a Day As Needed for Cough., Disp: 473 mL, Rfl: 0    Restasis 0.05 % ophthalmic emulsion, Administer 1 drop to both eyes 2 (Two) Times a Day., Disp: , Rfl:     rOPINIRole (REQUIP) 1 MG tablet, Take 1 tablet by mouth 3 (Three) Times a Day. Take 1 hour  before bedtime., Disp: 270 tablet, Rfl: 3    rosuvastatin (CRESTOR) 10 MG tablet, Take 1 tablet by mouth Daily., Disp: 90 tablet, Rfl: 3    SUMAtriptan (IMITREX) 25 MG tablet, TAKE 1 TABLET BY MOUTH AS NEEDED FOR MIGRAINE, Disp: 9 tablet, Rfl: 1    tiZANidine (ZANAFLEX) 4 MG tablet, Take 1 tablet by mouth Every 8 (Eight) Hours As Needed for Muscle Spasms., Disp: 90 tablet, Rfl: 5    topiramate (Topamax) 25 MG tablet, Take one tablet twice a day for one week, Disp: 14 tablet, Rfl: 0    topiramate (TOPAMAX) 50 MG tablet, Take 1 tablet by mouth 2 (Two) Times a Day., Disp: 180 tablet, Rfl: 1    traZODone (DESYREL) 100 MG tablet, Take 1 tablet by mouth At Night As Needed for Sleep., Disp: 90 tablet, Rfl: 3    Unithroid 137 MCG tablet, Take 1 tablet by mouth Every Morning., Disp: 90 tablet, Rfl: 3    ursodiol (ACTIGALL) 300 MG capsule, TAKE 1 CAPSULE BY MOUTH THREE TIMES DAILY WITH MEALS, Disp: 270 capsule, Rfl: 3    venlafaxine XR (EFFEXOR-XR) 150 MG 24 hr capsule, Take 1 capsule by mouth Daily., Disp: 90 capsule, Rfl: 3    amoxicillin-clavulanate (AUGMENTIN) 875-125 MG per tablet, Take 1 tablet by mouth 2 (Two) Times a Day for 5 days., Disp: 10 tablet, Rfl: 0    benzonatate (Tessalon Perles) 100 MG capsule, Take 1 capsule by mouth 3 (Three) Times a Day As Needed for Cough for up to 10 days., Disp: 30 capsule, Rfl: 0    cetirizine (zyrTEC) 10 MG tablet, Take 1 tablet by mouth Daily., Disp: 90 tablet, Rfl: 0    diphenhydrAMINE (Benadryl Allergy) 25 MG tablet, Take 1 tablet by mouth At Night As Needed for Allergies., Disp: 90 tablet, Rfl: 0    fluticasone (FLONASE) 50 MCG/ACT nasal spray, Administer 1 spray into the nostril(s) as directed by provider Daily., Disp: 18.2 mL, Rfl: 0    methylPREDNISolone (MEDROL) 4 MG dose pack, Take as directed on package instructions., Disp: 21 tablet, Rfl: 0    Allergies:   Allergies   Allergen Reactions    Morphine And Codeine Itching     Nose itching       Objective     Physical  Exam:  Vital Signs: There were no vitals filed for this visit.  There is no height or weight on file to calculate BMI.       Physical Exam  Constitutional:       Appearance: Normal appearance.   HENT:      Head: Normocephalic and atraumatic.   Eyes:      Extraocular Movements: Extraocular movements intact.   Pulmonary:      Effort: No respiratory distress.   Skin:     General: Skin is dry.   Neurological:      Mental Status: She is alert.   Psychiatric:         Mood and Affect: Mood normal.         Behavior: Behavior normal.         Assessment / Plan      Assessment/Plan:   Diagnoses and all orders for this visit:    1. Viral upper respiratory illness (Primary)  -     promethazine-dextromethorphan (PROMETHAZINE-DM) 6.25-15 MG/5ML syrup; Take 5 mL by mouth 4 (Four) Times a Day As Needed for Cough.  Dispense: 473 mL; Refill: 0  -     benzonatate (Tessalon Perles) 100 MG capsule; Take 1 capsule by mouth 3 (Three) Times a Day As Needed for Cough for up to 10 days.  Dispense: 30 capsule; Refill: 0  -     methylPREDNISolone (MEDROL) 4 MG dose pack; Take as directed on package instructions.  Dispense: 21 tablet; Refill: 0  -     amoxicillin-clavulanate (AUGMENTIN) 875-125 MG per tablet; Take 1 tablet by mouth 2 (Two) Times a Day for 5 days.  Dispense: 10 tablet; Refill: 0    2. Seasonal allergic rhinitis due to other allergic trigger  -     cetirizine (zyrTEC) 10 MG tablet; Take 1 tablet by mouth Daily.  Dispense: 90 tablet; Refill: 0  -     diphenhydrAMINE (Benadryl Allergy) 25 MG tablet; Take 1 tablet by mouth At Night As Needed for Allergies.  Dispense: 90 tablet; Refill: 0  -     fluticasone (FLONASE) 50 MCG/ACT nasal spray; Administer 1 spray into the nostril(s) as directed by provider Daily.  Dispense: 18.2 mL; Refill: 0    3. Acute non-recurrent pansinusitis    Plan  Discussed with patient symptoms are likely related to allergies.  I will stress that she takes the Zyrtec, Benadryl, Flonase consistently over the next  few weeks especially as she is traveling out of state.  Additional medication was also sent to the pharmacy as a precautionary basis as again she will be out of town and with family.  Patient voiced understanding that she will hold off on these medications and stick with allergy related medication  Go to ER if any condition worsens or severe  Plan follow-up as scheduled    Follow Up:   Return for Next scheduled follow up.    Any medications prescribed have been sent electronically to   [id number] - [place], [place] - [address] [facility] - [phone]  - [phone]   [address]  [facility] [address]  Phone: [phone] Fax: [phone]    [facility] [place]  [address] [place], [place] [id number] [place] [address]  Phone: [phone] Fax: [phone]      10 minutes were spent reviewing the patient's questionnaire, formulating a treatment plan, and relaying information to the patient via [employer].    SANGEETHA Woodson    Part of this note may be an electronic transcription/translation of spoken language to printed text using the Dragon Dictation System.

## 2024-10-21 ENCOUNTER — OFFICE VISIT (OUTPATIENT)
Dept: ORTHOPEDIC SURGERY | Facility: CLINIC | Age: 54
End: 2024-10-21
Payer: MEDICAID

## 2024-10-21 VITALS
DIASTOLIC BLOOD PRESSURE: 72 MMHG | WEIGHT: 201.4 LBS | HEIGHT: 68 IN | BODY MASS INDEX: 30.52 KG/M2 | SYSTOLIC BLOOD PRESSURE: 104 MMHG

## 2024-10-21 DIAGNOSIS — M84.362A STRESS FRACTURE OF LEFT TIBIA, INITIAL ENCOUNTER: Primary | ICD-10-CM

## 2024-10-21 PROCEDURE — 1159F MED LIST DOCD IN RCRD: CPT | Performed by: ORTHOPAEDIC SURGERY

## 2024-10-21 PROCEDURE — 3078F DIAST BP <80 MM HG: CPT | Performed by: ORTHOPAEDIC SURGERY

## 2024-10-21 PROCEDURE — 3074F SYST BP LT 130 MM HG: CPT | Performed by: ORTHOPAEDIC SURGERY

## 2024-10-21 PROCEDURE — 1160F RVW MEDS BY RX/DR IN RCRD: CPT | Performed by: ORTHOPAEDIC SURGERY

## 2024-10-21 PROCEDURE — 99212 OFFICE O/P EST SF 10 MIN: CPT | Performed by: ORTHOPAEDIC SURGERY

## 2024-10-21 NOTE — PROGRESS NOTES
ESTABLISHED PATIENT    Patient: Dawna Lovell  : 1970    Primary Care Provider: Karissa Mar APRN    Requesting Provider: As above    Follow-up (2 month follow up -- Stress fracture of left tibia)      History    Chief Complaint: left tibial stress fracture    History of Present Illness: she is feeling much better, she is walking in shoes, doing PT    Current Outpatient Medications on File Prior to Visit   Medication Sig Dispense Refill    amoxicillin-clavulanate (AUGMENTIN) 875-125 MG per tablet Take 1 tablet by mouth 2 (Two) Times a Day for 5 days. 10 tablet 0    benzonatate (Tessalon Perles) 100 MG capsule Take 1 capsule by mouth 3 (Three) Times a Day As Needed for Cough for up to 10 days. 30 capsule 0    carvedilol (COREG) 6.25 MG tablet Take 1 tablet by mouth 2 (Two) Times a Day With Meals. 180 tablet 3    cetirizine (zyrTEC) 10 MG tablet Take 1 tablet by mouth Daily. 90 tablet 0    diphenhydrAMINE (Benadryl Allergy) 25 MG tablet Take 1 tablet by mouth At Night As Needed for Allergies. 90 tablet 0    doxycycline (VIBRAMYCIN) 100 MG capsule Take 1 capsule by mouth 2 (Two) Times a Day. 14 capsule 0    fluticasone (FLONASE) 50 MCG/ACT nasal spray Administer 1 spray into the nostril(s) as directed by provider Daily. 18.2 mL 0    folic acid (FOLVITE) 1 MG tablet 1 tablet daily.  90-day supply. (Patient taking differently: Take 1 tablet by mouth Daily. 1 tablet daily.  90-day supply.) 30 tablet 90    gabapentin (NEURONTIN) 300 MG capsule Take 3 capsules by mouth 3 (Three) Times a Day. 810 capsule 1    HYDROcodone-acetaminophen (NORCO) 7.5-325 MG per tablet Take 1 tablet by mouth Every 6 (Six) Hours As Needed for Moderate Pain. 25 tablet 0    HYDROcodone-acetaminophen (NORCO) 7.5-325 MG per tablet Take 1 tablet by mouth Every 6 (Six) Hours As Needed for Moderate Pain. 25 tablet 0    hydrOXYzine (ATARAX) 25 MG tablet TAKE 1 TABLET Twice daily PRN (Patient taking differently: Take 1 tablet by mouth  2 (Two) Times a Day As Needed for Anxiety.) 40 tablet 1    methylPREDNISolone (MEDROL) 4 MG dose pack Take as directed on package instructions. 21 tablet 0    Multiple Vitamins-Minerals (MULTIVITAMIN ADULT PO) Take 1 tablet by mouth Daily.      omeprazole (priLOSEC) 40 MG capsule TAKE 1 CAPSULE BY MOUTH TWICE DAILY 60 capsule 3    promethazine-dextromethorphan (PROMETHAZINE-DM) 6.25-15 MG/5ML syrup Take 5 mL by mouth 4 (Four) Times a Day As Needed for Cough. 473 mL 0    Restasis 0.05 % ophthalmic emulsion Administer 1 drop to both eyes 2 (Two) Times a Day.      rOPINIRole (REQUIP) 1 MG tablet Take 1 tablet by mouth 3 (Three) Times a Day. Take 1 hour before bedtime. 270 tablet 3    rosuvastatin (CRESTOR) 10 MG tablet Take 1 tablet by mouth Daily. 90 tablet 3    SUMAtriptan (IMITREX) 25 MG tablet TAKE 1 TABLET BY MOUTH AS NEEDED FOR MIGRAINE 9 tablet 1    tiZANidine (ZANAFLEX) 4 MG tablet Take 1 tablet by mouth Every 8 (Eight) Hours As Needed for Muscle Spasms. 90 tablet 5    topiramate (Topamax) 25 MG tablet Take one tablet twice a day for one week 14 tablet 0    topiramate (TOPAMAX) 50 MG tablet Take 1 tablet by mouth 2 (Two) Times a Day. 180 tablet 1    traZODone (DESYREL) 100 MG tablet Take 1 tablet by mouth At Night As Needed for Sleep. 90 tablet 3    Unithroid 137 MCG tablet Take 1 tablet by mouth Every Morning. 90 tablet 3    ursodiol (ACTIGALL) 300 MG capsule TAKE 1 CAPSULE BY MOUTH THREE TIMES DAILY WITH MEALS 270 capsule 3    venlafaxine XR (EFFEXOR-XR) 150 MG 24 hr capsule Take 1 capsule by mouth Daily. 90 capsule 3     No current facility-administered medications on file prior to visit.      Allergies   Allergen Reactions    Morphine And Codeine Itching     Nose itching      Past Medical History:   Diagnosis Date    Acute postoperative pain     Anxiety     Asthma     Depression     Fracture, fibula 4/16/2024    Fracture, foot 03/22/2023    Fracture, tibia and fibula 4/16/2024    GERD (gastroesophageal  reflux disease)     Headache, tension-type     Hyperlipidemia     Hypertension     Hypothyroidism     Irritable bowel syndrome     Lyme disease 2016    Migraine headache     Neuromuscular disorder     POTS (postural orthostatic tachycardia syndrome)     Syncope 2016    Tachycardia 2016     Past Surgical History:   Procedure Laterality Date     SECTION  2003    COLONOSCOPY      FOOT SURGERY Left 2023    Hardware Removal Lisfranc ORIF 23    FRACTURE SURGERY      MOUTH SURGERY      ORIF FOOT FRACTURE Left 2023    Procedure: ORIF left lisfranc foot injury;  Surgeon: Milagro Parker MD;  Location: [facility];  Service: Orthopedics;  Laterality: Left;    WISDOM TOOTH EXTRACTION       Family History   Problem Relation Age of Onset    Esophageal cancer Father     Cancer Father         Esophogical    Breast cancer Paternal Grandmother     Dementia Other     Ovarian cancer Neg Hx       Social History     Socioeconomic History    Marital status:    Tobacco Use    Smoking status: Former     Current packs/day: 0.00     Average packs/day: 0.5 packs/day for 11.0 years (5.5 ttl pk-yrs)     Types: Cigarettes     Start date: 1990     Quit date:      Years since quittin.8     Passive exposure: Past    Smokeless tobacco: Never   Vaping Use    Vaping status: Never Used   Substance and Sexual Activity    Alcohol use: Yes     Alcohol/week: 5.0 standard drinks of alcohol     Types: 5 Glasses of wine per week     Comment: off alcohol for surgery    Drug use: No    Sexual activity: Yes     Partners: Male     Birth control/protection: Post-menopausal, None        Review of Systems   Constitutional:  Negative for activity change, appetite change, chills, diaphoresis, fatigue, fever and unexpected weight change.   HENT:  Negative for congestion, dental problem, drooling, ear discharge, ear pain, facial swelling, hearing loss, mouth sores, nosebleeds, postnasal drip, rhinorrhea,  "sinus pressure, sneezing, sore throat, tinnitus, trouble swallowing and voice change.    Eyes:  Negative for photophobia, pain, discharge, redness, itching and visual disturbance.   Respiratory:  Negative for apnea, cough, choking, chest tightness, shortness of breath, wheezing and stridor.    Cardiovascular:  Negative for chest pain, palpitations and leg swelling.   Gastrointestinal:  Negative for abdominal distention, abdominal pain, anal bleeding, blood in stool, constipation, diarrhea, nausea, rectal pain and vomiting.   Endocrine: Negative for cold intolerance, heat intolerance, polydipsia, polyphagia and polyuria.   Genitourinary:  Negative for decreased urine volume, difficulty urinating, dysuria, enuresis, flank pain, frequency, genital sores, hematuria and urgency.   Musculoskeletal:  Positive for arthralgias. Negative for back pain, gait problem, joint swelling, myalgias, neck pain and neck stiffness.   Skin:  Negative for color change, pallor, rash and wound.   Allergic/Immunologic: Negative for environmental allergies, food allergies and immunocompromised state.   Neurological:  Negative for dizziness, tremors, seizures, syncope, facial asymmetry, speech difficulty, weakness, light-headedness, numbness and headaches.   Hematological:  Negative for adenopathy. Does not bruise/bleed easily.   Psychiatric/Behavioral:  Negative for agitation, behavioral problems, confusion, decreased concentration, dysphoric mood, hallucinations, self-injury, sleep disturbance and suicidal ideas. The patient is not nervous/anxious and is not hyperactive.      The following portions of the patient's history were reviewed and updated as appropriate: allergies, current medications, past family history, past medical history, past social history, past surgical history, and problem list.    Physical Exam:   Ht 172.5 cm (67.91\")   Wt 91.4 kg (201 lb 6.4 oz)   LMP 06/15/2022   BMI 30.70 kg/m²   No tenderness left ankle, mild " tingling/zinging feeling, good ROM    Medical Decision Making    Data Review:   ordered and reviewed x-rays today    Assessment/Plan/Diagnosis/Treatment Options:   1. Stress fracture of left tibia, initial encounter  Improving steadily after stress fracture left ankle, prior lisfranc injury.  Continue PT, continue good shoes, I will see her in 6 months, xray of foot and ankle  - XR Ankle 3+ View Left        Milagro Bauer MD

## 2024-11-26 DIAGNOSIS — J06.9 VIRAL UPPER RESPIRATORY ILLNESS: ICD-10-CM

## 2024-11-26 RX ORDER — DEXTROMETHORPHAN HYDROBROMIDE AND PROMETHAZINE HYDROCHLORIDE 15; 6.25 MG/5ML; MG/5ML
SYRUP ORAL
Qty: 473 ML | Refills: 0 | OUTPATIENT
Start: 2024-11-26

## 2024-11-26 NOTE — TELEPHONE ENCOUNTER
Hub to relay    Left message for patient to see if she requested this medication be refilled. Was originally sent for 10 days for a cough 1 month ago. Office number given.

## 2024-11-27 NOTE — TELEPHONE ENCOUNTER
Patient stated she is still having the lingering cough and wanted to know if she could get a one time fill of this medication as well as the cough syrup that was prescribed. I did let patient know provider is out of the office and she can always go to [facility].

## 2024-12-02 RX ORDER — BENZONATATE 100 MG/1
CAPSULE ORAL
Qty: 30 CAPSULE | Refills: 0 | Status: SHIPPED | OUTPATIENT
Start: 2024-12-02

## 2024-12-05 ENCOUNTER — TRANSCRIBE ORDERS (OUTPATIENT)
Dept: ADMINISTRATIVE | Facility: HOSPITAL | Age: 54
End: 2024-12-05

## 2024-12-05 DIAGNOSIS — Z12.31 VISIT FOR SCREENING MAMMOGRAM: Primary | ICD-10-CM

## 2025-01-17 DIAGNOSIS — M79.672 LEFT FOOT PAIN: ICD-10-CM

## 2025-01-20 ENCOUNTER — TELEPHONE (OUTPATIENT)
Dept: ORTHOPEDIC SURGERY | Facility: CLINIC | Age: 55
End: 2025-01-20

## 2025-01-20 DIAGNOSIS — Z09 FRACTURE FOLLOW-UP: ICD-10-CM

## 2025-01-20 DIAGNOSIS — M84.362A STRESS FRACTURE OF LEFT TIBIA, INITIAL ENCOUNTER: Primary | ICD-10-CM

## 2025-01-20 NOTE — TELEPHONE ENCOUNTER
Provider: CAROL    Caller: Dawna Lovell    Relationship to Patient: Self    Phone Number: [phone]    Reason for Call: PT NEEDING NEW P/T ORDER FOR PERFORMANCE PHYSICAL THERAPY  E [facility] TO BE SENT FOR LEFT FOOT, STATING VISITS HAVE RUN OUT. PLEASE [facility].

## 2025-02-04 ENCOUNTER — HOSPITAL ENCOUNTER (OUTPATIENT)
Dept: MAMMOGRAPHY | Facility: HOSPITAL | Age: 55
Discharge: HOME | End: 2025-02-04
Admitting: NURSE PRACTITIONER
Payer: MEDICAID

## 2025-02-04 DIAGNOSIS — Z12.31 VISIT FOR SCREENING MAMMOGRAM: ICD-10-CM

## 2025-02-04 PROCEDURE — 77063 BREAST TOMOSYNTHESIS BI: CPT | Performed by: RADIOLOGY

## 2025-02-04 PROCEDURE — 77067 SCR MAMMO BI INCL CAD: CPT

## 2025-02-04 PROCEDURE — 77067 SCR MAMMO BI INCL CAD: CPT | Performed by: RADIOLOGY

## 2025-02-04 PROCEDURE — 77063 BREAST TOMOSYNTHESIS BI: CPT

## 2025-02-24 ENCOUNTER — TELEPHONE (OUTPATIENT)
Dept: INTERNAL MEDICINE | Facility: CLINIC | Age: 55
End: 2025-02-24
Payer: MEDICAID

## 2025-02-24 ENCOUNTER — HOSPITAL ENCOUNTER (EMERGENCY)
Facility: HOSPITAL | Age: 55
Discharge: HOME | End: 2025-02-24
Attending: EMERGENCY MEDICINE
Payer: MEDICAID

## 2025-02-24 VITALS
WEIGHT: 180 LBS | HEIGHT: 68 IN | HEART RATE: 89 BPM | TEMPERATURE: 98.1 F | BODY MASS INDEX: 27.28 KG/M2 | RESPIRATION RATE: 16 BRPM | OXYGEN SATURATION: 100 % | DIASTOLIC BLOOD PRESSURE: 88 MMHG | SYSTOLIC BLOOD PRESSURE: 132 MMHG

## 2025-02-24 DIAGNOSIS — R09.81 SINUS CONGESTION: ICD-10-CM

## 2025-02-24 DIAGNOSIS — H72.92 PERFORATION OF LEFT TYMPANIC MEMBRANE: Primary | ICD-10-CM

## 2025-02-24 DIAGNOSIS — H91.92 HEARING LOSS OF LEFT EAR, UNSPECIFIED HEARING LOSS TYPE: ICD-10-CM

## 2025-02-24 PROCEDURE — 99282 EMERGENCY DEPT VISIT SF MDM: CPT

## 2025-02-24 NOTE — TELEPHONE ENCOUNTER
Caller: Dawna Lovell    Relationship: Self    Best call back number: [phone]     What is the medical concern/diagnosis: BLOOD IN EAR    What specialty or service is being requested: ENT    What is the provider, practice or medical service name: [facility]    What is the office location: [place]    What is the office phone number:     Any additional details: PATIENT WENT TO THE ER THIS MORNING AND TOLD HER SHE HAD A RUPTURED EARDRUM.

## 2025-02-24 NOTE — ED PROVIDER NOTES
Subjective   History of Present Illness  Pt is a 55 yo female presenting to ED with complaints of blood coming from ear. PMHx significant for HTN, HLD, IBS, POTS, Syncope, GERD, Anxiety and Depression. Pt explains she woke up this morning with blood coming from left ear and hearing loss. She has had nasal congestion recently and has been blowing her nose. She had complained of ear pressure yesterday to her  who accompanies patient to ED. She denies taking blood thinners, pain behind ear, headache, dizziness or neck pain. Denies recent head / ear injury. Denies sticking objects or qtips in ear. She denies recent antibiotics and no prior ENT evaluation. She has had sinus / allergy issues in the past and has been on multiple meds but reports not taking any recently.     History provided by:  Patient, relative and medical records      Review of Systems   Constitutional:  Negative for fever.   HENT:  Positive for congestion, ear discharge (bloody), hearing loss and sinus pressure. Negative for ear pain, facial swelling, sore throat, tinnitus and trouble swallowing.    Eyes:  Negative for visual disturbance.   Respiratory:  Negative for cough and shortness of breath.    Gastrointestinal:  Negative for nausea.   Neurological:  Negative for dizziness, syncope, speech difficulty, weakness, numbness and headaches.   Psychiatric/Behavioral:  Negative for confusion.        Past Medical History:   Diagnosis Date    Acute postoperative pain     Anxiety     Asthma     Depression     Fracture, fibula 4/16/2024    Fracture, foot 03/22/2023    Fracture, tibia and fibula 4/16/2024    GERD (gastroesophageal reflux disease)     Headache, tension-type     Hyperlipidemia     Hypertension     Hypothyroidism     Irritable bowel syndrome     Lyme disease 05/2016    Migraine headache     Neuromuscular disorder     POTS (postural orthostatic tachycardia syndrome)     Syncope 09/27/2016    Tachycardia 09/27/2016       Allergies    Allergen Reactions    Morphine And Codeine Itching     Nose itching       Past Surgical History:   Procedure Laterality Date     SECTION  2003    COLONOSCOPY      FOOT SURGERY Left 2023    Hardware Removal Lisfranc ORIF 23    FRACTURE SURGERY      MOUTH SURGERY      ORIF FOOT FRACTURE Left 2023    Procedure: ORIF left lisfranc foot injury;  Surgeon: Milagro Parker MD;  Location: [facility];  Service: Orthopedics;  Laterality: Left;    WISDOM TOOTH EXTRACTION         Family History   Problem Relation Age of Onset    Esophageal cancer Father     Cancer Father         Esophogical    Breast cancer Paternal Grandmother         40's    Dementia Other     Ovarian cancer Neg Hx        Social History     Socioeconomic History    Marital status:    Tobacco Use    Smoking status: Former     Current packs/day: 0.00     Average packs/day: 0.5 packs/day for 11.0 years (5.5 ttl pk-yrs)     Types: Cigarettes     Start date: 1990     Quit date:      Years since quittin.1     Passive exposure: Past    Smokeless tobacco: Never   Vaping Use    Vaping status: Never Used   Substance and Sexual Activity    Alcohol use: Yes     Alcohol/week: 5.0 standard drinks of alcohol     Types: 5 Glasses of wine per week     Comment: off alcohol for surgery    Drug use: No    Sexual activity: Yes     Partners: Male     Birth control/protection: Post-menopausal, None           Objective   Physical Exam  Vitals and nursing note reviewed.   Constitutional:       Appearance: She is well-developed.   HENT:      Head: Atraumatic.      Right Ear: Tympanic membrane and ear canal normal.      Left Ear: Decreased hearing noted. Drainage (bloody) present. No foreign body. Tympanic membrane is perforated.      Nose: Nose normal. Congestion present.   Eyes:      General: Lids are normal.      Extraocular Movements: Extraocular movements intact.      Conjunctiva/sclera: Conjunctivae normal.      Pupils: Pupils  "are equal, round, and reactive to light.   Cardiovascular:      Rate and Rhythm: Normal rate.   Pulmonary:      Effort: Pulmonary effort is normal. No respiratory distress.   Abdominal:      Palpations: Abdomen is soft.   Musculoskeletal:         General: No tenderness. Normal range of motion.      Cervical back: Normal range of motion and neck supple.   Skin:     General: Skin is warm and dry.      Findings: No erythema or rash.   Neurological:      Mental Status: She is alert and oriented to person, place, and time.      Sensory: No sensory deficit.   Psychiatric:         Mood and Affect: Mood normal.         Speech: Speech normal.         Behavior: Behavior normal.         Procedures           ED Course        No results found for this or any previous visit (from the past 24 hours).  Note: In addition to lab results from this visit, the labs listed above may include labs taken at another facility or during a different encounter within the last 24 hours. Please correlate lab times with ED admission and discharge times for further clarification of the services performed during this visit.    No orders to display     Vitals:    02/24/25 0945   BP: 132/88   BP Location: Left arm   Patient Position: Sitting   Pulse: 89   Resp: 16   Temp: 98.1 °F (36.7 °C)   TempSrc: Oral   SpO2: 100%   Weight: 81.6 kg (180 lb)   Height: 172.7 cm (68\")     Medications - No data to display  ECG/EMG Results (last 24 hours)       ** No results found for the last 24 hours. **          No orders to display       DISCHARGE    Patient discharged in stable condition.    Reviewed implications of results, diagnosis, meds, responsibility to follow up, warning signs and symptoms of possible worsening, potential complications and reasons to return to ER.    Patient/Family voiced understanding of above instructions.    Discussed plan for discharge, as there is no emergent indication for admission.  Pt/family is agreeable and understands need for " follow up and possible repeat testing.  Pt/family is aware that discharge does not mean that nothing is wrong but that it indicates no emergency is currently present that requires admission and they must continue care with follow-up as given below or with a physician of their choice.     FOLLOW-UP  Jorden Arredondo MD  [address] [facility]  [address]  [place] [id number]  [phone]    Schedule an appointment as soon as possible for a visit       Karissa Mar, APRN  [address] [place] [id number]  [phone]    Schedule an appointment as soon as possible for a visit       [facility] EMERGENCY DEPARTMENT  [address] [facility] [address] [phone]    If symptoms worsen         Medication List        New Prescriptions      amoxicillin-clavulanate 875-125 MG per tablet  Commonly known as: AUGMENTIN  Take 1 tablet by mouth 2 (Two) Times a Day for 10 days.            Changed      folic acid 1 MG tablet  Commonly known as: FOLVITE  1 tablet daily.  90-day supply.  What changed:   how much to take  how to take this  when to take this     hydrOXYzine 25 MG tablet  Commonly known as: ATARAX  TAKE 1 TABLET Twice daily PRN  What changed: See the new instructions.               Where to Get Your Medications        These medications were sent to [id number] - [place], [place] - [address] [facility] - [phone]  - [phone] FX  [address], [facility] [address]      Phone: [phone]   amoxicillin-clavulanate 875-125 MG per tablet                                                      Medical Decision Making  Pt is a 53 yo female presenting to ED with complaints of ear drainage and hearing loss.  I had a discussion with the patient / family regarding ED course, diagnosis, diagnostic results and treatment plan including medications and admission / discharge. Discussed if new or worse  symptoms / concerns to return to ED for further evaluation. Discussed need for close follow up with PCP / specialists.     DDx  Ear foreign body, ruptured TM, AOM, TM injury, intracranial mass     Problems Addressed:  Hearing loss of left ear, unspecified hearing loss type: complicated acute illness or injury  Perforation of left tympanic membrane: complicated acute illness or injury  Sinus congestion: complicated acute illness or injury    Amount and/or Complexity of Data Reviewed  Independent Historian: spouse  External Data Reviewed: notes.     Details: Reviewed previous non ED visits including prior labs, imaging, available notes, medications, allergies and surgical hx.    foot surgery follow with ortho    Risk  Prescription drug management.        Final diagnoses:   Perforation of left tympanic membrane   Sinus congestion   Hearing loss of left ear, unspecified hearing loss type       ED Disposition  ED Disposition       ED Disposition   Discharge    Condition   Stable    Comment   --               Jorden Arredondo MD  [address]  [place] [id number]  [phone]    Schedule an appointment as soon as possible for a visit       Karissa Mar, APRN  [address] [facility] [address]  [phone]    Schedule an appointment as soon as possible for a visit       [facility] EMERGENCY DEPARTMENT  [address] [facility] [id number] [phone]    If symptoms worsen         Medication List        New Prescriptions      amoxicillin-clavulanate 875-125 MG per tablet  Commonly known as: AUGMENTIN  Take 1 tablet by mouth 2 (Two) Times a Day for 10 days.            Changed      folic acid 1 MG tablet  Commonly known as: FOLVITE  1 tablet daily.  90-day supply.  What changed:   how much to take  how to take this  when to take this     hydrOXYzine 25 MG tablet  Commonly known as: ATARAX  TAKE 1 TABLET Twice daily PRN  What changed: See the new  instructions.               Where to Get Your Medications        These medications were sent to [id number] - [place], [place] - [address] [facility] - [phone] [facility] [phone]   [address], [facility] [address]      Phone: [phone]   amoxicillin-clavulanate 875-125 MG per tablet            Meri Rojas PA  02/24/25 2692

## 2025-02-25 ENCOUNTER — TELEPHONE (OUTPATIENT)
Dept: INTERNAL MEDICINE | Facility: CLINIC | Age: 55
End: 2025-02-25

## 2025-02-25 ENCOUNTER — LAB (OUTPATIENT)
Dept: LAB | Facility: HOSPITAL | Age: 55
End: 2025-02-25
Payer: MEDICAID

## 2025-02-25 ENCOUNTER — OFFICE VISIT (OUTPATIENT)
Dept: INTERNAL MEDICINE | Facility: CLINIC | Age: 55
End: 2025-02-25
Payer: MEDICAID

## 2025-02-25 VITALS
HEART RATE: 87 BPM | WEIGHT: 198.8 LBS | BODY MASS INDEX: 30.13 KG/M2 | SYSTOLIC BLOOD PRESSURE: 100 MMHG | TEMPERATURE: 98 F | HEIGHT: 68 IN | OXYGEN SATURATION: 86 % | DIASTOLIC BLOOD PRESSURE: 66 MMHG

## 2025-02-25 DIAGNOSIS — H72.92 PERFORATION OF LEFT TYMPANIC MEMBRANE: ICD-10-CM

## 2025-02-25 DIAGNOSIS — Z09 HOSPITAL DISCHARGE FOLLOW-UP: Primary | ICD-10-CM

## 2025-02-25 DIAGNOSIS — E06.3 HYPOTHYROIDISM DUE TO HASHIMOTO'S THYROIDITIS: ICD-10-CM

## 2025-02-25 PROCEDURE — 99213 OFFICE O/P EST LOW 20 MIN: CPT | Performed by: NURSE PRACTITIONER

## 2025-02-25 PROCEDURE — 1159F MED LIST DOCD IN RCRD: CPT | Performed by: NURSE PRACTITIONER

## 2025-02-25 PROCEDURE — 3078F DIAST BP <80 MM HG: CPT | Performed by: NURSE PRACTITIONER

## 2025-02-25 PROCEDURE — 1125F AMNT PAIN NOTED PAIN PRSNT: CPT | Performed by: NURSE PRACTITIONER

## 2025-02-25 PROCEDURE — 1160F RVW MEDS BY RX/DR IN RCRD: CPT | Performed by: NURSE PRACTITIONER

## 2025-02-25 PROCEDURE — 84443 ASSAY THYROID STIM HORMONE: CPT

## 2025-02-25 PROCEDURE — 3074F SYST BP LT 130 MM HG: CPT | Performed by: NURSE PRACTITIONER

## 2025-02-25 NOTE — TELEPHONE ENCOUNTER
Patient called because she is trying to schedule an appointment with the ENT and the referral from 2/24/2025 is already closed and she needs a new one so she can schedule that appointment.

## 2025-02-25 NOTE — TELEPHONE ENCOUNTER
"Called and left VM with pt that referral to ENT was place while she was at the ER today.    Ok for HUB to relay message from provider that \"Referral to ENT was placed while pt was in ER and PCP approves of referral.\"   "

## 2025-02-25 NOTE — TELEPHONE ENCOUNTER
HUB TO RELAY    CALLED TO [facility] REGARDING NOTE IN ENT REFERRAL COMMUNICATION - PATIENT SPOKE WITH INSURANCE AND PROVIDER IS NOT IN NETWORK/DOES NOT ACCEPT PATIENT'S INSURANCE - BUT THERE WAS NO ANSWER. LEFT DETAILED VOICEMAIL WITH OFFICE NUMBER FOR CALL BACK [phone].    SINCE PATIENT HAS ALREADY SPOKEN WITH HER INSURANCE DOES SHE KNOW WHAT IN NETWORK PROVIDER SHE WOULD LIKE TO SEE? WE CAN SEND REFERRAL INFORMATION ANYWHERE SHE WOULD LIKE US TO BUT SHE SHOULD CHECK WITH HER INSURANCE TO FIND IN NETWORK PROVIDER AS A LOT OF OFFICES DO NOT ACCEPT HER INSURANCE.

## 2025-02-25 NOTE — PROGRESS NOTES
Office Note     Name: Dawna Lovell    : 1970     MRN: [id number]     Chief Complaint  Hospital Follow Up Visit    Subjective     History of Present Illness:  Dawna Lovell is a 54 y.o. female who presents today for follow-up after recent ER visit.  Patient was diagnosed with perforation of the tympanic membrane.  She noted she had blood coming from her left ear and some hearing loss.  Patient was prescribed Augmentin twice daily for 10 days.  She also had a referral placed to ENT for further evaluation.    Patient states she has been taking the medication as prescribed.  She states that she is unsure what could have caused these symptoms.  She continues to have some intermittent bleeding and discomfort.  Patient states that she did call the ENT office that she was referred to.  Stated they told  her that they do not accept her insurance.  Patient also called another office and stated they were not accepting new patients      Past Medical History:   Diagnosis Date    Acute postoperative pain     Anxiety     Asthma     Depression     Fracture, fibula 2024    Fracture, foot 2023    Fracture, tibia and fibula 2024    GERD (gastroesophageal reflux disease)     Headache, tension-type     Hyperlipidemia     Hypertension     Hypothyroidism     Irritable bowel syndrome     Lyme disease 2016    Migraine headache     Neuromuscular disorder     POTS (postural orthostatic tachycardia syndrome)     Syncope 2016    Tachycardia 2016       Past Surgical History:   Procedure Laterality Date     SECTION  2003    COLONOSCOPY      FOOT SURGERY Left 2023    Hardware Removal Lisfranc ORIF 23    FRACTURE SURGERY      MOUTH SURGERY      ORIF FOOT FRACTURE Left 2023    Procedure: ORIF left lisfranc foot injury;  Surgeon: Milagro Parker MD;  [facility];  Service: Orthopedics;  Laterality: Left;    WISDOM TOOTH EXTRACTION         Social History      Socioeconomic History    Marital status:    Tobacco Use    Smoking status: Former     Current packs/day: 0.00     Average packs/day: 0.5 packs/day for 11.0 years (5.5 ttl pk-yrs)     Types: Cigarettes     Start date: 1990     Quit date:      Years since quittin.1     Passive exposure: Past    Smokeless tobacco: Never   Vaping Use    Vaping status: Never Used   Substance and Sexual Activity    Alcohol use: Yes     Alcohol/week: 5.0 standard drinks of alcohol     Types: 5 Glasses of wine per week     Comment: off alcohol for surgery    Drug use: No    Sexual activity: Yes     Partners: Male     Birth control/protection: Post-menopausal, None         Current Outpatient Medications:     amoxicillin-clavulanate (AUGMENTIN) 875-125 MG per tablet, Take 1 tablet by mouth 2 (Two) Times a Day for 10 days., Disp: 20 tablet, Rfl: 0    benzonatate (TESSALON) 100 MG capsule, TAKE 1 CAPSULE BY MOUTH THREE TIMES DAILY FOR UP TO 10 DAYS AS NEEDED FOR COUGH, Disp: 30 capsule, Rfl: 0    carvedilol (COREG) 6.25 MG tablet, Take 1 tablet by mouth 2 (Two) Times a Day With Meals., Disp: 180 tablet, Rfl: 3    cetirizine (zyrTEC) 10 MG tablet, Take 1 tablet by mouth Daily., Disp: 90 tablet, Rfl: 0    fluticasone (FLONASE) 50 MCG/ACT nasal spray, Administer 1 spray into the nostril(s) as directed by provider Daily., Disp: 18.2 mL, Rfl: 0    folic acid (FOLVITE) 1 MG tablet, 1 tablet daily.  90-day supply. (Patient taking differently: Take 1 tablet by mouth Daily. 1 tablet daily.  90-day supply.), Disp: 30 tablet, Rfl: 90    gabapentin (NEURONTIN) 300 MG capsule, Take 3 capsules by mouth 3 (Three) Times a Day., Disp: 810 capsule, Rfl: 1    hydrOXYzine (ATARAX) 25 MG tablet, TAKE 1 TABLET Twice daily PRN (Patient taking differently: Take 1 tablet by mouth 2 (Two) Times a Day As Needed for Anxiety.), Disp: 40 tablet, Rfl: 1    Multiple Vitamins-Minerals (MULTIVITAMIN ADULT PO), Take 1 tablet by mouth Daily., Disp: , Rfl:  "    omeprazole (priLOSEC) 40 MG capsule, TAKE 1 CAPSULE BY MOUTH TWICE DAILY, Disp: 60 capsule, Rfl: 3    Restasis 0.05 % ophthalmic emulsion, Administer 1 drop to both eyes 2 (Two) Times a Day., Disp: , Rfl:     rOPINIRole (REQUIP) 1 MG tablet, Take 1 tablet by mouth 3 (Three) Times a Day. Take 1 hour before bedtime., Disp: 270 tablet, Rfl: 3    rosuvastatin (CRESTOR) 10 MG tablet, Take 1 tablet by mouth Daily., Disp: 90 tablet, Rfl: 3    SUMAtriptan (IMITREX) 25 MG tablet, TAKE 1 TABLET BY MOUTH AS NEEDED FOR MIGRAINE, Disp: 9 tablet, Rfl: 1    tiZANidine (ZANAFLEX) 4 MG tablet, TAKE 1 TABLET BY MOUTH EVERY 8 HOURS AS NEEDED FOR MUSCLE SPASMS, Disp: 90 tablet, Rfl: 5    traZODone (DESYREL) 100 MG tablet, Take 1 tablet by mouth At Night As Needed for Sleep., Disp: 90 tablet, Rfl: 3    Unithroid 137 MCG tablet, Take 1 tablet by mouth Every Morning., Disp: 90 tablet, Rfl: 3    ursodiol (ACTIGALL) 300 MG capsule, TAKE 1 CAPSULE BY MOUTH THREE TIMES DAILY WITH MEALS, Disp: 270 capsule, Rfl: 3    venlafaxine XR (EFFEXOR-XR) 150 MG 24 hr capsule, Take 1 capsule by mouth Daily., Disp: 90 capsule, Rfl: 3    Objective     Vital Signs  /66 (BP Location: Left arm, Patient Position: Sitting, Cuff Size: Adult)   Pulse 87   Temp 98 °F (36.7 °C)   Ht 172.7 cm (68\")   Wt 90.2 kg (198 lb 12.8 oz)   SpO2 (!) 86%   BMI 30.23 kg/m²   Estimated body mass index is 30.23 kg/m² as calculated from the following:    Height as of this encounter: 172.7 cm (68\").    Weight as of this encounter: 90.2 kg (198 lb 12.8 oz).               Physical Exam  Vitals and nursing note reviewed.   Constitutional:       Appearance: Normal appearance.   HENT:      Head: Normocephalic and atraumatic.      Left Ear: Ear canal and external ear normal. Decreased hearing noted. Tympanic membrane is perforated.      Ears:      Comments: Dried bloody drainage noted in the ear canal  Eyes:      Extraocular Movements: Extraocular movements intact.      " Pupils: Pupils are equal, round, and reactive to light.   Cardiovascular:      Rate and Rhythm: Normal rate and regular rhythm.   Pulmonary:      Effort: Pulmonary effort is normal.   Musculoskeletal:         General: Normal range of motion.   Skin:     General: Skin is warm and dry.   Neurological:      Mental Status: She is alert and oriented to person, place, and time.   Psychiatric:         Mood and Affect: Mood normal.         Behavior: Behavior normal.                 Assessment and Plan     Diagnoses and all orders for this visit:    1. Hospital discharge follow-up (Primary)    2. Perforation of left tympanic membrane    Plan  Discussed with patient that she should continue full course of antibiotics as prescribed  Consider wearing a ear plug when submerging the head in water  Continue to clean external ear with any dry drainage  I did highly recommend the patient that she call her insurance to find out which ENT offices covered.  I did highly recommend she make an appointment.  Patient will also follow-up with me for reassessment of the ear  Go to ER if any condition worsens or severe  Plan to follow-up in 4 weeks    Reviewed vital signs during today's visit.  Patient was not under any respiratory distress.  Oxygen saturation was stated to be at 86%.  Unable to recheck before patient left office, again denied any symptoms at this time    Follow Up  Return for 4 week check on left eardrum.    SANGEETHA Woodson    Part of this note may be an electronic transcription/translation of spoken language to printed text using the Dragon Dictation System.

## 2025-02-26 LAB — TSH SERPL DL<=0.05 MIU/L-ACNC: 0.51 UIU/ML (ref 0.27–4.2)

## 2025-02-26 NOTE — TELEPHONE ENCOUNTER
Spoke with patient and she states that she would like to referral to be sent to [facility] faxed referral information.

## 2025-03-25 ENCOUNTER — OFFICE VISIT (OUTPATIENT)
Dept: INTERNAL MEDICINE | Facility: CLINIC | Age: 55
End: 2025-03-25
Payer: MEDICAID

## 2025-03-25 VITALS
HEART RATE: 90 BPM | BODY MASS INDEX: 30.16 KG/M2 | WEIGHT: 199 LBS | SYSTOLIC BLOOD PRESSURE: 118 MMHG | OXYGEN SATURATION: 92 % | DIASTOLIC BLOOD PRESSURE: 72 MMHG | HEIGHT: 68 IN | TEMPERATURE: 97.7 F | RESPIRATION RATE: 16 BRPM

## 2025-03-25 DIAGNOSIS — Z13.31 DEPRESSION SCREEN: ICD-10-CM

## 2025-03-25 DIAGNOSIS — H72.92 PERFORATION OF LEFT TYMPANIC MEMBRANE: Primary | ICD-10-CM

## 2025-03-25 PROCEDURE — 3074F SYST BP LT 130 MM HG: CPT | Performed by: NURSE PRACTITIONER

## 2025-03-25 PROCEDURE — 1125F AMNT PAIN NOTED PAIN PRSNT: CPT | Performed by: NURSE PRACTITIONER

## 2025-03-25 PROCEDURE — 3078F DIAST BP <80 MM HG: CPT | Performed by: NURSE PRACTITIONER

## 2025-03-25 PROCEDURE — 1160F RVW MEDS BY RX/DR IN RCRD: CPT | Performed by: NURSE PRACTITIONER

## 2025-03-25 PROCEDURE — 99213 OFFICE O/P EST LOW 20 MIN: CPT | Performed by: NURSE PRACTITIONER

## 2025-03-25 PROCEDURE — 96127 BRIEF EMOTIONAL/BEHAV ASSMT: CPT | Performed by: NURSE PRACTITIONER

## 2025-03-25 PROCEDURE — 1159F MED LIST DOCD IN RCRD: CPT | Performed by: NURSE PRACTITIONER

## 2025-03-25 RX ORDER — FLUOROMETHOLONE 1 MG/ML
1 SUSPENSION/ DROPS OPHTHALMIC 2 TIMES DAILY
COMMUNITY
Start: 2024-11-29

## 2025-03-25 RX ORDER — ESTRADIOL 10 UG/1
10 TABLET, FILM COATED VAGINAL
COMMUNITY
Start: 2024-11-25

## 2025-03-25 NOTE — PROGRESS NOTES
Office Note     Name: Dawna Lovell    : 1970     MRN: [id number]     Chief Complaint  Ear Injury (Left ear )    Subjective     History of Present Illness:  Dawna Lovell is a 54 y.o. female who presents today for follow-up from perforation of left tympanic membrane    Patient was last seen .  This was an ER follow-up after perforation of the tympanic membrane.  She noted blood coming from her left ear with some hearing loss.  She was prescribed Augmentin twice daily for 10 days and placed a referral to ENT.  I did review the referral for ENT and patient is scheduled with  ENT on .  Patient states she has had minimal improvement but continues to feel like she is underwater.  She has not noticed any bleeding.  She did complete full course of medication from previous visit      Past Medical History:   Diagnosis Date    Acute postoperative pain 2023    Anxiety     Asthma     Clotting disorder     Depression     Fracture, fibula 2024    Fracture, foot 2023    Fracture, tibia and fibula 2024    GERD (gastroesophageal reflux disease)     Headache, tension-type     HL (hearing loss)     Hyperlipidemia     Hypertension     Hypothyroidism     Irritable bowel syndrome     Lyme disease 2016    Migraine headache     Neuromuscular disorder     POTS (postural orthostatic tachycardia syndrome)     Syncope 2016    Tachycardia 2016       Past Surgical History:   Procedure Laterality Date     SECTION  2003    COLONOSCOPY      FOOT SURGERY Left 2023    Hardware Removal Lisfranc ORIF 23    FRACTURE SURGERY      MOUTH SURGERY      ORIF FOOT FRACTURE Left 2023    Procedure: ORIF left lisfranc foot injury;  Surgeon: Milagro Parker MD;  Location: [facility];  Service: Orthopedics;  Laterality: Left;    WISDOM TOOTH EXTRACTION         Social History     Socioeconomic History    Marital status:    Tobacco Use    Smoking  status: Former     Current packs/day: 0.00     Average packs/day: 0.5 packs/day for 11.0 years (5.5 ttl pk-yrs)     Types: Cigarettes     Start date: 1990     Quit date:      Years since quittin.2     Passive exposure: Past    Smokeless tobacco: Never   Vaping Use    Vaping status: Never Used   Substance and Sexual Activity    Alcohol use: Yes     Alcohol/week: 5.0 standard drinks of alcohol     Types: 5 Glasses of wine per week     Comment: off alcohol for surgery    Drug use: No    Sexual activity: Yes     Partners: Male     Birth control/protection: Post-menopausal, None         Current Outpatient Medications:     benzonatate (TESSALON) 100 MG capsule, TAKE 1 CAPSULE BY MOUTH THREE TIMES DAILY FOR UP TO 10 DAYS AS NEEDED FOR COUGH, Disp: 30 capsule, Rfl: 0    carvedilol (COREG) 6.25 MG tablet, Take 1 tablet by mouth 2 (Two) Times a Day With Meals., Disp: 180 tablet, Rfl: 3    estradiol (VAGIFEM) 10 MCG tablet vaginal tablet, Insert 1 tablet into the vagina., Disp: , Rfl:     fluorometholone (FML) 0.1 % ophthalmic suspension, Administer 1 drop to both eyes 2 (Two) Times a Day., Disp: , Rfl:     fluticasone (FLONASE) 50 MCG/ACT nasal spray, Administer 1 spray into the nostril(s) as directed by provider Daily., Disp: 18.2 mL, Rfl: 0    folic acid (FOLVITE) 1 MG tablet, 1 tablet daily.  90-day supply. (Patient taking differently: Take 1 tablet by mouth Daily. 1 tablet daily.  90-day supply.), Disp: 30 tablet, Rfl: 90    gabapentin (NEURONTIN) 300 MG capsule, Take 3 capsules by mouth 3 (Three) Times a Day., Disp: 810 capsule, Rfl: 1    hydrOXYzine (ATARAX) 25 MG tablet, TAKE 1 TABLET Twice daily PRN (Patient taking differently: Take 1 tablet by mouth 2 (Two) Times a Day As Needed for Anxiety.), Disp: 40 tablet, Rfl: 1    Multiple Vitamins-Minerals (MULTIVITAMIN ADULT PO), Take 1 tablet by mouth Daily., Disp: , Rfl:     omeprazole (priLOSEC) 40 MG capsule, TAKE 1 CAPSULE BY MOUTH TWICE DAILY, Disp: 60  "capsule, Rfl: 3    Restasis 0.05 % ophthalmic emulsion, Administer 1 drop to both eyes 2 (Two) Times a Day. (Patient taking differently: Administer 1 drop to both eyes 2 (Two) Times a Day. ReportPRN), Disp: , Rfl:     rOPINIRole (REQUIP) 1 MG tablet, Take 1 tablet by mouth 3 (Three) Times a Day. Take 1 hour before bedtime., Disp: 270 tablet, Rfl: 3    rosuvastatin (CRESTOR) 10 MG tablet, Take 1 tablet by mouth Daily., Disp: 90 tablet, Rfl: 3    SUMAtriptan (IMITREX) 25 MG tablet, TAKE 1 TABLET BY MOUTH AS NEEDED FOR MIGRAINE, Disp: 9 tablet, Rfl: 1    tiZANidine (ZANAFLEX) 4 MG tablet, TAKE 1 TABLET BY MOUTH EVERY 8 HOURS AS NEEDED FOR MUSCLE SPASMS, Disp: 90 tablet, Rfl: 5    traZODone (DESYREL) 100 MG tablet, Take 1 tablet by mouth At Night As Needed for Sleep., Disp: 90 tablet, Rfl: 3    Unithroid 137 MCG tablet, Take 1 tablet by mouth Every Morning., Disp: 90 tablet, Rfl: 3    ursodiol (ACTIGALL) 300 MG capsule, TAKE 1 CAPSULE BY MOUTH THREE TIMES DAILY WITH MEALS, Disp: 270 capsule, Rfl: 3    venlafaxine XR (EFFEXOR-XR) 150 MG 24 hr capsule, Take 1 capsule by mouth Daily., Disp: 90 capsule, Rfl: 3    cetirizine (zyrTEC) 10 MG tablet, Take 1 tablet by mouth Daily. (Patient not taking: Reported on 3/25/2025), Disp: 90 tablet, Rfl: 0    Objective     Vital Signs  /72 (BP Location: Left arm, Patient Position: Sitting, Cuff Size: Adult)   Pulse 90   Temp 97.7 °F (36.5 °C) (Infrared)   Resp 16   Ht 172.7 cm (68\")   Wt 90.3 kg (199 lb)   SpO2 92%   BMI 30.26 kg/m²   Estimated body mass index is 30.26 kg/m² as calculated from the following:    Height as of this encounter: 172.7 cm (68\").    Weight as of this encounter: 90.3 kg (199 lb).            PHQ-9 Depression Screening  Little interest or pleasure in doing things? Not at all   Feeling down, depressed, or hopeless? Not at all   PHQ-2 Total Score 0   Trouble falling or staying asleep, or sleeping too much?     Feeling tired or having little energy?   "   Poor appetite or overeating?     Feeling bad about yourself - or that you are a failure or have let yourself or your family down?     Trouble concentrating on things, such as reading the newspaper or watching television?     Moving or speaking so slowly that other people could have noticed? Or the opposite - being so fidgety or restless that you have been moving around a lot more than usual?     Thoughts that you would be better off dead, or of hurting yourself in some way?     PHQ-9 Total Score     If you checked off any problems, how difficult have these problems made it for you to do your work, take care of things at home, or get along with other people? Not difficult at all     PHQ-9 Total Score:      EZ-7       Physical Exam  Vitals and nursing note reviewed.   Constitutional:       Appearance: Normal appearance.   HENT:      Head: Normocephalic and atraumatic.      Right Ear: Hearing, tympanic membrane, ear canal and external ear normal.      Left Ear: Hearing and external ear normal.      Ears:      Comments: Left ear canal noted with minimal amount of dried blood.  Tympanic membrane did note that there was regrowth.  No noted perforation.  Clear fluid behind left tympanic membrane.  No evidence of infection  Eyes:      Extraocular Movements: Extraocular movements intact.      Pupils: Pupils are equal, round, and reactive to light.   Cardiovascular:      Rate and Rhythm: Normal rate and regular rhythm.   Pulmonary:      Effort: Pulmonary effort is normal.   Musculoskeletal:         General: Normal range of motion.   Skin:     General: Skin is warm and dry.   Neurological:      Mental Status: She is alert and oriented to person, place, and time.   Psychiatric:         Mood and Affect: Mood normal.         Behavior: Behavior normal.                 Assessment and Plan     Diagnoses and all orders for this visit:    1. Perforation of left tympanic membrane (Primary)    2. Depression screen    Plan  Discussed  physical assessment findings with patient today.  Please keep upcoming appointment with ENT on April 1.  Patient did have this appointment information.  Depression screen negative results  Go to ER if any condition worsens or severe  Plan to follow-up as scheduled in May    Follow Up  Return for Next scheduled follow up.    SANGEETHA Woodson    Part of this note may be an electronic transcription/translation of spoken language to printed text using the Dragon Dictation System.

## 2025-04-07 ENCOUNTER — OFFICE VISIT (OUTPATIENT)
Dept: ORTHOPEDIC SURGERY | Facility: CLINIC | Age: 55
End: 2025-04-07
Payer: MEDICAID

## 2025-04-07 VITALS
DIASTOLIC BLOOD PRESSURE: 80 MMHG | WEIGHT: 199 LBS | HEIGHT: 68 IN | SYSTOLIC BLOOD PRESSURE: 128 MMHG | BODY MASS INDEX: 30.16 KG/M2

## 2025-04-07 DIAGNOSIS — G62.9 NEUROPATHY: ICD-10-CM

## 2025-04-07 DIAGNOSIS — M79.672 LEFT FOOT PAIN: ICD-10-CM

## 2025-04-07 DIAGNOSIS — M25.572 LEFT ANKLE PAIN, UNSPECIFIED CHRONICITY: Primary | ICD-10-CM

## 2025-04-07 PROCEDURE — 1160F RVW MEDS BY RX/DR IN RCRD: CPT | Performed by: ORTHOPAEDIC SURGERY

## 2025-04-07 PROCEDURE — 3079F DIAST BP 80-89 MM HG: CPT | Performed by: ORTHOPAEDIC SURGERY

## 2025-04-07 PROCEDURE — 3074F SYST BP LT 130 MM HG: CPT | Performed by: ORTHOPAEDIC SURGERY

## 2025-04-07 PROCEDURE — 1159F MED LIST DOCD IN RCRD: CPT | Performed by: ORTHOPAEDIC SURGERY

## 2025-04-07 PROCEDURE — 99213 OFFICE O/P EST LOW 20 MIN: CPT | Performed by: ORTHOPAEDIC SURGERY

## 2025-04-07 NOTE — PROGRESS NOTES
ESTABLISHED PATIENT    Patient: Dawna Lovell  : 1970    Primary Care Provider: Karissa Mar APRN    Requesting Provider: As above    Follow-up (5.5 month follow up -Stress fracture of left tibia, initial encounter)      History    Chief Complaint: Follow-up left foot and ankle    History of Present Illness: She returns for follow-up of her left Lisfranc injury and then left tibial stress fracture.  The tibia no longer hurts but she has a lot of pain in the Lisfranc joint.  She does have good shoes and orthotics.  No change in her neuropathy.  Her  is with her today.    Current Outpatient Medications on File Prior to Visit   Medication Sig Dispense Refill    benzonatate (TESSALON) 100 MG capsule TAKE 1 CAPSULE BY MOUTH THREE TIMES DAILY FOR UP TO 10 DAYS AS NEEDED FOR COUGH 30 capsule 0    carvedilol (COREG) 6.25 MG tablet Take 1 tablet by mouth 2 (Two) Times a Day With Meals. 180 tablet 3    cetirizine (zyrTEC) 10 MG tablet Take 1 tablet by mouth Daily. 90 tablet 0    estradiol (VAGIFEM) 10 MCG tablet vaginal tablet Insert 1 tablet into the vagina.      fluorometholone (FML) 0.1 % ophthalmic suspension Administer 1 drop to both eyes 2 (Two) Times a Day.      fluticasone (FLONASE) 50 MCG/ACT nasal spray Administer 1 spray into the nostril(s) as directed by provider Daily. 18.2 mL 0    folic acid (FOLVITE) 1 MG tablet 1 tablet daily.  90-day supply. (Patient taking differently: Take 1 tablet by mouth Daily. 1 tablet daily.  90-day supply.) 30 tablet 90    gabapentin (NEURONTIN) 300 MG capsule Take 3 capsules by mouth 3 (Three) Times a Day. 810 capsule 1    hydrOXYzine (ATARAX) 25 MG tablet TAKE 1 TABLET Twice daily PRN (Patient taking differently: Take 1 tablet by mouth 2 (Two) Times a Day As Needed for Anxiety.) 40 tablet 1    Multiple Vitamins-Minerals (MULTIVITAMIN ADULT PO) Take 1 tablet by mouth Daily.      omeprazole (priLOSEC) 40 MG capsule TAKE 1 CAPSULE BY MOUTH TWICE DAILY 60  capsule 3    Restasis 0.05 % ophthalmic emulsion Administer 1 drop to both eyes 2 (Two) Times a Day. (Patient taking differently: Administer 1 drop to both eyes 2 (Two) Times a Day. ReportPRN)      rOPINIRole (REQUIP) 1 MG tablet Take 1 tablet by mouth 3 (Three) Times a Day. Take 1 hour before bedtime. 270 tablet 3    rosuvastatin (CRESTOR) 10 MG tablet Take 1 tablet by mouth Daily. 90 tablet 3    SUMAtriptan (IMITREX) 25 MG tablet TAKE 1 TABLET BY MOUTH AS NEEDED FOR MIGRAINE 9 tablet 1    tiZANidine (ZANAFLEX) 4 MG tablet TAKE 1 TABLET BY MOUTH EVERY 8 HOURS AS NEEDED FOR MUSCLE SPASMS 90 tablet 5    traZODone (DESYREL) 100 MG tablet Take 1 tablet by mouth At Night As Needed for Sleep. 90 tablet 3    Unithroid 137 MCG tablet Take 1 tablet by mouth Every Morning. 90 tablet 3    ursodiol (ACTIGALL) 300 MG capsule TAKE 1 CAPSULE BY MOUTH THREE TIMES DAILY WITH MEALS 270 capsule 3    venlafaxine XR (EFFEXOR-XR) 150 MG 24 hr capsule Take 1 capsule by mouth Daily. 90 capsule 3     No current facility-administered medications on file prior to visit.      Allergies   Allergen Reactions    Morphine And Codeine Itching     Nose itching- morphine only       Past Medical History:   Diagnosis Date    Acute postoperative pain 2023    Anxiety     Asthma     Clotting disorder     Depression     Fracture, fibula 2024    Fracture, foot 2023    Fracture, tibia and fibula 2024    GERD (gastroesophageal reflux disease)     Headache, tension-type     HL (hearing loss)     Hyperlipidemia     Hypertension     Hypothyroidism     Irritable bowel syndrome     Lyme disease 2016    Migraine headache     Neuromuscular disorder     POTS (postural orthostatic tachycardia syndrome)     Syncope 2016    Tachycardia 2016     Past Surgical History:   Procedure Laterality Date     SECTION  2003    COLONOSCOPY      FOOT SURGERY Left 2023    Hardware Removal Lisfranc ORIF 23    FRACTURE  "SURGERY      MOUTH SURGERY      ORIF FOOT FRACTURE Left 2023    Procedure: ORIF left lisfranc foot injury;  Surgeon: Milagro Parker MD;  Location: [facility];  Service: Orthopedics;  Laterality: Left;    WISDOM TOOTH EXTRACTION       Family History   Problem Relation Age of Onset    Esophageal cancer Father     Cancer Father         Esophogical    Breast cancer Paternal Grandmother         40's    Dementia Other     Ovarian cancer Neg Hx       Social History     Socioeconomic History    Marital status:    Tobacco Use    Smoking status: Former     Current packs/day: 0.00     Average packs/day: 0.5 packs/day for 11.0 years (5.5 ttl pk-yrs)     Types: Cigarettes     Start date: 1990     Quit date:      Years since quittin.2     Passive exposure: Past    Smokeless tobacco: Never   Vaping Use    Vaping status: Never Used   Substance and Sexual Activity    Alcohol use: Yes     Alcohol/week: 5.0 standard drinks of alcohol     Types: 5 Glasses of wine per week     Comment: off alcohol for surgery    Drug use: No    Sexual activity: Yes     Partners: Male     Birth control/protection: Post-menopausal, None        Review of Systems   Constitutional: Negative.    HENT: Negative.     Eyes: Negative.    Respiratory: Negative.     Cardiovascular: Negative.    Gastrointestinal: Negative.    Endocrine: Negative.    Genitourinary: Negative.    Musculoskeletal:  Positive for arthralgias.   Skin: Negative.    Allergic/Immunologic: Negative.    Neurological: Negative.    Hematological: Negative.    Psychiatric/Behavioral: Negative.         The following portions of the patient's history were reviewed and updated as appropriate: allergies, current medications, past family history, past medical history, past social history, past surgical history, and problem list.    Physical Exam:   /80   Ht 172.7 cm (68\")   Wt 90.3 kg (199 lb)   LMP 06/15/2022   BMI 30.26 kg/m²     Left foot incisions well-healed, " no tenderness in the ankle, she is tender over the TMT's    Medical Decision Making    Data Review:   ordered and reviewed x-rays today    Assessment/Plan/Diagnosis/Treatment Options:   1. Left foot pain  She has chronic pain in the Lisfranc joint following the injury.  She has posttraumatic arthritis.  I do not recommend that she return to work.  She reports she has been applying for disability.  I think the foot pain combined with neuropathy will make it very difficult for her to hold down any type of employment.  I do not think this will change.  I will see her again in a year with standing x-rays of the foot and ankle    2. Neuropathy  She has very dense neuropathy and neuropathic pain        Milagro Bauer MD

## 2025-04-08 DIAGNOSIS — K74.3 PRIMARY BILIARY CHOLANGITIS: ICD-10-CM

## 2025-04-08 DIAGNOSIS — F41.9 ANXIETY: ICD-10-CM

## 2025-04-08 RX ORDER — VENLAFAXINE HYDROCHLORIDE 150 MG/1
150 CAPSULE, EXTENDED RELEASE ORAL DAILY
Qty: 90 CAPSULE | Refills: 3 | OUTPATIENT
Start: 2025-04-08

## 2025-04-08 RX ORDER — URSODIOL 300 MG/1
300 CAPSULE ORAL
Qty: 270 CAPSULE | Refills: 3 | OUTPATIENT
Start: 2025-04-08

## 2025-05-19 ENCOUNTER — OFFICE VISIT (OUTPATIENT)
Dept: INTERNAL MEDICINE | Facility: CLINIC | Age: 55
End: 2025-05-19
Payer: MEDICAID

## 2025-05-19 VITALS
BODY MASS INDEX: 29.7 KG/M2 | OXYGEN SATURATION: 93 % | HEIGHT: 68 IN | DIASTOLIC BLOOD PRESSURE: 62 MMHG | HEART RATE: 80 BPM | WEIGHT: 196 LBS | SYSTOLIC BLOOD PRESSURE: 110 MMHG | TEMPERATURE: 98 F

## 2025-05-19 DIAGNOSIS — B37.9 ANTIBIOTIC-INDUCED YEAST INFECTION: ICD-10-CM

## 2025-05-19 DIAGNOSIS — T36.95XA ANTIBIOTIC-INDUCED YEAST INFECTION: ICD-10-CM

## 2025-05-19 DIAGNOSIS — L02.91 ABSCESS: Primary | ICD-10-CM

## 2025-05-19 PROCEDURE — 3078F DIAST BP <80 MM HG: CPT | Performed by: NURSE PRACTITIONER

## 2025-05-19 PROCEDURE — 99213 OFFICE O/P EST LOW 20 MIN: CPT | Performed by: NURSE PRACTITIONER

## 2025-05-19 PROCEDURE — 1160F RVW MEDS BY RX/DR IN RCRD: CPT | Performed by: NURSE PRACTITIONER

## 2025-05-19 PROCEDURE — 3074F SYST BP LT 130 MM HG: CPT | Performed by: NURSE PRACTITIONER

## 2025-05-19 PROCEDURE — 1159F MED LIST DOCD IN RCRD: CPT | Performed by: NURSE PRACTITIONER

## 2025-05-19 PROCEDURE — 1125F AMNT PAIN NOTED PAIN PRSNT: CPT | Performed by: NURSE PRACTITIONER

## 2025-05-19 RX ORDER — CEPHALEXIN 500 MG/1
500 CAPSULE ORAL 3 TIMES DAILY
Qty: 21 CAPSULE | Refills: 0 | Status: SHIPPED | OUTPATIENT
Start: 2025-05-19 | End: 2025-05-26

## 2025-05-19 RX ORDER — FLUCONAZOLE 150 MG/1
150 TABLET ORAL ONCE
Qty: 1 TABLET | Refills: 1 | Status: SHIPPED | OUTPATIENT
Start: 2025-05-19 | End: 2025-05-19

## 2025-05-19 RX ORDER — MUPIROCIN 20 MG/G
1 OINTMENT TOPICAL 3 TIMES DAILY
Qty: 30 G | Refills: 0 | Status: SHIPPED | OUTPATIENT
Start: 2025-05-19

## 2025-05-19 NOTE — PROGRESS NOTES
Office Note     Name: Dawna Lovell    : 1970     MRN: [id number]     Chief Complaint  Abrasion (Spot on left leg, inflamed and full of puss. )    Subjective     History of Present Illness:  Dawna Lovell is a 54 y.o. female who presents today for skin concern    Patient is accompanied by     Patient is here today for a left leg abscess that she noted starting on Friday.  No recent fevers.  She states there is some discomfort in the area as well as surrounding it.  She feels like there has been some spreading redness as well.  It is tender.  She did remind me that she does get yeast infections with antibiotics        Past Medical History:   Diagnosis Date    Acute postoperative pain 2023    Anxiety     Asthma     Clotting disorder     Depression     Fracture, fibula 2024    Fracture, foot 2023    Fracture, tibia and fibula 2024    GERD (gastroesophageal reflux disease)     Headache, tension-type     HL (hearing loss)     Hyperlipidemia     Hypertension     Hypothyroidism     Irritable bowel syndrome     Lyme disease 2016    Migraine headache     Neuromuscular disorder     POTS (postural orthostatic tachycardia syndrome)     Syncope 2016    Tachycardia 2016       Past Surgical History:   Procedure Laterality Date     SECTION  2003    COLONOSCOPY      FOOT SURGERY Left 2023    Hardware Removal Lisfranc ORIF 23    FRACTURE SURGERY      MOUTH SURGERY      ORIF FOOT FRACTURE Left 2023    Procedure: ORIF left lisfranc foot injury;  Surgeon: Milagro Parker MD;  Location: [facility];  Service: Orthopedics;  Laterality: Left;    WISDOM TOOTH EXTRACTION         Social History     Socioeconomic History    Marital status:    Tobacco Use    Smoking status: Former     Current packs/day: 0.00     Average packs/day: 0.5 packs/day for 11.0 years (5.5 ttl pk-yrs)     Types: Cigarettes     Start date: 1990     Quit date:       Years since quittin.3     Passive exposure: Past    Smokeless tobacco: Never   Vaping Use    Vaping status: Never Used   Substance and Sexual Activity    Alcohol use: Yes     Alcohol/week: 5.0 standard drinks of alcohol     Types: 5 Glasses of wine per week     Comment: off alcohol for surgery    Drug use: No    Sexual activity: Yes     Partners: Male     Birth control/protection: Post-menopausal, None         Current Outpatient Medications:     benzonatate (TESSALON) 100 MG capsule, TAKE 1 CAPSULE BY MOUTH THREE TIMES DAILY FOR UP TO 10 DAYS AS NEEDED FOR COUGH, Disp: 30 capsule, Rfl: 0    carvedilol (COREG) 6.25 MG tablet, Take 1 tablet by mouth 2 (Two) Times a Day With Meals., Disp: 180 tablet, Rfl: 3    cetirizine (zyrTEC) 10 MG tablet, Take 1 tablet by mouth Daily., Disp: 90 tablet, Rfl: 0    estradiol (VAGIFEM) 10 MCG tablet vaginal tablet, Insert 1 tablet into the vagina., Disp: , Rfl:     fluorometholone (FML) 0.1 % ophthalmic suspension, Administer 1 drop to both eyes 2 (Two) Times a Day., Disp: , Rfl:     fluticasone (FLONASE) 50 MCG/ACT nasal spray, Administer 1 spray into the nostril(s) as directed by provider Daily., Disp: 18.2 mL, Rfl: 0    folic acid (FOLVITE) 1 MG tablet, 1 tablet daily.  90-day supply. (Patient taking differently: Take 1 tablet by mouth Daily. 1 tablet daily.  90-day supply.), Disp: 30 tablet, Rfl: 90    gabapentin (NEURONTIN) 300 MG capsule, Take 3 capsules by mouth 3 (Three) Times a Day., Disp: 810 capsule, Rfl: 1    hydrOXYzine (ATARAX) 25 MG tablet, TAKE 1 TABLET Twice daily PRN (Patient taking differently: Take 1 tablet by mouth 2 (Two) Times a Day As Needed for Anxiety.), Disp: 40 tablet, Rfl: 1    Multiple Vitamins-Minerals (MULTIVITAMIN ADULT PO), Take 1 tablet by mouth Daily., Disp: , Rfl:     omeprazole (priLOSEC) 40 MG capsule, TAKE 1 CAPSULE BY MOUTH TWICE DAILY, Disp: 60 capsule, Rfl: 3    Restasis 0.05 % ophthalmic emulsion, Administer 1 drop to both eyes 2  "(Two) Times a Day. (Patient taking differently: Administer 1 drop to both eyes 2 (Two) Times a Day. ReportPRN), Disp: , Rfl:     rOPINIRole (REQUIP) 1 MG tablet, Take 1 tablet by mouth 3 (Three) Times a Day. Take 1 hour before bedtime., Disp: 270 tablet, Rfl: 3    rosuvastatin (CRESTOR) 10 MG tablet, Take 1 tablet by mouth Daily., Disp: 90 tablet, Rfl: 3    SUMAtriptan (IMITREX) 25 MG tablet, TAKE 1 TABLET BY MOUTH AS NEEDED FOR MIGRAINE, Disp: 9 tablet, Rfl: 1    tiZANidine (ZANAFLEX) 4 MG tablet, TAKE 1 TABLET BY MOUTH EVERY 8 HOURS AS NEEDED FOR MUSCLE SPASMS, Disp: 90 tablet, Rfl: 5    traZODone (DESYREL) 100 MG tablet, Take 1 tablet by mouth At Night As Needed for Sleep., Disp: 90 tablet, Rfl: 3    Unithroid 137 MCG tablet, Take 1 tablet by mouth Every Morning., Disp: 90 tablet, Rfl: 3    ursodiol (ACTIGALL) 300 MG capsule, TAKE 1 CAPSULE BY MOUTH THREE TIMES DAILY WITH MEALS, Disp: 270 capsule, Rfl: 3    venlafaxine XR (EFFEXOR-XR) 150 MG 24 hr capsule, Take 1 capsule by mouth Daily., Disp: 90 capsule, Rfl: 3    cephalexin (KEFLEX) 500 MG capsule, Take 1 capsule by mouth 3 (Three) Times a Day for 7 days., Disp: 21 capsule, Rfl: 0    fluconazole (Diflucan) 150 MG tablet, Take 1 tablet by mouth 1 (One) Time for 1 dose., Disp: 1 tablet, Rfl: 1    mupirocin (BACTROBAN) 2 % ointment, Apply 1 Application topically to the appropriate area as directed 3 (Three) Times a Day., Disp: 30 g, Rfl: 0    Objective     Vital Signs  /62 (BP Location: Left arm, Patient Position: Sitting, Cuff Size: Adult)   Pulse 80   Temp 98 °F (36.7 °C)   Ht 172.7 cm (68\")   Wt 88.9 kg (196 lb)   SpO2 93%   BMI 29.80 kg/m²   Estimated body mass index is 29.8 kg/m² as calculated from the following:    Height as of this encounter: 172.7 cm (68\").    Weight as of this encounter: 88.9 kg (196 lb).             Physical Exam  Vitals and nursing note reviewed.   Constitutional:       Appearance: Normal appearance.   HENT:      Head: " Normocephalic and atraumatic.   Eyes:      Extraocular Movements: Extraocular movements intact.      Pupils: Pupils are equal, round, and reactive to light.   Cardiovascular:      Rate and Rhythm: Normal rate and regular rhythm.   Pulmonary:      Effort: Pulmonary effort is normal.   Musculoskeletal:         General: Normal range of motion.   Skin:     General: Skin is warm and dry.      Findings: Abscess present.          Neurological:      Mental Status: She is alert and oriented to person, place, and time.   Psychiatric:         Mood and Affect: Mood normal.         Behavior: Behavior normal.                   Assessment and Plan     Diagnoses and all orders for this visit:    1. Abscess (Primary)  -     cephalexin (KEFLEX) 500 MG capsule; Take 1 capsule by mouth 3 (Three) Times a Day for 7 days.  Dispense: 21 capsule; Refill: 0  -     mupirocin (BACTROBAN) 2 % ointment; Apply 1 Application topically to the appropriate area as directed 3 (Three) Times a Day.  Dispense: 30 g; Refill: 0    2. Antibiotic-induced yeast infection  -     fluconazole (Diflucan) 150 MG tablet; Take 1 tablet by mouth 1 (One) Time for 1 dose.  Dispense: 1 tablet; Refill: 1    Plan  Minimal amount of drainage was expelled during visit today.  Patient will continue with warm compresses to the area.  Antibiotic therapy of Keflex sent to the pharmacy.  Mupirocin ointment to be used topically.  Diflucan sent to pharmacy for associated yeast infection.  Go to ER if any condition worsens or severe.  Plan to follow-up as scheduled    Follow Up  Return for Next scheduled follow up.    SANGEETHA Woodson    Part of this note may be an electronic transcription/translation of spoken language to printed text using the Dragon Dictation System.

## 2025-05-30 DIAGNOSIS — E78.2 MIXED HYPERLIPIDEMIA: ICD-10-CM

## 2025-06-02 RX ORDER — ROSUVASTATIN CALCIUM 10 MG/1
10 TABLET, COATED ORAL DAILY
Qty: 90 TABLET | Refills: 3 | OUTPATIENT
Start: 2025-06-02

## 2025-06-03 ENCOUNTER — OFFICE VISIT (OUTPATIENT)
Dept: INTERNAL MEDICINE | Facility: CLINIC | Age: 55
End: 2025-06-03
Payer: MEDICAID

## 2025-06-03 VITALS
WEIGHT: 191.2 LBS | HEIGHT: 68 IN | OXYGEN SATURATION: 94 % | DIASTOLIC BLOOD PRESSURE: 68 MMHG | BODY MASS INDEX: 28.98 KG/M2 | HEART RATE: 74 BPM | SYSTOLIC BLOOD PRESSURE: 114 MMHG

## 2025-06-03 DIAGNOSIS — Z00.00 ENCOUNTER FOR WELL ADULT EXAM WITHOUT ABNORMAL FINDINGS: ICD-10-CM

## 2025-06-03 DIAGNOSIS — K21.9 GASTROESOPHAGEAL REFLUX DISEASE, UNSPECIFIED WHETHER ESOPHAGITIS PRESENT: ICD-10-CM

## 2025-06-03 DIAGNOSIS — M79.672 LEFT FOOT PAIN: ICD-10-CM

## 2025-06-03 DIAGNOSIS — E06.3 HYPOTHYROIDISM DUE TO HASHIMOTO'S THYROIDITIS: ICD-10-CM

## 2025-06-03 DIAGNOSIS — G25.81 RESTLESS LEG SYNDROME: ICD-10-CM

## 2025-06-03 DIAGNOSIS — Z78.9 NON-SMOKER: ICD-10-CM

## 2025-06-03 DIAGNOSIS — F51.04 PSYCHOPHYSIOLOGICAL INSOMNIA: ICD-10-CM

## 2025-06-03 DIAGNOSIS — F41.9 ANXIETY: ICD-10-CM

## 2025-06-03 DIAGNOSIS — G90.A POTS (POSTURAL ORTHOSTATIC TACHYCARDIA SYNDROME): ICD-10-CM

## 2025-06-03 DIAGNOSIS — Z78.0 POSTMENOPAUSAL: ICD-10-CM

## 2025-06-03 DIAGNOSIS — E78.2 MIXED HYPERLIPIDEMIA: ICD-10-CM

## 2025-06-03 DIAGNOSIS — Z00.00 ANNUAL PHYSICAL EXAM: Primary | ICD-10-CM

## 2025-06-03 DIAGNOSIS — J30.89 SEASONAL ALLERGIC RHINITIS DUE TO OTHER ALLERGIC TRIGGER: ICD-10-CM

## 2025-06-03 DIAGNOSIS — K64.8 INTERNAL HEMORRHOIDS: ICD-10-CM

## 2025-06-03 DIAGNOSIS — G62.9 NEUROPATHY: ICD-10-CM

## 2025-06-03 DIAGNOSIS — Z79.899 LONG-TERM USE OF HIGH-RISK MEDICATION: ICD-10-CM

## 2025-06-03 DIAGNOSIS — E83.110 HEREDITARY HEMOCHROMATOSIS: Chronic | ICD-10-CM

## 2025-06-03 DIAGNOSIS — Z79.899 ON STATIN THERAPY: ICD-10-CM

## 2025-06-03 DIAGNOSIS — K63.5 POLYP OF COLON, UNSPECIFIED PART OF COLON, UNSPECIFIED TYPE: ICD-10-CM

## 2025-06-03 PROCEDURE — 3078F DIAST BP <80 MM HG: CPT | Performed by: NURSE PRACTITIONER

## 2025-06-03 PROCEDURE — 1160F RVW MEDS BY RX/DR IN RCRD: CPT | Performed by: NURSE PRACTITIONER

## 2025-06-03 PROCEDURE — 3074F SYST BP LT 130 MM HG: CPT | Performed by: NURSE PRACTITIONER

## 2025-06-03 PROCEDURE — 99396 PREV VISIT EST AGE 40-64: CPT | Performed by: NURSE PRACTITIONER

## 2025-06-03 PROCEDURE — 1126F AMNT PAIN NOTED NONE PRSNT: CPT | Performed by: NURSE PRACTITIONER

## 2025-06-03 PROCEDURE — 1159F MED LIST DOCD IN RCRD: CPT | Performed by: NURSE PRACTITIONER

## 2025-06-03 RX ORDER — TOPIRAMATE 50 MG/1
1 TABLET, FILM COATED ORAL EVERY 12 HOURS SCHEDULED
COMMUNITY
Start: 2025-05-15

## 2025-06-03 RX ORDER — FLUTICASONE PROPIONATE 50 MCG
1 SPRAY, SUSPENSION (ML) NASAL DAILY
Qty: 18.2 G | Refills: 5 | Status: SHIPPED | OUTPATIENT
Start: 2025-06-03

## 2025-06-03 RX ORDER — ROSUVASTATIN CALCIUM 10 MG/1
10 TABLET, COATED ORAL DAILY
Qty: 90 TABLET | Refills: 3 | Status: SHIPPED | OUTPATIENT
Start: 2025-06-03

## 2025-06-03 RX ORDER — TRAZODONE HYDROCHLORIDE 100 MG/1
100 TABLET ORAL NIGHTLY PRN
Qty: 90 TABLET | Refills: 3 | Status: SHIPPED | OUTPATIENT
Start: 2025-06-03

## 2025-06-03 RX ORDER — BUPROPION HYDROCHLORIDE 150 MG/1
150 TABLET ORAL DAILY
Qty: 90 TABLET | Refills: 0 | Status: SHIPPED | OUTPATIENT
Start: 2025-06-03

## 2025-06-03 RX ORDER — CARVEDILOL 6.25 MG/1
6.25 TABLET ORAL 2 TIMES DAILY WITH MEALS
Qty: 180 TABLET | Refills: 3 | Status: SHIPPED | OUTPATIENT
Start: 2025-06-03

## 2025-06-03 RX ORDER — CETIRIZINE HYDROCHLORIDE 10 MG/1
10 TABLET ORAL DAILY
Qty: 90 TABLET | Refills: 3 | Status: SHIPPED | OUTPATIENT
Start: 2025-06-03

## 2025-06-03 RX ORDER — VENLAFAXINE HYDROCHLORIDE 150 MG/1
150 CAPSULE, EXTENDED RELEASE ORAL DAILY
Qty: 90 CAPSULE | Refills: 3 | Status: SHIPPED | OUTPATIENT
Start: 2025-06-03

## 2025-06-03 RX ORDER — DIPHENHYDRAMINE HCL 25 MG/1
TABLET ORAL
COMMUNITY
Start: 2025-05-16

## 2025-06-03 NOTE — PROGRESS NOTES
Annual Physical     Name: Dawna Lovell    : 1970     MRN: [id number]     Chief Complaint  Annual Exam    Subjective     History of Present Illness:  Dawna Lovell is a 54 y.o. female who presents today for annual physical exam    Patient currently follows with orthopedics regarding neuropathy and left ankle and foot pain    Patient is followed with endocrinology for Hashimoto's thyroiditis in the past.    Patient follows with neurology regarding restless leg syndrome and migraines    patient is followed with pulmonology in the past    She is also followed with hematology for hereditary hemochromatosis.  Patient also has noted diagnosis of primary biliary cholangitis.    Patient is followed with cardiology in the past due to POTS syndrome for which she is on Coreg.  She has not seen cardiology in quite some time.    Patient is currently on Crestor regarding hyperlipidemia    She currently takes Prilosec for GERD    Patient is on Effexor, trazodone, hydroxyzine regarding anxiety  - Patient states she has been more irritable lately.  She would appreciate additional recommendations for the symptoms    Patient continues to be a non-smoker.  Occasional alcohol use.  No drug use.    Family history includes paternal grandmother with breast cancer.  Father with esophageal cancer    Patient does get antibiotic associated yeast infections with use of antibiotics    The patient is being seen for a health maintenance evaluation.    Past Medical History:   Diagnosis Date    Acute postoperative pain 2023    Allergic     Anxiety     Asthma     Clotting disorder     Depression     Fracture, fibula 2024    Fracture, foot 2023    Fracture, tibia and fibula 2024    GERD (gastroesophageal reflux disease)     Headache, tension-type     HL (hearing loss)     Hyperlipidemia     Hypertension     Hypothyroidism     Irritable bowel syndrome     Lyme disease 2016    Migraine headache      Neuromuscular disorder     POTS (postural orthostatic tachycardia syndrome)     Syncope 2016    Tachycardia 2016       Past Surgical History:   Procedure Laterality Date     SECTION  2003    COLONOSCOPY      FOOT SURGERY Left 2023    Hardware Removal Lisfranc ORIF 23    FRACTURE SURGERY      MOUTH SURGERY      ORIF FOOT FRACTURE Left 2023    Procedure: ORIF left lisfranc foot injury;  Surgeon: Milagro Parker MD;  Location: [facility];  Service: Orthopedics;  Laterality: Left;    WISDOM TOOTH EXTRACTION         Social History     Socioeconomic History    Marital status:    Tobacco Use    Smoking status: Former     Current packs/day: 0.00     Average packs/day: 0.5 packs/day for 11.0 years (5.5 ttl pk-yrs)     Types: Cigarettes     Start date: 1990     Quit date:      Years since quittin.4     Passive exposure: Past    Smokeless tobacco: Never   Vaping Use    Vaping status: Never Used   Substance and Sexual Activity    Alcohol use: Yes     Alcohol/week: 5.0 standard drinks of alcohol     Types: 5 Glasses of wine per week     Comment: off alcohol for surgery    Drug use: No    Sexual activity: Yes     Partners: Male     Birth control/protection: Post-menopausal         Current Outpatient Medications:     Banophen 25 MG tablet, TAKE 1 TABLET BY MOUTH EVERY NIGHT AT BEDTIME AS NEEDED FOR SLEEP/ALLERGIES, Disp: , Rfl:     carvedilol (COREG) 6.25 MG tablet, Take 1 tablet by mouth 2 (Two) Times a Day With Meals., Disp: 180 tablet, Rfl: 3    cetirizine (zyrTEC) 10 MG tablet, Take 1 tablet by mouth Daily., Disp: 90 tablet, Rfl: 3    estradiol (VAGIFEM) 10 MCG tablet vaginal tablet, Insert 1 tablet into the vagina., Disp: , Rfl:     fluorometholone (FML) 0.1 % ophthalmic suspension, Administer 1 drop to both eyes 2 (Two) Times a Day., Disp: , Rfl:     fluticasone (FLONASE) 50 MCG/ACT nasal spray, Administer 1 spray into the nostril(s) as directed by provider  Daily., Disp: 18.2 g, Rfl: 5    folic acid (FOLVITE) 1 MG tablet, 1 tablet daily.  90-day supply. (Patient taking differently: Take 1 tablet by mouth Daily. 1 tablet daily.  90-day supply.), Disp: 30 tablet, Rfl: 90    gabapentin (NEURONTIN) 300 MG capsule, Take 3 capsules by mouth 3 (Three) Times a Day., Disp: 810 capsule, Rfl: 1    hydrOXYzine (ATARAX) 25 MG tablet, TAKE 1 TABLET Twice daily PRN (Patient taking differently: Take 1 tablet by mouth 2 (Two) Times a Day As Needed for Anxiety.), Disp: 40 tablet, Rfl: 1    Multiple Vitamins-Minerals (MULTIVITAMIN ADULT PO), Take 1 tablet by mouth Daily., Disp: , Rfl:     omeprazole (priLOSEC) 40 MG capsule, TAKE 1 CAPSULE BY MOUTH TWICE DAILY, Disp: 60 capsule, Rfl: 3    Restasis 0.05 % ophthalmic emulsion, Administer 1 drop to both eyes 2 (Two) Times a Day. (Patient taking differently: Administer 1 drop to both eyes 2 (Two) Times a Day. ReportPRN), Disp: , Rfl:     rOPINIRole (REQUIP) 1 MG tablet, Take 1 tablet by mouth 3 (Three) Times a Day. Take 1 hour before bedtime., Disp: 270 tablet, Rfl: 3    rosuvastatin (CRESTOR) 10 MG tablet, Take 1 tablet by mouth Daily., Disp: 90 tablet, Rfl: 3    SUMAtriptan (IMITREX) 25 MG tablet, TAKE 1 TABLET BY MOUTH AS NEEDED FOR MIGRAINE, Disp: 9 tablet, Rfl: 1    tiZANidine (ZANAFLEX) 4 MG tablet, Take 1 tablet by mouth Every 8 (Eight) Hours As Needed for Muscle Spasms., Disp: 90 tablet, Rfl: 5    topiramate (TOPAMAX) 50 MG tablet, Take 1 tablet by mouth Every 12 (Twelve) Hours., Disp: , Rfl:     traZODone (DESYREL) 100 MG tablet, Take 1 tablet by mouth At Night As Needed for Sleep., Disp: 90 tablet, Rfl: 3    Unithroid 137 MCG tablet, Take 1 tablet by mouth Every Morning., Disp: 90 tablet, Rfl: 3    ursodiol (ACTIGALL) 300 MG capsule, TAKE 1 CAPSULE BY MOUTH THREE TIMES DAILY WITH MEALS, Disp: 270 capsule, Rfl: 3    venlafaxine XR (EFFEXOR-XR) 150 MG 24 hr capsule, Take 1 capsule by mouth Daily., Disp: 90 capsule, Rfl: 3     buPROPion XL (Wellbutrin XL) 150 MG 24 hr tablet, Take 1 tablet by mouth Daily., Disp: 90 tablet, Rfl: 0    General History  Dawna  does not have regular dental visits.  She does complain of vision problems. Last eye exam was not up to date.   Immunizations are not up to date. The patient needs the following immunizations: Patient has declined vaccines in the past    Lifestyle  Dawna  consumes mix between healthy and unhealthy.  She exercises intermittently.    Reproductive Health  Dawna  is postmenopausal.  She reports periods are rare.  She is sexually active. Her contraceptive plan is no method.    Screening  Last pap was patient follows with OB/GYN through   Last Completed Pap Smear            Upcoming       PAP SMEAR (Every 3 Years) Next due on 6/6/2025 06/06/2022  Done                        . History of abnormal pap smear or family history of gyn cancer: None stated      Last mammogram was February 2025  Last Completed Mammogram            Upcoming       MAMMOGRAM (Every 2 Years) Next due on 2/4/2027 02/04/2025  Mammo Screening Digital Tomosynthesis Bilateral With CAD    08/30/2021  Mammo Screening Digital Tomosynthesis Bilateral With CAD    11/30/2016  MAMMO SCREENING DIGITAL TOMOSYNTHESIS BILATERAL W CAD                        . Personal or family history of abnormal mammograms or breast cancer: Paternal grandmother    Last colonoscopy was 2021 with polyps, internal hemorrhoids.  Recommended 3-year repeat  Last Completed Colonoscopy    This patient has no relevant Health Maintenance data.     . Family history of colon cancer: None stated      Last DEXA was June 2024 with normal results.    Advance Care Planning   ACP discussion was held with the patient during this visit. Patient does not have an advance directive, declines further assistance.       Health Maintenance Summary            Awaiting Completion       COLORECTAL CANCER SCREENING (COLONOSCOPY - Every 3 Years) Order placed this  "encounter      06/03/2025  Order placed for Ambulatory Referral For Screening Colonoscopy by Karissa Mar APRN    10/14/2024  Follow-up changed to COLONOSCOPY - Every 3 Years by Silvana Vivas (Admin Frequency Update - Health Maintenance plan \"COLONOSCOPY EVERY 3 YEARS\" frequency replaced by patient-specific frequency)    08/19/2021  SCANNED - COLONOSCOPY    08/19/2021  SCANNED - COLONOSCOPY    08/19/2021  SCANNED - COLONOSCOPY     Only the first 5 history entries have been loaded, but more history exists.            LIPID PANEL (Yearly) Order placed this encounter      06/03/2025  Order placed for Lipid Panel by Kraissa Mar APRN    05/15/2024  Lipid Panel    04/12/2023  Lipid Panel    04/29/2022  Lipid Panel    04/19/2021  Lipid Panel      Only the first 5 history entries have been loaded, but more history exists.                      Upcoming       Pneumococcal Vaccine 50+ (1 of 1 - PCV) Postponed until 12/31/2025 06/03/2025  Postponed until 12/31/2025 by Elizabeth Shafer MA (Patient Refused)              PAP SMEAR (Every 3 Years) Next due on 6/6/2025 06/06/2022  Done              INFLUENZA VACCINE (Yearly - July to March) Next due on 7/1/2025 03/25/2025  Postponed until 9/21/2025 by Karissa Mar, SANGEETHA (Patient Refused)    12/08/2023  Imm Admin: Fluzone (or Fluarix & Flulaval for VFC) >6mos    11/16/2022  Imm Admin: Influenza, Unspecified    11/16/2022  Imm Admin: Fluzone (or Fluarix & Flulaval for VFC) >6mos    12/13/2019  Imm Admin: Influenza, Unspecified      Only the first 5 history entries have been loaded, but more history exists.              ANNUAL PHYSICAL (Yearly) Next due on 6/3/2026      06/03/2025  Done    05/15/2024  Done    04/12/2023  Done    04/07/2022  Done    04/05/2021  Done      Only the first 5 history entries have been loaded, but more history exists.              MAMMOGRAM (Every 2 Years) Next due on 2/4/2027 02/04/2025  Mammo Screening Digital " "Tomosynthesis Bilateral With CAD    08/30/2021  Mammo Screening Digital Tomosynthesis Bilateral With CAD    11/30/2016  MAMMO SCREENING DIGITAL TOMOSYNTHESIS BILATERAL W CAD              TDAP/TD VACCINES (2 - Td or Tdap) Next due on 4/7/2032 04/07/2022  Imm Admin: Tdap    04/05/2021  Postponed until 4/5/2022 by Jesenia Leahy PA (Pending event)                      Completed or No Longer Recommended       HEPATITIS C SCREENING  Completed      01/26/2023  Hep C Virus Ab component of Hepatitis C Antibody    10/28/2015  Hepatitis C Ab component of Hepatitis C antibody              Hepatitis B  Discontinued      05/15/2024  Frequency changed to Never by Karissa Mar APRN (Patient Preference - Patient declined)              COVID-19 Vaccine  Discontinued      12/08/2023  Frequency changed to Never by Karissa Mar APRN (declined)    04/12/2023  Postponed until 4/14/2024 by Jesenia Leahy PA (Patient Refused)    04/28/2021  Imm Admin: COVID-19 (PFIZER) Purple Cap Monovalent    03/31/2021  Imm Admin: COVID-19 (PFIZER) Purple Cap Monovalent              ZOSTER VACCINE  Discontinued      05/15/2024  Frequency changed to Never by Karissa Mar APRN (Patient Preference - Patient declined)    04/07/2022  Postponed until 4/7/2023 by Jesenia Leahy PA (Pending event)                          Immunization History   Administered Date(s) Administered    COVID-19 (PFIZER) Purple Cap Monovalent 03/31/2021, 04/28/2021    Fluzone (or Fluarix & Flulaval for VFC) >6mos 11/16/2022, 12/08/2023    Influenza, Unspecified 12/13/2019, 11/16/2022    Tdap 04/07/2022    flucelvax quad pfs =>4 YRS 12/13/2019       Review of Systems    Objective     Vital Signs  /68   Pulse 74   Ht 172.7 cm (67.99\")   Wt 86.7 kg (191 lb 3.2 oz)   SpO2 94%   BMI 29.08 kg/m²   Estimated body mass index is 29.08 kg/m² as calculated from the following:    Height as of this encounter: 172.7 cm (67.99\").    Weight as of this " encounter: 86.7 kg (191 lb 3.2 oz).             Physical Exam  Vitals and nursing note reviewed.   Constitutional:       General: She is awake.      Appearance: Normal appearance. She is well-groomed and overweight.   HENT:      Head: Normocephalic and atraumatic.   Eyes:      Extraocular Movements: Extraocular movements intact.      Pupils: Pupils are equal, round, and reactive to light.   Cardiovascular:      Rate and Rhythm: Normal rate and regular rhythm.      Pulses: Normal pulses.           Radial pulses are 2+ on the right side and 2+ on the left side.        Posterior tibial pulses are 2+ on the right side and 2+ on the left side.      Heart sounds: Normal heart sounds, S1 normal and S2 normal.   Pulmonary:      Effort: Pulmonary effort is normal.      Breath sounds: Normal breath sounds.   Abdominal:      General: Bowel sounds are normal.      Palpations: Abdomen is soft.   Musculoskeletal:         General: Normal range of motion.      Right lower leg: No edema.      Left lower leg: No edema.   Skin:     General: Skin is warm and dry.   Neurological:      Mental Status: She is alert and oriented to person, place, and time.      Comments: Mental status fully intact as patient was able to provide a detailed description of the events   Psychiatric:         Mood and Affect: Mood normal.         Behavior: Behavior normal. Behavior is cooperative.         Thought Content: Thought content normal.         Judgment: Judgment normal.                 Assessment and Plan     Diagnoses and all orders for this visit:    1. Annual physical exam (Primary)  -     CBC (No Diff); Future  -     Comprehensive Metabolic Panel; Future  -     Lipid Panel; Future    2. Encounter for well adult exam without abnormal findings    3. Restless leg syndrome    4. Neuropathy    5. Long-term use of high-risk medication    6. Hypothyroidism due to Hashimoto's thyroiditis    7. Hereditary hemochromatosis    8. POTS (postural orthostatic  tachycardia syndrome)  -     carvedilol (COREG) 6.25 MG tablet; Take 1 tablet by mouth 2 (Two) Times a Day With Meals.  Dispense: 180 tablet; Refill: 3    9. Mixed hyperlipidemia  -     CBC (No Diff); Future  -     Comprehensive Metabolic Panel; Future  -     Lipid Panel; Future  -     rosuvastatin (CRESTOR) 10 MG tablet; Take 1 tablet by mouth Daily.  Dispense: 90 tablet; Refill: 3    10. On statin therapy    11. Gastroesophageal reflux disease, unspecified whether esophagitis present    12. Anxiety  -     venlafaxine XR (EFFEXOR-XR) 150 MG 24 hr capsule; Take 1 capsule by mouth Daily.  Dispense: 90 capsule; Refill: 3  -     buPROPion XL (Wellbutrin XL) 150 MG 24 hr tablet; Take 1 tablet by mouth Daily.  Dispense: 90 tablet; Refill: 0    13. Non-smoker    14. Polyp of colon, unspecified part of colon, unspecified type  -     Ambulatory Referral For Screening Colonoscopy    15. Internal hemorrhoids  -     Ambulatory Referral For Screening Colonoscopy    16. Postmenopausal    17. Seasonal allergic rhinitis due to other allergic trigger  -     cetirizine (zyrTEC) 10 MG tablet; Take 1 tablet by mouth Daily.  Dispense: 90 tablet; Refill: 3  -     fluticasone (FLONASE) 50 MCG/ACT nasal spray; Administer 1 spray into the nostril(s) as directed by provider Daily.  Dispense: 18.2 g; Refill: 5    18. Left foot pain  -     tiZANidine (ZANAFLEX) 4 MG tablet; Take 1 tablet by mouth Every 8 (Eight) Hours As Needed for Muscle Spasms.  Dispense: 90 tablet; Refill: 5    19. Psychophysiological insomnia  -     traZODone (DESYREL) 100 MG tablet; Take 1 tablet by mouth At Night As Needed for Sleep.  Dispense: 90 tablet; Refill: 3    20. BMI 29.0-29.9,adult    Plan  Annual physical exam and additional concern addressed with patient today    Updated refills of medication sent to pharmacy    Labs are ordered and patient will have these obtained when she has been fasting    Updated order for colonoscopy was placed    Patient has been more  irritable lately.  Will add on Wellbutrin.  90 days of medication sent to the pharmacy as patient will call and let me know how she is doing with this medication before end of prescription    Go to ER if any condition worsens or severe    Plan to follow-up in 6 months for chronic care    Follow-up 1 year for annual physical    Follow Up  Return for  chronic care and 1 year for annual .    SANGEETHA Woodson    Part of this note may be an electronic transcription/translation of spoken language to printed text using the Dragon Dictation System.

## 2025-06-28 ENCOUNTER — PATIENT MESSAGE (OUTPATIENT)
Dept: ORTHOPEDIC SURGERY | Facility: CLINIC | Age: 55
End: 2025-06-28
Payer: MEDICAID

## 2025-07-10 DIAGNOSIS — E06.3 HYPOTHYROIDISM DUE TO HASHIMOTO'S THYROIDITIS: ICD-10-CM

## 2025-07-10 RX ORDER — LEVOTHYROXINE SODIUM 137 UG/1
137 TABLET ORAL EVERY MORNING
Qty: 90 TABLET | Refills: 3 | OUTPATIENT
Start: 2025-07-10

## 2025-07-10 NOTE — TELEPHONE ENCOUNTER
Rx Refill Note  Requested Prescriptions     Pending Prescriptions Disp Refills    Unithroid 137 MCG tablet [Pharmacy Med Name: UNITHROID 0.137MG (137MCG) TABLETS] 90 tablet 3     Sig: TAKE 1 TABLET BY MOUTH EVERY MORNING      Last office visit with prescribing clinician: 6/24/2024      Next office visit with prescribing clinician: Visit date not found     DENIED  Patient needs appt.  Not seen since 06/2024  {  Silvia Avery MA  07/10/25, 11:23 EDT

## 2025-07-15 DIAGNOSIS — E06.3 HYPOTHYROIDISM DUE TO HASHIMOTO'S THYROIDITIS: ICD-10-CM

## 2025-07-15 RX ORDER — LEVOTHYROXINE SODIUM 137 UG/1
137 TABLET ORAL EVERY MORNING
Qty: 90 TABLET | Refills: 3 | OUTPATIENT
Start: 2025-07-15

## 2025-07-15 NOTE — TELEPHONE ENCOUNTER
Rx Refill Note  Requested Prescriptions     Pending Prescriptions Disp Refills    Unithroid 137 MCG tablet [Pharmacy Med Name: UNITHROID 0.137MG (137MCG) TABLETS] 90 tablet 3     Sig: TAKE 1 TABLET BY MOUTH EVERY MORNING      Last office visit with prescribing clinician: 6/24/2024      Next office visit with prescribing clinician: Visit date not found     Denied. Patient needs appointment.  {  Silvia Avery MA  07/15/25, 13:26 EDT

## 2025-07-16 ENCOUNTER — OFFICE VISIT (OUTPATIENT)
Dept: NEUROLOGY | Facility: CLINIC | Age: 55
End: 2025-07-16
Payer: MEDICAID

## 2025-07-16 VITALS
WEIGHT: 188 LBS | HEIGHT: 68 IN | SYSTOLIC BLOOD PRESSURE: 118 MMHG | OXYGEN SATURATION: 94 % | DIASTOLIC BLOOD PRESSURE: 70 MMHG | BODY MASS INDEX: 28.49 KG/M2 | HEART RATE: 83 BPM

## 2025-07-16 DIAGNOSIS — G25.81 RESTLESS LEG SYNDROME: Primary | Chronic | ICD-10-CM

## 2025-07-16 DIAGNOSIS — E06.3 HYPOTHYROIDISM DUE TO HASHIMOTO'S THYROIDITIS: ICD-10-CM

## 2025-07-16 PROBLEM — G62.9 NEUROPATHY: Status: RESOLVED | Noted: 2023-04-17 | Resolved: 2025-07-16

## 2025-07-16 RX ORDER — ROPINIROLE 1 MG/1
1 TABLET, FILM COATED ORAL 3 TIMES DAILY
Qty: 270 TABLET | Refills: 3 | Status: SHIPPED | OUTPATIENT
Start: 2025-07-16 | End: 2026-07-16

## 2025-07-16 RX ORDER — GABAPENTIN 300 MG/1
900 CAPSULE ORAL 3 TIMES DAILY
Qty: 810 CAPSULE | Refills: 1 | Status: SHIPPED | OUTPATIENT
Start: 2025-07-16 | End: 2026-07-16

## 2025-07-16 NOTE — PROGRESS NOTES
"Chief Complaint  Restless Legs Syndrome    Subjective        Dawna Lovell presents to [facility] NEUROLOGY  History of Present Illness    54 y.o. female returns in follow up.  Last visit on 7/16/24 increaed GBP, continued Requip.       S/P ORIF left lisfranc injury     RLS not controlled.  Worse at night.     Taking GBP and Requip BID.  .       Electrical buzzing in feet.       Requip 1 mg qhs        Problem history:     Sx started two years ago.  Sx started suddenly.  Legs feel a fluttering in legs mainly lying down at night.  Sx improved during the day and walking.  Improved of late.  Mild intensity.  Gralise 1200 mg qhs.    Legs ache during the day when sitting still.       EMG/NCS - normal   Objective   Vital Signs:  /70   Pulse 83   Ht 172.7 cm (67.99\")   Wt 85.3 kg (188 lb)   SpO2 94%   BMI 28.59 kg/m²   Estimated body mass index is 28.59 kg/m² as calculated from the following:    Height as of this encounter: 172.7 cm (67.99\").    Weight as of this encounter: 85.3 kg (188 lb).    Neurological Exam  Mental Status  Awake, alert and oriented to person, place and time. Oriented to person, place and time. Speech is normal. Language is fluent with no aphasia. Attention and concentration are normal. Fund of knowledge is appropriate for level of education.    Cranial Nerves  CN III, IV, VI: Extraocular movements intact bilaterally. Pupils equal round and reactive to light bilaterally.  CN V: Facial sensation is normal.  CN VII: Full and symmetric facial movement.  CN IX, X: Palate elevates symmetrically  CN XI: Shoulder shrug strength is normal.  CN XII: Tongue midline without atrophy or fasciculations.    Motor   Strength is 5/5 throughout all four extremities.    Sensory  Sensation is intact to light touch, pinprick, vibration and proprioception in all four extremities.    Reflexes  Deep tendon reflexes are 2+ and symmetric in all four " extremities.    Coordination    Finger-to-nose, rapid alternating movements and heel-to-shin normal bilaterally without dysmetria.    Gait  Normal casual, toe, heel and tandem gait.         Physical Exam  Eyes:      Extraocular Movements: Extraocular movements intact.      Pupils: Pupils are equal, round, and reactive to light.   Neurological:      Motor: Motor strength is normal.     Coordination: Coordination is intact.      Deep Tendon Reflexes: Reflexes are normal and symmetric.   Psychiatric:         Speech: Speech normal.      Result Review :  The following data was reviewed by: Jasson Pisano MD on 07/16/2025:              Assessment and Plan   Diagnoses and all orders for this visit:    1. Restless leg syndrome (Primary)             Follow Up   No follow-ups on file.  Patient was given instructions and counseling regarding her condition or for health maintenance advice. Please see specific information pulled into the AVS if appropriate.

## 2025-07-16 NOTE — TELEPHONE ENCOUNTER
PATIENT NEEDS REFILLS TILL HER APPOINTMENT WITH DR. VANN. IF SHE NEEDS TO BE SEEN SOONER WE NEED DR. VANN TO SEE WHEN SHE CAN OVERBOOK HER SCHEDULE TO SEE PATIENT. PHONE NUMBER [phone]

## 2025-07-17 ENCOUNTER — PRIOR AUTHORIZATION (OUTPATIENT)
Dept: ENDOCRINOLOGY | Facility: CLINIC | Age: 55
End: 2025-07-17
Payer: MEDICAID

## 2025-07-17 RX ORDER — LEVOTHYROXINE SODIUM 137 UG/1
137 TABLET ORAL EVERY MORNING
Qty: 90 TABLET | Refills: 1 | Status: SHIPPED | OUTPATIENT
Start: 2025-07-17

## 2025-07-17 NOTE — TELEPHONE ENCOUNTER
Rx Refill Note  Requested Prescriptions     Pending Prescriptions Disp Refills    Unithroid 137 MCG tablet 90 tablet 3     Sig: Take 1 tablet by mouth Every Morning.      Last office visit with prescribing clinician: 6/24/2024      Next office visit with prescribing clinician: 11/11/2025   {  Silvia Avery MA  07/17/25, 13:13 EDT

## 2025-07-17 NOTE — TELEPHONE ENCOUNTER
Dawna Lovell (Key: [id number])  PA Case ID #: [id number]  Rx #: [id number]  Need Help? Call us at [phone]  Outcome  Approved today by [place] [employer] 2017  The request has been approved. The authorization is effective from 07/17/2025 to 07/17/2026, as long as the member is enrolled in their current health plan. A written notification letter will follow with additional details.  Effective Date: 7/17/2025  Authorization Expiration Date: 7/17/2026  Drug  Unithroid 137MCG tablets  ePA cloud logo  Form  [facility] [place] [facility] ePA Form 2017 NCPDP  Original Claim Info  75 TRANS FEE = 0.00PRIOR AUTH REQUIRED/INVALID; PA REQUESTS: [phone]

## 2025-08-13 ENCOUNTER — OUTSIDE FACILITY SERVICE (OUTPATIENT)
Dept: GASTROENTEROLOGY | Facility: CLINIC | Age: 55
End: 2025-08-13
Payer: MEDICAID

## 2025-08-27 DIAGNOSIS — F41.9 ANXIETY: ICD-10-CM

## 2025-08-28 RX ORDER — BUPROPION HYDROCHLORIDE 150 MG/1
150 TABLET ORAL DAILY
Qty: 90 TABLET | Refills: 0 | OUTPATIENT
Start: 2025-08-28

## 2025-08-29 DIAGNOSIS — F41.9 ANXIETY: ICD-10-CM

## 2025-08-29 RX ORDER — BUPROPION HYDROCHLORIDE 150 MG/1
150 TABLET ORAL DAILY
Qty: 90 TABLET | Refills: 1 | Status: SHIPPED | OUTPATIENT
Start: 2025-08-29

## (undated) DEVICE — GLV SURG SIGNATURE TOUCH PF LTX 7 STRL

## (undated) DEVICE — TBG PENCL TELESCP MEGADYNE SMOKE EVAC 10FT

## (undated) DEVICE — SUT MNCRYL 2/0 SH 27IN UD MCP417H

## (undated) DEVICE — KT PUMP INFUBLOCK MDL 2100 PMKITSOLIS

## (undated) DEVICE — 450 ML BOTTLE OF 0.05% CHLORHEXIDINE GLUCONATE IN 99.95% STERILE WATER FOR IRRIGATION, USP AND APPLICATOR.: Brand: IRRISEPT ANTIMICROBIAL WOUND LAVAGE

## (undated) DEVICE — DRAPE,TOP,102X53,STERILE: Brand: MEDLINE

## (undated) DEVICE — TRAP FLD MINIVAC MEGADYNE 100ML

## (undated) DEVICE — SPLNT ORTHOGLASS UNPAD P/C 4X30IN

## (undated) DEVICE — UNDERCAST PADDING: Brand: DEROYAL

## (undated) DEVICE — SUT ETHLN 4/0 PS2 18IN 1667H

## (undated) DEVICE — INTENDED FOR TISSUE SEPARATION, AND OTHER PROCEDURES THAT REQUIRE A SHARP SURGICAL BLADE TO PUNCTURE OR CUT.: Brand: BARD-PARKER ® SAFETYLOCK CARBON RIB-BACK BLADES

## (undated) DEVICE — GLV SURG RAD SENSICARE SHLD PF LF SZ8 STRL

## (undated) DEVICE — PK EXTREM LOWR 10

## (undated) DEVICE — DELFI MATCHING LIMB PROTECTION SLEEVES (MLPS) HELP PROTECT THE PATIENT’S LIMB FROM POSSIBLE WRINKLING, PINCHING AND SHEARING OF SKIN AND SOFT TISSUES OF THE LIMB.EACH DELFI MATCHING LIMB PROTECTION SLEEVE IS INTENDED FOR USE WITH A SPECIFIC DELFI TOURNIQUET CUFF. THIS SLEEVE IS SPECIFICALLY FOR THIGH.: Brand: MATCHING LIMB PROTECTION SLEEVES - VARI-FIT

## (undated) DEVICE — GW THRD 1.25X150MM FOR 3.5 4MM CANN SCRW

## (undated) DEVICE — Device

## (undated) DEVICE — GW THRD SPADE PT NO/COLR 1.6X150MM

## (undated) DEVICE — DRAPE,REIN 53X77,STERILE: Brand: MEDLINE

## (undated) DEVICE — PAD ARMBRD SURG CONVOL 7.5X20X2IN

## (undated) DEVICE — PATIENT RETURN ELECTRODE, SINGLE-USE, CONTACT QUALITY MONITORING, ADULT, WITH 9FT CORD, FOR PATIENTS WEIGING OVER 33LBS. (15KG): Brand: MEGADYNE

## (undated) DEVICE — SUT MONOCRYL PLS ANTIB UND 3/0  PS1 27IN

## (undated) DEVICE — UNDERGLV SURG BIOGEL INDICAT PF 61/2 GRN

## (undated) DEVICE — SNAP KOVER: Brand: UNBRANDED

## (undated) DEVICE — BNDG ELAS W/CLIP 6IN 10YD LF STRL

## (undated) DEVICE — SUT ETHLN 4/0 P3 CLR 18IN 691G

## (undated) DEVICE — SPNG GZ WOVN 4X4IN 12PLY 10/BX STRL

## (undated) DEVICE — ELECTRD BLD EZ CLN STD 2.5IN